# Patient Record
Sex: FEMALE | Race: WHITE | Employment: OTHER | URBAN - METROPOLITAN AREA
[De-identification: names, ages, dates, MRNs, and addresses within clinical notes are randomized per-mention and may not be internally consistent; named-entity substitution may affect disease eponyms.]

---

## 2017-01-16 ENCOUNTER — OFFICE VISIT (OUTPATIENT)
Dept: NEPHROLOGY | Age: 62
End: 2017-01-16

## 2017-01-16 VITALS
SYSTOLIC BLOOD PRESSURE: 118 MMHG | WEIGHT: 217.2 LBS | DIASTOLIC BLOOD PRESSURE: 64 MMHG | OXYGEN SATURATION: 98 % | BODY MASS INDEX: 42.42 KG/M2 | HEART RATE: 81 BPM

## 2017-01-16 DIAGNOSIS — N18.30 CKD (CHRONIC KIDNEY DISEASE) STAGE 3, GFR 30-59 ML/MIN (HCC): Primary | ICD-10-CM

## 2017-01-16 DIAGNOSIS — I10 ESSENTIAL HYPERTENSION: ICD-10-CM

## 2017-01-16 PROCEDURE — 99213 OFFICE O/P EST LOW 20 MIN: CPT | Performed by: INTERNAL MEDICINE

## 2017-01-24 LAB
AVERAGE GLUCOSE: 157 MG/DL (ref 66–114)
HBA1C MFR BLD: 7.1 % (ref 4.2–5.8)
T4 FREE: 1.33 NG/DL (ref 0.8–1.8)
TSH SERPL DL<=0.05 MIU/L-ACNC: 0.93 UIU/ML (ref 0.4–4.4)

## 2017-01-27 ENCOUNTER — OFFICE VISIT (OUTPATIENT)
Dept: ENDOCRINOLOGY | Age: 62
End: 2017-01-27

## 2017-01-27 VITALS
DIASTOLIC BLOOD PRESSURE: 62 MMHG | WEIGHT: 213 LBS | BODY MASS INDEX: 41.82 KG/M2 | HEART RATE: 84 BPM | HEIGHT: 60 IN | RESPIRATION RATE: 18 BRPM | SYSTOLIC BLOOD PRESSURE: 131 MMHG

## 2017-01-27 DIAGNOSIS — E11.65 TYPE 2 DIABETES MELLITUS WITH HYPERGLYCEMIA, WITH LONG-TERM CURRENT USE OF INSULIN (HCC): Primary | ICD-10-CM

## 2017-01-27 DIAGNOSIS — E66.01 MORBID OBESITY DUE TO EXCESS CALORIES (HCC): ICD-10-CM

## 2017-01-27 DIAGNOSIS — Z79.4 TYPE 2 DIABETES MELLITUS WITH HYPERGLYCEMIA, WITH LONG-TERM CURRENT USE OF INSULIN (HCC): Primary | ICD-10-CM

## 2017-01-27 DIAGNOSIS — E03.9 ACQUIRED HYPOTHYROIDISM: ICD-10-CM

## 2017-01-27 PROCEDURE — 99214 OFFICE O/P EST MOD 30 MIN: CPT | Performed by: NURSE PRACTITIONER

## 2017-01-27 ASSESSMENT — ENCOUNTER SYMPTOMS
CONSTIPATION: 0
VOMITING: 0
SHORTNESS OF BREATH: 0
BACK PAIN: 1
VOICE CHANGE: 1
CHEST TIGHTNESS: 0
ABDOMINAL PAIN: 0
NAUSEA: 0
PHOTOPHOBIA: 0
TROUBLE SWALLOWING: 0

## 2017-04-18 ENCOUNTER — OFFICE VISIT (OUTPATIENT)
Dept: OTHER | Age: 62
End: 2017-04-18

## 2017-04-18 VITALS
SYSTOLIC BLOOD PRESSURE: 127 MMHG | HEART RATE: 93 BPM | WEIGHT: 212 LBS | DIASTOLIC BLOOD PRESSURE: 60 MMHG | BODY MASS INDEX: 41.62 KG/M2

## 2017-04-18 DIAGNOSIS — E11.69 TYPE 2 DIABETES MELLITUS WITH OTHER SPECIFIED COMPLICATION, WITH LONG-TERM CURRENT USE OF INSULIN (HCC): Primary | ICD-10-CM

## 2017-04-18 DIAGNOSIS — Z79.4 TYPE 2 DIABETES MELLITUS WITH OTHER SPECIFIED COMPLICATION, WITH LONG-TERM CURRENT USE OF INSULIN (HCC): Primary | ICD-10-CM

## 2017-05-18 ENCOUNTER — OFFICE VISIT (OUTPATIENT)
Dept: PULMONOLOGY | Age: 62
End: 2017-05-18

## 2017-05-18 VITALS
HEART RATE: 89 BPM | HEIGHT: 59 IN | DIASTOLIC BLOOD PRESSURE: 70 MMHG | BODY MASS INDEX: 43.22 KG/M2 | OXYGEN SATURATION: 94 % | WEIGHT: 214.4 LBS | SYSTOLIC BLOOD PRESSURE: 130 MMHG

## 2017-05-18 DIAGNOSIS — G47.33 OSA ON CPAP: Primary | ICD-10-CM

## 2017-05-18 DIAGNOSIS — Z99.89 OSA ON CPAP: Primary | ICD-10-CM

## 2017-05-18 PROCEDURE — 99213 OFFICE O/P EST LOW 20 MIN: CPT | Performed by: PHYSICIAN ASSISTANT

## 2017-05-27 ENCOUNTER — NURSE TRIAGE (OUTPATIENT)
Dept: ADMINISTRATIVE | Age: 62
End: 2017-05-27

## 2017-06-05 ENCOUNTER — TELEPHONE (OUTPATIENT)
Dept: OTHER | Age: 62
End: 2017-06-05

## 2017-06-29 ENCOUNTER — OFFICE VISIT (OUTPATIENT)
Dept: ENDOCRINOLOGY | Age: 62
End: 2017-06-29

## 2017-06-29 VITALS
DIASTOLIC BLOOD PRESSURE: 58 MMHG | BODY MASS INDEX: 41.33 KG/M2 | HEIGHT: 60 IN | WEIGHT: 210.5 LBS | SYSTOLIC BLOOD PRESSURE: 134 MMHG | HEART RATE: 95 BPM | RESPIRATION RATE: 20 BRPM

## 2017-06-29 DIAGNOSIS — E78.2 MIXED HYPERLIPIDEMIA: ICD-10-CM

## 2017-06-29 DIAGNOSIS — Z79.4 TYPE 2 DIABETES MELLITUS WITH HYPERGLYCEMIA, WITH LONG-TERM CURRENT USE OF INSULIN (HCC): Primary | ICD-10-CM

## 2017-06-29 DIAGNOSIS — E11.65 TYPE 2 DIABETES MELLITUS WITH HYPERGLYCEMIA, WITH LONG-TERM CURRENT USE OF INSULIN (HCC): Primary | ICD-10-CM

## 2017-06-29 DIAGNOSIS — E03.9 HYPOTHYROIDISM (ACQUIRED): ICD-10-CM

## 2017-06-29 DIAGNOSIS — E66.01 MORBID OBESITY DUE TO EXCESS CALORIES (HCC): ICD-10-CM

## 2017-06-29 PROCEDURE — 99214 OFFICE O/P EST MOD 30 MIN: CPT | Performed by: NURSE PRACTITIONER

## 2017-06-29 ASSESSMENT — ENCOUNTER SYMPTOMS
SHORTNESS OF BREATH: 0
CONSTIPATION: 0
ABDOMINAL PAIN: 0
VOMITING: 0
PHOTOPHOBIA: 0
VOICE CHANGE: 0
CHEST TIGHTNESS: 0
NAUSEA: 0
TROUBLE SWALLOWING: 0

## 2017-07-11 LAB
ANION GAP SERPL CALCULATED.3IONS-SCNC: 14 MMOL/L
BUN BLDV-MCNC: 16 MG/DL (ref 10–20)
CALCIUM SERPL-MCNC: 9.7 MG/DL (ref 8.7–10.8)
CHLORIDE BLD-SCNC: 101 MMOL/L (ref 95–111)
CO2: 28 MMOL/L (ref 21–32)
CREAT SERPL-MCNC: 1.2 MG/DL (ref 0.5–1.3)
CREATINE, URINE: 127.9 MG/DL
EGFR AFRICAN AMERICAN: 56
EGFR IF NONAFRICAN AMERICAN: 46
GLUCOSE: 149 MG/DL (ref 70–100)
POTASSIUM SERPL-SCNC: 4.4 MMOL/L (ref 3.5–5.4)
PROTEIN, URINE: 8 MG/DL
SODIUM BLD-SCNC: 139 MMOL/L (ref 134–147)
T4 FREE: 1.19 NG/DL (ref 0.8–1.8)
TSH SERPL DL<=0.05 MIU/L-ACNC: 0.36 UIU/ML (ref 0.4–4.4)

## 2017-07-17 ENCOUNTER — OFFICE VISIT (OUTPATIENT)
Dept: NEPHROLOGY | Age: 62
End: 2017-07-17
Payer: MEDICARE

## 2017-07-17 VITALS
BODY MASS INDEX: 41.03 KG/M2 | WEIGHT: 209 LBS | SYSTOLIC BLOOD PRESSURE: 110 MMHG | DIASTOLIC BLOOD PRESSURE: 76 MMHG | HEIGHT: 60 IN | HEART RATE: 72 BPM

## 2017-07-17 DIAGNOSIS — N18.30 CKD (CHRONIC KIDNEY DISEASE) STAGE 3, GFR 30-59 ML/MIN (HCC): Primary | ICD-10-CM

## 2017-07-17 DIAGNOSIS — Z79.4 TYPE 2 DIABETES MELLITUS WITH OTHER SPECIFIED COMPLICATION, WITH LONG-TERM CURRENT USE OF INSULIN (HCC): ICD-10-CM

## 2017-07-17 DIAGNOSIS — E03.9 HYPOTHYROIDISM (ACQUIRED): ICD-10-CM

## 2017-07-17 DIAGNOSIS — I10 ESSENTIAL HYPERTENSION: ICD-10-CM

## 2017-07-17 DIAGNOSIS — E11.69 TYPE 2 DIABETES MELLITUS WITH OTHER SPECIFIED COMPLICATION, WITH LONG-TERM CURRENT USE OF INSULIN (HCC): ICD-10-CM

## 2017-07-17 PROCEDURE — 99213 OFFICE O/P EST LOW 20 MIN: CPT | Performed by: INTERNAL MEDICINE

## 2017-07-21 ENCOUNTER — APPOINTMENT (OUTPATIENT)
Dept: CT IMAGING | Age: 62
End: 2017-07-21
Payer: MEDICARE

## 2017-07-21 ENCOUNTER — APPOINTMENT (OUTPATIENT)
Dept: NON INVASIVE DIAGNOSTICS | Age: 62
End: 2017-07-21
Payer: MEDICARE

## 2017-07-21 ENCOUNTER — HOSPITAL ENCOUNTER (OUTPATIENT)
Age: 62
Setting detail: OBSERVATION
Discharge: HOME | End: 2017-07-21
Attending: FAMILY MEDICINE | Admitting: INTERNAL MEDICINE
Payer: MEDICARE

## 2017-07-21 VITALS
RESPIRATION RATE: 16 BRPM | BODY MASS INDEX: 39.58 KG/M2 | HEART RATE: 111 BPM | TEMPERATURE: 98.1 F | HEIGHT: 60 IN | SYSTOLIC BLOOD PRESSURE: 148 MMHG | DIASTOLIC BLOOD PRESSURE: 67 MMHG | WEIGHT: 201.6 LBS | OXYGEN SATURATION: 96 %

## 2017-07-21 DIAGNOSIS — Z79.4 TYPE 2 DIABETES MELLITUS WITH HYPOGLYCEMIA WITHOUT COMA, WITH LONG-TERM CURRENT USE OF INSULIN (HCC): ICD-10-CM

## 2017-07-21 DIAGNOSIS — R06.00 DYSPNEA AND RESPIRATORY ABNORMALITIES: Primary | ICD-10-CM

## 2017-07-21 DIAGNOSIS — E11.649 TYPE 2 DIABETES MELLITUS WITH HYPOGLYCEMIA WITHOUT COMA, WITH LONG-TERM CURRENT USE OF INSULIN (HCC): ICD-10-CM

## 2017-07-21 DIAGNOSIS — R06.89 DYSPNEA AND RESPIRATORY ABNORMALITIES: Primary | ICD-10-CM

## 2017-07-21 DIAGNOSIS — R07.9 CHEST PAIN, UNSPECIFIED TYPE: ICD-10-CM

## 2017-07-21 PROBLEM — E03.9 HYPOTHYROIDISM: Chronic | Status: ACTIVE | Noted: 2017-07-21

## 2017-07-21 PROBLEM — I10 ESSENTIAL HYPERTENSION: Status: ACTIVE | Noted: 2017-07-21

## 2017-07-21 PROBLEM — I50.32 CHRONIC DIASTOLIC CHF (CONGESTIVE HEART FAILURE) (HCC): Chronic | Status: ACTIVE | Noted: 2017-07-21

## 2017-07-21 PROBLEM — R65.10 SIRS (SYSTEMIC INFLAMMATORY RESPONSE SYNDROME) (HCC): Status: ACTIVE | Noted: 2017-07-21

## 2017-07-21 PROBLEM — E83.42 HYPOMAGNESEMIA: Status: ACTIVE | Noted: 2017-07-21

## 2017-07-21 PROBLEM — E66.9 OBESITY (BMI 30-39.9): Chronic | Status: ACTIVE | Noted: 2017-07-21

## 2017-07-21 LAB
ALLEN TEST: POSITIVE
ANION GAP SERPL CALCULATED.3IONS-SCNC: 15 MEQ/L (ref 8–16)
ANISOCYTOSIS: ABNORMAL
AVERAGE GLUCOSE: 168 MG/DL (ref 70–126)
BASE EXCESS (CALCULATED): -2.7 MMOL/L (ref -2.5–2.5)
BASOPHILS # BLD: 0.2 %
BASOPHILS ABSOLUTE: 0 THOU/MM3 (ref 0–0.1)
BUN BLDV-MCNC: 17 MG/DL (ref 7–22)
CALCIUM SERPL-MCNC: 9 MG/DL (ref 8.5–10.5)
CHLORIDE BLD-SCNC: 101 MEQ/L (ref 98–111)
CO2: 21 MEQ/L (ref 23–33)
COLLECTED BY:: ABNORMAL
CREAT SERPL-MCNC: 1.1 MG/DL (ref 0.4–1.2)
DEVICE: ABNORMAL
EKG ATRIAL RATE: 104 BPM
EKG P AXIS: 44 DEGREES
EKG P-R INTERVAL: 164 MS
EKG Q-T INTERVAL: 332 MS
EKG QRS DURATION: 76 MS
EKG QTC CALCULATION (BAZETT): 436 MS
EKG R AXIS: 31 DEGREES
EKG T AXIS: 62 DEGREES
EKG VENTRICULAR RATE: 104 BPM
EOSINOPHIL # BLD: 2.3 %
EOSINOPHILS ABSOLUTE: 0.3 THOU/MM3 (ref 0–0.4)
GFR SERPL CREATININE-BSD FRML MDRD: 51 ML/MIN/1.73M2
GLUCOSE BLD-MCNC: 159 MG/DL (ref 70–108)
GLUCOSE BLD-MCNC: 170 MG/DL (ref 70–108)
GLUCOSE BLD-MCNC: 221 MG/DL (ref 70–108)
HBA1C MFR BLD: 7.6 % (ref 4.4–6.4)
HCO3: 23 MMOL/L (ref 23–28)
HCT VFR BLD CALC: 35.8 % (ref 37–47)
HEMOGLOBIN: 11.6 GM/DL (ref 12–16)
HYPOCHROMIA: ABNORMAL
IRON: 28 UG/DL (ref 50–170)
LV EF: 62 %
LVEF MODALITY: NORMAL
LYMPHOCYTES # BLD: 21.6 %
LYMPHOCYTES ABSOLUTE: 3.1 THOU/MM3 (ref 1–4.8)
MAGNESIUM: 1.5 MG/DL (ref 1.6–2.4)
MCH RBC QN AUTO: 24.5 PG (ref 27–31)
MCHC RBC AUTO-ENTMCNC: 32.2 GM/DL (ref 33–37)
MCV RBC AUTO: 76.1 FL (ref 81–99)
MICROCYTES: ABNORMAL
MONOCYTES # BLD: 7.5 %
MONOCYTES ABSOLUTE: 1.1 THOU/MM3 (ref 0.4–1.3)
NUCLEATED RED BLOOD CELLS: 0 /100 WBC
O2 SATURATION: 93 %
OSMOLALITY CALCULATION: 279.3 MOSMOL/KG (ref 275–300)
PCO2: 41 MMHG (ref 35–45)
PDW BLD-RTO: 18 % (ref 11.5–14.5)
PH BLOOD GAS: 7.35 (ref 7.35–7.45)
PLATELET # BLD: 375 THOU/MM3 (ref 130–400)
PMV BLD AUTO: 9.9 MCM (ref 7.4–10.4)
PO2: 70 MMHG (ref 71–104)
POTASSIUM SERPL-SCNC: 4.5 MEQ/L (ref 3.5–5.2)
PRO-BNP: 28.1 PG/ML (ref 0–900)
PROCALCITONIN: 0.04 NG/ML (ref 0.01–0.09)
RBC # BLD: 4.71 MILL/MM3 (ref 4.2–5.4)
RBC # BLD: NORMAL 10*6/UL
SEG NEUTROPHILS: 68.4 %
SEGMENTED NEUTROPHILS ABSOLUTE COUNT: 9.9 THOU/MM3 (ref 1.8–7.7)
SODIUM BLD-SCNC: 137 MEQ/L (ref 135–145)
SOURCE, BLOOD GAS: ABNORMAL
TROPONIN T: < 0.01 NG/ML
WBC # BLD: 14.5 THOU/MM3 (ref 4.8–10.8)

## 2017-07-21 PROCEDURE — 6370000000 HC RX 637 (ALT 250 FOR IP): Performed by: INTERNAL MEDICINE

## 2017-07-21 PROCEDURE — 96375 TX/PRO/DX INJ NEW DRUG ADDON: CPT

## 2017-07-21 PROCEDURE — 84145 PROCALCITONIN (PCT): CPT

## 2017-07-21 PROCEDURE — 3430000000 HC RX DIAGNOSTIC RADIOPHARMACEUTICAL: Performed by: INTERNAL MEDICINE

## 2017-07-21 PROCEDURE — 99220 PR INITIAL OBSERVATION CARE/DAY 70 MINUTES: CPT | Performed by: INTERNAL MEDICINE

## 2017-07-21 PROCEDURE — 71275 CT ANGIOGRAPHY CHEST: CPT

## 2017-07-21 PROCEDURE — 83880 ASSAY OF NATRIURETIC PEPTIDE: CPT

## 2017-07-21 PROCEDURE — 6360000002 HC RX W HCPCS: Performed by: FAMILY MEDICINE

## 2017-07-21 PROCEDURE — 96361 HYDRATE IV INFUSION ADD-ON: CPT

## 2017-07-21 PROCEDURE — 36415 COLL VENOUS BLD VENIPUNCTURE: CPT

## 2017-07-21 PROCEDURE — 84484 ASSAY OF TROPONIN QUANT: CPT

## 2017-07-21 PROCEDURE — G0378 HOSPITAL OBSERVATION PER HR: HCPCS

## 2017-07-21 PROCEDURE — 82948 REAGENT STRIP/BLOOD GLUCOSE: CPT

## 2017-07-21 PROCEDURE — 6360000004 HC RX CONTRAST MEDICATION: Performed by: FAMILY MEDICINE

## 2017-07-21 PROCEDURE — 83036 HEMOGLOBIN GLYCOSYLATED A1C: CPT

## 2017-07-21 PROCEDURE — 78452 HT MUSCLE IMAGE SPECT MULT: CPT

## 2017-07-21 PROCEDURE — 83540 ASSAY OF IRON: CPT

## 2017-07-21 PROCEDURE — 99285 EMERGENCY DEPT VISIT HI MDM: CPT

## 2017-07-21 PROCEDURE — A9500 TC99M SESTAMIBI: HCPCS | Performed by: INTERNAL MEDICINE

## 2017-07-21 PROCEDURE — 82803 BLOOD GASES ANY COMBINATION: CPT

## 2017-07-21 PROCEDURE — 85025 COMPLETE CBC W/AUTO DIFF WBC: CPT

## 2017-07-21 PROCEDURE — 36600 WITHDRAWAL OF ARTERIAL BLOOD: CPT

## 2017-07-21 PROCEDURE — 93005 ELECTROCARDIOGRAM TRACING: CPT

## 2017-07-21 PROCEDURE — 96365 THER/PROPH/DIAG IV INF INIT: CPT

## 2017-07-21 PROCEDURE — 6360000002 HC RX W HCPCS

## 2017-07-21 PROCEDURE — 6360000002 HC RX W HCPCS: Performed by: PHYSICIAN ASSISTANT

## 2017-07-21 PROCEDURE — 96366 THER/PROPH/DIAG IV INF ADDON: CPT

## 2017-07-21 PROCEDURE — 93017 CV STRESS TEST TRACING ONLY: CPT | Performed by: INTERNAL MEDICINE

## 2017-07-21 PROCEDURE — 83735 ASSAY OF MAGNESIUM: CPT

## 2017-07-21 PROCEDURE — 2580000003 HC RX 258: Performed by: PHYSICIAN ASSISTANT

## 2017-07-21 PROCEDURE — 93306 TTE W/DOPPLER COMPLETE: CPT

## 2017-07-21 PROCEDURE — 2580000003 HC RX 258: Performed by: FAMILY MEDICINE

## 2017-07-21 PROCEDURE — 80048 BASIC METABOLIC PNL TOTAL CA: CPT

## 2017-07-21 PROCEDURE — 2580000003 HC RX 258: Performed by: INTERNAL MEDICINE

## 2017-07-21 PROCEDURE — 96374 THER/PROPH/DIAG INJ IV PUSH: CPT

## 2017-07-21 RX ORDER — SODIUM CHLORIDE 0.9 % (FLUSH) 0.9 %
10 SYRINGE (ML) INJECTION EVERY 12 HOURS SCHEDULED
Status: DISCONTINUED | OUTPATIENT
Start: 2017-07-21 | End: 2017-07-21 | Stop reason: HOSPADM

## 2017-07-21 RX ORDER — LORAZEPAM 2 MG/ML
1 INJECTION INTRAMUSCULAR ONCE
Status: COMPLETED | OUTPATIENT
Start: 2017-07-21 | End: 2017-07-21

## 2017-07-21 RX ORDER — ATORVASTATIN CALCIUM 40 MG/1
40 TABLET, FILM COATED ORAL DAILY
Status: DISCONTINUED | OUTPATIENT
Start: 2017-07-21 | End: 2017-07-21 | Stop reason: HOSPADM

## 2017-07-21 RX ORDER — FERROUS SULFATE 325(65) MG
325 TABLET ORAL
Status: DISCONTINUED | OUTPATIENT
Start: 2017-07-21 | End: 2017-07-21 | Stop reason: HOSPADM

## 2017-07-21 RX ORDER — DEXTROSE MONOHYDRATE 25 G/50ML
12.5 INJECTION, SOLUTION INTRAVENOUS PRN
Status: DISCONTINUED | OUTPATIENT
Start: 2017-07-21 | End: 2017-07-21 | Stop reason: HOSPADM

## 2017-07-21 RX ORDER — SODIUM CHLORIDE 0.9 % (FLUSH) 0.9 %
10 SYRINGE (ML) INJECTION PRN
Status: DISCONTINUED | OUTPATIENT
Start: 2017-07-21 | End: 2017-07-21 | Stop reason: HOSPADM

## 2017-07-21 RX ORDER — ASPIRIN 81 MG/1
81 TABLET ORAL DAILY
Status: DISCONTINUED | OUTPATIENT
Start: 2017-07-21 | End: 2017-07-21 | Stop reason: HOSPADM

## 2017-07-21 RX ORDER — LEVOTHYROXINE SODIUM 0.15 MG/1
150 TABLET ORAL DAILY
Status: DISCONTINUED | OUTPATIENT
Start: 2017-07-21 | End: 2017-07-21 | Stop reason: HOSPADM

## 2017-07-21 RX ORDER — GABAPENTIN 400 MG/1
400 CAPSULE ORAL 3 TIMES DAILY
Status: DISCONTINUED | OUTPATIENT
Start: 2017-07-21 | End: 2017-07-21 | Stop reason: HOSPADM

## 2017-07-21 RX ORDER — INSULIN GLARGINE 100 [IU]/ML
28 INJECTION, SOLUTION SUBCUTANEOUS EVERY MORNING
Status: DISCONTINUED | OUTPATIENT
Start: 2017-07-21 | End: 2017-07-21 | Stop reason: HOSPADM

## 2017-07-21 RX ORDER — LAMOTRIGINE 100 MG/1
200 TABLET ORAL DAILY
Status: DISCONTINUED | OUTPATIENT
Start: 2017-07-21 | End: 2017-07-21 | Stop reason: HOSPADM

## 2017-07-21 RX ORDER — HYDROCHLOROTHIAZIDE 25 MG/1
25 TABLET ORAL DAILY
Status: DISCONTINUED | OUTPATIENT
Start: 2017-07-21 | End: 2017-07-21 | Stop reason: HOSPADM

## 2017-07-21 RX ORDER — PANTOPRAZOLE SODIUM 40 MG/1
40 TABLET, DELAYED RELEASE ORAL
Status: DISCONTINUED | OUTPATIENT
Start: 2017-07-21 | End: 2017-07-21 | Stop reason: HOSPADM

## 2017-07-21 RX ORDER — LISINOPRIL 5 MG/1
5 TABLET ORAL DAILY
Status: DISCONTINUED | OUTPATIENT
Start: 2017-07-21 | End: 2017-07-21 | Stop reason: HOSPADM

## 2017-07-21 RX ORDER — BUSPIRONE HYDROCHLORIDE 10 MG/1
30 TABLET ORAL 2 TIMES DAILY
Status: DISCONTINUED | OUTPATIENT
Start: 2017-07-21 | End: 2017-07-21 | Stop reason: HOSPADM

## 2017-07-21 RX ORDER — ACETAMINOPHEN 325 MG/1
650 TABLET ORAL EVERY 4 HOURS PRN
Status: DISCONTINUED | OUTPATIENT
Start: 2017-07-21 | End: 2017-07-21 | Stop reason: HOSPADM

## 2017-07-21 RX ORDER — ONDANSETRON 2 MG/ML
INJECTION INTRAMUSCULAR; INTRAVENOUS
Status: DISPENSED
Start: 2017-07-21 | End: 2017-07-21

## 2017-07-21 RX ORDER — INSULIN GLARGINE 100 [IU]/ML
30 INJECTION, SOLUTION SUBCUTANEOUS NIGHTLY
Status: DISCONTINUED | OUTPATIENT
Start: 2017-07-21 | End: 2017-07-21 | Stop reason: HOSPADM

## 2017-07-21 RX ORDER — FENOFIBRATE 160 MG/1
160 TABLET ORAL DAILY
Status: DISCONTINUED | OUTPATIENT
Start: 2017-07-21 | End: 2017-07-21 | Stop reason: HOSPADM

## 2017-07-21 RX ORDER — NICOTINE POLACRILEX 4 MG
15 LOZENGE BUCCAL PRN
Status: DISCONTINUED | OUTPATIENT
Start: 2017-07-21 | End: 2017-07-21 | Stop reason: HOSPADM

## 2017-07-21 RX ORDER — 0.9 % SODIUM CHLORIDE 0.9 %
500 INTRAVENOUS SOLUTION INTRAVENOUS ONCE
Status: COMPLETED | OUTPATIENT
Start: 2017-07-21 | End: 2017-07-21

## 2017-07-21 RX ORDER — ONDANSETRON 2 MG/ML
4 INJECTION INTRAMUSCULAR; INTRAVENOUS EVERY 6 HOURS PRN
Status: DISCONTINUED | OUTPATIENT
Start: 2017-07-21 | End: 2017-07-21 | Stop reason: HOSPADM

## 2017-07-21 RX ORDER — DOXEPIN HYDROCHLORIDE 50 MG/1
200 CAPSULE ORAL NIGHTLY
Status: DISCONTINUED | OUTPATIENT
Start: 2017-07-21 | End: 2017-07-21 | Stop reason: HOSPADM

## 2017-07-21 RX ORDER — DEXTROSE MONOHYDRATE 50 MG/ML
100 INJECTION, SOLUTION INTRAVENOUS PRN
Status: DISCONTINUED | OUTPATIENT
Start: 2017-07-21 | End: 2017-07-21 | Stop reason: HOSPADM

## 2017-07-21 RX ORDER — METOPROLOL SUCCINATE 25 MG/1
25 TABLET, EXTENDED RELEASE ORAL NIGHTLY
Status: DISCONTINUED | OUTPATIENT
Start: 2017-07-21 | End: 2017-07-21 | Stop reason: HOSPADM

## 2017-07-21 RX ORDER — VILAZODONE HYDROCHLORIDE 40 MG/1
40 TABLET ORAL DAILY
Status: DISCONTINUED | OUTPATIENT
Start: 2017-07-21 | End: 2017-07-21 | Stop reason: HOSPADM

## 2017-07-21 RX ADMIN — MAGNESIUM GLUCONATE 500 MG ORAL TABLET 400 MG: 500 TABLET ORAL at 12:03

## 2017-07-21 RX ADMIN — SODIUM CHLORIDE 500 ML: 9 INJECTION, SOLUTION INTRAVENOUS at 00:55

## 2017-07-21 RX ADMIN — MAGNESIUM SULFATE HEPTAHYDRATE 2 G: 500 INJECTION, SOLUTION INTRAMUSCULAR; INTRAVENOUS at 12:09

## 2017-07-21 RX ADMIN — INSULIN GLARGINE 28 UNITS: 100 INJECTION, SOLUTION SUBCUTANEOUS at 12:09

## 2017-07-21 RX ADMIN — LAMOTRIGINE 200 MG: 100 TABLET ORAL at 12:03

## 2017-07-21 RX ADMIN — LISINOPRIL 5 MG: 5 TABLET ORAL at 12:03

## 2017-07-21 RX ADMIN — VILAZODONE HYDROCHLORIDE 40 MG: 40 TABLET ORAL at 12:03

## 2017-07-21 RX ADMIN — Medication 31.4 MILLICURIE: at 10:32

## 2017-07-21 RX ADMIN — Medication 325 MG: at 12:04

## 2017-07-21 RX ADMIN — ATORVASTATIN CALCIUM 40 MG: 40 TABLET, FILM COATED ORAL at 13:51

## 2017-07-21 RX ADMIN — FENOFIBRATE 160 MG: 160 TABLET ORAL at 12:04

## 2017-07-21 RX ADMIN — LORAZEPAM 1 MG: 2 INJECTION INTRAMUSCULAR; INTRAVENOUS at 03:29

## 2017-07-21 RX ADMIN — Medication 9.2 MILLICURIE: at 09:35

## 2017-07-21 RX ADMIN — HYDROCHLOROTHIAZIDE 25 MG: 25 TABLET ORAL at 12:03

## 2017-07-21 RX ADMIN — ASPIRIN 81 MG: 81 TABLET, COATED ORAL at 12:04

## 2017-07-21 RX ADMIN — LEVOTHYROXINE SODIUM 150 MCG: 150 TABLET ORAL at 12:03

## 2017-07-21 RX ADMIN — GABAPENTIN 400 MG: 400 CAPSULE ORAL at 12:04

## 2017-07-21 RX ADMIN — Medication 10 ML: at 12:09

## 2017-07-21 RX ADMIN — PANTOPRAZOLE SODIUM 40 MG: 40 TABLET, DELAYED RELEASE ORAL at 12:03

## 2017-07-21 RX ADMIN — IOPAMIDOL 80 ML: 755 INJECTION, SOLUTION INTRAVENOUS at 02:26

## 2017-07-21 RX ADMIN — ONDANSETRON 4 MG: 2 INJECTION INTRAMUSCULAR; INTRAVENOUS at 02:02

## 2017-07-21 RX ADMIN — BUSPIRONE HYDROCHLORIDE 30 MG: 10 TABLET ORAL at 12:04

## 2017-07-21 RX ADMIN — LINAGLIPTIN 5 MG: 5 TABLET, FILM COATED ORAL at 13:51

## 2017-07-21 ASSESSMENT — PAIN DESCRIPTION - PAIN TYPE
TYPE: ACUTE PAIN

## 2017-07-21 ASSESSMENT — ENCOUNTER SYMPTOMS
NAUSEA: 1
SHORTNESS OF BREATH: 1
EYE PAIN: 0
DIARRHEA: 1
ABDOMINAL PAIN: 0
EYE DISCHARGE: 0
VOMITING: 0
SORE THROAT: 0
RHINORRHEA: 0
BACK PAIN: 0
COUGH: 0

## 2017-07-21 ASSESSMENT — PAIN DESCRIPTION - LOCATION
LOCATION: CHEST

## 2017-07-21 ASSESSMENT — PAIN SCALES - GENERAL
PAINLEVEL_OUTOF10: 0
PAINLEVEL_OUTOF10: 8
PAINLEVEL_OUTOF10: 5
PAINLEVEL_OUTOF10: 5
PAINLEVEL_OUTOF10: 6
PAINLEVEL_OUTOF10: 0
PAINLEVEL_OUTOF10: 7

## 2017-08-22 ENCOUNTER — OFFICE VISIT (OUTPATIENT)
Dept: CARDIOLOGY CLINIC | Age: 62
End: 2017-08-22
Payer: MEDICARE

## 2017-08-22 VITALS
SYSTOLIC BLOOD PRESSURE: 128 MMHG | HEART RATE: 84 BPM | DIASTOLIC BLOOD PRESSURE: 58 MMHG | WEIGHT: 204 LBS | BODY MASS INDEX: 40.05 KG/M2 | HEIGHT: 60 IN

## 2017-08-22 DIAGNOSIS — I50.32 CHRONIC DIASTOLIC CHF (CONGESTIVE HEART FAILURE) (HCC): Chronic | ICD-10-CM

## 2017-08-22 DIAGNOSIS — I10 ESSENTIAL HYPERTENSION: Primary | ICD-10-CM

## 2017-08-22 PROCEDURE — 99213 OFFICE O/P EST LOW 20 MIN: CPT | Performed by: INTERNAL MEDICINE

## 2017-08-22 RX ORDER — ASCORBIC ACID 500 MG
1000 TABLET ORAL DAILY
COMMUNITY

## 2017-08-24 ENCOUNTER — HOSPITAL ENCOUNTER (OUTPATIENT)
Dept: PHARMACY | Age: 62
Setting detail: THERAPIES SERIES
Discharge: HOME | End: 2017-08-24
Payer: MEDICARE

## 2017-08-24 VITALS
HEIGHT: 60 IN | SYSTOLIC BLOOD PRESSURE: 118 MMHG | BODY MASS INDEX: 40.44 KG/M2 | WEIGHT: 206 LBS | DIASTOLIC BLOOD PRESSURE: 68 MMHG

## 2017-08-24 PROCEDURE — G0108 DIAB MANAGE TRN  PER INDIV: HCPCS | Performed by: REGISTERED NURSE

## 2017-10-03 ENCOUNTER — HOSPITAL ENCOUNTER (OUTPATIENT)
Dept: WOMENS IMAGING | Age: 62
Discharge: HOME | End: 2017-10-03
Payer: MEDICARE

## 2017-10-03 DIAGNOSIS — Z13.9 VISIT FOR SCREENING: ICD-10-CM

## 2017-10-03 PROCEDURE — G0202 SCR MAMMO BI INCL CAD: HCPCS

## 2017-10-05 ENCOUNTER — HOSPITAL ENCOUNTER (OUTPATIENT)
Dept: PHARMACY | Age: 62
Setting detail: THERAPIES SERIES
Discharge: HOME | End: 2017-10-05
Payer: MEDICARE

## 2017-10-05 VITALS — HEIGHT: 60 IN | BODY MASS INDEX: 38.68 KG/M2 | WEIGHT: 197 LBS

## 2017-10-05 PROCEDURE — 97803 MED NUTRITION INDIV SUBSEQ: CPT | Performed by: DIETITIAN, REGISTERED

## 2017-10-05 RX ORDER — BENZTROPINE MESYLATE 0.5 MG/1
0.5 TABLET ORAL 2 TIMES DAILY
Status: ON HOLD | COMMUNITY
End: 2019-06-27 | Stop reason: ALTCHOICE

## 2017-10-05 RX ORDER — ARIPIPRAZOLE 2 MG/1
10 TABLET ORAL DAILY
COMMUNITY
End: 2018-03-13 | Stop reason: DRUGHIGH

## 2017-10-05 NOTE — IP AVS SNAPSHOT
ID and insurance card. Information from Your Visit        Department     Name Address Phone Fax    [facility] [address].  [place] [address]      Vital Signs     Height Weight Body Mass Index Smoking Status          5' (1.524 m) 197 lb (89.4 kg) 38.47 kg/m2 Never Smoker        Additional Information about your Body Mass Index (BMI)           Your BMI as listed above is considered obese (30 or more). BMI is an estimate of body fat, calculated from your height and weight. The higher your BMI, the greater your risk of heart disease, high blood pressure, type 2 diabetes, stroke, gallstones, arthritis, sleep apnea, and certain cancers. BMI is not perfect. It may overestimate body fat in athletes and people who are more muscular. Even a small weight loss (between 5 and 10 percent of your current weight) by decreasing your calorie intake and becoming more physically active will help lower your risk of developing or worsening diseases associated with obesity.      Learn more at: [email or web address]             Medications and Orders      Your Current Medications Are              ARIPiprazole (ABILIFY) 2 MG tablet Take 2 mg by mouth daily    benztropine (COGENTIN) 0.5 MG tablet Take 0.5 mg by mouth daily    Ascorbic Acid (VITAMIN C) 500 MG tablet Take 1,000 mg by mouth daily    Cyanocobalamin (VITAMIN B 12 PO) Take by mouth    Coenzyme Q10 (CO Q 10 PO) Take by mouth    insulin glargine (LANTUS SOLOSTAR) 100 UNIT/ML injection pen 28 in the am, 30 in the pm    SITagliptin (JANUVIA) 100 MG tablet Take 0.5 tablets by mouth daily    insulin lispro (HUMALOG KWIKPEN) 100 UNIT/ML pen 20 units at breakfast, 18 units lunch and 25 units at dinner plus group 2 sliding scale (max 100 units/day)    busPIRone (BUSPAR) 30 MG tablet Take 30 mg by mouth 2 times daily

## 2017-10-05 NOTE — PROGRESS NOTES
[address] [facility] at [address] Brigette Banda, [address]  [phone] (phone)  [phone] (fax)    Patient Name: Gemma Harrison. Date of Birth: [id number]. MRN: [id number]      Assessment: Patient is a 63 y.o. female seen for follow-up MNT visit for Type II DB.     -Nutritionally relevant labs:   Lab Results   Component Value Date/Time    LABA1C 7.6 (H) 07/21/2017 05:56 AM    LABA1C 7.0 (H) 05/18/2017 12:26 PM    LABA1C 7.1 (H) 01/23/2017 12:39 PM    GLUCOSE 170 (H) 07/21/2017 12:51 AM    GLUCOSE 149 (H) 07/10/2017 11:59 AM    GLUCOSE 113 (H) 05/27/2017 11:19 PM    GLUCOSE 253 (H) 07/14/2016 11:47 AM    CHOL 149 05/18/2017 12:26 PM    HDL 26 05/18/2017 12:26 PM    LDLCALC 95 05/18/2017 12:26 PM    TRIG 138 05/18/2017 12:26 PM     -Blood sugar trends: Checks BS 3x/day most of the time. Jamie Track meter downloaded reviewed and scanned. FBS's:  [address]  Before Lunch: [address]  Before Dinner:  [address]  Bedtime BS's:   [address]    Pt with hx of acid reflux and lactose intolerance (diarrhea) avoids milk and ice cream. Ok with milk in cooking. Ok with cheese. Dislikes cott. Cheese.    -Food recall/logs:   Breakfast: Rice Krispies or Honey Nut Cheerios with fat free lactose free milk OR chocolate milk and banana (pt states this day was a busy day with appointments) OR maple brown sugar oatmeal (and weight watchers type item). Lunch: BBQ beef (from frozen) boxed macaroni and cheese, Taffy apple OR Manan crackers, milk OR bologna and cheese sandwich. Dinner: Spaghetti squash with meat sauce OR steak, scalloped potatoes, diana pasta. Snacks: peppermint patties OR ketan milk    Pt states she rarely eats chips anymore or eats sweets. Out to eat ~ 1-2 x month - community day at a [employer] OR [employer] OR [employer]. Once a month goes to the grocery store, such as [employer] or [employer] for produce.   Pt also goes to [employer] to get bulk cheese for her family. Food pantry 2x/month gets free produce. Pt states she currently has apples pears nectarines julia tomato carrots sterling peppers in her house. She aims for ~ 45 gms carbs/meal.  Pt states her family goes through a lot of cheese - 8-0# sliced cheese (american & swiss = 139 slices gone in a month). She also buys [employer] and occ shredded cheese - [employer] cheaper and get in bulk and divide into packages. Pt denies eating cheese but only seldom. Household includes  (does not work outside the home) and son and his family - son works outside the home. Has to do back exercises twice a week. Muscles are tight around her spine and she has a slipped vertibrea   Apples pears nectarines julia tomato carrots sterling peppers. Around 45 gms carbs/meal.    -Main Beverages: Diet dr reddy (3 cans), occ drinks water - stating when she drinks water she jugs this.    -Impression of Dietary Intake: on average, 3 meals per day, high fat/ cholesterol, high salt, lacking fresh fruits and veggies in her diet. .    -  Current Outpatient Prescriptions on File Prior to Encounter   Medication Sig Dispense Refill    Ascorbic Acid (VITAMIN C) 500 MG tablet Take 1,000 mg by mouth daily      Cyanocobalamin (VITAMIN B 12 PO) Take by mouth      Coenzyme Q10 (CO Q 10 PO) Take by mouth      insulin glargine (LANTUS SOLOSTAR) 100 UNIT/ML injection pen 28 in the am, 30 in the pm 5 Pen 0    SITagliptin (JANUVIA) 100 MG tablet Take 0.5 tablets by mouth daily 30 tablet 2    insulin lispro (HUMALOG KWIKPEN) 100 UNIT/ML pen 20 units at breakfast, 18 units lunch and 25 units at dinner plus group 2 sliding scale (max 100 units/day) 10 Pen 5    busPIRone (BUSPAR) 30 MG tablet Take 30 mg by mouth 2 times daily      lisinopril (PRINIVIL;ZESTRIL) 5 MG tablet Take 2.5 mg by mouth daily       pantoprazole (PROTONIX) 40 MG tablet Take 1 tablet by mouth daily 30 tablet 12    vilazodone HCl (VIIBRYD) 40 MG TABS Take 40 mg by mouth daily and calories.    -Instructed the patient on: Meal Planning for Regular, Balanced Meals & Snacks, Plate Method and The Importance of Regular Physical Activity. followng the plate method to improve her nutritional status and to help with weight loss efforts naturally, increasing her fresh fruit and vegetable intake, healthy snacks, her personal meal plan, limiting the high fat food choices (the black dot foods in the carb count booklet) and cutting in half added fats.    -Handouts given for: carbohydrate counting booklet and healthy snacks pictures. Patient Instructions/Goals:  1.)  Include a cooked veggie &/or salad with lunch and supper. 2.)  Include a fresh fruit with meal times or snacks (count into your carb allowance)  3.)  Good idea to use your roaster with water. 4.)  Follow the picture of the plate to keep your portions in check. 5.)  In place of flaherty - use nonfat plain greek yogurt to make chicken salad. 6.)  Have at least 3 food group choices at each meal:  Lean protein, nonfat milk or yogurt, fresh fruit, veggies, whole grains  7.)  Keep active each day - Ex:  10 minute walk in the morning and 10 minutes in the evening or late afternoon. Bring food logging - Thanks! ! And good job with checking your blood sugars. -General Diet Recommendations: low fat, balanced meal and snack planning.  -Nutrition prescription: 1400 calories/day, 150 g carbs/day. (45 gms/meal and 15-20 at snack time). Comprehension verified using teachback method. Monitoring/Evaluation:   -Followup visit: 2 mo with dietitian.   -Receptiveness to education/goals: Agreeable.  -Evaluation of education: Indicates understanding.  -Readiness to change: precontemplative- increasing daily physical activity and contemplation - ambivalent about change balanced meal planning with fresh fruit and veggies, cutting back on added fats. -Expected compliance: fair. Thank you for your referral of this patient.      Total time involved in direct patient education: 60 minutes for follow-up MNT visit.

## 2017-10-05 NOTE — LETTER
[facility]  [address] [facility].  [address]  Phone: [phone]  Fax: [phone]    Maxim Sorenson, OLEG, GIN        October 5, 2017     Belinda Matta, [address] [facility]  [place]IN[place] VILLAFANA [place], [address]    Patient: Sandy Hernandez   MR Number: [id number]   YOB: 1955   Date of Visit: 10/5/2017       Dear Dr. Yuri Prado: Thank you for referring Jonathan Castellanos to me for evaluation. Below are the relevant portions of my assessment and plan of care. Assessment:    Vitals from current and previous visits:  5'  Wt. 197#   -Body mass index is 38.47 kg/(m^2). 35-39.9 - Obesity Grade II. Pt has not seen a dietitian in 1 year.  -Weight goal: lose weight. Nutrition Diagnosis:   Obesity related to Sedentary lifestyle as evidenced by Body mass index is 38.47 kg/(m^2). and food logs indicative high fat and high sodium processed food choices. Plan:    Intervention:  -Impression: Pt spoke a lot about her 15 yr grandson that lives with her who is prediabetic and is 5'9\" and 281#. Encouraged pt that the two of you can support each other in improving your eating habits. Pt did not want to use the fat/calorie food logging form. Pt did not want to worry about losing weight and counting fat grams and calories.    -Instructed the patient on: Meal Planning for Regular, Balanced Meals & Snacks, Plate Method and The Importance of Regular Physical Activity. followng the plate method to improve her nutritional status and to help with weight loss efforts naturally, increasing her fresh fruit and vegetable intake, healthy snacks, her personal meal plan, limiting the high fat food choices (the black dot foods in the carb count booklet) and cutting in half added fats.    -Handouts given for: carbohydrate counting booklet and healthy snacks pictures. Patient Instructions/Goals:  1.)  Include a cooked veggie &/or salad with lunch and supper. 2.)  Include a fresh fruit with meal times or snacks (count into your carb allowance)  3.)  Good idea to use your roaster with water. 4.)  Follow the picture of the plate to keep your portions in check. 5.)  In place of flaherty - use nonfat plain greek yogurt to make chicken salad. 6.)  Have at least 3 food group choices at each meal:  Lean protein, nonfat milk or yogurt, fresh fruit, veggies, whole grains  7.)  Keep active each day - Ex:  10 minute walk in the morning and 10 minutes in the evening or late afternoon. Bring food logging - Thanks! ! And good job with checking your blood sugars. -General Diet Recommendations: low fat, balanced meal and snack planning.  -Nutrition prescription: 1400 calories/day, 150 g carbs/day. (45 gms/meal and 15-20 at snack time). Comprehension verified using teachback method. Monitoring/Evaluation:   -Followup visit: 2 mo with dietitian.   -Receptiveness to education/goals: Agreeable.  -Evaluation of education: Indicates understanding.  -Readiness to change: precontemplative- increasing daily physical activity and contemplation - ambivalent about change balanced meal planning with fresh fruit and veggies, cutting back on added fats. -Expected compliance: fair. Thank you for your referral of this patient. If you have questions, please do not hesitate to call me. I look forward to following Leta Andre along with you.     Sincerely,        Pennie Gaucher, RD, LD

## 2017-10-05 NOTE — COMMUNICATION BODY
Assessment:    Vitals from current and previous visits:  5'  Wt. 197#   -Body mass index is 38.47 kg/(m^2). 35-39.9 - Obesity Grade II. Pt has not seen a dietitian in 1 year.  -Weight goal: lose weight. Nutrition Diagnosis:   Obesity related to Sedentary lifestyle as evidenced by Body mass index is 38.47 kg/(m^2). and food logs indicative high fat and high sodium processed food choices. Plan:    Intervention:  -Impression: Pt spoke a lot about her 15 yr grandson that lives with her who is prediabetic and is 5'9\" and 281#. Encouraged pt that the two of you can support each other in improving your eating habits. Pt did not want to use the fat/calorie food logging form. Pt did not want to worry about losing weight and counting fat grams and calories.    -Instructed the patient on: Meal Planning for Regular, Balanced Meals & Snacks, Plate Method and The Importance of Regular Physical Activity. followng the plate method to improve her nutritional status and to help with weight loss efforts naturally, increasing her fresh fruit and vegetable intake, healthy snacks, her personal meal plan, limiting the high fat food choices (the black dot foods in the carb count booklet) and cutting in half added fats.    -Handouts given for: carbohydrate counting booklet and healthy snacks pictures. Patient Instructions/Goals:  1.)  Include a cooked veggie &/or salad with lunch and supper. 2.)  Include a fresh fruit with meal times or snacks (count into your carb allowance)  3.)  Good idea to use your roaster with water. 4.)  Follow the picture of the plate to keep your portions in check. 5.)  In place of flaherty - use nonfat plain greek yogurt to make chicken salad. 6.)  Have at least 3 food group choices at each meal:  Lean protein, nonfat milk or yogurt, fresh fruit, veggies, whole grains  7.)  Keep active each day - Ex:  10 minute walk in the morning and 10 minutes in the evening or late afternoon.   Bring food logging -

## 2017-10-19 ENCOUNTER — APPOINTMENT (OUTPATIENT)
Dept: CT IMAGING | Age: 62
End: 2017-10-19
Payer: MEDICARE

## 2017-10-19 ENCOUNTER — APPOINTMENT (OUTPATIENT)
Dept: GENERAL RADIOLOGY | Age: 62
End: 2017-10-19
Payer: MEDICARE

## 2017-10-19 ENCOUNTER — HOSPITAL ENCOUNTER (EMERGENCY)
Age: 62
Discharge: HOME | End: 2017-10-19
Payer: MEDICARE

## 2017-10-19 VITALS
WEIGHT: 220 LBS | SYSTOLIC BLOOD PRESSURE: 143 MMHG | HEART RATE: 67 BPM | TEMPERATURE: 98.7 F | OXYGEN SATURATION: 98 % | DIASTOLIC BLOOD PRESSURE: 56 MMHG | BODY MASS INDEX: 43.19 KG/M2 | RESPIRATION RATE: 16 BRPM | HEIGHT: 60 IN

## 2017-10-19 DIAGNOSIS — N39.0 URINARY TRACT INFECTION WITHOUT HEMATURIA, SITE UNSPECIFIED: ICD-10-CM

## 2017-10-19 DIAGNOSIS — R42 DIZZINESS: Primary | ICD-10-CM

## 2017-10-19 LAB
ANION GAP SERPL CALCULATED.3IONS-SCNC: 11 MEQ/L (ref 8–16)
ANISOCYTOSIS: ABNORMAL
BACTERIA: ABNORMAL
BASOPHILIA: SLIGHT
BASOPHILS # BLD: 0.2 %
BASOPHILS ABSOLUTE: 0 THOU/MM3 (ref 0–0.1)
BILIRUBIN URINE: NEGATIVE
BLOOD, URINE: NEGATIVE
BUN BLDV-MCNC: 23 MG/DL (ref 7–22)
CALCIUM SERPL-MCNC: 9 MG/DL (ref 8.5–10.5)
CASTS: ABNORMAL /LPF
CASTS: ABNORMAL /LPF
CHARACTER, URINE: CLEAR
CHLORIDE BLD-SCNC: 105 MEQ/L (ref 98–111)
CO2: 27 MEQ/L (ref 23–33)
COLOR: YELLOW
CREAT SERPL-MCNC: 1.2 MG/DL (ref 0.4–1.2)
CRYSTALS: ABNORMAL
EKG ATRIAL RATE: 69 BPM
EKG P AXIS: 18 DEGREES
EKG P-R INTERVAL: 176 MS
EKG Q-T INTERVAL: 402 MS
EKG QRS DURATION: 76 MS
EKG QTC CALCULATION (BAZETT): 430 MS
EKG R AXIS: 24 DEGREES
EKG T AXIS: -3 DEGREES
EKG VENTRICULAR RATE: 69 BPM
EOSINOPHIL # BLD: 1.9 %
EOSINOPHILS ABSOLUTE: 0.2 THOU/MM3 (ref 0–0.4)
EPITHELIAL CELLS, UA: ABNORMAL /HPF
GFR SERPL CREATININE-BSD FRML MDRD: 46 ML/MIN/1.73M2
GLUCOSE BLD-MCNC: 74 MG/DL (ref 70–108)
GLUCOSE, URINE: NEGATIVE MG/DL
HCT VFR BLD CALC: 32.4 % (ref 37–47)
HEMOGLOBIN: 10.5 GM/DL (ref 12–16)
HYPOCHROMIA: ABNORMAL
KETONES, URINE: NEGATIVE
LEUKOCYTE ESTERASE, URINE: ABNORMAL
LYMPHOCYTES # BLD: 24 %
LYMPHOCYTES ABSOLUTE: 2.9 THOU/MM3 (ref 1–4.8)
MCH RBC QN AUTO: 26.4 PG (ref 27–31)
MCHC RBC AUTO-ENTMCNC: 32.4 GM/DL (ref 33–37)
MCV RBC AUTO: 81.6 FL (ref 81–99)
MISCELLANEOUS LAB TEST RESULT: ABNORMAL
MONOCYTES # BLD: 6.8 %
MONOCYTES ABSOLUTE: 0.8 THOU/MM3 (ref 0.4–1.3)
NITRITE, URINE: POSITIVE
NUCLEATED RED BLOOD CELLS: 0 /100 WBC
OSMOLALITY CALCULATION: 287.3 MOSMOL/KG (ref 275–300)
PDW BLD-RTO: 21.8 % (ref 11.5–14.5)
PH UA: 5.5
PLATELET # BLD: 302 THOU/MM3 (ref 130–400)
PLATELET ESTIMATE: ADEQUATE
PMV BLD AUTO: 9.9 MCM (ref 7.4–10.4)
POTASSIUM SERPL-SCNC: 4.8 MEQ/L (ref 3.5–5.2)
PRO-BNP: 225.2 PG/ML (ref 0–900)
PROTEIN UA: NEGATIVE MG/DL
RBC # BLD: 3.97 MILL/MM3 (ref 4.2–5.4)
RBC # BLD: NORMAL 10*6/UL
RBC URINE: ABNORMAL /HPF
RENAL EPITHELIAL, UA: ABNORMAL
SCAN OF BLOOD SMEAR: NORMAL
SEG NEUTROPHILS: 67.1 %
SEGMENTED NEUTROPHILS ABSOLUTE COUNT: 8.1 THOU/MM3 (ref 1.8–7.7)
SODIUM BLD-SCNC: 143 MEQ/L (ref 135–145)
SPECIFIC GRAVITY UA: 1.02 (ref 1–1.03)
TROPONIN T: < 0.01 NG/ML
UROBILINOGEN, URINE: 1 EU/DL
WBC # BLD: 12.1 THOU/MM3 (ref 4.8–10.8)
WBC UA: ABNORMAL /HPF
YEAST: ABNORMAL

## 2017-10-19 PROCEDURE — 71020 XR CHEST STANDARD TWO VW: CPT

## 2017-10-19 PROCEDURE — 81001 URINALYSIS AUTO W/SCOPE: CPT

## 2017-10-19 PROCEDURE — 84484 ASSAY OF TROPONIN QUANT: CPT

## 2017-10-19 PROCEDURE — 87184 SC STD DISK METHOD PER PLATE: CPT

## 2017-10-19 PROCEDURE — 80048 BASIC METABOLIC PNL TOTAL CA: CPT

## 2017-10-19 PROCEDURE — 70450 CT HEAD/BRAIN W/O DYE: CPT

## 2017-10-19 PROCEDURE — 36415 COLL VENOUS BLD VENIPUNCTURE: CPT

## 2017-10-19 PROCEDURE — 83880 ASSAY OF NATRIURETIC PEPTIDE: CPT

## 2017-10-19 PROCEDURE — 87086 URINE CULTURE/COLONY COUNT: CPT

## 2017-10-19 PROCEDURE — 85025 COMPLETE CBC W/AUTO DIFF WBC: CPT

## 2017-10-19 PROCEDURE — 87077 CULTURE AEROBIC IDENTIFY: CPT

## 2017-10-19 PROCEDURE — 87186 SC STD MICRODIL/AGAR DIL: CPT

## 2017-10-19 PROCEDURE — 93005 ELECTROCARDIOGRAM TRACING: CPT

## 2017-10-19 PROCEDURE — 99284 EMERGENCY DEPT VISIT MOD MDM: CPT

## 2017-10-19 RX ORDER — NITROFURANTOIN 25; 75 MG/1; MG/1
100 CAPSULE ORAL 2 TIMES DAILY
Qty: 14 CAPSULE | Refills: 0 | Status: SHIPPED | OUTPATIENT
Start: 2017-10-19 | End: 2017-10-23

## 2017-10-19 ASSESSMENT — ENCOUNTER SYMPTOMS
COUGH: 0
CHEST TIGHTNESS: 0
ABDOMINAL PAIN: 0
NAUSEA: 0
RHINORRHEA: 0
DIARRHEA: 0
CONSTIPATION: 0
SHORTNESS OF BREATH: 0
PHOTOPHOBIA: 0
COLOR CHANGE: 0
SORE THROAT: 0
BACK PAIN: 0
VOMITING: 0
FACIAL SWELLING: 0

## 2017-10-19 NOTE — ED PROVIDER NOTES
[address] [facility] [address] [address] EMERGENCY DEPT      CHIEF COMPLAINT       Chief Complaint   Patient presents with    Dizziness       Nurses Notes reviewed and I agree except as noted in the HPI. HISTORY OF PRESENT ILLNESS    Chris Kumar is a 63 y.o. female with a history of diabetes, hypertension, hyperlipidemia, CKD, and diastolic heart failure who presents to the ED for evaluation of dizziness. The patient states she has felt dizzy and lightheaded for two days. She states she notices it more when she goes from sitting to standing or when she is walking. She has a history of vertigo and states this feel different than past vertigo episodes. She states she also feels off balance when she walks and has fallen twice in the last two days. She states one fall was getting up from bed and the other was walking to the car. She denies hitting her head, loss of consciousness, or other injury. She is on aspirin but no other anticoagulants. She states her blood sugars have been controlled. She denies new medications. She denies nausea, vomiting, chest pain, shortness of breath, headache, vision changes, weakness, numbness, paraesthesias, fever, chills, urinary symptoms, syncope, abdominal pain, or palpitations. REVIEW OF SYSTEMS     Review of Systems   Constitutional: Negative for chills, diaphoresis, fatigue, fever and unexpected weight change. HENT: Negative for congestion, ear pain, facial swelling, hearing loss, rhinorrhea, sore throat and tinnitus. Eyes: Negative for photophobia and visual disturbance. Respiratory: Negative for cough, chest tightness and shortness of breath. Cardiovascular: Negative for chest pain, palpitations and leg swelling. Gastrointestinal: Negative for abdominal pain, constipation, diarrhea, nausea and vomiting. Endocrine: Negative for polyuria. Genitourinary: Negative for difficulty urinating, dysuria, flank pain, hematuria and vaginal bleeding.    Musculoskeletal: Negative for back pain, joint swelling, neck pain and neck stiffness. Skin: Negative for color change, pallor and rash. Allergic/Immunologic: Negative for immunocompromised state. Neurological: Positive for dizziness and light-headedness. Negative for syncope, facial asymmetry, speech difficulty, weakness, numbness and headaches. Hematological: Does not bruise/bleed easily. Psychiatric/Behavioral: Negative for confusion. PAST MEDICAL HISTORY    has a past medical history of Asthma; Back pain; Chronic diastolic CHF (congestive heart failure) ([facility] [address] Colitis; COPD (chronic obstructive pulmonary disease) ([facility] [address] Depression; Depression; Gastroesophageal reflux; GERD (gastroesophageal reflux disease); H/O endoscopy; Hyperlipidemia; Hypertension; Hypertension; Hypothyroidism; Incontinence of urine; Lordosis (acquired) (postural); PONV (postoperative nausea and vomiting); Spondylosis of unspecified site without mention of myelopathy; Thoracic or lumbosacral neuritis or radiculitis, unspecified; Type II or unspecified type diabetes mellitus without mention of complication, not stated as uncontrolled; Unspecified sleep apnea; and Urinary incontinence. SURGICAL HISTORY      has a past surgical history that includes  section (); Carpal tunnel release (Bilateral, S); Abdomen surgery (); Dilation and curettage of uterus; Hysterectomy (); cardiovascular stress test (2015); Colonoscopy (, ); and Upper gastrointestinal endoscopy ().     CURRENT MEDICATIONS       Discharge Medication List as of 10/19/2017  5:13 PM      CONTINUE these medications which have NOT CHANGED    Details   ARIPiprazole (ABILIFY) 2 MG tablet Take 2 mg by mouth dailyHistorical Med      benztropine (COGENTIN) 0.5 MG tablet Take 0.5 mg by mouth dailyHistorical Med      Ascorbic Acid (VITAMIN C) 500 MG tablet Take 1,000 mg by mouth dailyHistorical Med      Cyanocobalamin (VITAMIN B 12 PO) Take by mouthHistorical Med Coenzyme Q10 (CO Q 10 PO) Take by mouthHistorical Med      insulin glargine (LANTUS SOLOSTAR) 100 UNIT/ML injection pen 28 in the am, 30 in the pm, Disp-5 Pen, R-0NO PRINT      SITagliptin (JANUVIA) 100 MG tablet Take 0.5 tablets by mouth daily, Disp-30 tablet, R-2NO PRINT      insulin lispro (HUMALOG KWIKPEN) 100 UNIT/ML pen 20 units at breakfast, 18 units lunch and 25 units at dinner plus group 2 sliding scale (max 100 units/day), Disp-10 Pen, R-5NO PRINT      busPIRone (BUSPAR) 30 MG tablet Take 30 mg by mouth 2 times daily      lisinopril (PRINIVIL;ZESTRIL) 5 MG tablet Take 2.5 mg by mouth daily Historical Med      pantoprazole (PROTONIX) 40 MG tablet Take 1 tablet by mouth daily, Disp-30 tablet, R-12      vilazodone HCl (VIIBRYD) 40 MG TABS Take 40 mg by mouth daily      ammonium lactate (AMLACTIN) 12 % cream Apply topically 2 times daily Apply topically as needed., Topical, Historical Med      Multiple Vitamins-Minerals (MULTI FOR HER PO) Take by mouth daily      atorvastatin (LIPITOR) 40 MG tablet Take 40 mg by mouth daily      ferrous sulfate 325 (65 FE) MG tablet Take 325 mg by mouth daily (with breakfast)      fenofibrate (TRICOR) 145 MG tablet Take 145 mg by mouth daily      Levothyroxine Sodium (SYNTHROID PO) Take 150 mcg by mouth 6 days a weekHistorical Med      Gabapentin (NEURONTIN PO)   Take 400 mg by mouth three times daily       DOXEPIN HCL PO Take 200 mg by mouth nightly. lamoTRIgine (LAMICTAL) 100 MG tablet   Take by mouth daily 200MG AM AND 100MG AT SUPPER      metFORMIN (GLUCOPHAGE) 1000 MG tablet Take 1,000 mg by mouth 2 times daily (with meals).       metoprolol (TOPROL-XL) 25 MG XL tablet   Take 25 mg by mouth nightly       glucose blood VI test strips (FREESTYLE LITE) strip Disp-400 each, R-1, NormalCheck BS 4 times a day      Blood Glucose Monitoring Suppl (FREESTYLE LITE) EMILIE DAILY PRN Starting 4/5/2016, Until Discontinued, Disp-1 Device, R-0, Normal      aspirin 81 MG tablet Take 4. GCS verbal subscore is 5. GCS motor subscore is 6. Skin: Skin is warm and dry. No rash noted. Psychiatric: She has a normal mood and affect. Her behavior is normal.       DIFFERENTIAL DIAGNOSIS:   Stroke, ACS, UTI, vertigo, pneumonia     DIAGNOSTIC RESULTS     EKG: All EKG's are interpreted by the Emergency Department Physician who either signs or Co-signs this chart in the absence of a cardiologist.  EKG reveals normal sinus rhythm with ventricular rate of 69bpm, interval of 176ms, QTc of 430ms, normal axis and no STEMI. EKG compared to study on 07/21/17 and shows improvement from previous. RADIOLOGY: non-plain film images(s) such as CT, Ultrasound and MRI are read by the radiologist.  Plain radiographic images are visualized and preliminarily interpreted by the emergency physician unless otherwise stated below. CT HEAD WO CONTRAST   Final Result   1. No acute intracranial hemorrhage, infarction, or mass. 2.  No fractures. **This report has been created using voice recognition software. It may contain minor errors which are inherent in voice recognition technology. **      Final report electronically signed by Dr. Asia Sebastian on 10/19/2017 3:36 PM      XR CHEST STANDARD (2 VW)   Final Result   1. Mild cardiomegaly. 2. No acute infiltrates or effusions are seen. No change from prior study. **This report has been created using voice recognition software. It may contain minor errors which are inherent in voice recognition technology. **      Final report electronically signed by Dr. Nhan Champion on 10/19/2017 3:28 PM          LABS:   Results for orders placed or performed during the hospital encounter of 10/19/17   CBC auto differential   Result Value Ref Range    WBC 12.1 (H) 4.8 - 10.8 thou/mm3    RBC 3.97 (L) 4.20 - 5.40 mill/mm3    Hemoglobin 10.5 (L) 12.0 - 16.0 gm/dl    Hematocrit 32.4 (L) 37.0 - 47.0 %    MCV 81.6 81.0 - 99.0 fL    MCH 26.4 (L) 27.0 - 31.0 pg    MCHC 32.4 (L) 33.0 - 37.0 gm/dl    RDW 21.8 (H) 11.5 - 14.5 %    Platelets 598 408 - 926 thou/mm3    MPV 9.9 7.4 - 10.4 mcm    RBC Morphology NORMAL     Seg Neutrophils 67.1 %    Lymphocytes 24.0 %    Monocytes 6.8 %    Eosinophils 1.9 %    Basophils 0.2 %    nRBC 0 /100 wbc    Platelet Estimate ADEQUATE     Anisocytosis 2+     Hypochromia 1+     BASOPHILIA SLIGHT     Segs Absolute 8.1 (H) 1.8 - 7.7 thou/mm3    Lymphocytes # 2.9 1.0 - 4.8 thou/mm3    Monocytes # 0.8 0.4 - 1.3 thou/mm3    Eosinophils # 0.2 0.0 - 0.4 thou/mm3    Basophils # 0.0 0.0 - 0.1 thou/mm3   Basic Metabolic Panel   Result Value Ref Range    Sodium 143 135 - 145 meq/L    Potassium 4.8 3.5 - 5.2 meq/L    Chloride 105 98 - 111 meq/L    CO2 27 23 - 33 meq/L    Glucose 74 70 - 108 mg/dL    BUN 23 (H) 7 - 22 mg/dL    CREATININE 1.2 0.4 - 1.2 mg/dL    Calcium 9.0 8.5 - 10.5 mg/dL   Brain Natriuretic Peptide   Result Value Ref Range    Pro-.2 0.0 - 900.0 pg/mL   Troponin   Result Value Ref Range    Troponin T < 0.010 ng/ml   Urinalysis with Microscopic   Result Value Ref Range    Glucose, Urine NEGATIVE NEGATIVE mg/dl    Bilirubin Urine NEGATIVE NEGATIVE    Ketones, Urine NEGATIVE NEGATIVE    Specific Gravity, UA 1.016 1.002 - 1.03    Blood, Urine NEGATIVE NEGATIVE    pH, UA 5.5 5.0 - 9.0    Protein, UA NEGATIVE NEGATIVE mg/dl    Urobilinogen, Urine 1.0 0.0 - 1.0 eu/dl    Nitrite, Urine POSITIVE (A) NEGATIVE    Leukocyte Esterase, Urine MODERATE (A) NEGATIVE    Color, UA YELLOW YELLOW-STR    Character, Urine CLEAR CLR-SL.HOA    RBC, UA 0-2 0-2/hpf /hpf    WBC, UA 10-15 0-4/hpf /hpf    Epi Cells 0-2 3-5/hpf /hpf    Bacteria, UA MODERATE FEW/NONE S    Casts NONE SEEN NONE SEEN /lpf    Crystals NONE SEEN NONE SEEN    Renal Epithelial, Urine NONE SEEN NONE SEEN    Yeast, UA NONE SEEN NONE SEEN    Casts NONE SEEN /lpf    Miscellaneous Lab Test Result NONE SEEN    Anion Gap   Result Value Ref Range    Anion Gap 11.0 8.0 - 16.0 meq/L   Glomerular Filtration Rate, Estimated   Result Value Ref Range    EstJose J Rate 46 (A) ml/min/1.73m2   Osmolality   Result Value Ref Range    Osmolality Calc 287.3 275.0 - 300 mOsmol/kg   Scan of Blood Smear   Result Value Ref Range    SCAN OF BLOOD SMEAR see below    EKG Dizziness   Result Value Ref Range    Ventricular Rate 69 BPM    Atrial Rate 69 BPM    P-R Interval 176 ms    QRS Duration 76 ms    Q-T Interval 402 ms    QTc Calculation (Bazett) 430 ms    P Axis 18 degrees    R Axis 24 degrees    T Axis -3 degrees       EMERGENCY DEPARTMENT COURSE:   Vitals:    Vitals:    10/19/17 1431 10/19/17 1602   BP: (!) 143/56    Pulse: 67 67   Resp: 22 16   Temp: 98.7 °F (37.1 °C)    TempSrc: Oral    SpO2: 94% 98%   Weight: 220 lb (99.8 kg)    Height: 5' (1.524 m)        MDM  Pt here with vague c/o dizziness that is not vertiginous. UA noted. Will start on Macrobid and f/u with PCP. The patient was given typical anticipatory guidance including indications for returning to the emergency department. All questions answered. Medications Given in the ED  Medications - No data to display      CRITICAL CARE[de-identified]   NONE    CONSULTS:  None    PROCEDURES:  None    FINAL IMPRESSION      1. Dizziness    2.  Urinary tract infection without hematuria, site unspecified          DISPOSITION/PLAN   DC    PATIENT REFERRED TO:  Rogers Reyez [address]    Schedule an appointment as soon as possible for a visit in 1 week        DISCHARGE MEDICATIONS:  Discharge Medication List as of 10/19/2017  5:13 PM      START taking these medications    Details   nitrofurantoin, macrocrystal-monohydrate, (MACROBID) 100 MG capsule Take 1 capsule by mouth 2 times daily for 7 doses, Disp-14 capsule, R-0Print             (Please note that portions of this note were completed with a voice recognition program.  Efforts were made to edit the dictations but occasionally words are mis-transcribed.)      Elva Gonzalez PA-C 10/19/17

## 2017-10-19 NOTE — ED NOTES
Patient to ED for increased dizziness for several days. Reports that she has had several episodes in which she had to lower herself to the ground. Denies hard falls, denies pain from any falls, denies any full losses of consciousness.       Aakash Asif RN  10/19/17 [id number]

## 2017-10-21 LAB
ORGANISM: ABNORMAL
URINE CULTURE, ROUTINE: ABNORMAL

## 2017-10-24 NOTE — PROGRESS NOTES
Pharmacy Note  ED Culture Follow-up    Patricia Steward is a 63 y.o. female. Allergies: Ibuprofen     Labs:  Lab Results   Component Value Date    BUN 23 (H) 10/19/2017    CREATININE 1.2 10/19/2017    WBC 12.1 (H) 10/19/2017     Estimated Creatinine Clearance: 54 mL/min (based on SCr of 1.2 mg/dL). Current antimicrobials:   · Nitrofurantoin    ASSESSMENT:  Micro results:   Urine culture: positive for E coli      PLAN:  Need for intervention: No 2/2 bacteria is sensitive to prescribed abx   Discussed with: Violette Bamberger, MD  Chosen treatment:    · Patient already on appropriate treatment as above    Patient response:   · No need to contact patient    Called/sent in prescription to: Not applicable    Please call with any questions.  Vernette Buerger, PharmD 10:09 PM 10/23/2017

## 2017-10-26 LAB
ANION GAP SERPL CALCULATED.3IONS-SCNC: 18 MMOL/L
BUN BLDV-MCNC: 22 MG/DL (ref 10–20)
CALCIUM SERPL-MCNC: 9.4 MG/DL (ref 8.7–10.8)
CHLORIDE BLD-SCNC: 103 MMOL/L (ref 95–111)
CHOLESTEROL/HDL RATIO: 4.5
CHOLESTEROL: 122 MG/DL
CO2: 27 MMOL/L (ref 21–32)
CREAT SERPL-MCNC: 1.4 MG/DL (ref 0.5–1.3)
EGFR AFRICAN AMERICAN: 47
EGFR IF NONAFRICAN AMERICAN: 38
GLUCOSE: 82 MG/DL (ref 70–100)
HDLC SERPL-MCNC: 27 MG/DL (ref 40–60)
LDL CHOLESTEROL CALCULATED: 71 MG/DL
LDL/HDL RATIO: 2.6
POTASSIUM SERPL-SCNC: 4.2 MMOL/L (ref 3.5–5.4)
SODIUM BLD-SCNC: 144 MMOL/L (ref 134–147)
TRIGL SERPL-MCNC: 120 MG/DL
VLDLC SERPL CALC-MCNC: 24 MG/DL

## 2017-10-27 LAB
AVERAGE GLUCOSE: 126 MG/DL (ref 66–114)
HBA1C MFR BLD: 6 % (ref 4.2–5.8)

## 2017-11-02 ENCOUNTER — OFFICE VISIT (OUTPATIENT)
Dept: ENDOCRINOLOGY | Age: 62
End: 2017-11-02
Payer: MEDICARE

## 2017-11-02 VITALS
HEIGHT: 60 IN | BODY MASS INDEX: 39.46 KG/M2 | RESPIRATION RATE: 15 BRPM | DIASTOLIC BLOOD PRESSURE: 60 MMHG | SYSTOLIC BLOOD PRESSURE: 131 MMHG | HEART RATE: 95 BPM | WEIGHT: 201 LBS

## 2017-11-02 DIAGNOSIS — Z79.4 TYPE 2 DIABETES MELLITUS WITH OTHER SPECIFIED COMPLICATION, WITH LONG-TERM CURRENT USE OF INSULIN (HCC): Primary | ICD-10-CM

## 2017-11-02 DIAGNOSIS — E78.00 PURE HYPERCHOLESTEROLEMIA: ICD-10-CM

## 2017-11-02 DIAGNOSIS — I10 ESSENTIAL HYPERTENSION: ICD-10-CM

## 2017-11-02 DIAGNOSIS — E11.69 TYPE 2 DIABETES MELLITUS WITH OTHER SPECIFIED COMPLICATION, WITH LONG-TERM CURRENT USE OF INSULIN (HCC): Primary | ICD-10-CM

## 2017-11-02 PROCEDURE — 99214 OFFICE O/P EST MOD 30 MIN: CPT | Performed by: INTERNAL MEDICINE

## 2017-11-02 ASSESSMENT — ENCOUNTER SYMPTOMS: BLURRED VISION: 0

## 2017-11-02 NOTE — LETTER
Endocrine Diabetes Metabolism [facility]  [address]  [address]  [address] [facility]  Phone: [phone]  Fax: [phone]    Danelle Vyas MD      11/03/17    Dr. Denny Nunn    Patient: Anastacio Venegas  MR Number: [id number]  YOB: 1955  Date of Visit: 11/2/2017      Dear Dr. Denny Nunn    Thank you for the request for consultation for Anastacio Venegas to me for the evaluation of   Chief Complaint   Patient presents with    Diabetes     type 2    Foot Problem     toes and heels of feet are numb    Eye Problem     10/2017    Hypothyroidism    Hyperlipidemia    Results     07/10/17   . Below are the relevant portions of my assessment and plan of care. Subjective:     Diabetes   She presents for her follow-up diabetic visit. She has type 2 diabetes mellitus. The initial diagnosis of diabetes was made 11 years ago. Her disease course has been stable. Hypoglycemia symptoms include tremors. Pertinent negatives for hypoglycemia include no sweats. Associated symptoms include fatigue and foot paresthesias. Pertinent negatives for diabetes include no blurred vision, no chest pain, no foot ulcerations, no polydipsia and no polyuria. Pertinent negatives for hypoglycemia complications include no blackouts and no hospitalization. Diabetic complications include nephropathy. Pertinent negatives for diabetic complications include no CVA, heart disease or retinopathy. Risk factors for coronary artery disease include diabetes mellitus, dyslipidemia, hypertension and obesity. Current diabetic treatments: lantis 30/28, humalog 20/18/25, januvia 50 mg daily, metformin 1000/1000. Her weight is decreasing steadily. She is following a generally healthy diet. She has had a previous visit with a dietitian. She participates in exercise intermittently. She monitors blood glucose at home 3-4 x per day. An ACE inhibitor/angiotensin II receptor blocker is being taken.  She does not see  DOXEPIN HCL PO Take 200 mg by mouth nightly.  lamoTRIgine (LAMICTAL) 100 MG tablet   Take by mouth daily 200MG AM AND 100MG AT SUPPER      metFORMIN (GLUCOPHAGE) 1000 MG tablet Take 1,000 mg by mouth 2 times daily (with meals).  metoprolol (TOPROL-XL) 25 MG XL tablet   Take 25 mg by mouth nightly        No current facility-administered medications for this visit.       Allergies   Allergen Reactions    Ibuprofen Other (See Comments)     Due to kidney failure     Health Maintenance   Topic Date Due    Hepatitis C screen  1955    HIV screen  11/29/1972    DTaP/Tdap/Td vaccine (1 - Tdap) 11/29/1976    Pneumococcal med risk (1 of 1 - PPSV23) 11/29/1976    Cervical cancer screen  11/29/1978    Colon cancer screen colonoscopy  11/29/2007    Diabetic foot exam  05/17/2017    Flu vaccine (1) 09/01/2017    Diabetic retinal exam  11/07/2017    Diabetic hemoglobin A1C test  10/25/2018    Lipid screen  10/25/2018    Breast cancer screen  10/03/2019       Labs  Lab Results   Component Value Date    LABA1C 6.0 (H) 10/25/2017     No results found for: EAG  Lab Results   Component Value Date    TSH 0.364 (L) 07/10/2017     Lab Results   Component Value Date    CHOL 122 10/25/2017    CHOL 149 05/18/2017    CHOL 128 12/03/2015     Lab Results   Component Value Date    TRIG 120 10/25/2017    TRIG 138 05/18/2017    TRIG 88 12/03/2015     Lab Results   Component Value Date    HDL 27 (L) 10/25/2017    HDL 26 05/18/2017    HDL 28 12/03/2015     Lab Results   Component Value Date    LDLCALC 71 10/25/2017    [address] 05/18/2017    LDLCALC 82 12/03/2015     Lab Results   Component Value Date    LABVLDL 24 10/25/2017     Lab Results   Component Value Date    CHOLHDLRATIO 4.5 10/25/2017         Review of Systems  Constitutional: negative for chills, fatigue and fevers  Eyes: negative for irritation, redness and visual disturbance  Respiratory: negative for cough, shortness of breath and wheezing Cardiovascular: negative for chest pain, irregular heart beat and palpitations    The remainder of systems were reviewed and negative. Objective:   /60   Pulse 95   Resp 15   Ht 5' (1.524 m)   Wt 201 lb (91.2 kg)   BMI 39.26 kg/m²      General:  alert, appears stated age, cooperative and no distress   Oropharynx: normal findings: lips normal without lesions, buccal mucosa normal, gums healthy and tongue midline and normal    Eyes:  negative findings: lids and lashes normal, conjunctivae and sclerae normal, corneas clear and pupils equal, round, reactive to light and accomodation       Neck: no adenopathy, no carotid bruit, no JVD, supple, symmetrical, trachea midline and thyroid not enlarged, symmetric, no tenderness/mass/nodules   Thyroid:  no palpable nodule   Lung: clear to auscultation bilaterally   Heart:  regular rate and rhythm, S1, S2 normal, no murmur, click, rub or gallop and normal apical impulse   Abdomen: soft, non-tender; bowel sounds normal; no masses,  no organomegaly   Extremities: extremities normal, atraumatic, no cyanosis or edema and Homans sign is negative, no sign of DVT   Pulses: 2+ and symmetric   Skin: warm and dry, no hyperpigmentation, vitiligo, or suspicious lesions   Neuro: normal without focal findings, mental status, speech normal, alert and oriented x3, LEILA, cranial nerves 2-12 intact, muscle tone and strength normal and symmetric. Assessment/Plan:     1. Type 2 diabetes mellitus with other specified complication, with long-term current use of insulin [facility]): Patient is now at an increased risk for hypoglycemia. I will adjust insulin to mitigate this tendency. Continue to check blood sugars 4 times a day. 2. Essential hypertension : Current therapy is effective and well tolerated. 3. Pure hypercholesterolemia : She is tolerating statin therapy with no muscular skeletal complaints.

## 2017-11-02 NOTE — PROGRESS NOTES
takes metoprolol and lisinopril. She denies headaches and chest pains.   Past Medical History:   Diagnosis Date    Asthma     Back pain     with radiation    Chronic diastolic CHF (congestive heart failure) ([facility] [address] 7/21/2017    Colitis     COPD (chronic obstructive pulmonary disease) (HCC)     Depression     Depression     Gastroesophageal reflux     GERD (gastroesophageal reflux disease)     H/O endoscopy     stretch the eso    Hyperlipidemia     Hypertension     Hypertension     Hypothyroidism     Incontinence of urine     Lordosis (acquired) (postural)     PONV (postoperative nausea and vomiting)     Spondylosis of unspecified site without mention of myelopathy     Thoracic or lumbosacral neuritis or radiculitis, unspecified     Type II or unspecified type diabetes mellitus without mention of complication, not stated as uncontrolled     diagnosed 2006    Unspecified sleep apnea      cpap mask    Urinary incontinence       Past Surgical History:   Procedure Laterality Date    ABDOMEN SURGERY  1980'S    LAPAROSCOPY    CARDIOVASCULAR STRESS TEST  6/2015    was not positive but proceeding cath    CARPAL TUNNEL RELEASE Bilateral 1970'S   [address]    COLONOSCOPY  2010, 2016    DILATION AND CURETTAGE OF UTERUS      HYSTERECTOMY  2002    Total    UPPER GASTROINTESTINAL ENDOSCOPY  2016    [facility] Dr. Luke Schroeder       Family History   Problem Relation Age of Onset    Diabetes Mother     Heart Disease Mother     Diabetes Father     Heart Disease Father     Diabetes Sister     Stroke Sister     Thyroid Disease Brother      Social History   Substance Use Topics    Smoking status: Never Smoker    Smokeless tobacco: Never Used      Comment: Second hand smoke from [place] Stephen Alcohol use No      Current Outpatient Prescriptions   Medication Sig Dispense Refill    ARIPiprazole (ABILIFY) 2 MG tablet Take 2 mg by mouth daily      benztropine (COGENTIN) 0.5 MG tablet Take 0.5 mg by

## 2017-11-02 NOTE — PATIENT INSTRUCTIONS
Take 28 units of lantus in the morning and 30 units in the evening     Take meal time insulin as follows:  Breakfast: 17 units of humalog  Lunch:       18 units of  humalog  Dinner:      25 units of  humalog     Plus sliding scale  humalog as follows:         Glucose:         Dose:               No Insulin  151-200           2 Units  201-250           4 Units  251-300           6 units  301-350           8 units  351-400           10 units  Above 400      12 units and call MD

## 2017-11-22 NOTE — TELEPHONE ENCOUNTER
Patients pharmacy contacted our office requesting a refill of humalog. She was seen in the office last 11/2017 and is scheduled to return 2-20-18. RX is pending, please advise.

## 2018-01-08 DIAGNOSIS — E03.9 HYPOTHYROIDISM (ACQUIRED): ICD-10-CM

## 2018-01-16 ENCOUNTER — OFFICE VISIT (OUTPATIENT)
Dept: PHYSICAL MEDICINE AND REHAB | Age: 63
End: 2018-01-16
Payer: MEDICARE

## 2018-01-16 VITALS
SYSTOLIC BLOOD PRESSURE: 154 MMHG | HEIGHT: 60 IN | BODY MASS INDEX: 41.11 KG/M2 | WEIGHT: 209.4 LBS | DIASTOLIC BLOOD PRESSURE: 73 MMHG | HEART RATE: 100 BPM

## 2018-01-16 DIAGNOSIS — M51.36 DDD (DEGENERATIVE DISC DISEASE), LUMBAR: ICD-10-CM

## 2018-01-16 DIAGNOSIS — M46.1 SI (SACROILIAC) JOINT INFLAMMATION (HCC): ICD-10-CM

## 2018-01-16 DIAGNOSIS — G89.4 CHRONIC PAIN SYNDROME: ICD-10-CM

## 2018-01-16 DIAGNOSIS — M47.816 SPONDYLOSIS OF LUMBAR REGION WITHOUT MYELOPATHY OR RADICULOPATHY: Primary | ICD-10-CM

## 2018-01-16 PROCEDURE — 99204 OFFICE O/P NEW MOD 45 MIN: CPT | Performed by: NURSE PRACTITIONER

## 2018-01-16 RX ORDER — CHLORAL HYDRATE 500 MG
3000 CAPSULE ORAL DAILY
COMMUNITY
End: 2020-05-26

## 2018-01-16 ASSESSMENT — ENCOUNTER SYMPTOMS
SHORTNESS OF BREATH: 0
CHEST TIGHTNESS: 0
SORE THROAT: 0
EYE PAIN: 0
BACK PAIN: 1
CONSTIPATION: 0
COLOR CHANGE: 0
PHOTOPHOBIA: 0
RHINORRHEA: 0
WHEEZING: 0
VOMITING: 0
SINUS PRESSURE: 0
NAUSEA: 0
COUGH: 0
ABDOMINAL PAIN: 0
DIARRHEA: 0

## 2018-01-16 NOTE — LETTER
[facility]  [address]  Phone: [phone]  Fax: [address], CNP        January 16, 2018     Beena Ruffin, [address] [id number]    Patient: Jesse Kam  MR Number: [id number]  YOB: 1955  Date of Visit: 1/16/2018    Dear Dr. Beena Ruffin:    Thank you for the request for consultation for [facility] to me for the evaluation of low back pain. Below are the relevant portions of my assessment and plan of care. If you have questions, please do not hesitate to call me. I look forward to following Natasha Baez along with you.     Sincerely,        Scar Villarreal, CNP

## 2018-01-16 NOTE — PROGRESS NOTES
Psychiatric: She has a normal mood and affect. Her speech is normal and behavior is normal. Judgment and thought content normal. Her mood appears not anxious. Her affect is not angry, not blunt, not labile and not inappropriate. She is not agitated, not aggressive, not hyperactive, not slowed, not withdrawn, not actively hallucinating and not combative. Thought content is not paranoid and not delusional. Cognition and memory are normal. Cognition and memory are not impaired. She does not express impulsivity or inappropriate judgment. She does not exhibit a depressed mood. She expresses no homicidal and no suicidal ideation. She expresses no suicidal plans and no homicidal plans. She exhibits normal recent memory and normal remote memory. She is attentive. Nursing note and vitals reviewed. HOMER test: positive   Yeoman's test: positive     Assessment:     1. Spondylosis of lumbar region without myelopathy or radiculopathy    2. SI (sacroiliac) joint inflammation (HCC)    3. Chronic pain syndrome    4. DDD (degenerative disc disease), lumbar            Plan:      · Patient read and signed orientation and opioid agreement. · OARRS reviewed. · Discussed long term side effects of medications. · Discussed tolerance, dependency and addiction. .UDS preformed today. · Patient told can not receive any pain medications from any other source. · Discussed with pt.may not be pain free. · No evidence of abuse, diversion or aberrant behavior. · Medications and/or procedures improve function and quality of life. · Reviewed OIO notes  · Reviewed Lumbar XR and MRI  · Plan Lumbar Facet MBB #1 @ L3-4,4-5,5-S1 Bilateral, risk and procedure reviewed  · Discussed SI injections in future if need. · Continue Neurontin per PCP, on for Diabetic Neuropathy  · Patient on VIIBRYD psych med that thins the blood and increases risk of bleeding. Will need Eliza from [place] approval to hold 3-5 days prior to procedure.     Previous Treatments tried:  · PT: Yes,  any benefit? No, how many weeks? 12 last date done: Fall 2017  · NSAIDs: No,  any benefit? No,  how long taken: Unable to take due to Kidney Stage 3  · Chiropractic: Yes,  any benefit? Yes short erm  · Muscle relaxants: No,  any benefit? No  · Narcotics: No,  any benefit? No  · Spine surgeon consult: Yes Dr Alyssa Blankenship  · Any Implants: No    Meds. Prescribed:   No orders of the defined types were placed in this encounter. Return for Lumbar Facet MBB #1@ L3-4,4-5,5-S1 Bilateral, Follow up after procedure. Time spent with patient was 60 minutes more than 50% was spent  Counseling/coordinated the patient's care.     Electronically signed by Demetris Plummer CNP on 1/16/2018 at 1:32 PM

## 2018-01-16 NOTE — LETTER
[facility]  [address]  Phone: [phone]  Fax: [address], [place]        January 16, 2018     Genevieve Mckenzie, [phone]    Patient: Fabi Joshi  MR Number: [id number]  YOB: 1955  Date of Visit: 1/16/2018    Dear Dr. Genevieve Mckenzie:    Thank you for the request for consultation for [facility] to me for the evaluation of low back pain. Below are the relevant portions of my assessment and plan of care. If you have questions, please do not hesitate to call me. I look forward to following Cameron Coburn along with you.     Sincerely,        Berneda Curling, CNP

## 2018-01-25 LAB
ANION GAP SERPL CALCULATED.3IONS-SCNC: 11 MMOL/L
BUN BLDV-MCNC: 18 MG/DL (ref 10–20)
CALCIUM SERPL-MCNC: 9.3 MG/DL (ref 8.7–10.8)
CHLORIDE BLD-SCNC: 104 MMOL/L (ref 95–111)
CO2: 29 MMOL/L (ref 21–32)
CREAT SERPL-MCNC: 1.3 MG/DL (ref 0.5–1.3)
EGFR AFRICAN AMERICAN: 51
EGFR IF NONAFRICAN AMERICAN: 42
GLUCOSE: 74 MG/DL (ref 70–100)
POTASSIUM SERPL-SCNC: 4 MMOL/L (ref 3.5–5.4)
SODIUM BLD-SCNC: 140 MMOL/L (ref 134–147)

## 2018-02-02 ENCOUNTER — TELEPHONE (OUTPATIENT)
Dept: PHYSICAL MEDICINE AND REHAB | Age: 63
End: 2018-02-02

## 2018-02-06 ENCOUNTER — OFFICE VISIT (OUTPATIENT)
Dept: NEPHROLOGY | Age: 63
End: 2018-02-06
Payer: MEDICARE

## 2018-02-06 VITALS
HEART RATE: 72 BPM | BODY MASS INDEX: 40.23 KG/M2 | WEIGHT: 206 LBS | OXYGEN SATURATION: 94 % | DIASTOLIC BLOOD PRESSURE: 64 MMHG | SYSTOLIC BLOOD PRESSURE: 124 MMHG

## 2018-02-06 DIAGNOSIS — I10 ESSENTIAL HYPERTENSION: ICD-10-CM

## 2018-02-06 DIAGNOSIS — N18.30 CKD (CHRONIC KIDNEY DISEASE) STAGE 3, GFR 30-59 ML/MIN (HCC): Primary | ICD-10-CM

## 2018-02-06 DIAGNOSIS — I50.32 CHRONIC DIASTOLIC CHF (CONGESTIVE HEART FAILURE) (HCC): Chronic | ICD-10-CM

## 2018-02-06 PROCEDURE — 99213 OFFICE O/P EST LOW 20 MIN: CPT | Performed by: INTERNAL MEDICINE

## 2018-02-06 RX ORDER — TRAZODONE HYDROCHLORIDE 50 MG/1
50 TABLET ORAL NIGHTLY
COMMUNITY
End: 2022-05-18

## 2018-02-06 RX ORDER — LIDOCAINE 50 MG/G
OINTMENT TOPICAL 3 TIMES DAILY PRN
COMMUNITY
End: 2021-05-25

## 2018-02-06 RX ORDER — SPIRONOLACTONE 25 MG/1
25 TABLET ORAL PRN
COMMUNITY
End: 2020-11-24 | Stop reason: ALTCHOICE

## 2018-02-15 NOTE — TELEPHONE ENCOUNTER
Approval received on 2/14/18.  Approved codes 02524,10064 from 1/31/18 - 3/30/18  Authorization number: [id number]  Ready to schedule

## 2018-02-21 ENCOUNTER — TELEPHONE (OUTPATIENT)
Dept: PHYSICAL MEDICINE AND REHAB | Age: 63
End: 2018-02-21

## 2018-02-27 ENCOUNTER — OFFICE VISIT (OUTPATIENT)
Dept: ENDOCRINOLOGY | Age: 63
End: 2018-02-27
Payer: MEDICARE

## 2018-02-27 VITALS
HEART RATE: 91 BPM | SYSTOLIC BLOOD PRESSURE: 141 MMHG | BODY MASS INDEX: 40.64 KG/M2 | HEIGHT: 60 IN | DIASTOLIC BLOOD PRESSURE: 61 MMHG | WEIGHT: 207 LBS | RESPIRATION RATE: 18 BRPM

## 2018-02-27 DIAGNOSIS — E78.00 PURE HYPERCHOLESTEROLEMIA: ICD-10-CM

## 2018-02-27 DIAGNOSIS — Z79.4 TYPE 2 DIABETES MELLITUS WITH HYPOGLYCEMIA WITHOUT COMA, WITH LONG-TERM CURRENT USE OF INSULIN (HCC): ICD-10-CM

## 2018-02-27 DIAGNOSIS — N18.30 CKD (CHRONIC KIDNEY DISEASE) STAGE 3, GFR 30-59 ML/MIN (HCC): ICD-10-CM

## 2018-02-27 DIAGNOSIS — E11.649 TYPE 2 DIABETES MELLITUS WITH HYPOGLYCEMIA WITHOUT COMA, WITH LONG-TERM CURRENT USE OF INSULIN (HCC): ICD-10-CM

## 2018-02-27 DIAGNOSIS — E03.9 HYPOTHYROIDISM, UNSPECIFIED TYPE: Chronic | ICD-10-CM

## 2018-02-27 DIAGNOSIS — Z79.4 TYPE 2 DIABETES MELLITUS WITH OTHER SPECIFIED COMPLICATION, WITH LONG-TERM CURRENT USE OF INSULIN (HCC): Primary | ICD-10-CM

## 2018-02-27 DIAGNOSIS — I10 ESSENTIAL HYPERTENSION: ICD-10-CM

## 2018-02-27 DIAGNOSIS — E11.69 TYPE 2 DIABETES MELLITUS WITH OTHER SPECIFIED COMPLICATION, WITH LONG-TERM CURRENT USE OF INSULIN (HCC): Primary | ICD-10-CM

## 2018-02-27 LAB — HBA1C MFR BLD: 7 %

## 2018-02-27 PROCEDURE — 83036 HEMOGLOBIN GLYCOSYLATED A1C: CPT | Performed by: INTERNAL MEDICINE

## 2018-02-27 PROCEDURE — 99214 OFFICE O/P EST MOD 30 MIN: CPT | Performed by: INTERNAL MEDICINE

## 2018-02-27 ASSESSMENT — ENCOUNTER SYMPTOMS
ABDOMINAL PAIN: 0
WHEEZING: 0
SHORTNESS OF BREATH: 0
SORE THROAT: 0
NAUSEA: 0
HEMOPTYSIS: 0
DIARRHEA: 0
BLURRED VISION: 0
HEARTBURN: 0
COUGH: 0

## 2018-02-27 NOTE — PATIENT INSTRUCTIONS
Take 32 units of lantus in the morning and 26 units in the evening     Take meal time insulin as follows:  Breakfast: 17 units of humalog  Lunch:       20 units of  humalog  Dinner:      28 units of  humalog     Plus sliding scale  humalog  WITH MEALS as follows:         Glucose:         Dose:               No Insulin  151-200           2 Units  201-250           4 Units  251-300           6 units  301-350           8 units  351-400           10 units  Above 400      12 units and call MD      SLIDING SCALE FOR BEDTIME  Glucose:         Dose:               No Insulin  151-200           1 Units  201-250           2 Units  251-300           3 units  301-350           4 units  351-400           5 units  Above 400      6 units and call MD

## 2018-02-27 NOTE — PROGRESS NOTES
Lordosis (acquired) (postural)     PONV (postoperative nausea and vomiting)     Spondylosis of unspecified site without mention of myelopathy     Thoracic or lumbosacral neuritis or radiculitis, unspecified     Type II or unspecified type diabetes mellitus without mention of complication, not stated as uncontrolled     diagnosed 2006    Unspecified sleep apnea      cpap mask    Urinary incontinence       Past Surgical History:   Procedure Laterality Date    ABDOMEN SURGERY  1980'S    LAPAROSCOPY    CARDIOVASCULAR STRESS TEST  6/2015    was not positive but proceeding cath   [address] Bilateral 1970'S   [address]    COLONOSCOPY  2010, 2016   [address]      HYSTERECTOMY  2002    Total    UPPER GASTROINTESTINAL ENDOSCOPY  [facility]    [facility] Dr. Tiana Umaña       Family History   Problem Relation Age of Onset    Diabetes Mother     Heart Disease Mother     Diabetes Father     Heart Disease Father     Diabetes Sister     Stroke Sister     Thyroid Disease Brother      Social History   Substance Use Topics    Smoking status: Never Smoker    Smokeless tobacco: Never Used      Comment: Second hand smoke from [place] Alcohol use No      Current Outpatient Prescriptions   Medication Sig Dispense Refill    insulin glargine (LANTUS SOLOSTAR) 100 UNIT/ML injection pen 32 in the am, 26 in the pm 5 pen 0    insulin lispro (HUMALOG KWIKPEN) 100 UNIT/ML pen Inject 17 units before breakfast, 20 units before lunch and 28 units before dinner plus sliding scale. Patient uses a max of 96 units per day. 10 pen 3    lidocaine (XYLOCAINE) 5 % ointment Apply topically 3 times daily as needed for Pain Apply topically as needed.       spironolactone (ALDACTONE) 25 MG tablet Take 25 mg by mouth daily      traZODone (DESYREL) 50 MG tablet Take 50 mg by mouth nightly      Omega-3 Fatty Acids (FISH OIL) 1000 MG CAPS Take 3,000 mg by mouth daily      SITagliptin (JANUVIA) 100 MG tablet Take 0.5 tablets by mouth daily 30 tablet 3    ARIPiprazole (ABILIFY) 2 MG tablet Take 10 mg by mouth daily       benztropine (COGENTIN) 0.5 MG tablet Take 0.5 mg by mouth daily      Ascorbic Acid (VITAMIN C) 500 MG tablet Take 1,000 mg by mouth daily      Cyanocobalamin (VITAMIN B 12 PO) Take 1,000 mg by mouth daily       busPIRone (BUSPAR) 30 MG tablet Take 30 mg by mouth 2 times daily      lisinopril (PRINIVIL;ZESTRIL) 5 MG tablet Take 7.5 mg by mouth daily       pantoprazole (PROTONIX) 40 MG tablet Take 1 tablet by mouth daily 30 tablet 12    vilazodone HCl (VIIBRYD) 40 MG TABS Take 40 mg by mouth daily      glucose blood VI test strips (FREESTYLE LITE) strip Check BS 4 times a day 400 each 1    Blood Glucose Monitoring Suppl (FREESTYLE LITE) EMILIE 1 Device by Does not apply route daily as needed 1 Device 0    aspirin 81 MG tablet Take 81 mg by mouth daily      ammonium lactate (AMLACTIN) 12 % cream Apply topically 2 times daily Apply topically as needed.  Multiple Vitamins-Minerals (MULTI FOR HER PO) Take by mouth daily      atorvastatin (LIPITOR) 40 MG tablet Take 40 mg by mouth daily      ferrous sulfate 325 (65 FE) MG tablet Take 325 mg by mouth daily (with breakfast)      fenofibrate (TRICOR) 145 MG tablet Take 145 mg by mouth daily      Levothyroxine Sodium (SYNTHROID PO) Take 150 mcg by mouth 6 days a week      Gabapentin (NEURONTIN PO)   Take 400 mg by mouth three times daily       DOXEPIN HCL PO Take 200 mg by mouth nightly.  lamoTRIgine (LAMICTAL) 100 MG tablet   Take by mouth daily 200MG AM AND 100MG AT SUPPER      metFORMIN (GLUCOPHAGE) 1000 MG tablet Take 1,000 mg by mouth 2 times daily (with meals).  metoprolol (TOPROL-XL) 25 MG XL tablet   Take 25 mg by mouth nightly        No current facility-administered medications for this visit.       Allergies   Allergen Reactions    Ibuprofen Other (See Comments)     Due to kidney failure     Health Maintenance

## 2018-03-13 RX ORDER — ARIPIPRAZOLE 10 MG/1
5 TABLET ORAL DAILY
Status: ON HOLD | COMMUNITY
End: 2019-06-27 | Stop reason: ALTCHOICE

## 2018-03-19 ENCOUNTER — HOSPITAL ENCOUNTER (OUTPATIENT)
Age: 63
Setting detail: OUTPATIENT SURGERY
Discharge: HOME | End: 2018-03-19
Attending: PAIN MEDICINE | Admitting: PAIN MEDICINE
Payer: MEDICARE

## 2018-03-19 ENCOUNTER — APPOINTMENT (OUTPATIENT)
Dept: GENERAL RADIOLOGY | Age: 63
End: 2018-03-19
Attending: PAIN MEDICINE
Payer: MEDICARE

## 2018-03-19 ENCOUNTER — ANESTHESIA EVENT (OUTPATIENT)
Dept: OPERATING ROOM | Age: 63
End: 2018-03-19
Payer: MEDICARE

## 2018-03-19 ENCOUNTER — ANESTHESIA (OUTPATIENT)
Dept: OPERATING ROOM | Age: 63
End: 2018-03-19
Payer: MEDICARE

## 2018-03-19 VITALS
WEIGHT: 199 LBS | HEART RATE: 84 BPM | HEIGHT: 60 IN | RESPIRATION RATE: 16 BRPM | BODY MASS INDEX: 39.07 KG/M2 | OXYGEN SATURATION: 95 % | TEMPERATURE: 98.1 F | SYSTOLIC BLOOD PRESSURE: 118 MMHG | DIASTOLIC BLOOD PRESSURE: 58 MMHG

## 2018-03-19 VITALS
SYSTOLIC BLOOD PRESSURE: 127 MMHG | RESPIRATION RATE: 27 BRPM | DIASTOLIC BLOOD PRESSURE: 57 MMHG | OXYGEN SATURATION: 96 %

## 2018-03-19 LAB — GLUCOSE BLD-MCNC: 158 MG/DL (ref 70–108)

## 2018-03-19 PROCEDURE — 64495 INJ PARAVERT F JNT L/S 3 LEV: CPT | Performed by: PAIN MEDICINE

## 2018-03-19 PROCEDURE — 2500000003 HC RX 250 WO HCPCS: Performed by: PAIN MEDICINE

## 2018-03-19 PROCEDURE — 82948 REAGENT STRIP/BLOOD GLUCOSE: CPT

## 2018-03-19 PROCEDURE — 7100000011 HC PHASE II RECOVERY - ADDTL 15 MIN: Performed by: PAIN MEDICINE

## 2018-03-19 PROCEDURE — 3600000058 HC PAIN LEVEL 5 BASE: Performed by: PAIN MEDICINE

## 2018-03-19 PROCEDURE — 64494 INJ PARAVERT F JNT L/S 2 LEV: CPT | Performed by: PAIN MEDICINE

## 2018-03-19 PROCEDURE — 64493 INJ PARAVERT F JNT L/S 1 LEV: CPT | Performed by: PAIN MEDICINE

## 2018-03-19 PROCEDURE — 2500000003 HC RX 250 WO HCPCS: Performed by: NURSE ANESTHETIST, CERTIFIED REGISTERED

## 2018-03-19 PROCEDURE — 6360000002 HC RX W HCPCS: Performed by: PAIN MEDICINE

## 2018-03-19 PROCEDURE — 3700000000 HC ANESTHESIA ATTENDED CARE: Performed by: PAIN MEDICINE

## 2018-03-19 PROCEDURE — 7100000010 HC PHASE II RECOVERY - FIRST 15 MIN: Performed by: PAIN MEDICINE

## 2018-03-19 PROCEDURE — 3209999900 FLUORO FOR SURGICAL PROCEDURES

## 2018-03-19 PROCEDURE — 3600000059 HC PAIN LEVEL 5 ADDL 15 MIN: Performed by: PAIN MEDICINE

## 2018-03-19 PROCEDURE — 3700000001 HC ADD 15 MINUTES (ANESTHESIA): Performed by: PAIN MEDICINE

## 2018-03-19 PROCEDURE — 6360000002 HC RX W HCPCS: Performed by: NURSE ANESTHETIST, CERTIFIED REGISTERED

## 2018-03-19 RX ORDER — LIDOCAINE HYDROCHLORIDE 10 MG/ML
INJECTION, SOLUTION INFILTRATION; PERINEURAL PRN
Status: DISCONTINUED | OUTPATIENT
Start: 2018-03-19 | End: 2018-03-19 | Stop reason: HOSPADM

## 2018-03-19 RX ORDER — FENTANYL CITRATE 50 UG/ML
INJECTION, SOLUTION INTRAMUSCULAR; INTRAVENOUS PRN
Status: DISCONTINUED | OUTPATIENT
Start: 2018-03-19 | End: 2018-03-19 | Stop reason: SDUPTHER

## 2018-03-19 RX ORDER — BETAMETHASONE SODIUM PHOSPHATE AND BETAMETHASONE ACETATE 3; 3 MG/ML; MG/ML
INJECTION, SUSPENSION INTRA-ARTICULAR; INTRALESIONAL; INTRAMUSCULAR; SOFT TISSUE PRN
Status: DISCONTINUED | OUTPATIENT
Start: 2018-03-19 | End: 2018-03-19 | Stop reason: HOSPADM

## 2018-03-19 RX ORDER — LIDOCAINE HYDROCHLORIDE 20 MG/ML
INJECTION, SOLUTION INFILTRATION; PERINEURAL PRN
Status: DISCONTINUED | OUTPATIENT
Start: 2018-03-19 | End: 2018-03-19 | Stop reason: SDUPTHER

## 2018-03-19 RX ORDER — PROPOFOL 10 MG/ML
INJECTION, EMULSION INTRAVENOUS PRN
Status: DISCONTINUED | OUTPATIENT
Start: 2018-03-19 | End: 2018-03-19 | Stop reason: SDUPTHER

## 2018-03-19 RX ORDER — BUPIVACAINE HYDROCHLORIDE 2.5 MG/ML
INJECTION, SOLUTION EPIDURAL; INFILTRATION; INTRACAUDAL PRN
Status: DISCONTINUED | OUTPATIENT
Start: 2018-03-19 | End: 2018-03-19 | Stop reason: HOSPADM

## 2018-03-19 RX ADMIN — PROPOFOL 100 MG: 10 INJECTION, EMULSION INTRAVENOUS at 10:46

## 2018-03-19 RX ADMIN — LIDOCAINE HYDROCHLORIDE 25 MG: 20 INJECTION, SOLUTION INFILTRATION; PERINEURAL at 10:46

## 2018-03-19 RX ADMIN — FENTANYL CITRATE 100 MCG: 50 INJECTION INTRAMUSCULAR; INTRAVENOUS at 10:40

## 2018-03-19 ASSESSMENT — PULMONARY FUNCTION TESTS
PIF_VALUE: 0

## 2018-03-19 ASSESSMENT — PAIN DESCRIPTION - DESCRIPTORS: DESCRIPTORS: ACHING

## 2018-03-19 ASSESSMENT — PAIN SCALES - GENERAL: PAINLEVEL_OUTOF10: 0

## 2018-03-19 ASSESSMENT — PAIN - FUNCTIONAL ASSESSMENT: PAIN_FUNCTIONAL_ASSESSMENT: 0-10

## 2018-03-19 NOTE — ANESTHESIA PRE PROCEDURE
tablet Take 1 tablet by mouth daily 8/18/16   Cem Velasquez MD   vilazodone HCl (VIIBRYD) 40 MG TABS Take 40 mg by mouth daily    Historical Provider, MD   glucose blood VI test strips (FREESTYLE LITE) strip Check BS 4 times a day 4/5/16   Kennedy Orellana MD   Blood Glucose Monitoring Suppl (FREESTYLE LITE) EMILIE 1 Device by Does not apply route daily as needed 4/5/16   Kennedy Orellana MD   aspirin 81 MG tablet Take 81 mg by mouth daily    Historical Provider, MD   ammonium lactate (AMLACTIN) 12 % cream Apply topically 2 times daily Apply topically as needed. Historical Provider, MD   Multiple Vitamins-Minerals (MULTI FOR HER PO) Take by mouth daily    Historical Provider, MD   atorvastatin (LIPITOR) 40 MG tablet Take 40 mg by mouth daily    Historical Provider, MD   ferrous sulfate 325 (65 FE) MG tablet Take 325 mg by mouth daily (with breakfast)    Historical Provider, MD   fenofibrate (TRICOR) 145 MG tablet Take 145 mg by mouth daily    Historical Provider, MD   Levothyroxine Sodium (SYNTHROID PO) Take 150 mcg by mouth 6 days a week    Historical Provider, MD   Gabapentin (NEURONTIN PO)   Take 400 mg by mouth three times daily     Historical Provider, MD   DOXEPIN HCL PO Take 200 mg by mouth nightly. Historical Provider, MD   lamoTRIgine (LAMICTAL) 100 MG tablet   Take by mouth daily 200MG AM AND 100MG AT SUPPER    Historical Provider, MD   metFORMIN (GLUCOPHAGE) 1000 MG tablet Take 1,000 mg by mouth 2 times daily (with meals). Historical Provider, MD   metoprolol (TOPROL-XL) 25 MG XL tablet   Take 25 mg by mouth nightly     Historical Provider, MD       Current medications:    No current facility-administered medications for this encounter. Allergies:     Allergies   Allergen Reactions    Ibuprofen Other (See Comments)     Due to kidney failure       Problem List:    Patient Active Problem List   Diagnosis Code    Dyspnea R06.00    Obesity, Class III, BMI 40-49.9 (morbid [facility]) [facility] E66.01   

## 2018-04-24 ENCOUNTER — TELEPHONE (OUTPATIENT)
Dept: PHYSICAL MEDICINE AND REHAB | Age: 63
End: 2018-04-24

## 2018-04-24 ENCOUNTER — OFFICE VISIT (OUTPATIENT)
Dept: PHYSICAL MEDICINE AND REHAB | Age: 63
End: 2018-04-24
Payer: MEDICARE

## 2018-04-24 VITALS
DIASTOLIC BLOOD PRESSURE: 64 MMHG | BODY MASS INDEX: 42.05 KG/M2 | SYSTOLIC BLOOD PRESSURE: 131 MMHG | HEART RATE: 79 BPM | WEIGHT: 214.2 LBS | HEIGHT: 60 IN

## 2018-04-24 DIAGNOSIS — G89.4 CHRONIC PAIN SYNDROME: ICD-10-CM

## 2018-04-24 DIAGNOSIS — M46.1 SI (SACROILIAC) JOINT INFLAMMATION (HCC): ICD-10-CM

## 2018-04-24 DIAGNOSIS — M47.816 SPONDYLOSIS OF LUMBAR REGION WITHOUT MYELOPATHY OR RADICULOPATHY: Primary | ICD-10-CM

## 2018-04-24 PROCEDURE — 99213 OFFICE O/P EST LOW 20 MIN: CPT | Performed by: NURSE PRACTITIONER

## 2018-04-24 ASSESSMENT — ENCOUNTER SYMPTOMS
WHEEZING: 0
ABDOMINAL PAIN: 0
EYE PAIN: 0
SORE THROAT: 0
CHEST TIGHTNESS: 0
VOMITING: 0
COLOR CHANGE: 0
DIARRHEA: 0
SINUS PRESSURE: 0
BACK PAIN: 1
CONSTIPATION: 0
COUGH: 0
NAUSEA: 0
PHOTOPHOBIA: 0
SHORTNESS OF BREATH: 0
RHINORRHEA: 0

## 2018-05-08 ENCOUNTER — ANESTHESIA EVENT (OUTPATIENT)
Dept: OPERATING ROOM | Age: 63
End: 2018-05-08
Payer: MEDICARE

## 2018-05-08 ENCOUNTER — ANESTHESIA (OUTPATIENT)
Dept: OPERATING ROOM | Age: 63
End: 2018-05-08
Payer: MEDICARE

## 2018-05-08 ENCOUNTER — APPOINTMENT (OUTPATIENT)
Dept: GENERAL RADIOLOGY | Age: 63
End: 2018-05-08
Attending: PAIN MEDICINE
Payer: MEDICARE

## 2018-05-08 ENCOUNTER — HOSPITAL ENCOUNTER (OUTPATIENT)
Age: 63
Setting detail: OUTPATIENT SURGERY
Discharge: HOME | End: 2018-05-08
Attending: PAIN MEDICINE | Admitting: PAIN MEDICINE
Payer: MEDICARE

## 2018-05-08 VITALS
DIASTOLIC BLOOD PRESSURE: 62 MMHG | SYSTOLIC BLOOD PRESSURE: 129 MMHG | OXYGEN SATURATION: 97 % | RESPIRATION RATE: 22 BRPM

## 2018-05-08 VITALS
HEART RATE: 66 BPM | DIASTOLIC BLOOD PRESSURE: 56 MMHG | WEIGHT: 208.8 LBS | OXYGEN SATURATION: 97 % | TEMPERATURE: 97 F | RESPIRATION RATE: 16 BRPM | SYSTOLIC BLOOD PRESSURE: 117 MMHG | BODY MASS INDEX: 40.99 KG/M2 | HEIGHT: 60 IN

## 2018-05-08 LAB — GLUCOSE BLD-MCNC: 132 MG/DL (ref 70–108)

## 2018-05-08 PROCEDURE — 3209999900 FLUORO FOR SURGICAL PROCEDURES

## 2018-05-08 PROCEDURE — 7100000011 HC PHASE II RECOVERY - ADDTL 15 MIN: Performed by: PAIN MEDICINE

## 2018-05-08 PROCEDURE — 7100000010 HC PHASE II RECOVERY - FIRST 15 MIN: Performed by: PAIN MEDICINE

## 2018-05-08 PROCEDURE — 64493 INJ PARAVERT F JNT L/S 1 LEV: CPT | Performed by: PAIN MEDICINE

## 2018-05-08 PROCEDURE — 82948 REAGENT STRIP/BLOOD GLUCOSE: CPT

## 2018-05-08 PROCEDURE — 64494 INJ PARAVERT F JNT L/S 2 LEV: CPT | Performed by: PAIN MEDICINE

## 2018-05-08 PROCEDURE — 2500000003 HC RX 250 WO HCPCS: Performed by: PAIN MEDICINE

## 2018-05-08 PROCEDURE — 6360000002 HC RX W HCPCS: Performed by: PAIN MEDICINE

## 2018-05-08 PROCEDURE — 2500000003 HC RX 250 WO HCPCS: Performed by: NURSE ANESTHETIST, CERTIFIED REGISTERED

## 2018-05-08 PROCEDURE — 3600000058 HC PAIN LEVEL 5 BASE: Performed by: PAIN MEDICINE

## 2018-05-08 PROCEDURE — 3700000000 HC ANESTHESIA ATTENDED CARE: Performed by: PAIN MEDICINE

## 2018-05-08 PROCEDURE — 3600000059 HC PAIN LEVEL 5 ADDL 15 MIN: Performed by: PAIN MEDICINE

## 2018-05-08 PROCEDURE — 6360000002 HC RX W HCPCS: Performed by: NURSE ANESTHETIST, CERTIFIED REGISTERED

## 2018-05-08 PROCEDURE — 3700000001 HC ADD 15 MINUTES (ANESTHESIA): Performed by: PAIN MEDICINE

## 2018-05-08 PROCEDURE — 64495 INJ PARAVERT F JNT L/S 3 LEV: CPT | Performed by: PAIN MEDICINE

## 2018-05-08 RX ORDER — PROPOFOL 10 MG/ML
INJECTION, EMULSION INTRAVENOUS PRN
Status: DISCONTINUED | OUTPATIENT
Start: 2018-05-08 | End: 2018-05-08 | Stop reason: SDUPTHER

## 2018-05-08 RX ORDER — BUPIVACAINE HYDROCHLORIDE 2.5 MG/ML
INJECTION, SOLUTION EPIDURAL; INFILTRATION; INTRACAUDAL PRN
Status: DISCONTINUED | OUTPATIENT
Start: 2018-05-08 | End: 2018-05-08 | Stop reason: HOSPADM

## 2018-05-08 RX ORDER — MECLIZINE HYDROCHLORIDE 25 MG/1
50 TABLET ORAL 3 TIMES DAILY PRN
COMMUNITY
End: 2021-05-25

## 2018-05-08 RX ORDER — FENTANYL CITRATE 50 UG/ML
INJECTION, SOLUTION INTRAMUSCULAR; INTRAVENOUS PRN
Status: DISCONTINUED | OUTPATIENT
Start: 2018-05-08 | End: 2018-05-08 | Stop reason: SDUPTHER

## 2018-05-08 RX ORDER — BETAMETHASONE SODIUM PHOSPHATE AND BETAMETHASONE ACETATE 3; 3 MG/ML; MG/ML
INJECTION, SUSPENSION INTRA-ARTICULAR; INTRALESIONAL; INTRAMUSCULAR; SOFT TISSUE PRN
Status: DISCONTINUED | OUTPATIENT
Start: 2018-05-08 | End: 2018-05-08 | Stop reason: HOSPADM

## 2018-05-08 RX ORDER — LIDOCAINE HYDROCHLORIDE 20 MG/ML
INJECTION, SOLUTION INFILTRATION; PERINEURAL PRN
Status: DISCONTINUED | OUTPATIENT
Start: 2018-05-08 | End: 2018-05-08 | Stop reason: SDUPTHER

## 2018-05-08 RX ORDER — LIDOCAINE HYDROCHLORIDE 10 MG/ML
INJECTION, SOLUTION INFILTRATION; PERINEURAL PRN
Status: DISCONTINUED | OUTPATIENT
Start: 2018-05-08 | End: 2018-05-08 | Stop reason: HOSPADM

## 2018-05-08 RX ADMIN — LIDOCAINE HYDROCHLORIDE 50 MG: 20 INJECTION, SOLUTION INFILTRATION; PERINEURAL at 09:53

## 2018-05-08 RX ADMIN — PROPOFOL 100 MG: 10 INJECTION, EMULSION INTRAVENOUS at 09:55

## 2018-05-08 RX ADMIN — FENTANYL CITRATE 50 MCG: 50 INJECTION INTRAMUSCULAR; INTRAVENOUS at 09:53

## 2018-05-08 ASSESSMENT — PULMONARY FUNCTION TESTS
PIF_VALUE: 0

## 2018-05-08 ASSESSMENT — ENCOUNTER SYMPTOMS: SHORTNESS OF BREATH: 1

## 2018-05-08 ASSESSMENT — PAIN SCALES - GENERAL: PAINLEVEL_OUTOF10: 0

## 2018-05-08 ASSESSMENT — PAIN DESCRIPTION - DESCRIPTORS: DESCRIPTORS: SHARP

## 2018-05-08 ASSESSMENT — PAIN - FUNCTIONAL ASSESSMENT: PAIN_FUNCTIONAL_ASSESSMENT: 0-10

## 2018-05-22 ENCOUNTER — OFFICE VISIT (OUTPATIENT)
Dept: PULMONOLOGY | Age: 63
End: 2018-05-22
Payer: MEDICARE

## 2018-05-22 ENCOUNTER — TELEPHONE (OUTPATIENT)
Dept: SLEEP CENTER | Age: 63
End: 2018-05-22

## 2018-05-22 VITALS
WEIGHT: 211.6 LBS | HEIGHT: 60 IN | OXYGEN SATURATION: 97 % | SYSTOLIC BLOOD PRESSURE: 112 MMHG | DIASTOLIC BLOOD PRESSURE: 68 MMHG | BODY MASS INDEX: 41.54 KG/M2 | HEART RATE: 69 BPM

## 2018-05-22 DIAGNOSIS — G47.33 OSA ON CPAP: Primary | ICD-10-CM

## 2018-05-22 DIAGNOSIS — E66.01 OBESITY, CLASS III, BMI 40-49.9 (MORBID OBESITY) (HCC): ICD-10-CM

## 2018-05-22 DIAGNOSIS — Z99.89 OSA ON CPAP: Primary | ICD-10-CM

## 2018-05-22 PROCEDURE — 99213 OFFICE O/P EST LOW 20 MIN: CPT | Performed by: PHYSICIAN ASSISTANT

## 2018-05-22 ASSESSMENT — ENCOUNTER SYMPTOMS
GASTROINTESTINAL NEGATIVE: 1
SORE THROAT: 0
SHORTNESS OF BREATH: 0
EYES NEGATIVE: 1
WHEEZING: 0
NAUSEA: 0
SPUTUM PRODUCTION: 0
COUGH: 0
SINUS PAIN: 0
HEARTBURN: 0
ORTHOPNEA: 0
RESPIRATORY NEGATIVE: 1

## 2018-05-29 ENCOUNTER — OFFICE VISIT (OUTPATIENT)
Dept: PHYSICAL MEDICINE AND REHAB | Age: 63
End: 2018-05-29
Payer: MEDICARE

## 2018-05-29 VITALS
BODY MASS INDEX: 41.55 KG/M2 | HEART RATE: 110 BPM | SYSTOLIC BLOOD PRESSURE: 127 MMHG | WEIGHT: 211.64 LBS | DIASTOLIC BLOOD PRESSURE: 64 MMHG | HEIGHT: 60 IN

## 2018-05-29 DIAGNOSIS — M47.816 SPONDYLOSIS OF LUMBAR REGION WITHOUT MYELOPATHY OR RADICULOPATHY: Primary | ICD-10-CM

## 2018-05-29 DIAGNOSIS — M46.1 SI (SACROILIAC) JOINT INFLAMMATION (HCC): ICD-10-CM

## 2018-05-29 DIAGNOSIS — G89.4 CHRONIC PAIN SYNDROME: ICD-10-CM

## 2018-05-29 PROCEDURE — 99213 OFFICE O/P EST LOW 20 MIN: CPT | Performed by: NURSE PRACTITIONER

## 2018-05-29 ASSESSMENT — ENCOUNTER SYMPTOMS
SORE THROAT: 0
DIARRHEA: 0
VOMITING: 0
RHINORRHEA: 0
WHEEZING: 0
PHOTOPHOBIA: 0
COLOR CHANGE: 0
COUGH: 0
CHEST TIGHTNESS: 0
EYE PAIN: 0
CONSTIPATION: 0
NAUSEA: 0
SHORTNESS OF BREATH: 0
SINUS PRESSURE: 0
ABDOMINAL PAIN: 0
BACK PAIN: 1

## 2018-06-06 ENCOUNTER — OFFICE VISIT (OUTPATIENT)
Dept: INTERNAL MEDICINE CLINIC | Age: 63
End: 2018-06-06
Payer: MEDICARE

## 2018-06-06 VITALS
HEART RATE: 99 BPM | SYSTOLIC BLOOD PRESSURE: 137 MMHG | WEIGHT: 210.5 LBS | BODY MASS INDEX: 41.33 KG/M2 | HEIGHT: 60 IN | RESPIRATION RATE: 16 BRPM | DIASTOLIC BLOOD PRESSURE: 62 MMHG

## 2018-06-06 DIAGNOSIS — E66.01 MORBID OBESITY WITH BMI OF 40.0-44.9, ADULT (HCC): ICD-10-CM

## 2018-06-06 DIAGNOSIS — E11.8 TYPE 2 DIABETES MELLITUS WITH COMPLICATION, WITH LONG-TERM CURRENT USE OF INSULIN (HCC): Primary | ICD-10-CM

## 2018-06-06 DIAGNOSIS — Z79.4 TYPE 2 DIABETES MELLITUS WITH HYPOGLYCEMIA WITHOUT COMA, WITH LONG-TERM CURRENT USE OF INSULIN (HCC): ICD-10-CM

## 2018-06-06 DIAGNOSIS — E78.2 MIXED HYPERLIPIDEMIA: ICD-10-CM

## 2018-06-06 DIAGNOSIS — E11.649 TYPE 2 DIABETES MELLITUS WITH HYPOGLYCEMIA WITHOUT COMA, WITH LONG-TERM CURRENT USE OF INSULIN (HCC): ICD-10-CM

## 2018-06-06 DIAGNOSIS — E03.9 HYPOTHYROIDISM (ACQUIRED): ICD-10-CM

## 2018-06-06 DIAGNOSIS — Z79.4 TYPE 2 DIABETES MELLITUS WITH COMPLICATION, WITH LONG-TERM CURRENT USE OF INSULIN (HCC): Primary | ICD-10-CM

## 2018-06-06 PROCEDURE — 99214 OFFICE O/P EST MOD 30 MIN: CPT | Performed by: CLINICAL NURSE SPECIALIST

## 2018-06-06 ASSESSMENT — PATIENT HEALTH QUESTIONNAIRE - PHQ9
SUM OF ALL RESPONSES TO PHQ QUESTIONS 1-9: 2
1. LITTLE INTEREST OR PLEASURE IN DOING THINGS: 1
SUM OF ALL RESPONSES TO PHQ9 QUESTIONS 1 & 2: 2
2. FEELING DOWN, DEPRESSED OR HOPELESS: 1

## 2018-06-06 ASSESSMENT — ENCOUNTER SYMPTOMS
BACK PAIN: 1
ABDOMINAL PAIN: 0
CHEST TIGHTNESS: 0
DIARRHEA: 0
EYE REDNESS: 0
SHORTNESS OF BREATH: 0
NAUSEA: 0
COUGH: 0
WHEEZING: 0
VOICE CHANGE: 0
CONSTIPATION: 0

## 2018-06-07 ENCOUNTER — TELEPHONE (OUTPATIENT)
Dept: PHYSICAL MEDICINE AND REHAB | Age: 63
End: 2018-06-07

## 2018-06-20 ENCOUNTER — NURSE TRIAGE (OUTPATIENT)
Dept: ADMINISTRATIVE | Age: 63
End: 2018-06-20

## 2018-06-20 ENCOUNTER — HOSPITAL ENCOUNTER (EMERGENCY)
Age: 63
Discharge: NURSING, REHAB OR LONG-TERM CARE | End: 2018-06-20
Attending: EMERGENCY MEDICINE
Payer: MEDICARE

## 2018-06-20 VITALS
DIASTOLIC BLOOD PRESSURE: 68 MMHG | SYSTOLIC BLOOD PRESSURE: 142 MMHG | OXYGEN SATURATION: 95 % | HEART RATE: 80 BPM | TEMPERATURE: 98.2 F | RESPIRATION RATE: 16 BRPM

## 2018-06-20 DIAGNOSIS — N39.0 URINARY TRACT INFECTION WITHOUT HEMATURIA, SITE UNSPECIFIED: Primary | ICD-10-CM

## 2018-06-20 LAB
ALBUMIN SERPL-MCNC: 4.3 G/DL (ref 3.5–5.1)
ALP BLD-CCNC: 97 U/L (ref 38–126)
ALT SERPL-CCNC: 31 U/L (ref 11–66)
AMPHETAMINE+METHAMPHETAMINE URINE SCREEN: NEGATIVE
ANION GAP SERPL CALCULATED.3IONS-SCNC: 16 MEQ/L (ref 8–16)
ANISOCYTOSIS: ABNORMAL
AST SERPL-CCNC: 27 U/L (ref 5–40)
BACTERIA: ABNORMAL /HPF
BARBITURATE QUANTITATIVE URINE: NEGATIVE
BASOPHILS # BLD: 0.3 %
BASOPHILS ABSOLUTE: 0 THOU/MM3 (ref 0–0.1)
BENZODIAZEPINE QUANTITATIVE URINE: NEGATIVE
BILIRUB SERPL-MCNC: 0.3 MG/DL (ref 0.3–1.2)
BILIRUBIN DIRECT: < 0.2 MG/DL (ref 0–0.3)
BILIRUBIN URINE: NEGATIVE
BLOOD, URINE: NEGATIVE
BUN BLDV-MCNC: 16 MG/DL (ref 7–22)
CALCIUM SERPL-MCNC: 10.3 MG/DL (ref 8.5–10.5)
CANNABINOID QUANTITATIVE URINE: NEGATIVE
CASTS 2: ABNORMAL /LPF
CASTS UA: ABNORMAL /LPF
CHARACTER, URINE: ABNORMAL
CHLORIDE BLD-SCNC: 99 MEQ/L (ref 98–111)
CO2: 23 MEQ/L (ref 23–33)
COCAINE METABOLITE QUANTITATIVE URINE: NEGATIVE
COLOR: YELLOW
CREAT SERPL-MCNC: 1 MG/DL (ref 0.4–1.2)
CRYSTALS, UA: ABNORMAL
EOSINOPHIL # BLD: 0.9 %
EOSINOPHILS ABSOLUTE: 0.1 THOU/MM3 (ref 0–0.4)
EPITHELIAL CELLS, UA: ABNORMAL /HPF
ETHYL ALCOHOL, SERUM: < 0.01 %
FLU A ANTIGEN: NEGATIVE
FLU B ANTIGEN: NEGATIVE
GFR SERPL CREATININE-BSD FRML MDRD: 56 ML/MIN/1.73M2
GLUCOSE BLD-MCNC: 227 MG/DL (ref 70–108)
GLUCOSE BLD-MCNC: 258 MG/DL (ref 70–108)
GLUCOSE URINE: NEGATIVE MG/DL
HCT VFR BLD CALC: 41.7 % (ref 37–47)
HEMOGLOBIN: 13.9 GM/DL (ref 12–16)
KETONES, URINE: NEGATIVE
LEUKOCYTE ESTERASE, URINE: ABNORMAL
LIPASE: 63.8 U/L (ref 5.6–51.3)
LYMPHOCYTES # BLD: 23.1 %
LYMPHOCYTES ABSOLUTE: 2.8 THOU/MM3 (ref 1–4.8)
MAGNESIUM: 1.6 MG/DL (ref 1.6–2.4)
MCH RBC QN AUTO: 29.2 PG (ref 27–31)
MCHC RBC AUTO-ENTMCNC: 33.2 GM/DL (ref 33–37)
MCV RBC AUTO: 87.9 FL (ref 81–99)
MISCELLANEOUS 2: ABNORMAL
MONOCYTES # BLD: 7.2 %
MONOCYTES ABSOLUTE: 0.9 THOU/MM3 (ref 0.4–1.3)
NITRITE, URINE: NEGATIVE
NUCLEATED RED BLOOD CELLS: 0 /100 WBC
OPIATES, URINE: NEGATIVE
OSMOLALITY CALCULATION: 285.7 MOSMOL/KG (ref 275–300)
OXYCODONE: NEGATIVE
PDW BLD-RTO: 15.2 % (ref 11.5–14.5)
PH UA: 6
PHENCYCLIDINE QUANTITATIVE URINE: NEGATIVE
PLATELET # BLD: 314 THOU/MM3 (ref 130–400)
PMV BLD AUTO: 9.8 FL (ref 7.4–10.4)
POTASSIUM SERPL-SCNC: 4.9 MEQ/L (ref 3.5–5.2)
PROTEIN UA: ABNORMAL
RBC # BLD: 4.75 MILL/MM3 (ref 4.2–5.4)
RBC URINE: ABNORMAL /HPF
RENAL EPITHELIAL, UA: ABNORMAL
SEG NEUTROPHILS: 68.5 %
SEGMENTED NEUTROPHILS ABSOLUTE COUNT: 8.2 THOU/MM3 (ref 1.8–7.7)
SODIUM BLD-SCNC: 138 MEQ/L (ref 135–145)
SPECIFIC GRAVITY, URINE: 1.01 (ref 1–1.03)
TOTAL PROTEIN: 7.8 G/DL (ref 6.1–8)
TROPONIN T: < 0.01 NG/ML
TSH SERPL DL<=0.05 MIU/L-ACNC: 3.75 UIU/ML (ref 0.4–4.2)
UROBILINOGEN, URINE: 1 EU/DL
WBC # BLD: 12 THOU/MM3 (ref 4.8–10.8)
WBC UA: ABNORMAL /HPF
YEAST: ABNORMAL

## 2018-06-20 PROCEDURE — 87086 URINE CULTURE/COLONY COUNT: CPT

## 2018-06-20 PROCEDURE — 84443 ASSAY THYROID STIM HORMONE: CPT

## 2018-06-20 PROCEDURE — 87804 INFLUENZA ASSAY W/OPTIC: CPT

## 2018-06-20 PROCEDURE — 84484 ASSAY OF TROPONIN QUANT: CPT

## 2018-06-20 PROCEDURE — 82248 BILIRUBIN DIRECT: CPT

## 2018-06-20 PROCEDURE — G0480 DRUG TEST DEF 1-7 CLASSES: HCPCS

## 2018-06-20 PROCEDURE — 36415 COLL VENOUS BLD VENIPUNCTURE: CPT

## 2018-06-20 PROCEDURE — 83690 ASSAY OF LIPASE: CPT

## 2018-06-20 PROCEDURE — 82948 REAGENT STRIP/BLOOD GLUCOSE: CPT

## 2018-06-20 PROCEDURE — 83735 ASSAY OF MAGNESIUM: CPT

## 2018-06-20 PROCEDURE — 80053 COMPREHEN METABOLIC PANEL: CPT

## 2018-06-20 PROCEDURE — 80307 DRUG TEST PRSMV CHEM ANLYZR: CPT

## 2018-06-20 PROCEDURE — 85025 COMPLETE CBC W/AUTO DIFF WBC: CPT

## 2018-06-20 PROCEDURE — 6370000000 HC RX 637 (ALT 250 FOR IP): Performed by: EMERGENCY MEDICINE

## 2018-06-20 PROCEDURE — 2580000003 HC RX 258: Performed by: EMERGENCY MEDICINE

## 2018-06-20 PROCEDURE — 81001 URINALYSIS AUTO W/SCOPE: CPT

## 2018-06-20 PROCEDURE — 99283 EMERGENCY DEPT VISIT LOW MDM: CPT

## 2018-06-20 RX ORDER — SULFAMETHOXAZOLE AND TRIMETHOPRIM 800; 160 MG/1; MG/1
1 TABLET ORAL 2 TIMES DAILY
Qty: 20 TABLET | Refills: 0 | Status: SHIPPED | OUTPATIENT
Start: 2018-06-20 | End: 2018-06-30

## 2018-06-20 RX ORDER — 0.9 % SODIUM CHLORIDE 0.9 %
1000 INTRAVENOUS SOLUTION INTRAVENOUS ONCE
Status: COMPLETED | OUTPATIENT
Start: 2018-06-20 | End: 2018-06-20

## 2018-06-20 RX ORDER — SULFAMETHOXAZOLE AND TRIMETHOPRIM 800; 160 MG/1; MG/1
1 TABLET ORAL ONCE
Status: COMPLETED | OUTPATIENT
Start: 2018-06-20 | End: 2018-06-20

## 2018-06-20 RX ADMIN — SULFAMETHOXAZOLE AND TRIMETHOPRIM 1 TABLET: 800; 160 TABLET ORAL at 23:11

## 2018-06-20 RX ADMIN — SODIUM CHLORIDE 1000 ML: 9 INJECTION, SOLUTION INTRAVENOUS at 22:01

## 2018-06-20 ASSESSMENT — ENCOUNTER SYMPTOMS
BACK PAIN: 0
DIARRHEA: 0
ABDOMINAL DISTENTION: 0
TROUBLE SWALLOWING: 0
SINUS PRESSURE: 0
ABDOMINAL PAIN: 0
EYE DISCHARGE: 0
VOICE CHANGE: 0
VOMITING: 0
EYE PAIN: 0
EYE REDNESS: 0
WHEEZING: 0
CONSTIPATION: 0
COUGH: 0
SORE THROAT: 0
CHEST TIGHTNESS: 0
RHINORRHEA: 0
BLOOD IN STOOL: 0
CHOKING: 0
SHORTNESS OF BREATH: 0
EYE ITCHING: 0
NAUSEA: 1
PHOTOPHOBIA: 0

## 2018-06-22 LAB
ORGANISM: ABNORMAL
URINE CULTURE REFLEX: ABNORMAL

## 2018-07-02 ENCOUNTER — ANESTHESIA EVENT (OUTPATIENT)
Dept: OPERATING ROOM | Age: 63
End: 2018-07-02
Payer: MEDICARE

## 2018-07-02 ENCOUNTER — ANESTHESIA (OUTPATIENT)
Dept: OPERATING ROOM | Age: 63
End: 2018-07-02
Payer: MEDICARE

## 2018-07-02 ENCOUNTER — HOSPITAL ENCOUNTER (OUTPATIENT)
Age: 63
Setting detail: OUTPATIENT SURGERY
Discharge: HOME | End: 2018-07-02
Attending: PAIN MEDICINE | Admitting: PAIN MEDICINE
Payer: MEDICARE

## 2018-07-02 ENCOUNTER — APPOINTMENT (OUTPATIENT)
Dept: GENERAL RADIOLOGY | Age: 63
End: 2018-07-02
Attending: PAIN MEDICINE
Payer: MEDICARE

## 2018-07-02 VITALS
SYSTOLIC BLOOD PRESSURE: 127 MMHG | DIASTOLIC BLOOD PRESSURE: 52 MMHG | OXYGEN SATURATION: 98 % | RESPIRATION RATE: 26 BRPM

## 2018-07-02 VITALS
TEMPERATURE: 98.4 F | RESPIRATION RATE: 14 BRPM | HEART RATE: 82 BPM | DIASTOLIC BLOOD PRESSURE: 58 MMHG | BODY MASS INDEX: 40.6 KG/M2 | OXYGEN SATURATION: 96 % | HEIGHT: 60 IN | SYSTOLIC BLOOD PRESSURE: 129 MMHG | WEIGHT: 206.8 LBS

## 2018-07-02 LAB — GLUCOSE BLD-MCNC: 139 MG/DL (ref 70–108)

## 2018-07-02 PROCEDURE — 3600000058 HC PAIN LEVEL 5 BASE: Performed by: PAIN MEDICINE

## 2018-07-02 PROCEDURE — 3700000001 HC ADD 15 MINUTES (ANESTHESIA): Performed by: PAIN MEDICINE

## 2018-07-02 PROCEDURE — 3700000000 HC ANESTHESIA ATTENDED CARE: Performed by: PAIN MEDICINE

## 2018-07-02 PROCEDURE — 64636 DESTROY L/S FACET JNT ADDL: CPT | Performed by: PAIN MEDICINE

## 2018-07-02 PROCEDURE — 64635 DESTROY LUMB/SAC FACET JNT: CPT | Performed by: PAIN MEDICINE

## 2018-07-02 PROCEDURE — 7100000010 HC PHASE II RECOVERY - FIRST 15 MIN: Performed by: PAIN MEDICINE

## 2018-07-02 PROCEDURE — 7100000011 HC PHASE II RECOVERY - ADDTL 15 MIN: Performed by: PAIN MEDICINE

## 2018-07-02 PROCEDURE — 3600000059 HC PAIN LEVEL 5 ADDL 15 MIN: Performed by: PAIN MEDICINE

## 2018-07-02 PROCEDURE — 6360000002 HC RX W HCPCS: Performed by: NURSE ANESTHETIST, CERTIFIED REGISTERED

## 2018-07-02 PROCEDURE — 82948 REAGENT STRIP/BLOOD GLUCOSE: CPT

## 2018-07-02 PROCEDURE — 2500000003 HC RX 250 WO HCPCS: Performed by: PAIN MEDICINE

## 2018-07-02 PROCEDURE — 6360000002 HC RX W HCPCS: Performed by: PAIN MEDICINE

## 2018-07-02 PROCEDURE — 3209999900 FLUORO FOR SURGICAL PROCEDURES

## 2018-07-02 RX ORDER — FENTANYL CITRATE 50 UG/ML
INJECTION, SOLUTION INTRAMUSCULAR; INTRAVENOUS PRN
Status: DISCONTINUED | OUTPATIENT
Start: 2018-07-02 | End: 2018-07-02 | Stop reason: SDUPTHER

## 2018-07-02 RX ORDER — LIDOCAINE HYDROCHLORIDE 10 MG/ML
INJECTION, SOLUTION EPIDURAL; INFILTRATION; INTRACAUDAL; PERINEURAL PRN
Status: DISCONTINUED | OUTPATIENT
Start: 2018-07-02 | End: 2018-07-02 | Stop reason: HOSPADM

## 2018-07-02 RX ORDER — BUPIVACAINE HYDROCHLORIDE 2.5 MG/ML
INJECTION, SOLUTION EPIDURAL; INFILTRATION; INTRACAUDAL PRN
Status: DISCONTINUED | OUTPATIENT
Start: 2018-07-02 | End: 2018-07-02 | Stop reason: HOSPADM

## 2018-07-02 RX ORDER — BETAMETHASONE SODIUM PHOSPHATE AND BETAMETHASONE ACETATE 3; 3 MG/ML; MG/ML
INJECTION, SUSPENSION INTRA-ARTICULAR; INTRALESIONAL; INTRAMUSCULAR; SOFT TISSUE PRN
Status: DISCONTINUED | OUTPATIENT
Start: 2018-07-02 | End: 2018-07-02 | Stop reason: HOSPADM

## 2018-07-02 RX ADMIN — FENTANYL CITRATE 50 MCG: 50 INJECTION INTRAMUSCULAR; INTRAVENOUS at 09:54

## 2018-07-02 RX ADMIN — PROPOFOL 60 MG: 10 INJECTION, EMULSION INTRAVENOUS at 10:12

## 2018-07-02 RX ADMIN — PROPOFOL 40 MG: 10 INJECTION, EMULSION INTRAVENOUS at 10:15

## 2018-07-02 ASSESSMENT — PULMONARY FUNCTION TESTS
PIF_VALUE: 0
PIF_VALUE: 3

## 2018-07-02 ASSESSMENT — PAIN DESCRIPTION - PAIN TYPE: TYPE: SURGICAL PAIN;CHRONIC PAIN

## 2018-07-02 ASSESSMENT — PAIN SCALES - GENERAL: PAINLEVEL_OUTOF10: 3

## 2018-07-02 ASSESSMENT — PAIN DESCRIPTION - LOCATION: LOCATION: BACK

## 2018-07-02 ASSESSMENT — ENCOUNTER SYMPTOMS: SHORTNESS OF BREATH: 1

## 2018-07-02 NOTE — PROGRESS NOTES
Dr. Corrina Guardado updated that patient finished antibiotics for UTI on 6/30/18. Ok to continue  with procedure.

## 2018-07-02 NOTE — ANESTHESIA PRE PROCEDURE
COLONOSCOPY  2010, 2016    DILATION AND CURETTAGE OF UTERUS      HYSTERECTOMY  2002    Total    NERVE SURGERY Bilateral 05/08/2018    LUMBAR FACET MBB L3-4, L4-5, L5-S1    OTHER SURGICAL HISTORY Bilateral 03/19/2018    Lumbar Facet MBB L3-4, L4-5, L5-S1    VT INJ DX/THER AGNT PARAVERT FACET JOINT, LUMBAR/SAC, 2ND LEVEL Bilateral 3/19/2018    LUMBAR FACET MBB   @L3-4, L4-5, L5-S1  BILATERAL performed by Rubi Blanchard MD at [facility] DX/THER AGNT PARAVERT FACET JOINT, LUMBAR/SAC, 2ND LEVEL Bilateral 5/8/2018    LUMBAR FACET MBB   @L3-4, L4-5, L5-S1  BILATERAL performed by Rubi Blanchard MD at [facility]  2016    [facility] Dr. Char Rockwell       Social History:    Social History   Substance Use Topics    Smoking status: Passive Smoke Exposure - Never Smoker    Smokeless tobacco: Never Used    Alcohol use No                                Counseling given: Not Answered      Vital Signs (Current): There were no vitals filed for this visit.                                            BP Readings from Last 3 Encounters:   06/20/18 (!) 142/68   06/06/18 137/62   05/29/18 127/64       NPO Status:                                                                                 BMI:   Wt Readings from Last 3 Encounters:   06/06/18 210 lb 8 oz (95.5 kg)   05/29/18 211 lb 10.3 oz (96 kg)   05/22/18 211 lb 9.6 oz (96 kg)     There is no height or weight on file to calculate BMI.    CBC:   Lab Results   Component Value Date    WBC 12.0 06/20/2018    RBC 4.75 06/20/2018    HGB 13.9 06/20/2018    HCT 41.7 06/20/2018    MCV 87.9 06/20/2018    RDW 15.2 06/20/2018     06/20/2018       CMP:   Lab Results   Component Value Date     06/20/2018    K 4.9 06/20/2018    CL 99 06/20/2018    CO2 23 06/20/2018    BUN 16 06/20/2018    CREATININE 1.0 06/20/2018    LABGLOM 56 06/20/2018    GLUCOSE 258 06/20/2018    GLUCOSE 74 01/24/2018    PROT 7.8 06/20/2018    CALCIUM 10.3 06/20/2018    BILITOT 0.3 06/20/2018    ALKPHOS 97 06/20/2018    AST 27 06/20/2018    ALT 31 06/20/2018       POC Tests: No results for input(s): POCGLU, POCNA, POCK, POCCL, POCBUN, POCHEMO, POCHCT in the last 72 hours. Coags: No results found for: PROTIME, INR, APTT    HCG (If Applicable): No results found for: PREGTESTUR, PREGSERUM, HCG, HCGQUANT     ABGs: No results found for: PHART, PO2ART, OHF4WYJ, RUZ3ISZ, BEART, J0VXBZRK     Type & Screen (If Applicable):  Lab Results   Component Value Date    LABRH POS 07/24/2015       Anesthesia Evaluation    Airway: Mallampati: III       Mouth opening: > = 3 FB Dental:          Pulmonary:   (+) shortness of breath:  sleep apnea:  asthma:                            Cardiovascular:    (+) hypertension:, CHF:,                   Neuro/Psych:               GI/Hepatic/Renal:   (+) GERD:,           Endo/Other:    (+) Diabetes, hypothyroidism::., .                 Abdominal:   (+) obese,         Vascular:                                        Anesthesia Plan      MAC     ASA 4             Anesthetic plan and risks discussed with patient. Plan discussed with CRNA.                   Freeman Santiago MD   7/2/2018

## 2018-07-02 NOTE — PROGRESS NOTES
1020: Patient to phase 2 recovery room via cart. Patient arouses easily to name. Report received from surgical Carolyn Issa. Patient's vitals obtained, see charting. 1021: Patient is denying nausea and stating her back hurts a little bit. 1024: Offered drink and snack provided to the patient. Ice pack given to apply to incision sites. Bed is in the lowest position, call light is within reach and patient's  is at the bedside. 1045: Discharge instructions given and explained to the patient and the patient's , both verbalized understanding. 1047: IV removed at this time, no complications. Patient is getting dressed at this time,  remains at the bedside. 1054: Patient discharged home in stable condition with her .

## 2018-07-26 ENCOUNTER — TELEPHONE (OUTPATIENT)
Dept: INTERNAL MEDICINE CLINIC | Age: 63
End: 2018-07-26

## 2018-07-30 ENCOUNTER — OFFICE VISIT (OUTPATIENT)
Dept: PHYSICAL MEDICINE AND REHAB | Age: 63
End: 2018-07-30
Payer: MEDICARE

## 2018-07-30 ENCOUNTER — TELEPHONE (OUTPATIENT)
Dept: CARDIOLOGY CLINIC | Age: 63
End: 2018-07-30

## 2018-07-30 VITALS
HEART RATE: 62 BPM | SYSTOLIC BLOOD PRESSURE: 113 MMHG | DIASTOLIC BLOOD PRESSURE: 60 MMHG | WEIGHT: 210 LBS | HEIGHT: 60 IN | BODY MASS INDEX: 41.23 KG/M2

## 2018-07-30 DIAGNOSIS — G89.4 CHRONIC PAIN SYNDROME: ICD-10-CM

## 2018-07-30 DIAGNOSIS — M46.1 SI (SACROILIAC) JOINT INFLAMMATION (HCC): ICD-10-CM

## 2018-07-30 DIAGNOSIS — M51.36 DDD (DEGENERATIVE DISC DISEASE), LUMBAR: ICD-10-CM

## 2018-07-30 DIAGNOSIS — M47.816 SPONDYLOSIS OF LUMBAR REGION WITHOUT MYELOPATHY OR RADICULOPATHY: Primary | ICD-10-CM

## 2018-07-30 PROCEDURE — 99213 OFFICE O/P EST LOW 20 MIN: CPT | Performed by: NURSE PRACTITIONER

## 2018-07-30 ASSESSMENT — ENCOUNTER SYMPTOMS
ABDOMINAL PAIN: 0
COLOR CHANGE: 0
BACK PAIN: 1
CONSTIPATION: 0

## 2018-07-30 NOTE — TELEPHONE ENCOUNTER
Called and talked to patient. She has appt on 8/23/18 to see Dr. Palomo Jessica and she is willing to wait till appt to be evaluated for clearance.

## 2018-07-30 NOTE — PROGRESS NOTES
since last visit. Pain scale with RFA/Tylenol/gabapentin is  2/10. Drug screen reviewed from 18 and was appropriate    The patient is allergic to ibuprofen. Past Medical History  Hayder Sawyer  has a past medical history of Asthma; Back pain; Chronic diastolic CHF (congestive heart failure) ([address] Colitis; Depression; Depression; Gastroesophageal reflux; GERD (gastroesophageal reflux disease); H/O endoscopy; Hyperlipidemia; Hypertension; Hypertension; Hypothyroidism; Incontinence of urine; Lordosis (acquired) (postural); Spondylosis of unspecified site without mention of myelopathy; Thoracic or lumbosacral neuritis or radiculitis, unspecified; Type II or unspecified type diabetes mellitus without mention of complication, not stated as uncontrolled; Unspecified sleep apnea; and Urinary incontinence. Past Surgical History  The patient  has a past surgical history that includes  section (); Carpal tunnel release (Bilateral, ); Abdomen surgery (); Dilation and curettage of uterus; Hysterectomy (); cardiovascular stress test (2015); Colonoscopy (, ); Upper gastrointestinal endoscopy (); Cardiac catheterization (); other surgical history (Bilateral, 2018); pr inj dx/ther agnt paravert facet joint, lumbar/sac, 2nd level (Bilateral, 3/19/2018); Nerve Surgery (Bilateral, 2018); pr inj dx/ther agnt paravert facet joint, lumbar/sac, 2nd level (Bilateral, 2018); and pr inject rx other periph nerve (Bilateral, 2018). Family History  This patient's family history includes Diabetes in her father, mother, and sister; Heart Disease in her father and mother; Stroke in her mother and sister; Thyroid Disease in her brother. Social History  Hayder Sawyer  reports that she is a non-smoker but has been exposed to tobacco smoke. She has never used smokeless tobacco. She reports that she does not drink alcohol or use drugs.     Medications    Current Outpatient

## 2018-07-31 ENCOUNTER — TELEPHONE (OUTPATIENT)
Dept: PHYSICAL MEDICINE AND REHAB | Age: 63
End: 2018-07-31

## 2018-08-23 ENCOUNTER — OFFICE VISIT (OUTPATIENT)
Dept: CARDIOLOGY CLINIC | Age: 63
End: 2018-08-23
Payer: MEDICARE

## 2018-08-23 VITALS
HEART RATE: 88 BPM | BODY MASS INDEX: 41.23 KG/M2 | DIASTOLIC BLOOD PRESSURE: 60 MMHG | WEIGHT: 210 LBS | HEIGHT: 60 IN | SYSTOLIC BLOOD PRESSURE: 130 MMHG

## 2018-08-23 DIAGNOSIS — Z01.810 PREOP CARDIOVASCULAR EXAM: Primary | ICD-10-CM

## 2018-08-23 PROCEDURE — 99213 OFFICE O/P EST LOW 20 MIN: CPT | Performed by: INTERNAL MEDICINE

## 2018-08-23 RX ORDER — METOPROLOL SUCCINATE 50 MG/1
50 TABLET, EXTENDED RELEASE ORAL DAILY
COMMUNITY

## 2018-08-23 ASSESSMENT — ENCOUNTER SYMPTOMS
EYE DISCHARGE: 0
EYE PAIN: 0
SPUTUM PRODUCTION: 0
ABDOMINAL PAIN: 0
ORTHOPNEA: 0
COUGH: 0
BOWEL INCONTINENCE: 0
CHANGE IN BOWEL HABIT: 0
HOARSE VOICE: 0
DOUBLE VISION: 0
BLOATING: 0
HEMOPTYSIS: 0
BLURRED VISION: 0
BACK PAIN: 0
SHORTNESS OF BREATH: 0
CONSTIPATION: 0
DIARRHEA: 0

## 2018-08-23 NOTE — PROGRESS NOTES
Reymundo Geiger [facility] CARDIOLOGY  [address]  [address] [place] [address] [address] [address]  Dept: [phone]  Dept Fax: [phone]  Loc: [phone]    Visit Date: 8/23/2018    Ms. Rossy Gage is a 63 y.o. female  who presented for:  Chief Complaint   Patient presents with    1 Year Follow Up    Cardiac Clearance     Dr Lombardi Foot pain mangement   preop     HPI:   62 yo F c hx of DM, HTN, HLD, Negrito on CPaP, diastolic HF, is here for preop risks tratification for hip injections. Patient used to follow up with Jina Arguelles in the past.  Denies any chest pain, sob, palpitations, lightheadedness, dizziness, orthopnea, PND or pedal edema. Current Outpatient Prescriptions:     metoprolol succinate (TOPROL XL) 50 MG extended release tablet, Take 50 mg by mouth daily, Disp: , Rfl:     insulin lispro (HUMALOG KWIKPEN) 100 UNIT/ML pen, Inject 17 units before breakfast, 20 units before lunch and 28 units before dinner plus sliding scale. Patient uses a max of 96 units per day., Disp: 10 pen, Rfl: 3    Liraglutide (VICTOZA) 18 MG/3ML SOPN SC injection, Inject 1.2 mg into the skin daily, Disp: 4 pen, Rfl: 3    insulin glargine (LANTUS SOLOSTAR) 100 UNIT/ML injection pen, 35 in the am, 29 in the pm, Disp: 5 pen, Rfl: 0    meclizine (ANTIVERT) 25 MG tablet, Take 25 mg by mouth 3 times daily as needed, Disp: , Rfl:     ARIPiprazole (ABILIFY) 10 MG tablet, Take 10 mg by mouth daily, Disp: , Rfl:     lidocaine (XYLOCAINE) 5 % ointment, Apply topically 3 times daily as needed for Pain Apply topically as needed. , Disp: , Rfl:     spironolactone (ALDACTONE) 25 MG tablet, Take 25 mg by mouth daily, Disp: , Rfl:     traZODone (DESYREL) 50 MG tablet, Take 50 mg by mouth nightly, Disp: , Rfl:     Omega-3 Fatty Acids (FISH OIL) 1000 MG CAPS, Take 3,000 mg by mouth daily, Disp: , Rfl:     benztropine (COGENTIN) 0.5 MG tablet, Take 0.5 mg by mouth daily, Disp: , Rfl:     Ascorbic Acid (VITAMIN C) 500 MG tablet, Take 1,000 mg by mouth daily, Disp: , Rfl:     Cyanocobalamin (VITAMIN B 12 PO), Take 1,000 mg by mouth daily , Disp: , Rfl:     busPIRone (BUSPAR) 30 MG tablet, Take 30 mg by mouth 3 times daily , Disp: , Rfl:     lisinopril (PRINIVIL;ZESTRIL) 5 MG tablet, Take 7.5 mg by mouth daily , Disp: , Rfl:     pantoprazole (PROTONIX) 40 MG tablet, Take 1 tablet by mouth daily, Disp: 30 tablet, Rfl: 12    vilazodone HCl (VIIBRYD) 40 MG TABS, Take 40 mg by mouth daily, Disp: , Rfl:     glucose blood VI test strips (FREESTYLE LITE) strip, Check BS 4 times a day, Disp: 400 each, Rfl: 1    Blood Glucose Monitoring Suppl (FREESTYLE LITE) EMILIE, 1 Device by Does not apply route daily as needed, Disp: 1 Device, Rfl: 0    aspirin 81 MG tablet, Take 81 mg by mouth daily, Disp: , Rfl:     ammonium lactate (AMLACTIN) 12 % cream, Apply topically 2 times daily Apply topically as needed. , Disp: , Rfl:     Multiple Vitamins-Minerals (MULTI FOR HER PO), Take by mouth daily, Disp: , Rfl:     atorvastatin (LIPITOR) 40 MG tablet, Take 40 mg by mouth daily, Disp: , Rfl:     ferrous sulfate 325 (65 FE) MG tablet, Take 325 mg by mouth daily (with breakfast), Disp: , Rfl:     fenofibrate (TRICOR) 145 MG tablet, Take 145 mg by mouth daily, Disp: , Rfl:     Levothyroxine Sodium (SYNTHROID PO), Take 150 mcg by mouth 6 days a week, Disp: , Rfl:     Gabapentin (NEURONTIN PO),  Take 400 mg by mouth three times daily , Disp: , Rfl:     DOXEPIN HCL PO, Take 200 mg by mouth nightly., Disp: , Rfl:     lamoTRIgine (LAMICTAL) 100 MG tablet,  Take by mouth daily 200MG AM AND 100MG AT SUPPER, Disp: , Rfl:     metFORMIN (GLUCOPHAGE) 1000 MG tablet, Take 1,000 mg by mouth 2 times daily (with meals). , Disp: , Rfl:     Past Medical History  Mary Segundo  has a past medical history of Asthma; Back pain; Chronic diastolic CHF (congestive heart failure) ([facility]  Colitis; Depression; Depression; Gastroesophageal reflux; GERD (gastroesophageal reflux 45 [address] ENDOSCOPY  2016    [facility] Dr. Char Rockwell       Subjective:     Review of Systems   Constitution: Negative for decreased appetite, diaphoresis, fever, weakness and malaise/fatigue. HENT: Negative for congestion, hearing loss and hoarse voice. Eyes: Negative for blurred vision, discharge, double vision and pain. Cardiovascular: Negative for chest pain, claudication, cyanosis, dyspnea on exertion, irregular heartbeat, leg swelling, near-syncope, orthopnea, palpitations, paroxysmal nocturnal dyspnea and syncope. Respiratory: Negative for cough, hemoptysis, shortness of breath and sputum production. Endocrine: Negative for cold intolerance, heat intolerance, polydipsia, polyphagia and polyuria. Musculoskeletal: Negative for arthritis, back pain, falls, gout, joint pain and joint swelling. Gastrointestinal: Negative for bloating, abdominal pain, anorexia, change in bowel habit, bowel incontinence, constipation and diarrhea. Genitourinary: Negative for bladder incontinence, dysuria, flank pain, frequency and hematuria. Neurological: Negative for difficulty with concentration, disturbances in coordination, focal weakness, headaches and numbness. Psychiatric/Behavioral: Negative for altered mental status and depression. All others reviewed and are negative. Objective:     /60   Pulse 88   Ht 5' (1.524 m)   Wt 210 lb (95.3 kg)   BMI 41.01 kg/m²     Wt Readings from Last 3 Encounters:   08/23/18 210 lb (95.3 kg)   07/30/18 210 lb (95.3 kg)   07/02/18 206 lb 12.8 oz (93.8 kg)     BP Readings from Last 3 Encounters:   08/23/18 130/60   07/30/18 113/60   07/02/18 (!) 129/58       Physical Exam   Constitutional: She is oriented to person, place, and time. She appears well-developed and well-nourished. HENT:   Head: Normocephalic and atraumatic. Eyes: Pupils are equal, round, and reactive to light. EOM are normal.   Neck: Normal range of motion.  Neck supple. No JVD present. Cardiovascular: Normal rate, regular rhythm, normal heart sounds and intact distal pulses. No murmur heard. Pulmonary/Chest: Effort normal and breath sounds normal. No respiratory distress. She has no wheezes. She has no rales. Abdominal: Soft. Bowel sounds are normal. She exhibits no distension. There is no tenderness. Musculoskeletal: Normal range of motion. She exhibits no edema. Neurological: She is alert and oriented to person, place, and time. No cranial nerve deficit. Coordination normal.   Skin: Skin is warm and dry. Psychiatric: She has a normal mood and affect. Nursing note and vitals reviewed.       No results found for: CKTOTAL, CKMB, CKMBINDEX    Lab Results   Component Value Date    WBC 12.0 06/20/2018    RBC 4.75 06/20/2018    HGB 13.9 06/20/2018    HCT 41.7 06/20/2018    MCV 87.9 06/20/2018    MCH 29.2 06/20/2018    MCHC 33.2 06/20/2018    RDW 15.2 06/20/2018     06/20/2018    MPV 9.8 06/20/2018       Lab Results   Component Value Date     06/20/2018    K 4.9 06/20/2018    CL 99 06/20/2018    CO2 23 06/20/2018    BUN 16 06/20/2018    LABALBU 4.3 06/20/2018    CREATININE 1.0 06/20/2018    CALCIUM 10.3 06/20/2018    LABGLOM 56 06/20/2018    GLUCOSE 258 06/20/2018    GLUCOSE 74 01/24/2018       Lab Results   Component Value Date    ALKPHOS 97 06/20/2018    ALT 31 06/20/2018    AST 27 06/20/2018    PROT 7.8 06/20/2018    BILITOT 0.3 06/20/2018    BILIDIR <0.2 06/20/2018    LABALBU 4.3 06/20/2018       Lab Results   Component Value Date    MG 1.6 06/20/2018       No results found for: INR, PROTIME      Lab Results   Component Value Date    LABA1C 7.0 02/27/2018       Lab Results   Component Value Date    TRIG 120 10/25/2017    HDL 27 10/25/2017    LDLCALC 71 10/25/2017    LABVLDL 24 10/25/2017       Lab Results   Component Value Date    TSH 3.750 06/20/2018         Testing Reviewed:      I have individually reviewed the below cardiac tests    EKG:SR, (Interpreting   physician) on 07/22/2017 at 12:43 PM   ----------------------------------------------------------------      Findings      Mitral Valve   The mitral valve structure was normal with normal leaflet separation. DOPPLER: The transmitral velocity was within the normal range with no   evidence for mitral stenosis. There was no evidence of mitral   regurgitation. Aortic Valve   The aortic valve was trileaflet with normal thickness and cuspal   separation. DOPPLER: Transaortic velocity was within the normal range with   no evidence of aortic stenosis. There was no evidence of aortic   regurgitation. Tricuspid Valve   The tricuspid valve structure was normal with normal leaflet separation. DOPPLER: There was no evidence of tricuspid stenosis. There was no   evidence of tricuspid regurgitation. Pulmonic Valve   The pulmonic valve leaflets exhibited normal thickness, no calcification,   and normal cuspal separation. DOPPLER: The transpulmonic velocity was   within the normal range with no evidence for regurgitation. Left Atrium   Left atrial size was normal.      Left Ventricle   Normal left ventricle size and systolic function. Ejection fraction was   estimated at 62%. There were no regional left ventricular wall motion   abnormalities and wall thickness was within normal limits. Doppler parameters were consistent with abnormal left ventricular   relaxation (grade 1 diastolic dysfunction). Right Atrium   Right atrial size was normal.      Right Ventricle   The right ventricular size was normal with normal systolic function and   wall thickness. Pericardial Effusion   The pericardium was normal in appearance with no evidence of a pericardial   effusion. Pleural Effusion   No evidence of pleural effusion. Aorta / Great Vessels   -Aortic root dimension within normal limits.   -The Pulmonary artery is within normal limits.    -IVC size is within normal limits with CAD    Assessment/Plan       Diagnosis Orders   1. Preop cardiovascular exam       Preop hip injections  Diastolic dysfunction  DM  HTN  HLD  Obesity  SERENA on CPAP    Patient denies any cardiac symptoms at present  Reviewed prior workup  BP well controlled  Continue Aspirin, lisinopril, metoprolol, spironolactone and statin  Patient is at a low risk for perioperative cardiac event  May proceed with the scheduled procedure  The patient is asked to make an attempt to improve diet and exercise patterns to aid in medical management of this problem. Advised patient to call office or seek immediate medical attention if there is any new onset of  any chest pain, sob, palpitations, lightheadedness, dizziness, orthopnea, PND or pedal edema. Thank you for allowing me to participate in the care of this patient. Please do not hesitate to contact me for any further questions. Return in about 1 year (around 8/23/2019), or if symptoms worsen or fail to improve, for Review testing, Regular follow up.        Electronically signed by Ash Neal MD [facility]  8/23/2018 at 10:54 AM

## 2018-08-23 NOTE — PROGRESS NOTES
Pt here for 1 year check up and Pre op clearance for back injection with Dr Bertrand Dawkins.   Stop ASA 5-7 days    Denies chest pain or SOB    C/O tachycardia with increased activity

## 2018-09-15 LAB
AVERAGE GLUCOSE: 143 MG/DL (ref 66–114)
HBA1C MFR BLD: 6.6 % (ref 4.2–5.8)
T4 FREE: 1.72 NG/DL (ref 0.8–1.9)
TSH SERPL DL<=0.05 MIU/L-ACNC: 0.69 UIU/ML (ref 0.4–4.1)

## 2018-09-17 ENCOUNTER — OFFICE VISIT (OUTPATIENT)
Dept: INTERNAL MEDICINE CLINIC | Age: 63
End: 2018-09-17
Payer: MEDICARE

## 2018-09-17 VITALS
SYSTOLIC BLOOD PRESSURE: 128 MMHG | BODY MASS INDEX: 40.47 KG/M2 | DIASTOLIC BLOOD PRESSURE: 64 MMHG | HEART RATE: 84 BPM | WEIGHT: 207.2 LBS | RESPIRATION RATE: 16 BRPM

## 2018-09-17 DIAGNOSIS — Z99.89 OSA ON CPAP: ICD-10-CM

## 2018-09-17 DIAGNOSIS — G47.33 OSA ON CPAP: ICD-10-CM

## 2018-09-17 DIAGNOSIS — E11.9 WELL CONTROLLED TYPE 2 DIABETES MELLITUS (HCC): Primary | ICD-10-CM

## 2018-09-17 DIAGNOSIS — E78.2 MIXED HYPERLIPIDEMIA: ICD-10-CM

## 2018-09-17 DIAGNOSIS — E66.01 OBESITY, MORBID, BMI 40.0-49.9 (HCC): ICD-10-CM

## 2018-09-17 DIAGNOSIS — I10 ESSENTIAL HYPERTENSION: ICD-10-CM

## 2018-09-17 DIAGNOSIS — N18.30 STAGE 3 CHRONIC KIDNEY DISEASE (HCC): ICD-10-CM

## 2018-09-17 PROCEDURE — 99204 OFFICE O/P NEW MOD 45 MIN: CPT | Performed by: INTERNAL MEDICINE

## 2018-09-17 NOTE — PROGRESS NOTES
Partners: Male     Other Topics Concern    Not on file     Social History Narrative    No narrative on file       Family History   Problem Relation Age of Onset    Diabetes Mother     Heart Disease Mother     Stroke Mother     Diabetes Father     Heart Disease Father     Diabetes Sister     Stroke Sister     Thyroid Disease Brother        Review of Systems - General ROS: negative  Psychological ROS: depression  Hematological and Lymphatic ROS: negative  Respiratory ROS: no cough, shortness of breath, or wheezing  Cardiovascular ROS: no chest pain or dyspnea on exertion  Gastrointestinal ROS: no abdominal pain, change in bowel habits, or black or bloody stools  Genito-polyuria  Musculoskeletal ROS: negative  Neurological ROS: no TIA or stroke symptoms  Dermatological ROS: thin hair, brittle nails    Blood pressure 128/64, pulse 84, resp. rate 16, weight 207 lb 3.2 oz (94 kg), not currently breastfeeding. Physical Examination: General appearance - alert, well appearing, and in no distress  HEENT-head normocephalic, atraumatic  Mental status - alert, oriented to person, place, and time  Neck - supple, no significant adenopathy  Chest - clear to auscultation, no wheezes, rales or rhonchi, symmetric air entry  Heart - normal rate, regular rhythm, normal S1, S2, no murmurs, rubs, clicks or gallops  Abdomen - soft, nontender, nondistended, no masses or organomegaly  Neurological - alert, oriented, normal speech, no focal findings or movement disorder noted  Musculoskeletal - no joint tenderness, deformity or swelling  Extremities - peripheral pulses normal, no pedal edema, no clubbing or cyanosis  Skin - normal coloration and turgor, no rashes, no suspicious skin lesions noted   DIABETIC FOOT EXAM    SENSATION: decreased on top left  PROPRIOCEPTION: intact  VIBRATORY SENSE: intact  No  ulcers, erythema or other lesions noted  Pulses present and normal       Diagnosis Orders   1.  Well controlled type 2

## 2018-09-28 ENCOUNTER — TELEPHONE (OUTPATIENT)
Dept: OPERATING ROOM | Age: 63
End: 2018-09-28

## 2018-10-02 ENCOUNTER — ANESTHESIA EVENT (OUTPATIENT)
Dept: OPERATING ROOM | Age: 63
End: 2018-10-02
Payer: MEDICARE

## 2018-10-02 ENCOUNTER — ANESTHESIA (OUTPATIENT)
Dept: OPERATING ROOM | Age: 63
End: 2018-10-02
Payer: MEDICARE

## 2018-10-02 ENCOUNTER — APPOINTMENT (OUTPATIENT)
Dept: GENERAL RADIOLOGY | Age: 63
End: 2018-10-02
Attending: PAIN MEDICINE
Payer: MEDICARE

## 2018-10-02 ENCOUNTER — HOSPITAL ENCOUNTER (OUTPATIENT)
Age: 63
Setting detail: OUTPATIENT SURGERY
Discharge: HOME | End: 2018-10-02
Attending: PAIN MEDICINE | Admitting: PAIN MEDICINE
Payer: MEDICARE

## 2018-10-02 VITALS
OXYGEN SATURATION: 95 % | HEIGHT: 60 IN | WEIGHT: 204 LBS | RESPIRATION RATE: 20 BRPM | DIASTOLIC BLOOD PRESSURE: 65 MMHG | BODY MASS INDEX: 40.05 KG/M2 | HEART RATE: 76 BPM | SYSTOLIC BLOOD PRESSURE: 144 MMHG | TEMPERATURE: 97.2 F

## 2018-10-02 VITALS
DIASTOLIC BLOOD PRESSURE: 74 MMHG | OXYGEN SATURATION: 99 % | SYSTOLIC BLOOD PRESSURE: 173 MMHG | RESPIRATION RATE: 26 BRPM

## 2018-10-02 LAB — GLUCOSE BLD-MCNC: 97 MG/DL (ref 70–108)

## 2018-10-02 PROCEDURE — 6360000002 HC RX W HCPCS: Performed by: NURSE ANESTHETIST, CERTIFIED REGISTERED

## 2018-10-02 PROCEDURE — 7100000010 HC PHASE II RECOVERY - FIRST 15 MIN: Performed by: PAIN MEDICINE

## 2018-10-02 PROCEDURE — 6360000004 HC RX CONTRAST MEDICATION: Performed by: PAIN MEDICINE

## 2018-10-02 PROCEDURE — 3600000054 HC PAIN LEVEL 3 BASE: Performed by: PAIN MEDICINE

## 2018-10-02 PROCEDURE — 2500000003 HC RX 250 WO HCPCS: Performed by: PAIN MEDICINE

## 2018-10-02 PROCEDURE — 2709999900 HC NON-CHARGEABLE SUPPLY: Performed by: PAIN MEDICINE

## 2018-10-02 PROCEDURE — 3209999900 FLUORO FOR SURGICAL PROCEDURES

## 2018-10-02 PROCEDURE — 3700000000 HC ANESTHESIA ATTENDED CARE: Performed by: PAIN MEDICINE

## 2018-10-02 PROCEDURE — 82948 REAGENT STRIP/BLOOD GLUCOSE: CPT

## 2018-10-02 PROCEDURE — 6360000002 HC RX W HCPCS: Performed by: PAIN MEDICINE

## 2018-10-02 PROCEDURE — 27096 INJECT SACROILIAC JOINT: CPT | Performed by: PAIN MEDICINE

## 2018-10-02 PROCEDURE — 7100000011 HC PHASE II RECOVERY - ADDTL 15 MIN: Performed by: PAIN MEDICINE

## 2018-10-02 PROCEDURE — 2500000003 HC RX 250 WO HCPCS: Performed by: NURSE ANESTHETIST, CERTIFIED REGISTERED

## 2018-10-02 PROCEDURE — 2580000003 HC RX 258: Performed by: NURSE ANESTHETIST, CERTIFIED REGISTERED

## 2018-10-02 RX ORDER — PROPOFOL 10 MG/ML
INJECTION, EMULSION INTRAVENOUS PRN
Status: DISCONTINUED | OUTPATIENT
Start: 2018-10-02 | End: 2018-10-02 | Stop reason: SDUPTHER

## 2018-10-02 RX ORDER — LIDOCAINE HYDROCHLORIDE 20 MG/ML
INJECTION, SOLUTION EPIDURAL; INFILTRATION; INTRACAUDAL; PERINEURAL PRN
Status: DISCONTINUED | OUTPATIENT
Start: 2018-10-02 | End: 2018-10-02 | Stop reason: SDUPTHER

## 2018-10-02 RX ORDER — 0.9 % SODIUM CHLORIDE 0.9 %
VIAL (ML) INJECTION PRN
Status: DISCONTINUED | OUTPATIENT
Start: 2018-10-02 | End: 2018-10-02 | Stop reason: SDUPTHER

## 2018-10-02 RX ORDER — METHYLPREDNISOLONE ACETATE 80 MG/ML
INJECTION, SUSPENSION INTRA-ARTICULAR; INTRALESIONAL; INTRAMUSCULAR; SOFT TISSUE PRN
Status: DISCONTINUED | OUTPATIENT
Start: 2018-10-02 | End: 2018-10-02 | Stop reason: HOSPADM

## 2018-10-02 RX ORDER — BUPIVACAINE HYDROCHLORIDE 2.5 MG/ML
INJECTION, SOLUTION EPIDURAL; INFILTRATION; INTRACAUDAL PRN
Status: DISCONTINUED | OUTPATIENT
Start: 2018-10-02 | End: 2018-10-02 | Stop reason: HOSPADM

## 2018-10-02 RX ORDER — LIDOCAINE HYDROCHLORIDE 10 MG/ML
INJECTION, SOLUTION INFILTRATION; PERINEURAL PRN
Status: DISCONTINUED | OUTPATIENT
Start: 2018-10-02 | End: 2018-10-02 | Stop reason: HOSPADM

## 2018-10-02 RX ADMIN — LIDOCAINE HYDROCHLORIDE 20 MG: 20 INJECTION, SOLUTION EPIDURAL; INFILTRATION; INTRACAUDAL; PERINEURAL at 09:57

## 2018-10-02 RX ADMIN — PROPOFOL 50 MG: 10 INJECTION, EMULSION INTRAVENOUS at 09:57

## 2018-10-02 RX ADMIN — SODIUM CHLORIDE 5 ML: 9 INJECTION, SOLUTION INTRAMUSCULAR; INTRAVENOUS; SUBCUTANEOUS at 09:57

## 2018-10-02 ASSESSMENT — PULMONARY FUNCTION TESTS
PIF_VALUE: 0

## 2018-10-02 ASSESSMENT — ENCOUNTER SYMPTOMS: SHORTNESS OF BREATH: 1

## 2018-10-02 ASSESSMENT — PAIN SCALES - GENERAL: PAINLEVEL_OUTOF10: 0

## 2018-10-02 ASSESSMENT — PAIN - FUNCTIONAL ASSESSMENT: PAIN_FUNCTIONAL_ASSESSMENT: 0-10

## 2018-10-08 ENCOUNTER — TELEPHONE (OUTPATIENT)
Dept: PHYSICAL MEDICINE AND REHAB | Age: 63
End: 2018-10-08

## 2018-11-06 ENCOUNTER — OFFICE VISIT (OUTPATIENT)
Dept: PHYSICAL MEDICINE AND REHAB | Age: 63
End: 2018-11-06
Payer: MEDICARE

## 2018-11-06 VITALS
HEIGHT: 60 IN | DIASTOLIC BLOOD PRESSURE: 64 MMHG | HEART RATE: 86 BPM | SYSTOLIC BLOOD PRESSURE: 108 MMHG | BODY MASS INDEX: 40.04 KG/M2 | WEIGHT: 203.93 LBS

## 2018-11-06 DIAGNOSIS — M46.1 SI (SACROILIAC) JOINT INFLAMMATION (HCC): Primary | ICD-10-CM

## 2018-11-06 DIAGNOSIS — G89.4 CHRONIC PAIN SYNDROME: ICD-10-CM

## 2018-11-06 DIAGNOSIS — M51.36 DDD (DEGENERATIVE DISC DISEASE), LUMBAR: ICD-10-CM

## 2018-11-06 DIAGNOSIS — M47.816 SPONDYLOSIS OF LUMBAR REGION WITHOUT MYELOPATHY OR RADICULOPATHY: ICD-10-CM

## 2018-11-06 PROCEDURE — 99213 OFFICE O/P EST LOW 20 MIN: CPT | Performed by: NURSE PRACTITIONER

## 2018-11-06 RX ORDER — DULOXETIN HYDROCHLORIDE 60 MG/1
90 CAPSULE, DELAYED RELEASE ORAL DAILY
Status: ON HOLD | COMMUNITY
End: 2019-09-25

## 2018-11-06 ASSESSMENT — ENCOUNTER SYMPTOMS
BACK PAIN: 1
SORE THROAT: 0
PHOTOPHOBIA: 0
COUGH: 0
VOMITING: 0
RHINORRHEA: 0
CONSTIPATION: 0
SHORTNESS OF BREATH: 0
WHEEZING: 0
ABDOMINAL PAIN: 0
NAUSEA: 0
EYE PAIN: 0
COLOR CHANGE: 0
SINUS PRESSURE: 0
DIARRHEA: 0
CHEST TIGHTNESS: 0

## 2018-11-06 NOTE — PROGRESS NOTES
Colitis; Depression; Depression; Gastroesophageal reflux; GERD (gastroesophageal reflux disease); H/O endoscopy; Hyperlipidemia; Hypertension; Hypertension; Hypothyroidism; Incontinence of urine; Lordosis (acquired) (postural); Spondylosis of unspecified site without mention of myelopathy; Thoracic or lumbosacral neuritis or radiculitis, unspecified; Type II or unspecified type diabetes mellitus without mention of complication, not stated as uncontrolled; Unspecified sleep apnea; and Urinary incontinence. Past Surgical History  The patient  has a past surgical history that includes  section (); Carpal tunnel release (Bilateral, ); Abdomen surgery (); Dilation and curettage of uterus; Hysterectomy (); cardiovascular stress test (2015); Colonoscopy (, ); Upper gastrointestinal endoscopy (); Cardiac catheterization (); other surgical history (Bilateral, 2018); pr inj dx/ther agnt paravert facet joint, lumbar/sac, 2nd level (Bilateral, 3/19/2018); Nerve Surgery (Bilateral, 2018); pr inj dx/ther agnt paravert facet joint, lumbar/sac, 2nd level (Bilateral, 2018); pr inject rx other periph nerve (Bilateral, 2018); and pr office/outpt visit,procedure only (Right, 10/2/2018). Family History  This patient's family history includes Diabetes in her father, mother, and sister; Heart Disease in her father and mother; Stroke in her mother and sister; Thyroid Disease in her brother. Social History  Varghese Ibrahim  reports that she is a non-smoker but has been exposed to tobacco smoke. She has never used smokeless tobacco. She reports that she does not drink alcohol or use drugs.     Medications    Current Outpatient Prescriptions:     DULoxetine (CYMBALTA) 60 MG extended release capsule, Take 60 mg by mouth daily, Disp: , Rfl:     metoprolol succinate (TOPROL XL) 50 MG extended release tablet, Take 50 mg by mouth daily, Disp: , Rfl:     insulin lispro (HUMALOG dizziness, tremors, seizures, syncope, facial asymmetry, speech difficulty, light-headedness and headaches. Hematological: Does not bruise/bleed easily. Psychiatric/Behavioral: Negative for agitation, behavioral problems, confusion, decreased concentration, dysphoric mood, hallucinations, self-injury, sleep disturbance and suicidal ideas. The patient is not nervous/anxious and is not hyperactive. Objective:     Vitals:    11/06/18 1245   BP: 108/64   Site: Left Upper Arm   Position: Sitting   Pulse: 86   Weight: 203 lb 14.8 oz (92.5 kg)   Height: 5' (1.524 m)       Physical Exam   Constitutional: She is oriented to person, place, and time. She appears well-developed and well-nourished. No distress. HENT:   Head: Normocephalic and atraumatic. Right Ear: External ear normal.   Left Ear: External ear normal.   Nose: Nose normal.   Mouth/Throat: Oropharynx is clear and moist. No oropharyngeal exudate. Eyes: Pupils are equal, round, and reactive to light. Conjunctivae and EOM are normal. Right eye exhibits no discharge. Left eye exhibits no discharge. No scleral icterus. Neck: Normal range of motion and full passive range of motion without pain. Neck supple. No muscular tenderness present. No neck rigidity. No edema, no erythema and normal range of motion present. No thyromegaly present. Cardiovascular: Normal rate, regular rhythm and intact distal pulses. Exam reveals no gallop and no friction rub. Murmur heard. Pulmonary/Chest: Effort normal and breath sounds normal. No respiratory distress. She has no wheezes. She has no rales. She exhibits no tenderness. Abdominal: Soft. Bowel sounds are normal. She exhibits no distension. There is no tenderness. There is no rebound and no guarding. Musculoskeletal: She exhibits tenderness. Right shoulder: Normal.        Left shoulder: Normal.        Right elbow: Normal.       Left elbow: Normal.        Right wrist: She exhibits tenderness. Left wrist: She exhibits tenderness. Right hip: She exhibits tenderness. Left hip: She exhibits tenderness. Right knee: Tenderness found. Left knee: Tenderness found. Right ankle: Normal.        Left ankle: Normal.        Cervical back: She exhibits tenderness. Thoracic back: She exhibits tenderness. Lumbar back: She exhibits decreased range of motion, tenderness, pain and spasm. Back:         Right upper leg: Normal.        Left upper leg: She exhibits tenderness. Neurological: She is alert and oriented to person, place, and time. She has normal strength and normal reflexes. She is not disoriented. She displays no atrophy. No cranial nerve deficit or sensory deficit. She exhibits normal muscle tone. She displays a negative Romberg sign. Coordination and gait normal. She displays no Babinski's sign on the right side. SLR-  Motor 5/5 BUE BLE   Skin: Skin is warm. No rash noted. She is not diaphoretic. No erythema. No pallor. Psychiatric: She has a normal mood and affect. Her speech is normal and behavior is normal. Judgment and thought content normal. Her mood appears not anxious. Her affect is not angry, not blunt, not labile and not inappropriate. She is not agitated, not aggressive, not hyperactive, not slowed, not withdrawn, not actively hallucinating and not combative. Thought content is not paranoid and not delusional. Cognition and memory are normal. Cognition and memory are not impaired. She does not express impulsivity or inappropriate judgment. She does not exhibit a depressed mood. She expresses no homicidal and no suicidal ideation. She expresses no suicidal plans and no homicidal plans. She exhibits normal recent memory and normal remote memory. She is attentive. Nursing note and vitals reviewed. HOMER test:positive right  Yeomans test: positive right  [place] test: positive right     Assessment:     1.  SI (sacroiliac) joint inflammation

## 2018-11-12 ENCOUNTER — TELEPHONE (OUTPATIENT)
Dept: PHYSICAL MEDICINE AND REHAB | Age: 63
End: 2018-11-12

## 2018-12-03 ENCOUNTER — PREP FOR PROCEDURE (OUTPATIENT)
Dept: PHYSICAL MEDICINE AND REHAB | Age: 63
End: 2018-12-03

## 2018-12-03 NOTE — H&P
Robert Rg is an 63 y.o. } female. Chief Complaint: SI PAIN        The patient is allergic to ibuprofen.     Past Medical History  Mumtaz  has a past medical history of Asthma; Back pain; Chronic diastolic CHF (congestive heart failure) ([address] Colitis; Depression; Depression; Gastroesophageal reflux; GERD (gastroesophageal reflux disease); H/O endoscopy; Hyperlipidemia; Hypertension; Hypertension; Hypothyroidism; Incontinence of urine; Lordosis (acquired) (postural); Spondylosis of unspecified site without mention of myelopathy; Thoracic or lumbosacral neuritis or radiculitis, unspecified; Type II or unspecified type diabetes mellitus without mention of complication, not stated as uncontrolled; Unspecified sleep apnea; and Urinary incontinence.     Past Surgical History  The patient  has a past surgical history that includes  section (); Carpal tunnel release (Bilateral, ); Abdomen surgery (); Dilation and curettage of uterus; Hysterectomy (); cardiovascular stress test (2015); Colonoscopy (, ); Upper gastrointestinal endoscopy (); Cardiac catheterization (); other surgical history (Bilateral, 2018); pr inj dx/ther agnt paravert facet joint, lumbar/sac, 2nd level (Bilateral, 3/19/2018); Nerve Surgery (Bilateral, 2018); pr inj dx/ther agnt paravert facet joint, lumbar/sac, 2nd level (Bilateral, 2018); and pr inject rx other periph nerve (Bilateral, 2018).    Family History  This patient's family history includes Diabetes in her father, mother, and sister; Heart Disease in her father and mother; Stroke in her mother and sister; Thyroid Disease in her brother.     Social History  Mumtaz  reports that she is a non-smoker but has been exposed to tobacco smoke.  She has never used smokeless tobacco. She reports that she does not drink alcohol or use drugs.     Medications     Current Medication      Current Outpatient Prescriptions:     insulin lispro (HUMALOG KWIKPEN) 100 UNIT/ML pen, Inject 17 units before breakfast, 20 units before lunch and 28 units before dinner plus sliding scale. Patient uses a max of 96 units per day., Disp: 10 pen, Rfl: 3    Liraglutide (VICTOZA) 18 MG/3ML SOPN SC injection, Inject 1.2 mg into the skin daily, Disp: 4 pen, Rfl: 3    insulin glargine (LANTUS SOLOSTAR) 100 UNIT/ML injection pen, 35 in the am, 29 in the pm, Disp: 5 pen, Rfl: 0    meclizine (ANTIVERT) 25 MG tablet, Take 25 mg by mouth 3 times daily as needed, Disp: , Rfl:     ARIPiprazole (ABILIFY) 10 MG tablet, Take 10 mg by mouth daily, Disp: , Rfl:     lidocaine (XYLOCAINE) 5 % ointment, Apply topically 3 times daily as needed for Pain Apply topically as needed. , Disp: , Rfl:     spironolactone (ALDACTONE) 25 MG tablet, Take 25 mg by mouth daily, Disp: , Rfl:     traZODone (DESYREL) 50 MG tablet, Take 50 mg by mouth nightly, Disp: , Rfl:     Omega-3 Fatty Acids (FISH OIL) 1000 MG CAPS, Take 3,000 mg by mouth daily, Disp: , Rfl:     benztropine (COGENTIN) 0.5 MG tablet, Take 0.5 mg by mouth daily, Disp: , Rfl:     Ascorbic Acid (VITAMIN C) 500 MG tablet, Take 1,000 mg by mouth daily, Disp: , Rfl:     Cyanocobalamin (VITAMIN B 12 PO), Take 1,000 mg by mouth daily , Disp: , Rfl:     busPIRone (BUSPAR) 30 MG tablet, Take 30 mg by mouth 3 times daily , Disp: , Rfl:     lisinopril (PRINIVIL;ZESTRIL) 5 MG tablet, Take 7.5 mg by mouth daily , Disp: , Rfl:     pantoprazole (PROTONIX) 40 MG tablet, Take 1 tablet by mouth daily, Disp: 30 tablet, Rfl: 12    vilazodone HCl (VIIBRYD) 40 MG TABS, Take 40 mg by mouth daily, Disp: , Rfl:     glucose blood VI test strips (FREESTYLE LITE) strip, Check BS 4 times a day, Disp: 400 each, Rfl: 1    Blood Glucose Monitoring Suppl (FREESTYLE LITE) EMILIE, 1 Device by Does not apply route daily as needed, Disp: 1 Device, Rfl: 0    aspirin 81 MG tablet, Take 81 mg by mouth daily, Disp: , Rfl:     ammonium lactate (AMLACTIN)

## 2018-12-07 ENCOUNTER — APPOINTMENT (OUTPATIENT)
Dept: GENERAL RADIOLOGY | Age: 63
End: 2018-12-07
Attending: PAIN MEDICINE
Payer: MEDICARE

## 2018-12-07 ENCOUNTER — ANESTHESIA EVENT (OUTPATIENT)
Dept: OPERATING ROOM | Age: 63
End: 2018-12-07
Payer: MEDICARE

## 2018-12-07 ENCOUNTER — HOSPITAL ENCOUNTER (OUTPATIENT)
Age: 63
Setting detail: OUTPATIENT SURGERY
Discharge: HOME | End: 2018-12-07
Attending: PAIN MEDICINE | Admitting: PAIN MEDICINE
Payer: MEDICARE

## 2018-12-07 ENCOUNTER — ANESTHESIA (OUTPATIENT)
Dept: OPERATING ROOM | Age: 63
End: 2018-12-07
Payer: MEDICARE

## 2018-12-07 VITALS
HEART RATE: 61 BPM | WEIGHT: 210 LBS | TEMPERATURE: 97.5 F | RESPIRATION RATE: 16 BRPM | BODY MASS INDEX: 41.23 KG/M2 | DIASTOLIC BLOOD PRESSURE: 58 MMHG | SYSTOLIC BLOOD PRESSURE: 122 MMHG | OXYGEN SATURATION: 96 % | HEIGHT: 60 IN

## 2018-12-07 VITALS
DIASTOLIC BLOOD PRESSURE: 63 MMHG | RESPIRATION RATE: 11 BRPM | OXYGEN SATURATION: 100 % | SYSTOLIC BLOOD PRESSURE: 138 MMHG

## 2018-12-07 LAB — GLUCOSE BLD-MCNC: 142 MG/DL (ref 70–108)

## 2018-12-07 PROCEDURE — 6360000002 HC RX W HCPCS: Performed by: SPECIALIST

## 2018-12-07 PROCEDURE — 3700000000 HC ANESTHESIA ATTENDED CARE: Performed by: PAIN MEDICINE

## 2018-12-07 PROCEDURE — 2580000003 HC RX 258: Performed by: SPECIALIST

## 2018-12-07 PROCEDURE — 2500000003 HC RX 250 WO HCPCS: Performed by: PAIN MEDICINE

## 2018-12-07 PROCEDURE — 6360000002 HC RX W HCPCS: Performed by: PAIN MEDICINE

## 2018-12-07 PROCEDURE — 27096 INJECT SACROILIAC JOINT: CPT | Performed by: PAIN MEDICINE

## 2018-12-07 PROCEDURE — 2709999900 HC NON-CHARGEABLE SUPPLY: Performed by: PAIN MEDICINE

## 2018-12-07 PROCEDURE — 3600000054 HC PAIN LEVEL 3 BASE: Performed by: PAIN MEDICINE

## 2018-12-07 PROCEDURE — 7100000011 HC PHASE II RECOVERY - ADDTL 15 MIN: Performed by: PAIN MEDICINE

## 2018-12-07 PROCEDURE — 7100000010 HC PHASE II RECOVERY - FIRST 15 MIN: Performed by: PAIN MEDICINE

## 2018-12-07 PROCEDURE — 82948 REAGENT STRIP/BLOOD GLUCOSE: CPT

## 2018-12-07 PROCEDURE — 6360000004 HC RX CONTRAST MEDICATION: Performed by: PAIN MEDICINE

## 2018-12-07 PROCEDURE — 3209999900 FLUORO FOR SURGICAL PROCEDURES

## 2018-12-07 RX ORDER — METHYLPREDNISOLONE ACETATE 80 MG/ML
INJECTION, SUSPENSION INTRA-ARTICULAR; INTRALESIONAL; INTRAMUSCULAR; SOFT TISSUE PRN
Status: DISCONTINUED | OUTPATIENT
Start: 2018-12-07 | End: 2018-12-07 | Stop reason: HOSPADM

## 2018-12-07 RX ORDER — 0.9 % SODIUM CHLORIDE 0.9 %
VIAL (ML) INJECTION PRN
Status: DISCONTINUED | OUTPATIENT
Start: 2018-12-07 | End: 2018-12-07 | Stop reason: SDUPTHER

## 2018-12-07 RX ORDER — LIDOCAINE HYDROCHLORIDE 10 MG/ML
INJECTION, SOLUTION INFILTRATION; PERINEURAL PRN
Status: DISCONTINUED | OUTPATIENT
Start: 2018-12-07 | End: 2018-12-07 | Stop reason: HOSPADM

## 2018-12-07 RX ORDER — ROPIVACAINE HYDROCHLORIDE 2 MG/ML
INJECTION, SOLUTION EPIDURAL; INFILTRATION; PERINEURAL PRN
Status: DISCONTINUED | OUTPATIENT
Start: 2018-12-07 | End: 2018-12-07 | Stop reason: HOSPADM

## 2018-12-07 RX ORDER — PROPOFOL 10 MG/ML
INJECTION, EMULSION INTRAVENOUS PRN
Status: DISCONTINUED | OUTPATIENT
Start: 2018-12-07 | End: 2018-12-07 | Stop reason: SDUPTHER

## 2018-12-07 RX ADMIN — PROPOFOL 60 MG: 10 INJECTION, EMULSION INTRAVENOUS at 13:36

## 2018-12-07 RX ADMIN — SODIUM CHLORIDE 5 ML: 9 INJECTION, SOLUTION INTRAMUSCULAR; INTRAVENOUS; SUBCUTANEOUS at 13:37

## 2018-12-07 ASSESSMENT — PULMONARY FUNCTION TESTS
PIF_VALUE: 0
PIF_VALUE: 0
PIF_VALUE: 1
PIF_VALUE: 0

## 2018-12-07 ASSESSMENT — ENCOUNTER SYMPTOMS: SHORTNESS OF BREATH: 1

## 2018-12-07 ASSESSMENT — PAIN - FUNCTIONAL ASSESSMENT: PAIN_FUNCTIONAL_ASSESSMENT: 0-10

## 2018-12-07 ASSESSMENT — PAIN SCALES - GENERAL: PAINLEVEL_OUTOF10: 0

## 2018-12-07 ASSESSMENT — PAIN DESCRIPTION - DESCRIPTORS: DESCRIPTORS: CONSTANT

## 2018-12-07 NOTE — OP NOTE
Pre-Procedure Note    Patient Name: Cole Gallagher   YOB: 1955  Medical Record Number: [id number]  Date: 2018     Indication: Right SI pain    Consent: On file. Vital Signs:   Vitals:    18 1301   BP: (!) 120/56   Pulse: 66   Resp: 16   Temp: 97.8 °F (36.6 °C)   SpO2: 98%       Past Medical History:   has a past medical history of Back pain; Chronic diastolic CHF (congestive heart failure) ([facility] [address] Colitis; Depression; Depression; Gastroesophageal reflux; GERD (gastroesophageal reflux disease); H/O endoscopy; Hyperlipidemia; Hypertension; Hypertension; Hypothyroidism; Incontinence of urine; Lordosis (acquired) (postural); Spondylosis of unspecified site without mention of myelopathy; Thoracic or lumbosacral neuritis or radiculitis, unspecified; Type II or unspecified type diabetes mellitus without mention of complication, not stated as uncontrolled; Unspecified sleep apnea; and Urinary incontinence. Past Surgical History:   has a past surgical history that includes  section (); Carpal tunnel release (Bilateral, 'S); Abdomen surgery (); Dilation and curettage of uterus; Hysterectomy (); cardiovascular stress test (2015); Colonoscopy (, ); Upper gastrointestinal endoscopy (); Cardiac catheterization (); other surgical history (Bilateral, 2018); pr inj dx/ther agnt paravert facet joint, lumbar/sac, 2nd level (Bilateral, 3/19/2018); Nerve Surgery (Bilateral, 2018); pr inj dx/ther agnt paravert facet joint, lumbar/sac, 2nd level (Bilateral, 2018); pr inject rx other periph nerve (Bilateral, 2018); and pr office/outpt visit,procedure only (Right, 10/2/2018). Pre-Sedation Documentation and Exam:   Vital signs have been reviewed (see flow sheet for vitals). Sedation/ Anesthesia Plan: MAC    Patient is an appropriate candidate for plan of sedation: yes    Preoperative Diagnosis:  Right sacroiliitis.     Postoperative

## 2019-01-28 ENCOUNTER — OFFICE VISIT (OUTPATIENT)
Dept: PHYSICAL MEDICINE AND REHAB | Age: 64
End: 2019-01-28
Payer: MEDICARE

## 2019-01-28 VITALS
WEIGHT: 211 LBS | SYSTOLIC BLOOD PRESSURE: 123 MMHG | HEIGHT: 60 IN | HEART RATE: 76 BPM | BODY MASS INDEX: 41.43 KG/M2 | DIASTOLIC BLOOD PRESSURE: 56 MMHG

## 2019-01-28 DIAGNOSIS — G89.4 CHRONIC PAIN SYNDROME: ICD-10-CM

## 2019-01-28 DIAGNOSIS — M47.816 SPONDYLOSIS OF LUMBAR REGION WITHOUT MYELOPATHY OR RADICULOPATHY: ICD-10-CM

## 2019-01-28 DIAGNOSIS — M46.1 SI (SACROILIAC) JOINT INFLAMMATION (HCC): Primary | ICD-10-CM

## 2019-01-28 DIAGNOSIS — M51.36 DDD (DEGENERATIVE DISC DISEASE), LUMBAR: ICD-10-CM

## 2019-01-28 PROCEDURE — 99213 OFFICE O/P EST LOW 20 MIN: CPT | Performed by: NURSE PRACTITIONER

## 2019-01-28 ASSESSMENT — ENCOUNTER SYMPTOMS
BACK PAIN: 1
CONSTIPATION: 0
COLOR CHANGE: 0
ABDOMINAL PAIN: 0

## 2019-01-31 ENCOUNTER — PREP FOR PROCEDURE (OUTPATIENT)
Dept: PHYSICAL MEDICINE AND REHAB | Age: 64
End: 2019-01-31

## 2019-02-05 ENCOUNTER — HOSPITAL ENCOUNTER (OUTPATIENT)
Dept: NON INVASIVE DIAGNOSTICS | Age: 64
Discharge: HOME | End: 2019-02-05
Payer: MEDICARE

## 2019-02-05 VITALS — BODY MASS INDEX: 41.43 KG/M2 | WEIGHT: 211 LBS | HEIGHT: 60 IN

## 2019-02-05 PROCEDURE — 2709999900 HC NON-CHARGEABLE SUPPLY

## 2019-02-05 PROCEDURE — 93660 TILT TABLE EVALUATION: CPT | Performed by: INTERNAL MEDICINE

## 2019-02-06 ENCOUNTER — TELEPHONE (OUTPATIENT)
Dept: OPERATING ROOM | Age: 64
End: 2019-02-06

## 2019-02-12 ENCOUNTER — TELEPHONE (OUTPATIENT)
Dept: PHYSICAL MEDICINE AND REHAB | Age: 64
End: 2019-02-12

## 2019-02-20 ENCOUNTER — TELEPHONE (OUTPATIENT)
Dept: PHYSICAL MEDICINE AND REHAB | Age: 64
End: 2019-02-20

## 2019-02-21 ENCOUNTER — HOSPITAL ENCOUNTER (OUTPATIENT)
Age: 64
Setting detail: SPECIMEN
Discharge: HOME | End: 2019-02-21
Payer: MEDICARE

## 2019-02-21 LAB
ANION GAP SERPL CALCULATED.3IONS-SCNC: 16 MMOL/L (ref 9–17)
BUN BLDV-MCNC: 26 MG/DL (ref 8–23)
BUN/CREAT BLD: ABNORMAL (ref 9–20)
CALCIUM SERPL-MCNC: 9.9 MG/DL (ref 8.6–10.4)
CHLORIDE BLD-SCNC: 97 MMOL/L (ref 98–107)
CO2: 26 MMOL/L (ref 20–31)
CREAT SERPL-MCNC: 1.22 MG/DL (ref 0.5–0.9)
CREATININE URINE: 111 MG/DL (ref 28–217)
GFR AFRICAN AMERICAN: 54 ML/MIN
GFR NON-AFRICAN AMERICAN: 45 ML/MIN
GFR SERPL CREATININE-BSD FRML MDRD: ABNORMAL ML/MIN/{1.73_M2}
GFR SERPL CREATININE-BSD FRML MDRD: ABNORMAL ML/MIN/{1.73_M2}
GLUCOSE BLD-MCNC: 145 MG/DL (ref 70–99)
POTASSIUM SERPL-SCNC: 4.9 MMOL/L (ref 3.7–5.3)
SODIUM BLD-SCNC: 139 MMOL/L (ref 135–144)
TOTAL PROTEIN, URINE: 9 MG/DL
URINE TOTAL PROTEIN CREATININE RATIO: 0.08 (ref 0–0.2)

## 2019-02-23 ENCOUNTER — HOSPITAL ENCOUNTER (OUTPATIENT)
Age: 64
Setting detail: OBSERVATION
Discharge: HOME | End: 2019-02-25
Attending: EMERGENCY MEDICINE | Admitting: INTERNAL MEDICINE
Payer: MEDICARE

## 2019-02-23 DIAGNOSIS — N30.01 ACUTE CYSTITIS WITH HEMATURIA: Primary | ICD-10-CM

## 2019-02-23 PROBLEM — N39.0 UTI (URINARY TRACT INFECTION): Status: ACTIVE | Noted: 2019-02-23

## 2019-02-23 LAB
AMPHETAMINE+METHAMPHETAMINE URINE SCREEN: NEGATIVE
ANION GAP SERPL CALCULATED.3IONS-SCNC: 13 MEQ/L (ref 8–16)
AVERAGE GLUCOSE: 156 MG/DL (ref 70–126)
BACTERIA: ABNORMAL /HPF
BARBITURATE QUANTITATIVE URINE: NEGATIVE
BASOPHILS # BLD: 0.3 %
BASOPHILS ABSOLUTE: 0 THOU/MM3 (ref 0–0.1)
BENZODIAZEPINE QUANTITATIVE URINE: NEGATIVE
BILIRUBIN URINE: NEGATIVE
BLOOD, URINE: ABNORMAL
BUN BLDV-MCNC: 20 MG/DL (ref 7–22)
CALCIUM SERPL-MCNC: 9.3 MG/DL (ref 8.5–10.5)
CANNABINOID QUANTITATIVE URINE: NEGATIVE
CASTS UA: ABNORMAL /LPF
CHARACTER, URINE: CLEAR
CHLORIDE BLD-SCNC: 105 MEQ/L (ref 98–111)
CO2: 21 MEQ/L (ref 23–33)
COCAINE METABOLITE QUANTITATIVE URINE: NEGATIVE
COLOR: YELLOW
CREAT SERPL-MCNC: 1.2 MG/DL (ref 0.4–1.2)
CRYSTALS, UA: ABNORMAL
EKG ATRIAL RATE: 70 BPM
EKG P AXIS: 55 DEGREES
EKG P-R INTERVAL: 188 MS
EKG Q-T INTERVAL: 372 MS
EKG QRS DURATION: 80 MS
EKG QTC CALCULATION (BAZETT): 401 MS
EKG R AXIS: 41 DEGREES
EKG T AXIS: 70 DEGREES
EKG VENTRICULAR RATE: 70 BPM
EOSINOPHIL # BLD: 2 %
EOSINOPHILS ABSOLUTE: 0.2 THOU/MM3 (ref 0–0.4)
EPITHELIAL CELLS, UA: ABNORMAL /HPF
ERYTHROCYTE [DISTWIDTH] IN BLOOD BY AUTOMATED COUNT: 14 % (ref 11.5–14.5)
ERYTHROCYTE [DISTWIDTH] IN BLOOD BY AUTOMATED COUNT: 46.8 FL (ref 35–45)
GFR SERPL CREATININE-BSD FRML MDRD: 46 ML/MIN/1.73M2
GLUCOSE BLD-MCNC: 133 MG/DL (ref 70–108)
GLUCOSE BLD-MCNC: 90 MG/DL (ref 70–108)
GLUCOSE URINE: NEGATIVE MG/DL
HBA1C MFR BLD: 7.2 % (ref 4.4–6.4)
HCT VFR BLD CALC: 38.1 % (ref 37–47)
HEMOGLOBIN: 12.3 GM/DL (ref 12–16)
IMMATURE GRANS (ABS): 0.05 THOU/MM3 (ref 0–0.07)
IMMATURE GRANULOCYTES: 0.4 %
KETONES, URINE: NEGATIVE
LEUKOCYTE ESTERASE, URINE: ABNORMAL
LYMPHOCYTES # BLD: 23.6 %
LYMPHOCYTES ABSOLUTE: 2.7 THOU/MM3 (ref 1–4.8)
MCH RBC QN AUTO: 29.5 PG (ref 26–33)
MCHC RBC AUTO-ENTMCNC: 32.3 GM/DL (ref 32.2–35.5)
MCV RBC AUTO: 91.4 FL (ref 81–99)
MONOCYTES # BLD: 9.3 %
MONOCYTES ABSOLUTE: 1.1 THOU/MM3 (ref 0.4–1.3)
NITRITE, URINE: NEGATIVE
NUCLEATED RED BLOOD CELLS: 0 /100 WBC
OPIATES, URINE: NEGATIVE
OSMOLALITY CALCULATION: 279.7 MOSMOL/KG (ref 275–300)
OXYCODONE: NEGATIVE
PH UA: 6
PHENCYCLIDINE QUANTITATIVE URINE: NEGATIVE
PLATELET # BLD: 303 THOU/MM3 (ref 130–400)
PMV BLD AUTO: 11.1 FL (ref 9.4–12.4)
POTASSIUM REFLEX MAGNESIUM: 5.3 MEQ/L (ref 3.5–5.2)
PRO-BNP: 63.1 PG/ML (ref 0–900)
PROTEIN UA: ABNORMAL
RBC # BLD: 4.17 MILL/MM3 (ref 4.2–5.4)
RBC URINE: ABNORMAL /HPF
RENAL EPITHELIAL, UA: ABNORMAL
SEG NEUTROPHILS: 64.4 %
SEGMENTED NEUTROPHILS ABSOLUTE COUNT: 7.4 THOU/MM3 (ref 1.8–7.7)
SODIUM BLD-SCNC: 139 MEQ/L (ref 135–145)
SPECIFIC GRAVITY, URINE: 1.02 (ref 1–1.03)
T4 FREE: 1.76 NG/DL (ref 0.93–1.76)
TROPONIN T: < 0.01 NG/ML
TSH SERPL DL<=0.05 MIU/L-ACNC: 1.5 UIU/ML (ref 0.4–4.2)
UROBILINOGEN, URINE: 0.2 EU/DL
WBC # BLD: 11.5 THOU/MM3 (ref 4.8–10.8)
WBC UA: ABNORMAL /HPF

## 2019-02-23 PROCEDURE — 81001 URINALYSIS AUTO W/SCOPE: CPT

## 2019-02-23 PROCEDURE — 83880 ASSAY OF NATRIURETIC PEPTIDE: CPT

## 2019-02-23 PROCEDURE — 1200000003 HC TELEMETRY R&B

## 2019-02-23 PROCEDURE — 99285 EMERGENCY DEPT VISIT HI MDM: CPT

## 2019-02-23 PROCEDURE — 2580000003 HC RX 258: Performed by: INTERNAL MEDICINE

## 2019-02-23 PROCEDURE — 82948 REAGENT STRIP/BLOOD GLUCOSE: CPT

## 2019-02-23 PROCEDURE — 99222 1ST HOSP IP/OBS MODERATE 55: CPT | Performed by: INTERNAL MEDICINE

## 2019-02-23 PROCEDURE — 80048 BASIC METABOLIC PNL TOTAL CA: CPT

## 2019-02-23 PROCEDURE — 93005 ELECTROCARDIOGRAM TRACING: CPT | Performed by: EMERGENCY MEDICINE

## 2019-02-23 PROCEDURE — 36415 COLL VENOUS BLD VENIPUNCTURE: CPT

## 2019-02-23 PROCEDURE — 6370000000 HC RX 637 (ALT 250 FOR IP): Performed by: INTERNAL MEDICINE

## 2019-02-23 PROCEDURE — 96365 THER/PROPH/DIAG IV INF INIT: CPT

## 2019-02-23 PROCEDURE — G0378 HOSPITAL OBSERVATION PER HR: HCPCS

## 2019-02-23 PROCEDURE — 87086 URINE CULTURE/COLONY COUNT: CPT

## 2019-02-23 PROCEDURE — 6360000002 HC RX W HCPCS: Performed by: EMERGENCY MEDICINE

## 2019-02-23 PROCEDURE — 80307 DRUG TEST PRSMV CHEM ANLYZR: CPT

## 2019-02-23 PROCEDURE — 96372 THER/PROPH/DIAG INJ SC/IM: CPT

## 2019-02-23 PROCEDURE — 84439 ASSAY OF FREE THYROXINE: CPT

## 2019-02-23 PROCEDURE — 87040 BLOOD CULTURE FOR BACTERIA: CPT

## 2019-02-23 PROCEDURE — 85025 COMPLETE CBC W/AUTO DIFF WBC: CPT

## 2019-02-23 PROCEDURE — 83036 HEMOGLOBIN GLYCOSYLATED A1C: CPT

## 2019-02-23 PROCEDURE — 84484 ASSAY OF TROPONIN QUANT: CPT

## 2019-02-23 PROCEDURE — 6360000002 HC RX W HCPCS: Performed by: INTERNAL MEDICINE

## 2019-02-23 PROCEDURE — 84443 ASSAY THYROID STIM HORMONE: CPT

## 2019-02-23 PROCEDURE — 2580000003 HC RX 258: Performed by: EMERGENCY MEDICINE

## 2019-02-23 RX ORDER — METOPROLOL SUCCINATE 50 MG/1
50 TABLET, EXTENDED RELEASE ORAL DAILY
Status: DISCONTINUED | OUTPATIENT
Start: 2019-02-24 | End: 2019-02-25 | Stop reason: HOSPADM

## 2019-02-23 RX ORDER — POLYETHYLENE GLYCOL 3350 17 G/17G
17 POWDER, FOR SOLUTION ORAL DAILY
Status: DISCONTINUED | OUTPATIENT
Start: 2019-02-23 | End: 2019-02-25 | Stop reason: HOSPADM

## 2019-02-23 RX ORDER — LAMOTRIGINE 100 MG/1
100 TABLET ORAL EVERY EVENING
Status: DISCONTINUED | OUTPATIENT
Start: 2019-02-23 | End: 2019-02-25 | Stop reason: HOSPADM

## 2019-02-23 RX ORDER — BENZTROPINE MESYLATE 1 MG/1
0.5 TABLET ORAL DAILY
Status: DISCONTINUED | OUTPATIENT
Start: 2019-02-23 | End: 2019-02-25 | Stop reason: HOSPADM

## 2019-02-23 RX ORDER — INSULIN GLARGINE 100 [IU]/ML
29 INJECTION, SOLUTION SUBCUTANEOUS NIGHTLY
Status: DISCONTINUED | OUTPATIENT
Start: 2019-02-23 | End: 2019-02-25 | Stop reason: HOSPADM

## 2019-02-23 RX ORDER — FENOFIBRATE 54 MG/1
145 TABLET ORAL DAILY
Status: DISCONTINUED | OUTPATIENT
Start: 2019-02-24 | End: 2019-02-25 | Stop reason: HOSPADM

## 2019-02-23 RX ORDER — SODIUM CHLORIDE 0.9 % (FLUSH) 0.9 %
10 SYRINGE (ML) INJECTION PRN
Status: DISCONTINUED | OUTPATIENT
Start: 2019-02-23 | End: 2019-02-25 | Stop reason: HOSPADM

## 2019-02-23 RX ORDER — TRAZODONE HYDROCHLORIDE 50 MG/1
50 TABLET ORAL NIGHTLY
Status: DISCONTINUED | OUTPATIENT
Start: 2019-02-23 | End: 2019-02-25 | Stop reason: HOSPADM

## 2019-02-23 RX ORDER — ASPIRIN 81 MG/1
81 TABLET ORAL DAILY
Status: DISCONTINUED | OUTPATIENT
Start: 2019-02-23 | End: 2019-02-25 | Stop reason: HOSPADM

## 2019-02-23 RX ORDER — SPIRONOLACTONE 25 MG/1
25 TABLET ORAL DAILY
Status: DISCONTINUED | OUTPATIENT
Start: 2019-02-24 | End: 2019-02-25 | Stop reason: HOSPADM

## 2019-02-23 RX ORDER — GABAPENTIN 600 MG/1
600 TABLET ORAL 3 TIMES DAILY
Status: DISCONTINUED | OUTPATIENT
Start: 2019-02-23 | End: 2019-02-25 | Stop reason: HOSPADM

## 2019-02-23 RX ORDER — DULOXETIN HYDROCHLORIDE 60 MG/1
60 CAPSULE, DELAYED RELEASE ORAL DAILY
Status: DISCONTINUED | OUTPATIENT
Start: 2019-02-24 | End: 2019-02-25 | Stop reason: HOSPADM

## 2019-02-23 RX ORDER — LAMOTRIGINE 100 MG/1
100 TABLET ORAL 2 TIMES DAILY
Status: DISCONTINUED | OUTPATIENT
Start: 2019-02-23 | End: 2019-02-23 | Stop reason: DRUGHIGH

## 2019-02-23 RX ORDER — ONDANSETRON 2 MG/ML
4 INJECTION INTRAMUSCULAR; INTRAVENOUS EVERY 6 HOURS PRN
Status: DISCONTINUED | OUTPATIENT
Start: 2019-02-23 | End: 2019-02-25 | Stop reason: HOSPADM

## 2019-02-23 RX ORDER — ATORVASTATIN CALCIUM 40 MG/1
40 TABLET, FILM COATED ORAL DAILY
Status: DISCONTINUED | OUTPATIENT
Start: 2019-02-23 | End: 2019-02-25 | Stop reason: HOSPADM

## 2019-02-23 RX ORDER — ARIPIPRAZOLE 10 MG/1
10 TABLET ORAL DAILY
Status: DISCONTINUED | OUTPATIENT
Start: 2019-02-24 | End: 2019-02-25 | Stop reason: HOSPADM

## 2019-02-23 RX ORDER — NICOTINE POLACRILEX 4 MG
15 LOZENGE BUCCAL PRN
Status: DISCONTINUED | OUTPATIENT
Start: 2019-02-23 | End: 2019-02-25 | Stop reason: HOSPADM

## 2019-02-23 RX ORDER — SODIUM CHLORIDE 9 MG/ML
INJECTION, SOLUTION INTRAVENOUS CONTINUOUS
Status: DISCONTINUED | OUTPATIENT
Start: 2019-02-23 | End: 2019-02-24

## 2019-02-23 RX ORDER — DOXEPIN HYDROCHLORIDE 50 MG/1
200 CAPSULE ORAL NIGHTLY
Status: DISCONTINUED | OUTPATIENT
Start: 2019-02-24 | End: 2019-02-25 | Stop reason: HOSPADM

## 2019-02-23 RX ORDER — TRAZODONE HYDROCHLORIDE 50 MG/1
50 TABLET ORAL NIGHTLY
Status: DISCONTINUED | OUTPATIENT
Start: 2019-02-24 | End: 2019-02-23

## 2019-02-23 RX ORDER — LEVOTHYROXINE SODIUM 0.15 MG/1
150 TABLET ORAL DAILY
Status: DISCONTINUED | OUTPATIENT
Start: 2019-02-24 | End: 2019-02-25 | Stop reason: HOSPADM

## 2019-02-23 RX ORDER — PANTOPRAZOLE SODIUM 40 MG/1
40 TABLET, DELAYED RELEASE ORAL DAILY
Status: DISCONTINUED | OUTPATIENT
Start: 2019-02-24 | End: 2019-02-25 | Stop reason: HOSPADM

## 2019-02-23 RX ORDER — LAMOTRIGINE 100 MG/1
200 TABLET ORAL EVERY MORNING
Status: DISCONTINUED | OUTPATIENT
Start: 2019-02-24 | End: 2019-02-25 | Stop reason: HOSPADM

## 2019-02-23 RX ORDER — SODIUM CHLORIDE 0.9 % (FLUSH) 0.9 %
10 SYRINGE (ML) INJECTION EVERY 12 HOURS SCHEDULED
Status: DISCONTINUED | OUTPATIENT
Start: 2019-02-23 | End: 2019-02-25 | Stop reason: HOSPADM

## 2019-02-23 RX ORDER — DEXTROSE MONOHYDRATE 25 G/50ML
12.5 INJECTION, SOLUTION INTRAVENOUS PRN
Status: DISCONTINUED | OUTPATIENT
Start: 2019-02-23 | End: 2019-02-25 | Stop reason: HOSPADM

## 2019-02-23 RX ORDER — BUSPIRONE HYDROCHLORIDE 7.5 MG/1
30 TABLET ORAL 3 TIMES DAILY
Status: DISCONTINUED | OUTPATIENT
Start: 2019-02-23 | End: 2019-02-25 | Stop reason: HOSPADM

## 2019-02-23 RX ORDER — INSULIN GLARGINE 100 [IU]/ML
35 INJECTION, SOLUTION SUBCUTANEOUS DAILY
Status: DISCONTINUED | OUTPATIENT
Start: 2019-02-24 | End: 2019-02-25 | Stop reason: HOSPADM

## 2019-02-23 RX ORDER — DEXTROSE MONOHYDRATE 50 MG/ML
100 INJECTION, SOLUTION INTRAVENOUS PRN
Status: DISCONTINUED | OUTPATIENT
Start: 2019-02-23 | End: 2019-02-25 | Stop reason: HOSPADM

## 2019-02-23 RX ADMIN — LAMOTRIGINE 100 MG: 100 TABLET ORAL at 20:27

## 2019-02-23 RX ADMIN — ENOXAPARIN SODIUM 40 MG: 40 INJECTION SUBCUTANEOUS at 20:27

## 2019-02-23 RX ADMIN — SODIUM CHLORIDE: 9 INJECTION, SOLUTION INTRAVENOUS at 17:43

## 2019-02-23 RX ADMIN — DOXEPIN HYDROCHLORIDE 200 MG: 50 CAPSULE ORAL at 23:10

## 2019-02-23 RX ADMIN — CEFTRIAXONE SODIUM 1 G: 1 INJECTION, POWDER, FOR SOLUTION INTRAMUSCULAR; INTRAVENOUS at 16:22

## 2019-02-23 RX ADMIN — ATORVASTATIN CALCIUM 40 MG: 40 TABLET, FILM COATED ORAL at 20:27

## 2019-02-23 RX ADMIN — BUSPIRONE HYDROCHLORIDE 30 MG: 7.5 TABLET ORAL at 23:10

## 2019-02-23 RX ADMIN — GABAPENTIN 600 MG: 600 TABLET, FILM COATED ORAL at 22:31

## 2019-02-23 RX ADMIN — TRAZODONE HYDROCHLORIDE 50 MG: 50 TABLET ORAL at 23:10

## 2019-02-23 RX ADMIN — BENZTROPINE MESYLATE 0.5 MG: 1 TABLET ORAL at 22:31

## 2019-02-23 RX ADMIN — INSULIN GLARGINE 29 UNITS: 100 INJECTION, SOLUTION SUBCUTANEOUS at 22:31

## 2019-02-23 RX ADMIN — ASPIRIN 81 MG: 81 TABLET, COATED ORAL at 22:31

## 2019-02-23 ASSESSMENT — ENCOUNTER SYMPTOMS
EYE PAIN: 0
COUGH: 0
ABDOMINAL PAIN: 0
BACK PAIN: 0
WHEEZING: 0
VOMITING: 0
SORE THROAT: 0
DIARRHEA: 0
EYE DISCHARGE: 0
SHORTNESS OF BREATH: 0
RHINORRHEA: 0
NAUSEA: 0

## 2019-02-23 ASSESSMENT — PAIN DESCRIPTION - ONSET: ONSET: ON-GOING

## 2019-02-23 ASSESSMENT — PAIN - FUNCTIONAL ASSESSMENT: PAIN_FUNCTIONAL_ASSESSMENT: ACTIVITIES ARE NOT PREVENTED

## 2019-02-23 ASSESSMENT — PAIN DESCRIPTION - ORIENTATION: ORIENTATION: LOWER

## 2019-02-23 ASSESSMENT — PAIN DESCRIPTION - LOCATION: LOCATION: ABDOMEN

## 2019-02-23 ASSESSMENT — PAIN DESCRIPTION - PROGRESSION: CLINICAL_PROGRESSION: GRADUALLY WORSENING

## 2019-02-23 ASSESSMENT — PAIN SCALES - GENERAL: PAINLEVEL_OUTOF10: 1

## 2019-02-23 ASSESSMENT — PAIN DESCRIPTION - DESCRIPTORS: DESCRIPTORS: ACHING

## 2019-02-23 ASSESSMENT — PAIN DESCRIPTION - FREQUENCY: FREQUENCY: INTERMITTENT

## 2019-02-23 ASSESSMENT — PAIN DESCRIPTION - PAIN TYPE: TYPE: ACUTE PAIN

## 2019-02-24 ENCOUNTER — APPOINTMENT (OUTPATIENT)
Dept: GENERAL RADIOLOGY | Age: 64
End: 2019-02-24
Payer: MEDICARE

## 2019-02-24 LAB
ANION GAP SERPL CALCULATED.3IONS-SCNC: 12 MEQ/L (ref 8–16)
BASOPHILS # BLD: 0.3 %
BASOPHILS ABSOLUTE: 0 THOU/MM3 (ref 0–0.1)
BUN BLDV-MCNC: 22 MG/DL (ref 7–22)
CALCIUM SERPL-MCNC: 9 MG/DL (ref 8.5–10.5)
CHLORIDE BLD-SCNC: 101 MEQ/L (ref 98–111)
CO2: 26 MEQ/L (ref 23–33)
CREAT SERPL-MCNC: 1.3 MG/DL (ref 0.4–1.2)
EOSINOPHIL # BLD: 2.4 %
EOSINOPHILS ABSOLUTE: 0.3 THOU/MM3 (ref 0–0.4)
ERYTHROCYTE [DISTWIDTH] IN BLOOD BY AUTOMATED COUNT: 14.2 % (ref 11.5–14.5)
ERYTHROCYTE [DISTWIDTH] IN BLOOD BY AUTOMATED COUNT: 47.8 FL (ref 35–45)
GFR SERPL CREATININE-BSD FRML MDRD: 42 ML/MIN/1.73M2
GLUCOSE BLD-MCNC: 107 MG/DL (ref 70–108)
GLUCOSE BLD-MCNC: 112 MG/DL (ref 70–108)
GLUCOSE BLD-MCNC: 137 MG/DL (ref 70–108)
GLUCOSE BLD-MCNC: 160 MG/DL (ref 70–108)
GLUCOSE BLD-MCNC: 173 MG/DL (ref 70–108)
HCT VFR BLD CALC: 37.7 % (ref 37–47)
HEMOGLOBIN: 12 GM/DL (ref 12–16)
IMMATURE GRANS (ABS): 0.08 THOU/MM3 (ref 0–0.07)
IMMATURE GRANULOCYTES: 0.6 %
LYMPHOCYTES # BLD: 26.2 %
LYMPHOCYTES ABSOLUTE: 3.2 THOU/MM3 (ref 1–4.8)
MAGNESIUM: 1.7 MG/DL (ref 1.6–2.4)
MCH RBC QN AUTO: 29.1 PG (ref 26–33)
MCHC RBC AUTO-ENTMCNC: 31.8 GM/DL (ref 32.2–35.5)
MCV RBC AUTO: 91.5 FL (ref 81–99)
MONOCYTES # BLD: 8.3 %
MONOCYTES ABSOLUTE: 1 THOU/MM3 (ref 0.4–1.3)
NUCLEATED RED BLOOD CELLS: 0 /100 WBC
ORGANISM: ABNORMAL
PLATELET # BLD: 295 THOU/MM3 (ref 130–400)
PMV BLD AUTO: 11.2 FL (ref 9.4–12.4)
POTASSIUM REFLEX MAGNESIUM: 4.5 MEQ/L (ref 3.5–5.2)
RBC # BLD: 4.12 MILL/MM3 (ref 4.2–5.4)
SEG NEUTROPHILS: 62.2 %
SEGMENTED NEUTROPHILS ABSOLUTE COUNT: 7.7 THOU/MM3 (ref 1.8–7.7)
SODIUM BLD-SCNC: 139 MEQ/L (ref 135–145)
URINE CULTURE REFLEX: ABNORMAL
WBC # BLD: 12.4 THOU/MM3 (ref 4.8–10.8)

## 2019-02-24 PROCEDURE — 99232 SBSQ HOSP IP/OBS MODERATE 35: CPT | Performed by: INTERNAL MEDICINE

## 2019-02-24 PROCEDURE — 82948 REAGENT STRIP/BLOOD GLUCOSE: CPT

## 2019-02-24 PROCEDURE — G0378 HOSPITAL OBSERVATION PER HR: HCPCS

## 2019-02-24 PROCEDURE — 83735 ASSAY OF MAGNESIUM: CPT

## 2019-02-24 PROCEDURE — 6370000000 HC RX 637 (ALT 250 FOR IP): Performed by: INTERNAL MEDICINE

## 2019-02-24 PROCEDURE — 93010 ELECTROCARDIOGRAM REPORT: CPT | Performed by: INTERNAL MEDICINE

## 2019-02-24 PROCEDURE — 36415 COLL VENOUS BLD VENIPUNCTURE: CPT

## 2019-02-24 PROCEDURE — 74018 RADEX ABDOMEN 1 VIEW: CPT

## 2019-02-24 PROCEDURE — 96372 THER/PROPH/DIAG INJ SC/IM: CPT

## 2019-02-24 PROCEDURE — 80048 BASIC METABOLIC PNL TOTAL CA: CPT

## 2019-02-24 PROCEDURE — 96376 TX/PRO/DX INJ SAME DRUG ADON: CPT

## 2019-02-24 PROCEDURE — 85025 COMPLETE CBC W/AUTO DIFF WBC: CPT

## 2019-02-24 PROCEDURE — 1200000003 HC TELEMETRY R&B

## 2019-02-24 PROCEDURE — 2580000003 HC RX 258: Performed by: INTERNAL MEDICINE

## 2019-02-24 PROCEDURE — 6360000002 HC RX W HCPCS: Performed by: INTERNAL MEDICINE

## 2019-02-24 RX ORDER — MECLIZINE HYDROCHLORIDE CHEWABLE TABLETS 25 MG/1
50 TABLET, CHEWABLE ORAL 2 TIMES DAILY PRN
Status: DISCONTINUED | OUTPATIENT
Start: 2019-02-24 | End: 2019-02-25 | Stop reason: HOSPADM

## 2019-02-24 RX ORDER — LACTULOSE 10 G/15ML
20 SOLUTION ORAL 2 TIMES DAILY
Status: DISCONTINUED | OUTPATIENT
Start: 2019-02-24 | End: 2019-02-25 | Stop reason: HOSPADM

## 2019-02-24 RX ADMIN — GABAPENTIN 600 MG: 600 TABLET, FILM COATED ORAL at 13:24

## 2019-02-24 RX ADMIN — LISINOPRIL 7.5 MG: 5 TABLET ORAL at 09:24

## 2019-02-24 RX ADMIN — MECLIZINE HCL 50 MG: 25 TABLET, CHEWABLE ORAL at 11:28

## 2019-02-24 RX ADMIN — DOXEPIN HYDROCHLORIDE 200 MG: 50 CAPSULE ORAL at 20:37

## 2019-02-24 RX ADMIN — ENOXAPARIN SODIUM 40 MG: 40 INJECTION SUBCUTANEOUS at 20:39

## 2019-02-24 RX ADMIN — BUSPIRONE HYDROCHLORIDE 30 MG: 7.5 TABLET ORAL at 13:24

## 2019-02-24 RX ADMIN — SODIUM CHLORIDE: 9 INJECTION, SOLUTION INTRAVENOUS at 06:35

## 2019-02-24 RX ADMIN — BISACODYL 5 MG: 5 TABLET, DELAYED RELEASE ORAL at 22:14

## 2019-02-24 RX ADMIN — GABAPENTIN 600 MG: 600 TABLET, FILM COATED ORAL at 09:24

## 2019-02-24 RX ADMIN — METFORMIN HYDROCHLORIDE 1000 MG: 500 TABLET ORAL at 17:17

## 2019-02-24 RX ADMIN — Medication 1 UNITS: at 20:41

## 2019-02-24 RX ADMIN — PANTOPRAZOLE SODIUM 40 MG: 40 TABLET, DELAYED RELEASE ORAL at 17:21

## 2019-02-24 RX ADMIN — DULOXETINE HYDROCHLORIDE 60 MG: 60 CAPSULE, DELAYED RELEASE ORAL at 09:24

## 2019-02-24 RX ADMIN — INSULIN GLARGINE 35 UNITS: 100 INJECTION, SOLUTION SUBCUTANEOUS at 09:41

## 2019-02-24 RX ADMIN — BUSPIRONE HYDROCHLORIDE 30 MG: 7.5 TABLET ORAL at 09:26

## 2019-02-24 RX ADMIN — ASPIRIN 81 MG: 81 TABLET, COATED ORAL at 20:38

## 2019-02-24 RX ADMIN — GABAPENTIN 600 MG: 600 TABLET, FILM COATED ORAL at 20:37

## 2019-02-24 RX ADMIN — CEFTRIAXONE SODIUM 1 G: 1 INJECTION, POWDER, FOR SOLUTION INTRAMUSCULAR; INTRAVENOUS at 04:27

## 2019-02-24 RX ADMIN — Medication 10 ML: at 20:39

## 2019-02-24 RX ADMIN — ARIPIPRAZOLE 10 MG: 10 TABLET ORAL at 09:24

## 2019-02-24 RX ADMIN — METFORMIN HYDROCHLORIDE 1000 MG: 500 TABLET ORAL at 09:24

## 2019-02-24 RX ADMIN — METOPROLOL SUCCINATE 50 MG: 50 TABLET, EXTENDED RELEASE ORAL at 20:36

## 2019-02-24 RX ADMIN — INSULIN GLARGINE 29 UNITS: 100 INJECTION, SOLUTION SUBCUTANEOUS at 20:41

## 2019-02-24 RX ADMIN — CEFTRIAXONE SODIUM 1 G: 1 INJECTION, POWDER, FOR SOLUTION INTRAMUSCULAR; INTRAVENOUS at 17:21

## 2019-02-24 RX ADMIN — SPIRONOLACTONE 25 MG: 25 TABLET ORAL at 09:24

## 2019-02-24 RX ADMIN — LAMOTRIGINE 100 MG: 100 TABLET ORAL at 17:19

## 2019-02-24 RX ADMIN — LACTULOSE 20 G: 20 SOLUTION ORAL at 20:36

## 2019-02-24 RX ADMIN — FENOFIBRATE 135 MG: 54 TABLET ORAL at 17:17

## 2019-02-24 RX ADMIN — BUSPIRONE HYDROCHLORIDE 30 MG: 7.5 TABLET ORAL at 20:38

## 2019-02-24 RX ADMIN — INSULIN LISPRO 2 UNITS: 100 INJECTION, SOLUTION INTRAVENOUS; SUBCUTANEOUS at 11:30

## 2019-02-24 RX ADMIN — LEVOTHYROXINE SODIUM 150 MCG: 150 TABLET ORAL at 06:35

## 2019-02-24 RX ADMIN — ATORVASTATIN CALCIUM 40 MG: 40 TABLET, FILM COATED ORAL at 20:38

## 2019-02-24 RX ADMIN — TRAZODONE HYDROCHLORIDE 50 MG: 50 TABLET ORAL at 20:36

## 2019-02-24 RX ADMIN — BENZTROPINE MESYLATE 0.5 MG: 1 TABLET ORAL at 20:38

## 2019-02-24 RX ADMIN — LAMOTRIGINE 200 MG: 100 TABLET ORAL at 09:24

## 2019-02-24 ASSESSMENT — PAIN SCALES - GENERAL
PAINLEVEL_OUTOF10: 5
PAINLEVEL_OUTOF10: 0
PAINLEVEL_OUTOF10: 0

## 2019-02-24 ASSESSMENT — PAIN DESCRIPTION - PAIN TYPE: TYPE: ACUTE PAIN

## 2019-02-24 ASSESSMENT — PAIN DESCRIPTION - DESCRIPTORS: DESCRIPTORS: ACHING

## 2019-02-24 ASSESSMENT — PAIN DESCRIPTION - FREQUENCY: FREQUENCY: INTERMITTENT

## 2019-02-24 ASSESSMENT — PAIN DESCRIPTION - LOCATION: LOCATION: ABDOMEN

## 2019-02-24 ASSESSMENT — PAIN DESCRIPTION - ONSET: ONSET: ON-GOING

## 2019-02-24 ASSESSMENT — PAIN DESCRIPTION - ORIENTATION: ORIENTATION: LOWER

## 2019-02-24 ASSESSMENT — PAIN - FUNCTIONAL ASSESSMENT: PAIN_FUNCTIONAL_ASSESSMENT: ACTIVITIES ARE NOT PREVENTED

## 2019-02-24 ASSESSMENT — PAIN DESCRIPTION - PROGRESSION: CLINICAL_PROGRESSION: GRADUALLY WORSENING

## 2019-02-25 VITALS
TEMPERATURE: 97.6 F | SYSTOLIC BLOOD PRESSURE: 125 MMHG | HEART RATE: 67 BPM | WEIGHT: 216.1 LBS | HEIGHT: 60 IN | RESPIRATION RATE: 18 BRPM | DIASTOLIC BLOOD PRESSURE: 58 MMHG | BODY MASS INDEX: 42.43 KG/M2 | OXYGEN SATURATION: 95 %

## 2019-02-25 LAB
GLUCOSE BLD-MCNC: 127 MG/DL (ref 70–108)
GLUCOSE BLD-MCNC: 179 MG/DL (ref 70–108)

## 2019-02-25 PROCEDURE — 97165 OT EVAL LOW COMPLEX 30 MIN: CPT

## 2019-02-25 PROCEDURE — 96376 TX/PRO/DX INJ SAME DRUG ADON: CPT

## 2019-02-25 PROCEDURE — 2580000003 HC RX 258: Performed by: INTERNAL MEDICINE

## 2019-02-25 PROCEDURE — 6370000000 HC RX 637 (ALT 250 FOR IP): Performed by: INTERNAL MEDICINE

## 2019-02-25 PROCEDURE — 97535 SELF CARE MNGMENT TRAINING: CPT

## 2019-02-25 PROCEDURE — 97110 THERAPEUTIC EXERCISES: CPT

## 2019-02-25 PROCEDURE — 99217 PR OBSERVATION CARE DISCHARGE MANAGEMENT: CPT | Performed by: INTERNAL MEDICINE

## 2019-02-25 PROCEDURE — G0378 HOSPITAL OBSERVATION PER HR: HCPCS

## 2019-02-25 PROCEDURE — 82948 REAGENT STRIP/BLOOD GLUCOSE: CPT

## 2019-02-25 PROCEDURE — 97161 PT EVAL LOW COMPLEX 20 MIN: CPT

## 2019-02-25 PROCEDURE — 6360000002 HC RX W HCPCS: Performed by: INTERNAL MEDICINE

## 2019-02-25 RX ORDER — CEFDINIR 300 MG/1
600 CAPSULE ORAL DAILY
Qty: 14 CAPSULE | Refills: 0 | Status: SHIPPED | OUTPATIENT
Start: 2019-02-25 | End: 2019-03-04

## 2019-02-25 RX ADMIN — DULOXETINE HYDROCHLORIDE 60 MG: 60 CAPSULE, DELAYED RELEASE ORAL at 08:37

## 2019-02-25 RX ADMIN — ARIPIPRAZOLE 10 MG: 10 TABLET ORAL at 08:37

## 2019-02-25 RX ADMIN — PANTOPRAZOLE SODIUM 40 MG: 40 TABLET, DELAYED RELEASE ORAL at 08:35

## 2019-02-25 RX ADMIN — BUSPIRONE HYDROCHLORIDE 30 MG: 7.5 TABLET ORAL at 08:37

## 2019-02-25 RX ADMIN — SPIRONOLACTONE 25 MG: 25 TABLET ORAL at 08:34

## 2019-02-25 RX ADMIN — GABAPENTIN 600 MG: 600 TABLET, FILM COATED ORAL at 08:36

## 2019-02-25 RX ADMIN — METFORMIN HYDROCHLORIDE 1000 MG: 500 TABLET ORAL at 08:34

## 2019-02-25 RX ADMIN — Medication 10 ML: at 08:43

## 2019-02-25 RX ADMIN — CEFTRIAXONE SODIUM 1 G: 1 INJECTION, POWDER, FOR SOLUTION INTRAMUSCULAR; INTRAVENOUS at 04:00

## 2019-02-25 RX ADMIN — LAMOTRIGINE 200 MG: 100 TABLET ORAL at 08:36

## 2019-02-25 RX ADMIN — LEVOTHYROXINE SODIUM 150 MCG: 150 TABLET ORAL at 06:46

## 2019-02-25 RX ADMIN — FENOFIBRATE 135 MG: 54 TABLET ORAL at 08:36

## 2019-02-25 RX ADMIN — INSULIN GLARGINE 35 UNITS: 100 INJECTION, SOLUTION SUBCUTANEOUS at 08:28

## 2019-02-25 RX ADMIN — LISINOPRIL 7.5 MG: 5 TABLET ORAL at 08:35

## 2019-02-25 ASSESSMENT — PAIN SCALES - GENERAL: PAINLEVEL_OUTOF10: 0

## 2019-03-01 LAB
BLOOD CULTURE, ROUTINE: NORMAL
BLOOD CULTURE, ROUTINE: NORMAL

## 2019-03-18 ENCOUNTER — OFFICE VISIT (OUTPATIENT)
Dept: INTERNAL MEDICINE CLINIC | Age: 64
End: 2019-03-18
Payer: MEDICARE

## 2019-03-18 VITALS
SYSTOLIC BLOOD PRESSURE: 117 MMHG | HEART RATE: 78 BPM | HEIGHT: 60 IN | WEIGHT: 213.5 LBS | BODY MASS INDEX: 41.91 KG/M2 | DIASTOLIC BLOOD PRESSURE: 62 MMHG

## 2019-03-18 DIAGNOSIS — E11.649 TYPE 2 DIABETES MELLITUS WITH HYPOGLYCEMIA WITHOUT COMA, WITH LONG-TERM CURRENT USE OF INSULIN (HCC): ICD-10-CM

## 2019-03-18 DIAGNOSIS — Z79.4 TYPE 2 DIABETES MELLITUS WITH HYPOGLYCEMIA WITHOUT COMA, WITH LONG-TERM CURRENT USE OF INSULIN (HCC): ICD-10-CM

## 2019-03-18 PROCEDURE — 99214 OFFICE O/P EST MOD 30 MIN: CPT | Performed by: NURSE PRACTITIONER

## 2019-03-25 PROBLEM — N39.0 UTI (URINARY TRACT INFECTION): Status: RESOLVED | Noted: 2019-02-23 | Resolved: 2019-03-25

## 2019-03-26 ENCOUNTER — OFFICE VISIT (OUTPATIENT)
Dept: NEPHROLOGY | Age: 64
End: 2019-03-26
Payer: MEDICARE

## 2019-03-26 VITALS
DIASTOLIC BLOOD PRESSURE: 75 MMHG | OXYGEN SATURATION: 98 % | HEART RATE: 98 BPM | WEIGHT: 207 LBS | BODY MASS INDEX: 40.43 KG/M2 | SYSTOLIC BLOOD PRESSURE: 145 MMHG

## 2019-03-26 DIAGNOSIS — I10 ESSENTIAL HYPERTENSION: ICD-10-CM

## 2019-03-26 DIAGNOSIS — N18.30 CKD (CHRONIC KIDNEY DISEASE) STAGE 3, GFR 30-59 ML/MIN (HCC): Primary | ICD-10-CM

## 2019-03-26 DIAGNOSIS — I50.32 CHRONIC DIASTOLIC CHF (CONGESTIVE HEART FAILURE) (HCC): ICD-10-CM

## 2019-03-26 PROCEDURE — 99213 OFFICE O/P EST LOW 20 MIN: CPT | Performed by: INTERNAL MEDICINE

## 2019-03-26 RX ORDER — GABAPENTIN 600 MG/1
600 TABLET ORAL 2 TIMES DAILY
Qty: 90 TABLET | Refills: 3 | Status: ON HOLD
Start: 2019-03-26 | End: 2020-01-20 | Stop reason: ALTCHOICE

## 2019-03-26 RX ORDER — CALCIUM CARBONATE 500(1250)
650 TABLET ORAL DAILY
COMMUNITY
End: 2021-05-25

## 2019-04-10 ENCOUNTER — APPOINTMENT (OUTPATIENT)
Dept: CT IMAGING | Age: 64
End: 2019-04-10
Payer: MEDICARE

## 2019-04-10 ENCOUNTER — HOSPITAL ENCOUNTER (EMERGENCY)
Age: 64
Discharge: HOME | End: 2019-04-10
Payer: MEDICARE

## 2019-04-10 VITALS
DIASTOLIC BLOOD PRESSURE: 49 MMHG | SYSTOLIC BLOOD PRESSURE: 135 MMHG | HEART RATE: 65 BPM | OXYGEN SATURATION: 95 % | BODY MASS INDEX: 40.43 KG/M2 | WEIGHT: 207 LBS | RESPIRATION RATE: 15 BRPM

## 2019-04-10 DIAGNOSIS — R53.1 GENERALIZED WEAKNESS: Primary | ICD-10-CM

## 2019-04-10 DIAGNOSIS — G25.2 FINE TREMOR: ICD-10-CM

## 2019-04-10 DIAGNOSIS — Z79.899 POLYPHARMACY: ICD-10-CM

## 2019-04-10 DIAGNOSIS — R29.6 FREQUENT FALLS: ICD-10-CM

## 2019-04-10 LAB
ALBUMIN SERPL-MCNC: 3.7 G/DL (ref 3.5–5.1)
ALP BLD-CCNC: 78 U/L (ref 38–126)
ALT SERPL-CCNC: 20 U/L (ref 11–66)
ANION GAP SERPL CALCULATED.3IONS-SCNC: 12 MEQ/L (ref 8–16)
AST SERPL-CCNC: 20 U/L (ref 5–40)
BASOPHILS # BLD: 0.3 %
BASOPHILS ABSOLUTE: 0 THOU/MM3 (ref 0–0.1)
BILIRUB SERPL-MCNC: 0.2 MG/DL (ref 0.3–1.2)
BILIRUBIN DIRECT: < 0.2 MG/DL (ref 0–0.3)
BUN BLDV-MCNC: 21 MG/DL (ref 7–22)
CALCIUM SERPL-MCNC: 9.3 MG/DL (ref 8.5–10.5)
CHLORIDE BLD-SCNC: 105 MEQ/L (ref 98–111)
CO2: 26 MEQ/L (ref 23–33)
CREAT SERPL-MCNC: 1.5 MG/DL (ref 0.4–1.2)
EKG ATRIAL RATE: 72 BPM
EKG P AXIS: 37 DEGREES
EKG P-R INTERVAL: 182 MS
EKG Q-T INTERVAL: 392 MS
EKG QRS DURATION: 70 MS
EKG QTC CALCULATION (BAZETT): 429 MS
EKG R AXIS: 30 DEGREES
EKG T AXIS: 56 DEGREES
EKG VENTRICULAR RATE: 72 BPM
EOSINOPHIL # BLD: 1.7 %
EOSINOPHILS ABSOLUTE: 0.2 THOU/MM3 (ref 0–0.4)
ERYTHROCYTE [DISTWIDTH] IN BLOOD BY AUTOMATED COUNT: 13.8 % (ref 11.5–14.5)
ERYTHROCYTE [DISTWIDTH] IN BLOOD BY AUTOMATED COUNT: 47 FL (ref 35–45)
GFR SERPL CREATININE-BSD FRML MDRD: 35 ML/MIN/1.73M2
GLUCOSE BLD-MCNC: 113 MG/DL (ref 70–108)
HCT VFR BLD CALC: 38.3 % (ref 37–47)
HEMOGLOBIN: 12.2 GM/DL (ref 12–16)
IMMATURE GRANS (ABS): 0.07 THOU/MM3 (ref 0–0.07)
IMMATURE GRANULOCYTES: 0.7 %
LYMPHOCYTES # BLD: 25.7 %
LYMPHOCYTES ABSOLUTE: 2.6 THOU/MM3 (ref 1–4.8)
MCH RBC QN AUTO: 29.8 PG (ref 26–33)
MCHC RBC AUTO-ENTMCNC: 31.9 GM/DL (ref 32.2–35.5)
MCV RBC AUTO: 93.4 FL (ref 81–99)
MONOCYTES # BLD: 6.8 %
MONOCYTES ABSOLUTE: 0.7 THOU/MM3 (ref 0.4–1.3)
NUCLEATED RED BLOOD CELLS: 0 /100 WBC
OSMOLALITY CALCULATION: 288.8 MOSMOL/KG (ref 275–300)
PLATELET # BLD: 295 THOU/MM3 (ref 130–400)
PMV BLD AUTO: 11.1 FL (ref 9.4–12.4)
POTASSIUM SERPL-SCNC: 5.3 MEQ/L (ref 3.5–5.2)
RBC # BLD: 4.1 MILL/MM3 (ref 4.2–5.4)
SEG NEUTROPHILS: 64.8 %
SEGMENTED NEUTROPHILS ABSOLUTE COUNT: 6.6 THOU/MM3 (ref 1.8–7.7)
SODIUM BLD-SCNC: 143 MEQ/L (ref 135–145)
TOTAL PROTEIN: 6.9 G/DL (ref 6.1–8)
TSH SERPL DL<=0.05 MIU/L-ACNC: 0.55 UIU/ML (ref 0.4–4.2)
WBC # BLD: 10.2 THOU/MM3 (ref 4.8–10.8)

## 2019-04-10 PROCEDURE — 72125 CT NECK SPINE W/O DYE: CPT

## 2019-04-10 PROCEDURE — 70450 CT HEAD/BRAIN W/O DYE: CPT

## 2019-04-10 PROCEDURE — 99284 EMERGENCY DEPT VISIT MOD MDM: CPT

## 2019-04-10 PROCEDURE — 80053 COMPREHEN METABOLIC PANEL: CPT

## 2019-04-10 PROCEDURE — 36415 COLL VENOUS BLD VENIPUNCTURE: CPT

## 2019-04-10 PROCEDURE — 85025 COMPLETE CBC W/AUTO DIFF WBC: CPT

## 2019-04-10 PROCEDURE — 84443 ASSAY THYROID STIM HORMONE: CPT

## 2019-04-10 PROCEDURE — 82248 BILIRUBIN DIRECT: CPT

## 2019-04-10 PROCEDURE — 93005 ELECTROCARDIOGRAM TRACING: CPT | Performed by: NURSE PRACTITIONER

## 2019-04-10 ASSESSMENT — ENCOUNTER SYMPTOMS
BLOOD IN STOOL: 0
ABDOMINAL PAIN: 0
VOMITING: 0
CHEST TIGHTNESS: 0
SORE THROAT: 0
DIARRHEA: 0
SINUS PRESSURE: 0
PHOTOPHOBIA: 0
VOICE CHANGE: 0
CONSTIPATION: 0
NAUSEA: 0
SHORTNESS OF BREATH: 0
ABDOMINAL DISTENTION: 0
WHEEZING: 0
RHINORRHEA: 0
COLOR CHANGE: 0
COUGH: 0
BACK PAIN: 0
EYE REDNESS: 0

## 2019-04-10 NOTE — ED TRIAGE NOTES
Patient is seeing doctor Chao for tremors. Patient is taking cogentin and dose was up two weeks ago patient doesn't feel like it is helping.

## 2019-04-10 NOTE — ED NOTES
Report given to Pratibha Alvarado RN at bedside face to face with patient.       Eyad Grove RN  04/10/19 4758

## 2019-04-10 NOTE — ED NOTES
Reassessment of the patients Tremors   is unchanged, the patients pain reassessment is a 0/10, Side rails up times 2, call light in reach, will continue to monitor.        Larisa Mazariegos RN  04/10/19 8480

## 2019-04-10 NOTE — ED NOTES
Patient has no outward tremors she states, \"I feel them in my arms and legs. \"     General Cristofer RN  04/10/19 4286

## 2019-04-10 NOTE — ED PROVIDER NOTES
[facility] Emergency Department    CHIEF COMPLAINT       Chief Complaint   Patient presents with    Tremors       Nurses Notes reviewed and I agree except as noted in the HPI. HISTORY OF PRESENT ILLNESS    Scott Khanna royce 66 y.o. female who presents to the ED for evaluation of tremors. The patient reports a history of hypertension, hyperlipidemia, diabetes, CHF, CAD, and chronic pain for which she sees Dr. Luis Vizcarra (Pain Management) and has received injections in the past. She states that she has been experiencing tremors with generalized weakness and intermittent dizziness which she describes as the \"room spinning\" for the past 3 months. She has been seen within the ED for the issue in the past, and she was admitted to the hospital on 2/23 for a UTI where she has similar symptoms. The patient reports that she has fallen twice in the past week and that she experienced some confusion with difficulty speaking after one of the episodes. She states that the symptoms have since resolved, and denies that she has hit her head with any of her falls. The patient reports that she was placed on Cogentin through [facility] [place] [facility] for her weakness, and that she saw Dr. Ricardo Mendoza (Neurology) on 4/3 where she was advised that she could follow-up as needed, but that her work-ups have been negative. The patient denies experiencing any chest pain, shortness of breath, abdominal pain, fever, chills, headache, weakness, numbness, tingling, visual changes, nausea, emesis, or urinary symptoms. She sees Dr. Alan Bautista (Nephrology) and he reduced her Neurontin recently. She is not on any blood thinners. The patient denies further complaints at initial encounter. Experienced previously: Yes    HPI was provided by the patient. REVIEW OF SYSTEMS     Review of Systems   Constitutional: Negative for appetite change, chills, diaphoresis, fatigue, fever and unexpected weight change.    HENT: Negative for congestion, hearing loss, postnasal drip, rhinorrhea, sinus pressure, sore throat and voice change. Eyes: Negative for photophobia, redness and visual disturbance. Respiratory: Negative for cough, chest tightness, shortness of breath and wheezing. Cardiovascular: Negative for chest pain and palpitations. Gastrointestinal: Negative for abdominal distention, abdominal pain, blood in stool, constipation, diarrhea, nausea and vomiting. Endocrine: Negative for cold intolerance, heat intolerance, polydipsia, polyphagia and polyuria. Genitourinary: Negative for difficulty urinating, dysuria, flank pain, frequency and vaginal pain. Musculoskeletal: Negative for arthralgias, back pain, gait problem, joint swelling, neck pain and neck stiffness. Skin: Negative for color change and rash. Allergic/Immunologic: Negative for immunocompromised state. Neurological: Positive for dizziness (\"room spinning\"), tremors, speech difficulty (resolved) and weakness (generalized). Negative for syncope, light-headedness, numbness and headaches. Hematological: Does not bruise/bleed easily. Psychiatric/Behavioral: Positive for confusion (resolved). Negative for behavioral problems, decreased concentration, hallucinations, self-injury and suicidal ideas. The patient is not nervous/anxious.          PAST MEDICAL HISTORY     Past Medical History:   Diagnosis Date    Back pain     with radiation    CAD (coronary artery disease)     Chronic diastolic CHF [facility]congestive heart failure) ([facility] 7/21/2017    Colitis     Depression     Depression     Gastroesophageal reflux     GERD (gastroesophageal reflux disease)     H/O endoscopy     stretch the eso    Hyperlipidemia     Hypertension     Hypertension     Hypothyroidism     Incontinence of urine     Lordosis (acquired) (postural)     Spondylosis of unspecified site without mention of myelopathy     Thoracic or lumbosacral neuritis or radiculitis, unspecified     Type II or unspecified type diabetes (TRICOR) 145 MG TABLET    Take 145 mg by mouth daily    FERROUS SULFATE 325 (65 FE) MG TABLET    Take 325 mg by mouth daily (with breakfast)    GABAPENTIN (NEURONTIN) 600 MG TABLET    Take 1 tablet by mouth 2 times daily for 60 days. GLUCOSE BLOOD VI TEST STRIPS (FREESTYLE LITE) STRIP    Check BS 4 times a day    INSULIN GLARGINE (LANTUS SOLOSTAR) 100 UNIT/ML INJECTION PEN    35 in the am, 29 in the pm    INSULIN LISPRO (HUMALOG KWIKPEN) 100 UNIT/ML PEN    Inject 17 units before breakfast, 20 units before lunch and 28 units before dinner plus sliding scale. Patient uses a max of 96 units per day. LAMOTRIGINE (LAMICTAL) 100 MG TABLET      Take by mouth daily 200MG AM AND 100MG AT SUPPER    LEVOTHYROXINE SODIUM (SYNTHROID PO)    Take 150 mcg by mouth 6 days a week    LIDOCAINE (XYLOCAINE) 5 % OINTMENT    Apply topically 3 times daily as needed for Pain Apply topically as needed.     LIRAGLUTIDE (VICTOZA) 18 MG/3ML SOPN SC INJECTION    Inject 1.2 mg into the skin daily    LISINOPRIL (PRINIVIL;ZESTRIL) 5 MG TABLET    Take 7.5 mg by mouth daily     MECLIZINE (ANTIVERT) 25 MG TABLET    Take 50 mg by mouth 3 times daily as needed     METFORMIN (GLUCOPHAGE) 1000 MG TABLET    Take 1,000 mg by mouth 2 times daily (with meals) Indications: 1 tab if hr is >60 or SBP is >100     METOPROLOL SUCCINATE (TOPROL XL) 50 MG EXTENDED RELEASE TABLET    Take 50 mg by mouth daily    MULTIPLE VITAMINS-MINERALS (MULTI FOR HER PO)    Take by mouth daily    NONFORMULARY    Take 8,400 mcg by mouth daily Indications: cranberry- 2 gelcaps daily    NONFORMULARY    Take by mouth 3 times daily Indications: fiber capsule-    OMEGA-3 FATTY ACIDS (FISH OIL) 1000 MG CAPS    Take 3,000 mg by mouth daily    PANTOPRAZOLE (PROTONIX) 40 MG TABLET    Take 1 tablet by mouth daily    SPIRONOLACTONE (ALDACTONE) 25 MG TABLET    Take 25 mg by mouth daily    TRAZODONE (DESYREL) 50 MG TABLET    Take 50 mg by mouth nightly Indications: 1/2 tab        ALLERGIES is allergic to ibuprofen. FAMILY HISTORY     indicated that her mother is . She indicated that her father is . She indicated that both of her sisters are alive. She indicated that both of her brothers are alive. She indicated that her child is alive. family history includes Diabetes in her father, mother, and sister; Heart Disease in her father and mother; Stroke in her mother and sister; Thyroid Disease in her brother. SOCIAL HISTORY       Social History     Socioeconomic History    Marital status:      Spouse name: Not on file    Number of children: Not on file    Years of education: Not on file    Highest education level: Not on file   Occupational History    Not on file   Social Needs    Financial resource strain: Not on file    Food insecurity:     Worry: Not on file     Inability: Not on file    Transportation needs:     Medical: Not on file     Non-medical: Not on file   Tobacco Use    Smoking status: Passive Smoke Exposure - Never Smoker    Smokeless tobacco: Never Used   Substance and Sexual Activity    Alcohol use: No     Alcohol/week: 0.0 oz    Drug use: No    Sexual activity: Yes     Partners: Male   Lifestyle    Physical activity:     Days per week: Not on file     Minutes per session: Not on file    Stress: Not on file   Relationships    Social connections:     Talks on phone: Not on file     Gets together: Not on file     Attends Orthodox service: Not on file     Active member of club or organization: Not on file     Attends meetings of clubs or organizations: Not on file     Relationship status: Not on file    Intimate partner violence:     Fear of current or ex partner: Not on file     Emotionally abused: Not on file     Physically abused: Not on file     Forced sexual activity: Not on file   Other Topics Concern    Not on file   Social History Narrative    Not on file       PHYSICAL EXAM     INITIAL VITALS:  weight is 207 lb (93.9 kg).  Her blood pressure is 135/49 (abnormal) and her pulse is 65. Her respiration is 15 and oxygen saturation is 95%. Physical Exam   Constitutional: She is oriented to person, place, and time. She appears well-developed and well-nourished. HENT:   Head: Normocephalic. Mouth/Throat: Uvula is midline. Eyes: Conjunctivae and EOM are normal.   Neck: Normal range of motion. Neck supple. Cardiovascular: Normal rate, regular rhythm, S1 normal, S2 normal, normal heart sounds, intact distal pulses and normal pulses. Pulses:       Radial pulses are 2+ on the right side, and 2+ on the left side. Dorsalis pedis pulses are 2+ on the right side, and 2+ on the left side. Posterior tibial pulses are 2+ on the right side, and 2+ on the left side. Pulmonary/Chest: Effort normal and breath sounds normal. No respiratory distress. She exhibits no tenderness. Abdominal: Soft. Normal appearance and bowel sounds are normal. She exhibits no distension. There is no tenderness. Musculoskeletal: Normal range of motion. Lymphadenopathy:     She has no cervical adenopathy. Neurological: She is alert and oriented to person, place, and time. She has normal strength. She displays no tremor. No cranial nerve deficit. She exhibits normal muscle tone. GCS eye subscore is 4. GCS verbal subscore is 5. GCS motor subscore is 6. Patient is neurologically intact with intact sensation. 5/5 strength is appreciated in bilateral upper and lower extremities. Skin: Skin is warm, dry and intact. Psychiatric: She has a normal mood and affect. Her speech is normal and behavior is normal. Thought content normal.   Nursing note and vitals reviewed.       DIFFERENTIAL DIAGNOSIS:   Orthostatic hypotension, dehydration, electrolyte imbalance, fall, fracture, rule out ICH    DIAGNOSTIC RESULTS     EKG: All EKG's are interpreted by the Emergency Department Physician who eithersigns or Co-signs this chart in the absence of a cardiologist.    EKG read and interpreted by myself gives impression of normal sinus rhythm with heart rate of 72; interval 182; QRS 70;QTc 429; axis 37, 30, 56. No STEMI     RADIOLOGY: non-plainfilm images(s) such as CT, Ultrasound and MRI are read by the radiologist.  Plain radiographic images are visualized and preliminarily interpreted by the emergency physician unless otherwise stated below. CT Head WO Contrast   Final Result   No acute intracranial findings. **This report has been created using voice recognition software. It may contain minor errors which are inherent in voice recognition technology. **      Final report electronically signed by Dr. Fuentes Sinha on 4/10/2019 12:17 PM      CT Cervical Spine WO Contrast   Final Result   No acute fracture or subluxation. **This report has been created using voice recognition software. It may contain minor errors which are inherent in voice recognition technology. **      Final report electronically signed by Dr. Fuentes Sinha on 4/10/2019 12:10 PM            LABS:   Labs Reviewed   CBC WITH AUTO DIFFERENTIAL - Abnormal; Notable for the following components:       Result Value    RBC 4.10 (*)     MCHC 31.9 (*)     RDW-SD 47.0 (*)     All other components within normal limits   BASIC METABOLIC PANEL - Abnormal; Notable for the following components:    Potassium 5.3 (*)     Glucose 113 (*)     CREATININE 1.5 (*)     All other components within normal limits   HEPATIC FUNCTION PANEL - Abnormal; Notable for the following components:     Total Bilirubin 0.2 (*)     All other components within normal limits   GLOMERULAR FILTRATION RATE, ESTIMATED - Abnormal; Notable for the following components:    Est, Glom Filt Rate 35 (*)     All other components within normal limits   TSH WITHOUT REFLEX   ANION GAP   OSMOLALITY       EMERGENCY DEPARTMENT COURSE:   Vitals:    Vitals:    04/10/19 1107 04/10/19 1200 04/10/19 1316   BP: (!) 123/57  (!) 135/49   Pulse: 79  65   Resp: evalaution      DISCHARGE MEDICATIONS:  New Prescriptions    No medications on file       (Please note that portions of this note were completed with a voice recognition program.  Efforts were made to edit the dictations but occasionally words are mis-transcribed.)    Scribe:  Skyla Orellana 4/10/19 11:39 AM Scribing for and in the presence of Star Frazier CNP. Signed by: Jade Agarwal, 04/10/19 1:54 PM    Provider:  I personally performed the services described in the documentation,reviewed and edited the documentation which was dictated to the scribe in my presence, and it accurately records my words and actions.     Star Frazier CNP 04/10/19 1:54 PM    RAIMUNDO Juarez CNP  04/10/19 4688

## 2019-04-11 PROCEDURE — 93010 ELECTROCARDIOGRAM REPORT: CPT | Performed by: INTERNAL MEDICINE

## 2019-04-26 ENCOUNTER — TELEPHONE (OUTPATIENT)
Dept: PHYSICAL MEDICINE AND REHAB | Age: 64
End: 2019-04-26

## 2019-04-26 NOTE — TELEPHONE ENCOUNTER
Pt. Called to check on the appeal status for her procedure. The last documentation we have discusses that the patient would call to appeal the procedure and that we let her know the paperwork she requested was ready for picked. Called to discuss further. Left message for patient to call the office.

## 2019-05-20 NOTE — PROGRESS NOTES
[facility]      Zeeshan Gray         [id number]  5/21/2019   Chief Complaint   Patient presents with    Sleep Apnea     SERENA 1 yr f/u [facility]         Pt of Mayi DOUGHERTY Download:   Original or initialAHI: 102.7     Date of initial study: 9/24/14      Compliant  93.3%     Noncompliant 6.7 %     PAP Type CPAPLevel  10   Avg Hrs/Day 9:04  AHI: 0.3   Recorded compliance dates , April 21, 2019  to May 20, 2019   Machine/Mfg: Respironics Interface: Nasal    Provider:    _[email or web address]           Jerrell Soto        __ [employer]    __ [address]            __[facility] [facility] __[employer]   __[place]:       __Other    Neck Size: 18.75  Mallampati Mallampati 4  ESS:  18  SAQLI: 62    Here is a scan of the most recent download:              Presentation:   Deja Palomares presents for sleep medicine follow up for obstructive sleep apnea  Since the last visit, Deja Palomares is doing ok with PAP but still having EDS. She is sleeping ok. She feels sleepy a lot. She is not active. She is on 33 medicines!! Equipment issues: The pressure is  acceptable, the mask is acceptable and she  is  using the humidity. Sleep issues:  Do you feel better? No  More rested? No   Better concentration? no    Progress History:   Since last visit any new medical issues? Yes dizziness  New ER or hospitlal visits? No  Any new or changes in medicines? No  Any new sleep medicines?  No        Past Medical History:   Diagnosis Date    Back pain     with radiation    CAD (coronary artery disease)     Chronic diastolic CH[facility]congestive [facility] 7/21/2017    Colitis     Depression     Depression     Gastroesophageal reflux     GERD (gastroesophageal reflux disease)     H/O endoscopy     stretch the eso    Hyperlipidemia     Hypertension     Hypertension     Hypothyroidism     Incontinence of urine     Lordosis (acquired) (postural)     Spondylosis of unspecified site without mention of myelopathy     by mouth 3 times daily       lisinopril (PRINIVIL;ZESTRIL) 5 MG tablet Take 7.5 mg by mouth daily       pantoprazole (PROTONIX) 40 MG tablet Take 1 tablet by mouth daily 30 tablet 12    glucose blood VI test strips (FREESTYLE LITE) strip Check BS 4 times a day 400 each 1    Blood Glucose Monitoring Suppl (FREESTYLE LITE) EMILIE 1 Device by Does not apply route daily as needed 1 Device 0    aspirin 81 MG tablet Take 81 mg by mouth daily      ammonium lactate (AMLACTIN) 12 % cream Apply topically 2 times daily Apply topically as needed.  Multiple Vitamins-Minerals (MULTI FOR HER PO) Take by mouth daily      atorvastatin (LIPITOR) 40 MG tablet Take 40 mg by mouth daily      ferrous sulfate 325 (65 FE) MG tablet Take 325 mg by mouth daily (with breakfast)      fenofibrate (TRICOR) 145 MG tablet Take 145 mg by mouth daily      Levothyroxine Sodium (SYNTHROID PO) Take 150 mcg by mouth 6 days a week      DOXEPIN HCL PO Take 200 mg by mouth nightly.  lamoTRIgine (LAMICTAL) 100 MG tablet   Take by mouth daily 200MG AM AND 100MG AT SUPPER      metFORMIN (GLUCOPHAGE) 1000 MG tablet Take 1,000 mg by mouth 2 times daily (with meals) Indications: 1 tab if hr is >60 or SBP is >100        No current facility-administered medications for this visit. Family History   Problem Relation Age of Onset    Diabetes Mother     Heart Disease Mother     Stroke Mother     Diabetes Father     Heart Disease Father     Diabetes Sister     Stroke Sister     Thyroid Disease Brother         Review of Systems -   Review of Systems   Constitutional: Negative for activity change, appetite change, chills and fever. HENT: Negative for congestion and postnasal drip. Eyes: Negative. Respiratory: Negative for cough, chest tightness, shortness of breath, wheezing and stridor. Cardiovascular: Negative for chest pain and leg swelling. Gastrointestinal: Negative for diarrhea and nausea. Endocrine: Negative. Genitourinary: Negative. Musculoskeletal: Negative. Negative for arthralgias and back pain. Skin: Negative. Allergic/Immunologic: Negative. Neurological: Positive for dizziness. Negative for light-headedness. Psychiatric/Behavioral: Negative. All other systems reviewed and are negative. Physical Exam:    BMI:  Body mass index is 40.58 kg/m². Wt Readings from Last 3 Encounters:   05/21/19 207 lb 12.8 oz (94.3 kg)   04/10/19 207 lb (93.9 kg)   03/26/19 207 lb (93.9 kg)     Weight stable / unchanged  Vitals: /64 (Site: Right Upper Arm, Position: Sitting, Cuff Size: Large Adult)   Pulse 67   Ht 5' (1.524 m)   Wt 207 lb 12.8 oz (94.3 kg)   SpO2 98%   BMI 40.58 kg/m²       Physical Exam   Constitutional: She is oriented to person, place, and time. She appears well-developed and well-nourished. HENT:   Head: Normocephalic and atraumatic. Right Ear: External ear normal.   Left Ear: External ear normal.   Mouth/Throat: Oropharynx is clear and moist.   Eyes: Pupils are equal, round, and reactive to light. Conjunctivae and EOM are normal.   Neck: Normal range of motion. Neck supple. Cardiovascular: Normal rate, regular rhythm and normal heart sounds. Pulmonary/Chest: Effort normal and breath sounds normal.   Abdominal: Soft. Musculoskeletal: Normal range of motion. Neurological: She is alert and oriented to person, place, and time. Skin: Skin is warm and dry. Psychiatric: She has a normal mood and affect. Her behavior is normal. Judgment and thought content normal.         ASSESSMENT/DIAGNOSIS     Diagnosis Orders   1. SERENA on CPAP     2. Obesity, Class III, BMI 40-49.9 (morbid obesity) ([address]     3. Chronic diastolic CHF (congestive heart failure) ([address]     4. Excessive sleepiness              Plan   Do you need any equipment today?  Yes update supplies  - Hypersomnia secondary to medicines  - Plan scheduled naps  - Not inclined to add more medicines to over come side effects of current meds  - She  was advised to continue current positive airway pressure therapy with above described pressure. - She  advised to keep goodcompliance with current recommended pressure to get optimal results and clinical improvement  - Recommend 7-9 hours of sleep with PAP  - She was advised to call [employer] regarding supplies if needed.   -She call my office for earlier appointment if needed for worsening of sleep symptoms.   - She was instructed on weight loss  - Otto Mosqueda was educated about my impression and plan. Patient verbalizesunderstanding.   We will see Michelle Archuleta back in: 1 year with download    Information added by my medical assistant/LPN was reviewed today       Steven Blunt PA-C, MPAS  5/21/2019

## 2019-05-21 ENCOUNTER — OFFICE VISIT (OUTPATIENT)
Dept: PULMONOLOGY | Age: 64
End: 2019-05-21
Payer: MEDICARE

## 2019-05-21 VITALS
HEART RATE: 67 BPM | BODY MASS INDEX: 40.8 KG/M2 | SYSTOLIC BLOOD PRESSURE: 116 MMHG | OXYGEN SATURATION: 98 % | WEIGHT: 207.8 LBS | HEIGHT: 60 IN | DIASTOLIC BLOOD PRESSURE: 64 MMHG

## 2019-05-21 DIAGNOSIS — I50.32 CHRONIC DIASTOLIC CHF (CONGESTIVE HEART FAILURE) (HCC): Chronic | ICD-10-CM

## 2019-05-21 DIAGNOSIS — Z99.89 OSA ON CPAP: Primary | ICD-10-CM

## 2019-05-21 DIAGNOSIS — G47.10 EXCESSIVE SLEEPINESS: ICD-10-CM

## 2019-05-21 DIAGNOSIS — E66.01 OBESITY, CLASS III, BMI 40-49.9 (MORBID OBESITY) (HCC): ICD-10-CM

## 2019-05-21 DIAGNOSIS — G47.33 OSA ON CPAP: Primary | ICD-10-CM

## 2019-05-21 PROCEDURE — 99213 OFFICE O/P EST LOW 20 MIN: CPT | Performed by: PHYSICIAN ASSISTANT

## 2019-05-21 ASSESSMENT — ENCOUNTER SYMPTOMS
WHEEZING: 0
COUGH: 0
ALLERGIC/IMMUNOLOGIC NEGATIVE: 1
EYES NEGATIVE: 1
STRIDOR: 0
SHORTNESS OF BREATH: 0
NAUSEA: 0
DIARRHEA: 0
BACK PAIN: 0
CHEST TIGHTNESS: 0

## 2019-05-24 ENCOUNTER — OFFICE VISIT (OUTPATIENT)
Dept: PHYSICAL MEDICINE AND REHAB | Age: 64
End: 2019-05-24
Payer: MEDICARE

## 2019-05-24 VITALS
DIASTOLIC BLOOD PRESSURE: 61 MMHG | HEART RATE: 66 BPM | BODY MASS INDEX: 40.82 KG/M2 | HEIGHT: 60 IN | SYSTOLIC BLOOD PRESSURE: 111 MMHG | WEIGHT: 207.89 LBS

## 2019-05-24 DIAGNOSIS — M47.816 SPONDYLOSIS OF LUMBAR REGION WITHOUT MYELOPATHY OR RADICULOPATHY: Primary | ICD-10-CM

## 2019-05-24 DIAGNOSIS — G89.4 CHRONIC PAIN SYNDROME: ICD-10-CM

## 2019-05-24 DIAGNOSIS — M51.36 DDD (DEGENERATIVE DISC DISEASE), LUMBAR: ICD-10-CM

## 2019-05-24 DIAGNOSIS — M46.1 SI (SACROILIAC) JOINT INFLAMMATION (HCC): ICD-10-CM

## 2019-05-24 PROCEDURE — 99213 OFFICE O/P EST LOW 20 MIN: CPT | Performed by: NURSE PRACTITIONER

## 2019-05-24 ASSESSMENT — ENCOUNTER SYMPTOMS
COLOR CHANGE: 0
CONSTIPATION: 0
ABDOMINAL PAIN: 0
BACK PAIN: 1

## 2019-05-24 NOTE — PROGRESS NOTES
[address] [facility]  [address] [facility] [address]  Dept: [phone]  Dept Fax: [phone]  Loc: [phone]    Visit Date: 5/24/2019    Functionality Assessment/Goals Worksheet     On a scale of 0 (Does not Interfere) to 10 (Completely Interferes)     1. Which number describes how during the past week pain has interfered with       the following:  A. General Activity:  8  B. Mood: 6  C. Walking Ability:  9  D. Normal Work (Includes both work outside the home and housework):  8  E. Relations with Other People:   8  F. Sleep:   3  G. Enjoyment of Life:   10    2. Patient Prefers to Take their Pain Medications:     [x]  On a regular basis   []  Only when necessary    []  Does not take pain medications    3. What are the Patient's Goals/Expectations for Visiting Pain Management? [x]  Learn about my pain    []  Receive Medication   []  Physical Therapy     []  Treat Depression   [x]  Receive Injections    []  Treat Sleep   [x]  Deal with Anxiety and Stress   []  Treat Opoid Dependence/Addiction   []  Other:      HPI:   Pari Lainez is a 66 y.o. female is here today for    Chief Complaint: Low back pain    HPI     Lumbar RFA Bilateral L3-4,4-5,5-S1 7/2/2018: patient had 70% relief for over 4 months and 60% relief for a couple more months. Around the 6 month kalyn relief had worn off. Patient would like to et these repeated as she is having difficulty with her ADLs and getting things done around the house. There has been a delay in getting these repeated due to awaiting appeal for SI (see next paragraph). SI RFA appeal: per notes in our office, papers were prepared for patient to  for her to file an appeal, patient states she did not pick them up and didn't want to appeal at this time. Medications reviewed. Pain scale with out pain medications or at its worst is 9/10.   Pain scale with pain medications or at its best is 4/10. Drug screen reviewed from 18 and was appropriate    The patientis allergic to ibuprofen. Past Medical History  Clementina Villatoro  has a past medical history of Back pain, CAD (coronary artery disease), Chronic diastolic CHF (congestive heart failure) ([facility] [address] Colitis, Depression, Depression, Gastroesophageal reflux, GERD (gastroesophageal reflux disease), H/O endoscopy, Hyperlipidemia, Hypertension, Hypertension, Hypothyroidism, Incontinence of urine, Lordosis (acquired) (postural), Spondylosis of unspecified site without mention of myelopathy, Thoracic or lumbosacral neuritis or radiculitis, unspecified, Type II or unspecified type diabetes mellitus without mention of complication, not stated as uncontrolled, Unspecified sleep apnea, and Urinary incontinence. Past Surgical History  The patient  has a past surgical history that includes  section (); Carpal tunnel release (Bilateral, ); Abdomen surgery (); Dilation and curettage of uterus; Hysterectomy (); cardiovascular stress test (2015); Colonoscopy (, ); Upper gastrointestinal endoscopy (); Cardiac catheterization (); other surgical history (Bilateral, 2018); pr inj dx/ther agnt paravert facet joint, lumbar/sac, 2nd level (Bilateral, 3/19/2018); Nerve Surgery (Bilateral, 2018); pr inj dx/ther agnt paravert facet joint, lumbar/sac, 2nd level (Bilateral, 2018); pr inject rx other periph nerve (Bilateral, 2018); pr office/outpt visit,procedure only (Right, 10/2/2018); and Injection Procedure For Sacroiliac Joint (Right, 2018). Family History  This patient's family history includes Diabetes in her father, mother, and sister; Heart Disease in her father and mother; Stroke in her mother and sister; Thyroid Disease in her brother. Social History  Clementina Villatoro  reports that she is a non-smoker but has been exposed to tobacco smoke.  She has never used smokeless tobacco. She reports that she does not drink alcohol or use drugs. Medications    Current Outpatient Medications:     calcium carbonate (OSCAL) 500 MG TABS tablet, Take 650 mg by mouth daily Indications: 2 chews daily, Disp: , Rfl:     NONFORMULARY, Take 8,400 mcg by mouth daily Indications: cranberry- 2 gelcaps daily, Disp: , Rfl:     NONFORMULARY, Take by mouth 3 times daily Indications: fiber capsule-, Disp: , Rfl:     gabapentin (NEURONTIN) 600 MG tablet, Take 1 tablet by mouth 2 times daily for 60 days. , Disp: 90 tablet, Rfl: 3    insulin lispro (HUMALOG KWIKPEN) 100 UNIT/ML pen, Inject 17 units before breakfast, 20 units before lunch and 28 units before dinner plus sliding scale. Patient uses a max of 96 units per day., Disp: 10 pen, Rfl: 5    Liraglutide (VICTOZA) 18 MG/3ML SOPN SC injection, Inject 1.2 mg into the skin daily, Disp: 4 pen, Rfl: 5    insulin glargine (LANTUS SOLOSTAR) 100 UNIT/ML injection pen, 35 in the am, 29 in the pm, Disp: 5 pen, Rfl: 5    DULoxetine (CYMBALTA) 60 MG extended release capsule, Take 60 mg by mouth daily, Disp: , Rfl:     metoprolol succinate (TOPROL XL) 50 MG extended release tablet, Take 50 mg by mouth daily, Disp: , Rfl:     meclizine (ANTIVERT) 25 MG tablet, Take 50 mg by mouth 3 times daily as needed , Disp: , Rfl:     ARIPiprazole (ABILIFY) 10 MG tablet, Take 5 mg by mouth daily , Disp: , Rfl:     lidocaine (XYLOCAINE) 5 % ointment, Apply topically 3 times daily as needed for Pain Apply topically as needed. , Disp: , Rfl:     spironolactone (ALDACTONE) 25 MG tablet, Take 25 mg by mouth daily, Disp: , Rfl:     traZODone (DESYREL) 50 MG tablet, Take 50 mg by mouth nightly Indications: 1/2 tab , Disp: , Rfl:     Omega-3 Fatty Acids (FISH OIL) 1000 MG CAPS, Take 3,000 mg by mouth daily, Disp: , Rfl:     benztropine (COGENTIN) 0.5 MG tablet, Take 0.5 mg by mouth 2 times daily , Disp: , Rfl:     Ascorbic Acid (VITAMIN C) 500 MG tablet, Take 1,000 mg by mouth daily, Disp: , Rfl:     Cyanocobalamin (VITAMIN B 12 PO), Take 500 mg by mouth daily , Disp: , Rfl:     busPIRone (BUSPAR) 30 MG tablet, Take 30 mg by mouth 3 times daily , Disp: , Rfl:     lisinopril (PRINIVIL;ZESTRIL) 5 MG tablet, Take 7.5 mg by mouth daily , Disp: , Rfl:     pantoprazole (PROTONIX) 40 MG tablet, Take 1 tablet by mouth daily, Disp: 30 tablet, Rfl: 12    glucose blood VI test strips (FREESTYLE LITE) strip, Check BS 4 times a day, Disp: 400 each, Rfl: 1    Blood Glucose Monitoring Suppl (FREESTYLE LITE) EMILIE, 1 Device by Does not apply route daily as needed, Disp: 1 Device, Rfl: 0    aspirin 81 MG tablet, Take 81 mg by mouth daily, Disp: , Rfl:     ammonium lactate (AMLACTIN) 12 % cream, Apply topically 2 times daily Apply topically as needed. , Disp: , Rfl:     Multiple Vitamins-Minerals (MULTI FOR HER PO), Take by mouth daily, Disp: , Rfl:     atorvastatin (LIPITOR) 40 MG tablet, Take 40 mg by mouth daily, Disp: , Rfl:     ferrous sulfate 325 (65 FE) MG tablet, Take 325 mg by mouth daily (with breakfast), Disp: , Rfl:     fenofibrate (TRICOR) 145 MG tablet, Take 145 mg by mouth daily, Disp: , Rfl:     Levothyroxine Sodium (SYNTHROID PO), Take 150 mcg by mouth 6 days a week, Disp: , Rfl:     DOXEPIN HCL PO, Take 200 mg by mouth nightly., Disp: , Rfl:     lamoTRIgine (LAMICTAL) 100 MG tablet,  Take by mouth daily 200MG AM AND 100MG AT SUPPER, Disp: , Rfl:     metFORMIN (GLUCOPHAGE) 1000 MG tablet, Take 1,000 mg by mouth 2 times daily (with meals) Indications: 1 tab if hr is >60 or SBP is >100 , Disp: , Rfl:     Subjective:      Review of Systems   Constitutional: Positive for activity change and fatigue. Negative for chills and fever. Gastrointestinal: Negative for abdominal pain and constipation. Musculoskeletal: Positive for arthralgias, back pain, gait problem and myalgias. Skin: Negative for color change, pallor and rash. Neurological: Negative for numbness. Psychiatric/Behavioral: Positive for sleep disturbance. Objective:     Vitals:    05/24/19 0852   BP: 111/61   Site: Right Upper Arm   Position: Sitting   Cuff Size: Large Adult   Pulse: 66   Weight: 207 lb 14.3 oz (94.3 kg)   Height: 5' (1.524 m)       Physical Exam   Constitutional: She is oriented to person, place, and time. She appears well-developed and well-nourished. No distress. HENT:   Head: Normocephalic and atraumatic. Right Ear: External ear normal.   Left Ear: External ear normal.   Eyes: Right eye exhibits no discharge. Left eye exhibits no discharge. Neck: Normal range of motion. Neck supple. Pulmonary/Chest: Effort normal. No respiratory distress. Musculoskeletal: She exhibits tenderness. She exhibits no edema. Lumbar back: She exhibits normal range of motion, no tenderness and no pain. Back:    Neurological: She is alert and oriented to person, place, and time. Skin: Skin is warm and dry. She is not diaphoretic. No erythema. Psychiatric: She has a normal mood and affect. Her behavior is normal. Judgment and thought content normal.     HOMER test: positive  Yeomans test: positive  Gaenslen test: positive      Assessment:     1. Spondylosis of lumbar region without myelopathy or radiculopathy    2. DDD (degenerative disc disease), lumbar    3. SI (sacroiliac) joint inflammation (HCC)    4. Chronic pain syndrome            Plan:      · OARRS reviewed. Current MED: 0  · Patient was not offered naloxone for home. · Discussed long term side effects of medications, tolerance, dependency and addiction. · Previous UDS reviewed  · Patient told can not receive any pain medications from any other source. · No evidence of abuse, diversion or aberrant behavior.    Medications and/or procedures to improve function and quality of life- patient understanding with this and that may not be pain free   Discussed with patient about safe storage of medications at home   Discussed possible weaning of medication dosing dependent on treatment/procedure results.  Testing: none   Procedures: Lumbar RFA Bilateral L3-4,4-5,5-S1   Discussed with patient about risks with procedure including infection, reaction to medication, increased pain, or bleeding.  Medications: none       Meds. Prescribed:   No orders of the defined types were placed in this encounter. Return for Lumbar RFA Bilateral L3-4,4-5,5-S1, follow up after procedure.          Electronically signed by RAIMUNDO Cuellar CNP on5/24/2019 at 9:07 AM

## 2019-06-11 ENCOUNTER — HOSPITAL ENCOUNTER (OUTPATIENT)
Dept: WOMENS IMAGING | Age: 64
Discharge: HOME | End: 2019-06-11
Payer: MEDICARE

## 2019-06-11 DIAGNOSIS — Z12.31 VISIT FOR SCREENING MAMMOGRAM: ICD-10-CM

## 2019-06-11 PROCEDURE — 77063 BREAST TOMOSYNTHESIS BI: CPT

## 2019-06-18 ENCOUNTER — HOSPITAL ENCOUNTER (OUTPATIENT)
Age: 64
Setting detail: SPECIMEN
Discharge: HOME | End: 2019-06-18
Payer: MEDICARE

## 2019-06-20 ENCOUNTER — PREP FOR PROCEDURE (OUTPATIENT)
Dept: PHYSICAL MEDICINE AND REHAB | Age: 64
End: 2019-06-20

## 2019-06-20 ENCOUNTER — HOSPITAL ENCOUNTER (OUTPATIENT)
Dept: WOMENS IMAGING | Age: 64
Discharge: HOME | End: 2019-06-20
Payer: MEDICARE

## 2019-06-20 DIAGNOSIS — Z90.722 ACQUIRED ABSENCE OF OVARIES, BILATERAL: ICD-10-CM

## 2019-06-20 LAB
CYTOLOGY REPORT: NORMAL
HPV SAMPLE: NORMAL
HPV, GENOTYPE 16: NOT DETECTED
HPV, GENOTYPE 18: NOT DETECTED
HPV, HIGH RISK OTHER: NOT DETECTED
HPV, INTERPRETATION: NORMAL
SPECIMEN DESCRIPTION: NORMAL

## 2019-06-20 PROCEDURE — 77080 DXA BONE DENSITY AXIAL: CPT

## 2019-06-20 NOTE — H&P (VIEW-ONLY)
Clarita Pastor is an 66 y.o.  female. Chief Complaint: Low back pain     The patientis allergic to ibuprofen.     Past Medical History  Sherine Albrecht  has a past medical history of Back pain, CAD (coronary artery disease), Chronic diastolic CHF (congestive heart failure) ([address] Colitis, Depression, Depression, Gastroesophageal reflux, GERD (gastroesophageal reflux disease), H/O endoscopy, Hyperlipidemia, Hypertension, Hypertension, Hypothyroidism, Incontinence of urine, Lordosis (acquired) (postural), Spondylosis of unspecified site without mention of myelopathy, Thoracic or lumbosacral neuritis or radiculitis, unspecified, Type II or unspecified type diabetes mellitus without mention of complication, not stated as uncontrolled, Unspecified sleep apnea, and Urinary incontinence.     Past Surgical History  The patient  has a past surgical history that includes  section (); Carpal tunnel release (Bilateral, ); Abdomen surgery (); Dilation and curettage of uterus; Hysterectomy (); cardiovascular stress test (2015); Colonoscopy (, ); Upper gastrointestinal endoscopy (); Cardiac catheterization (); other surgical history (Bilateral, 2018); pr inj dx/ther agnt paravert facet joint, lumbar/sac, 2nd level (Bilateral, 3/19/2018); Nerve Surgery (Bilateral, 2018); pr inj dx/ther agnt paravert facet joint, lumbar/sac, 2nd level (Bilateral, 2018); pr inject rx other periph nerve (Bilateral, 2018); pr office/outpt visit,procedure only (Right, 10/2/2018); and Injection Procedure For Sacroiliac Joint (Right, 2018).    Family History  This patient's family history includes Diabetes in her father, mother, and sister; Heart Disease in her father and mother; Stroke in her mother and sister; Thyroid Disease in her brother.  Vickiede Jitendra  reports that she is a non-smoker but has been exposed to tobacco smoke.  She has never used smokeless tobacco. She reports that she does not drink alcohol or use drugs.     Medications    Current Medication      Current Outpatient Medications:     calcium carbonate (OSCAL) 500 MG TABS tablet, Take 650 mg by mouth daily Indications: 2 chews daily, Disp: , Rfl:     NONFORMULARY, Take 8,400 mcg by mouth daily Indications: cranberry- 2 gelcaps daily, Disp: , Rfl:     NONFORMULARY, Take by mouth 3 times daily Indications: fiber capsule-, Disp: , Rfl:     gabapentin (NEURONTIN) 600 MG tablet, Take 1 tablet by mouth 2 times daily for 60 days. , Disp: 90 tablet, Rfl: 3    insulin lispro (HUMALOG KWIKPEN) 100 UNIT/ML pen, Inject 17 units before breakfast, 20 units before lunch and 28 units before dinner plus sliding scale. Patient uses a max of 96 units per day., Disp: 10 pen, Rfl: 5    Liraglutide (VICTOZA) 18 MG/3ML SOPN SC injection, Inject 1.2 mg into the skin daily, Disp: 4 pen, Rfl: 5    insulin glargine (LANTUS SOLOSTAR) 100 UNIT/ML injection pen, 35 in the am, 29 in the pm, Disp: 5 pen, Rfl: 5    DULoxetine (CYMBALTA) 60 MG extended release capsule, Take 60 mg by mouth daily, Disp: , Rfl:     metoprolol succinate (TOPROL XL) 50 MG extended release tablet, Take 50 mg by mouth daily, Disp: , Rfl:     meclizine (ANTIVERT) 25 MG tablet, Take 50 mg by mouth 3 times daily as needed , Disp: , Rfl:     ARIPiprazole (ABILIFY) 10 MG tablet, Take 5 mg by mouth daily , Disp: , Rfl:     lidocaine (XYLOCAINE) 5 % ointment, Apply topically 3 times daily as needed for Pain Apply topically as needed. , Disp: , Rfl:     spironolactone (ALDACTONE) 25 MG tablet, Take 25 mg by mouth daily, Disp: , Rfl:     traZODone (DESYREL) 50 MG tablet, Take 50 mg by mouth nightly Indications: 1/2 tab , Disp: , Rfl:     Omega-3 Fatty Acids (FISH OIL) 1000 MG CAPS, Take 3,000 mg by mouth daily, Disp: , Rfl:     benztropine (COGENTIN) 0.5 MG tablet, Take 0.5 mg by mouth 2 times daily , Disp: , Rfl:     Ascorbic Acid (VITAMIN C) 500 MG tablet, Take numbness. Psychiatric/Behavioral: Positive for sleep disturbance.         Objective:      Vitals   Weight: 207 lb 14.3 oz (94.3 kg)   Height: 5' (1.524 m)            Physical Exam   Constitutional: She is oriented to person, place, and time. She appears well-developed and well-nourished. No distress. HENT:   Head: Normocephalic and atraumatic. Right Ear: External ear normal.   Left Ear: External ear normal.   Eyes: Right eye exhibits no discharge. Left eye exhibits no discharge. Neck: Normal range of motion. Neck supple. Pulmonary/Chest: Effort normal. No respiratory distress. Musculoskeletal: She exhibits tenderness. She exhibits no edema. Lumbar back: She exhibits normal range of motion, no tenderness and no pain. Back:    Neurological: She is alert and oriented to person, place, and time. Skin: Skin is warm and dry. She is not diaphoretic. No erythema. Psychiatric: She has a normal mood and affect. Her behavior is normal. Judgment and thought content normal.      HOMER test: positive  Yeomans test: positive  Gaenslen test: positive   Assessment:      1. Spondylosis of lumbar region without myelopathy or radiculopathy    2. DDD (degenerative disc disease), lumbar    3. SI (sacroiliac) joint inflammation (HCC)    4.  Chronic pain syndrome           Review of Systems    Physical Exam      Plan:  Lumbar RFA Bilateral L3-4,4-5,5-S1        Anais Mcgowan, APRN - CNP  6/20/2019

## 2019-06-24 ENCOUNTER — TELEPHONE (OUTPATIENT)
Dept: PHYSICAL MEDICINE AND REHAB | Age: 64
End: 2019-06-24

## 2019-06-27 ENCOUNTER — ANESTHESIA (OUTPATIENT)
Dept: OPERATING ROOM | Age: 64
End: 2019-06-27
Payer: MEDICARE

## 2019-06-27 ENCOUNTER — APPOINTMENT (OUTPATIENT)
Dept: GENERAL RADIOLOGY | Age: 64
End: 2019-06-27
Attending: PAIN MEDICINE
Payer: MEDICARE

## 2019-06-27 ENCOUNTER — HOSPITAL ENCOUNTER (OUTPATIENT)
Age: 64
Setting detail: OUTPATIENT SURGERY
Discharge: HOME | End: 2019-06-27
Attending: PAIN MEDICINE | Admitting: PAIN MEDICINE
Payer: MEDICARE

## 2019-06-27 ENCOUNTER — ANESTHESIA EVENT (OUTPATIENT)
Dept: OPERATING ROOM | Age: 64
End: 2019-06-27
Payer: MEDICARE

## 2019-06-27 VITALS
OXYGEN SATURATION: 100 % | DIASTOLIC BLOOD PRESSURE: 81 MMHG | RESPIRATION RATE: 21 BRPM | SYSTOLIC BLOOD PRESSURE: 192 MMHG

## 2019-06-27 VITALS
TEMPERATURE: 98.4 F | HEIGHT: 59 IN | RESPIRATION RATE: 18 BRPM | BODY MASS INDEX: 41.12 KG/M2 | DIASTOLIC BLOOD PRESSURE: 66 MMHG | HEART RATE: 79 BPM | SYSTOLIC BLOOD PRESSURE: 142 MMHG | WEIGHT: 204 LBS | OXYGEN SATURATION: 95 %

## 2019-06-27 LAB — GLUCOSE BLD-MCNC: 174 MG/DL (ref 70–108)

## 2019-06-27 PROCEDURE — 82948 REAGENT STRIP/BLOOD GLUCOSE: CPT

## 2019-06-27 PROCEDURE — 3700000001 HC ADD 15 MINUTES (ANESTHESIA): Performed by: PAIN MEDICINE

## 2019-06-27 PROCEDURE — 6360000002 HC RX W HCPCS: Performed by: PAIN MEDICINE

## 2019-06-27 PROCEDURE — 3700000000 HC ANESTHESIA ATTENDED CARE: Performed by: PAIN MEDICINE

## 2019-06-27 PROCEDURE — 7100000011 HC PHASE II RECOVERY - ADDTL 15 MIN: Performed by: PAIN MEDICINE

## 2019-06-27 PROCEDURE — 2500000003 HC RX 250 WO HCPCS: Performed by: PAIN MEDICINE

## 2019-06-27 PROCEDURE — 3209999900 FLUORO FOR SURGICAL PROCEDURES

## 2019-06-27 PROCEDURE — 3600000056 HC PAIN LEVEL 4 BASE: Performed by: PAIN MEDICINE

## 2019-06-27 PROCEDURE — 6360000002 HC RX W HCPCS: Performed by: NURSE ANESTHETIST, CERTIFIED REGISTERED

## 2019-06-27 PROCEDURE — 3600000057 HC PAIN LEVEL 4 ADDL 15 MIN: Performed by: PAIN MEDICINE

## 2019-06-27 PROCEDURE — 64635 DESTROY LUMB/SAC FACET JNT: CPT | Performed by: PAIN MEDICINE

## 2019-06-27 PROCEDURE — 2500000003 HC RX 250 WO HCPCS: Performed by: NURSE ANESTHETIST, CERTIFIED REGISTERED

## 2019-06-27 PROCEDURE — 2709999900 HC NON-CHARGEABLE SUPPLY: Performed by: PAIN MEDICINE

## 2019-06-27 PROCEDURE — 64636 DESTROY L/S FACET JNT ADDL: CPT | Performed by: PAIN MEDICINE

## 2019-06-27 PROCEDURE — 7100000010 HC PHASE II RECOVERY - FIRST 15 MIN: Performed by: PAIN MEDICINE

## 2019-06-27 RX ORDER — ROPIVACAINE HYDROCHLORIDE 2 MG/ML
INJECTION, SOLUTION EPIDURAL; INFILTRATION; PERINEURAL PRN
Status: DISCONTINUED | OUTPATIENT
Start: 2019-06-27 | End: 2019-06-27 | Stop reason: ALTCHOICE

## 2019-06-27 RX ORDER — METHYLPREDNISOLONE ACETATE 80 MG/ML
INJECTION, SUSPENSION INTRA-ARTICULAR; INTRALESIONAL; INTRAMUSCULAR; SOFT TISSUE PRN
Status: DISCONTINUED | OUTPATIENT
Start: 2019-06-27 | End: 2019-06-27 | Stop reason: ALTCHOICE

## 2019-06-27 RX ORDER — LIDOCAINE HYDROCHLORIDE 10 MG/ML
INJECTION, SOLUTION INFILTRATION; PERINEURAL PRN
Status: DISCONTINUED | OUTPATIENT
Start: 2019-06-27 | End: 2019-06-27 | Stop reason: SDUPTHER

## 2019-06-27 RX ORDER — PROPOFOL 10 MG/ML
INJECTION, EMULSION INTRAVENOUS PRN
Status: DISCONTINUED | OUTPATIENT
Start: 2019-06-27 | End: 2019-06-27 | Stop reason: SDUPTHER

## 2019-06-27 RX ORDER — LIDOCAINE HYDROCHLORIDE 10 MG/ML
INJECTION, SOLUTION EPIDURAL; INFILTRATION; INTRACAUDAL; PERINEURAL PRN
Status: DISCONTINUED | OUTPATIENT
Start: 2019-06-27 | End: 2019-06-27 | Stop reason: ALTCHOICE

## 2019-06-27 RX ORDER — FENTANYL CITRATE 50 UG/ML
INJECTION, SOLUTION INTRAMUSCULAR; INTRAVENOUS PRN
Status: DISCONTINUED | OUTPATIENT
Start: 2019-06-27 | End: 2019-06-27 | Stop reason: SDUPTHER

## 2019-06-27 RX ADMIN — FENTANYL CITRATE 50 MCG: 50 INJECTION INTRAMUSCULAR; INTRAVENOUS at 09:17

## 2019-06-27 RX ADMIN — LIDOCAINE HYDROCHLORIDE 3 ML: 10 INJECTION, SOLUTION INFILTRATION; PERINEURAL at 09:12

## 2019-06-27 RX ADMIN — FENTANYL CITRATE 50 MCG: 50 INJECTION INTRAMUSCULAR; INTRAVENOUS at 09:12

## 2019-06-27 RX ADMIN — PROPOFOL 100 MG: 10 INJECTION, EMULSION INTRAVENOUS at 09:24

## 2019-06-27 ASSESSMENT — PULMONARY FUNCTION TESTS
PIF_VALUE: 1
PIF_VALUE: 1
PIF_VALUE: 0
PIF_VALUE: 1
PIF_VALUE: 3
PIF_VALUE: 1

## 2019-06-27 ASSESSMENT — ENCOUNTER SYMPTOMS: SHORTNESS OF BREATH: 1

## 2019-06-27 ASSESSMENT — PAIN SCALES - GENERAL: PAINLEVEL_OUTOF10: 4

## 2019-06-27 ASSESSMENT — PAIN - FUNCTIONAL ASSESSMENT: PAIN_FUNCTIONAL_ASSESSMENT: 0-10

## 2019-06-27 NOTE — OP NOTE
Pre-Procedure Note    Patient Name: Shira Addison   YOB: 1955  Medical Record Number: [id number]  Date: 19    Indication:  Lower back pain  Consent: On file. Vital Signs:   Vitals:    19 0835   BP: (!) 169/72   Pulse: 89   Resp: 16   Temp: 97 °F (36.1 °C)   SpO2: 94%       Past Medical History:   has a past medical history of Back pain, CAD (coronary artery disease), Chronic diastolic CHF (congestive heart failure) ([facility] [address] Colitis, Depression, Depression, Gastroesophageal reflux, GERD (gastroesophageal reflux disease), H/O endoscopy, Hyperlipidemia, Hypertension, Hypertension, Hypothyroidism, Incontinence of urine, Lordosis (acquired) (postural), Spondylosis of unspecified site without mention of myelopathy, Thoracic or lumbosacral neuritis or radiculitis, unspecified, Type II or unspecified type diabetes mellitus without mention of complication, not stated as uncontrolled, Unspecified sleep apnea, and Urinary incontinence. Past Surgical History:   has a past surgical history that includes  section (); Carpal tunnel release (Bilateral, 'S); Abdomen surgery (); Dilation and curettage of uterus; Hysterectomy (); cardiovascular stress test (2015); Colonoscopy (, ); Upper gastrointestinal endoscopy (); Cardiac catheterization (); other surgical history (Bilateral, 2018); pr inj dx/ther agnt paravert facet joint, lumbar/sac, 2nd level (Bilateral, 3/19/2018); Nerve Surgery (Bilateral, 2018); pr inj dx/ther agnt paravert facet joint, lumbar/sac, 2nd level (Bilateral, 2018); pr inject rx other periph nerve (Bilateral, 2018); pr office/outpt visit,procedure only (Right, 10/2/2018); and Injection Procedure For Sacroiliac Joint (Right, 2018). Pre-Sedation Documentation and Exam:   Vital signs have been reviewed (see flow sheet for vitals).      Sedation/ Anesthesia Plan:   MAC    Patient is an appropriate candidate for plan of sedation: yes    Preoperative Diagnosis:  L-spondylosis    Post-Op Dx: as above    Procedure Performed:  :Radiofrequency ablation of median branches at the levels of L4-5 and L5-S1 bilateral under fluoroscopic guidance     Indication for the Procedure: The patient has ahistory of chronic low back pain that is not responding well to the conservative treatment. Patient's pain is mostly axial in nature. Pain is interfering with the activities of daily living. Physical examination revealed facet tenderness and facet loading is positive. Patient had undergone lumbar facet joint injections with pain relief that lasted for only a short period of time and had greater than 70% pain relief with confirmatory median branch blocks. Hence we decided to do radiofrequency abalation of median branches for long term pain releif. The procedure and risks  were discussed with the patient and an informed consent was obtained. Procedure:  Bilateral   A meaningful communication was kept up with the patient throughout the procedure. The patient is placed in prone position and skin over the back was prepped and draped in sterile manner. Then using fluoroscopy the junction of the transverse process of the vertebra with the superior process of the facet joint was observed and the view was optimized. The skin and deep tissues posterior were infiltrated with 12 ml of  1% xylocaine. The RF canula with the 15 mm active tip was introduced through the skin wheal under fluoroscopy guidance such that the tip of the needle lies in the groove of the transverse process with the superior processes of the facet joint. Then a lateral view of the lumbar spine was obtained to make sure the tip of needle is not in the neural foramen. Then electric impedence was checked to make sure it is acceptable. Then a sensory stimulus was applied at 50 Hz up to 1 volt and concordant pain symptoms were reproduced.  Then a motor stimulus was applied at 2 Hz up to 2 volts and no motor stimulation was seen in lower extremities. Some multifidus stimulus was seen. Then after negative aspiration a mixture of depomedrol 80 mg  and 0.2% ropivacaine 2 cc was injected through the needle in divided dosesThen a initial lesion was done at 80 degrees centigrade for 90 seconds. For L5 median branch block the junction of the ala of  the sacrum with the superior articular process of the facet joint was taken as a reference point. For the L4 median branch the junction of the transverse process of L5 with the superolateral possible facet joint was taken as a reference point and for S1 median branch the most lateral and superior aspect of S1 foramina was taken as a reference point,. Patient's vital signs and neurological status remained stable throughout the procedure and post procedural period. The patient tolerated the procedure well and was discharged home in stable condition.     Electronically signed by Jeff Hartley MD on 6/27/19 at 9:13 AM

## 2019-06-27 NOTE — INTERVAL H&P NOTE
H&P Update    Patient's History and Physical from June 20, 2019 was reviewed. Patient examined. There has been no change.     Brina Louis

## 2019-06-27 NOTE — ANESTHESIA POSTPROCEDURE EVALUATION
Department of Anesthesiology  Postprocedure Note    Patient: Regina Lewis  MRN: [id number]  YOB: 1955  Date of evaluation: 6/27/2019  Time:  9:40 AM     Procedure Summary     Date:  06/27/19 Room / Location:  [address]    Anesthesia Start:  0909 Anesthesia Stop:  3589    Procedure:  Lumbar RFA Bilateral L4-5,L5-S1 (Bilateral Back) Diagnosis:  (Spondylosis of lumbar region)    Surgeon:  Jeff Hartley MD Responsible Provider:  Danna Levy MD    Anesthesia Type:  MAC ASA Status:  3          Anesthesia Type: MAC    Will Phase I:      Will Phase II: Will Score: 10    Last vitals: Reviewed and per EMR flowsheets. Anesthesia Post Evaluation    Patient location during evaluation: PACU  Patient participation: complete - patient participated  Level of consciousness: awake and alert  Airway patency: patent  Nausea & Vomiting: no nausea and no vomiting  Complications: no  Cardiovascular status: hemodynamically stable  Respiratory status: acceptable  Hydration status: euvolemic      [facility]  POST-ANESTHESIA NOTE       Name:  Regina Lewis                                         Age:  66 y.o.   MRN:  [id number]      Last Vitals:  BP (!) 142/66   Pulse 79   Temp 98.4 °F (36.9 °C) (Temporal)   Resp 18   Ht 4' 11\" (1.499 m)   Wt 204 lb (92.5 kg)   SpO2 95%   BMI 41.20 kg/m²   Patient Vitals for the past 4 hrs:   BP Temp Temp src Pulse Resp SpO2 Height Weight   06/27/19 0929 (!) 142/66 98.4 °F (36.9 °C) Temporal 79 18 95 % -- --   06/27/19 0835 (!) 169/72 97 °F (36.1 °C) Temporal 89 16 94 % 4' 11\" (1.499 m) 204 lb (92.5 kg)       Level of Consciousness:  Awake    Respiratory:  Stable    Oxygen Saturation:  Stable    Cardiovascular:  Stable    Hydration:  Adequate    PONV:  Stable    Post-op Pain:  Adequate analgesia    Post-op Assessment:  No apparent anesthetic complications    Additional Follow-Up / Treatment / Comment:  None    ESTELA Lynn Andrews MD  June 27, 2019   9:40 AM

## 2019-06-27 NOTE — ANESTHESIA PRE PROCEDURE
Department of Anesthesiology  Preprocedure Note       Name:  Clarita Pastor   Age:  66 y.o.  :  1955                                          MRN:  [id number]         Date:  2019      Surgeon: Alberto Trinh):  Grayson Rasheed MD    Procedure: Lumbar RFA Bilateral L4-5,L5-S1 (Bilateral )    Medications prior to admission:   Prior to Admission medications    Medication Sig Start Date End Date Taking? Authorizing Provider   calcium carbonate (OSCAL) 500 MG TABS tablet Take 650 mg by mouth daily Indications: 2 chews daily    Historical Provider, MD   NONFORMULARY Take 8,400 mcg by mouth daily Indications: cranberry- 2 gelcaps daily    Historical Provider, MD   NONFORMULARY Take by mouth 3 times daily Indications: fiber capsule-    Historical Provider, MD   gabapentin (NEURONTIN) 600 MG tablet Take 1 tablet by mouth 2 times daily for 60 days. 3/26/19 5/25/19  Amairani Srivastava MD   insulin lispro (HUMALOG KWIKPEN) 100 UNIT/ML pen Inject 17 units before breakfast, 20 units before lunch and 28 units before dinner plus sliding scale. Patient uses a max of 96 units per day. 3/18/19   RAIMUNDO Lopez CNP   Liraglutide (VICTOZA) 18 MG/3ML SOPN SC injection Inject 1.2 mg into the skin daily 3/18/19   RAIMUNDO Lopez CNP   insulin glargine (LANTUS SOLOSTAR) 100 UNIT/ML injection pen 35 in the am, 29 in the pm 3/18/19   RAIMUNDO oLpez CNP   DULoxetine (CYMBALTA) 60 MG extended release capsule Take 60 mg by mouth daily    Historical Provider, MD   metoprolol succinate (TOPROL XL) 50 MG extended release tablet Take 50 mg by mouth daily    Historical Provider, MD   meclizine (ANTIVERT) 25 MG tablet Take 50 mg by mouth 3 times daily as needed     Historical Provider, MD   lidocaine (XYLOCAINE) 5 % ointment Apply topically 3 times daily as needed for Pain Apply topically as needed.     Historical Provider, MD   spironolactone (ALDACTONE) 25 MG tablet Take 25 mg by mouth daily without mention of complication, not stated as uncontrolled     diagnosed     Unspecified sleep apnea      cpap mask    Urinary incontinence        Past Surgical History:        Procedure Laterality Date    ABDOMEN SURGERY      LAPAROSCOPY    CARDIAC CATHETERIZATION      CARDIOVASCULAR STRESS TEST  2015    was not positive but proceeding cath    CARPAL TUNNEL RELEASE Bilateral 'S     SECTION  1975    COLONOSCOPY  , 2016    DILATION AND CURETTAGE OF UTERUS      HC INJECTION PROCEDURE FOR SACROILIAC JOINT Right 2018    SACROILIAC JOINT INJECTION Right SI MBB #2, performed by Alfie Beard MD at [facility]  2002    Total    NERVE SURGERY Bilateral 2018    LUMBAR FACET MBB L3-4, L4-5, L5-S1    OTHER SURGICAL HISTORY Bilateral 2018    Lumbar Facet MBB L3-4, L4-5, L5-S1    [place] INJ DX/THER AGNT PARAVERT FACET JOINT, LUMBAR/SAC, 2ND LEVEL Bilateral 3/19/2018    LUMBAR FACET MBB   @L3-4, L4-5, L5-S1  BILATERAL performed by Alfie Beard MD at [address] [facility] DX/THER AGNT PARAVERT FACET JOINT, LUMBAR/SAC, 2ND LEVEL Bilateral 2018    LUMBAR FACET MBB   @L3-4, L4-5, L5-S1  BILATERAL performed by Alfie Beard MD at [address] Bilateral 2018    LUMBAR RFA  Bilateral @L3-4,4-5,5-S1, performed by Alfie Beard MD at [address] OFFICE/OUTPT [facility] Right 10/2/2018    SACROILIAC JOINT INJECTION SI MBB RIGHT SIDE, performed by Alfie Beard MD at [facility]      [facility] [address] Manfred Reid       Social History:    Social History     Tobacco Use    Smoking status: Passive Smoke Exposure - Never Smoker    Smokeless tobacco: Never Used   Substance Use Topics    Alcohol use: No     Alcohol/week: 0.0 oz                                Counseling given: Not Cardiovascular:  Exercise tolerance: good (>4 METS),   (+) hypertension:, CHF: diastolic,                   Neuro/Psych:   (+) psychiatric history:            GI/Hepatic/Renal:   (+) GERD:, morbid obesity          Endo/Other:    (+) DiabetesType II DM, , hypothyroidism: arthritis:., .          Pt had no PAT visit       Abdominal:   (+) obese,     Abdomen: soft. Vascular: negative vascular ROS. Anesthesia Plan      MAC     ASA 3       Induction: intravenous. Anesthetic plan and risks discussed with patient.       Plan discussed with CRNA and surgical team.                  Ford Hernandez MD   6/27/2019

## 2019-06-27 NOTE — PROGRESS NOTES
8356: Patient to phase 2 recovery room via cart. Patient is awake and alert. Report received from surgical RN, Baby Minus. Patient's vitals obtained, see charting.   8683: Patient is denying nausea and rating pain a \"4\"/10.   0934: Offered drink and snack provided to the patient. Bed is in the lowest position, call light is within reach and patient's  brought to the room. 6510: Discharge instructions given and explained to the patient and the patient's , both verbalized understanding. 1712: IV removed at this time, no complications and dressing applied. 1002: Patient wheeled to the car and discharged home in stable condition with her .

## 2019-07-18 ENCOUNTER — OFFICE VISIT (OUTPATIENT)
Dept: PHYSICAL MEDICINE AND REHAB | Age: 64
End: 2019-07-18
Payer: MEDICARE

## 2019-07-18 VITALS
SYSTOLIC BLOOD PRESSURE: 118 MMHG | WEIGHT: 202.82 LBS | DIASTOLIC BLOOD PRESSURE: 62 MMHG | HEART RATE: 70 BPM | HEIGHT: 59 IN | BODY MASS INDEX: 40.89 KG/M2

## 2019-07-18 DIAGNOSIS — M51.26 DISC DISPLACEMENT, LUMBAR: Primary | ICD-10-CM

## 2019-07-18 DIAGNOSIS — M51.36 DDD (DEGENERATIVE DISC DISEASE), LUMBAR: ICD-10-CM

## 2019-07-18 DIAGNOSIS — M54.50 LUMBAR PAIN: ICD-10-CM

## 2019-07-18 DIAGNOSIS — G89.4 CHRONIC PAIN SYNDROME: ICD-10-CM

## 2019-07-18 PROCEDURE — 99214 OFFICE O/P EST MOD 30 MIN: CPT | Performed by: NURSE PRACTITIONER

## 2019-07-18 ASSESSMENT — ENCOUNTER SYMPTOMS
ABDOMINAL PAIN: 0
CONSTIPATION: 0
BACK PAIN: 1
COLOR CHANGE: 0

## 2019-07-18 NOTE — PROGRESS NOTES
[facility]  [address]  Dept: [phone]  Dept Fax: [phone]: [phone]    Visit Date: 7/18/2019    Functionality Assessment/Goals Worksheet     On a scale of 0 (Does not Interfere) to 10 (Completely Interferes)     1. Which number describes how during the past week pain has interfered with       the following:  A. General Activity:  10  B. Mood: 7  C. Walking Ability:  4  D. Normal Work (Includes both work outside the home and housework):  10  E. Relations with Other People:   4  F. Sleep:   4  G. Enjoyment of Life:   10    2. Patient Prefers to Take their Pain Medications:     [x]  On a regular basis   []  Only when necessary    []  Does not take pain medications    3. What are the Patient's Goals/Expectations for Visiting Pain Management? [x]  Learn about my pain    []  Receive Medication   []  Physical Therapy     []  Treat Depression   [x]  Receive Injections    []  Treat Sleep   []  Deal with Anxiety and Stress   []  Treat Opoid Dependence/Addiction   []  Other:      HPI:   Maegan Sandoval is a 66 y.o. female is here today for    Chief Complaint: Low back pain    HPI     Radiofrequency ablation of median branches at the levels of L4-5 and L5-S1 bilateral under fluoroscopic guidance with Dr Therese Goyal. Patient states that she had relief for about 2-3 days then pain completely returned. Patient discouraged as these have worked well for her in the past.     MRI reviewed. Some disc displacement at L2-3, L3-4. Discussed LESI at L3 for better relief of overall back pain. Patient denies any leg pain    Medications reviewed. Pain scale with out pain medications or at its worst is 10/10. Pain scale with pain medications or at its best is 7/10. Drug screen reviewed from 5/29/18 and was appropriate          The patientis allergic to ibuprofen.     Past Medical History  Layla Rojas  has a past medical history of Back pain, CAD (coronary artery disease), Chronic diastolic CHF (congestive heart failure) ([facility] [address] Colitis, Depression, Depression, Gastroesophageal reflux, GERD (gastroesophageal reflux disease), H/O endoscopy, Hyperlipidemia, Hypertension, Hypertension, Hypothyroidism, Incontinence of urine, Lordosis (acquired) (postural), Spondylosis of unspecified site without mention of myelopathy, Thoracic or lumbosacral neuritis or radiculitis, unspecified, Type II or unspecified type diabetes mellitus without mention of complication, not stated as uncontrolled, Unspecified sleep apnea, and Urinary incontinence. Past Surgical History  The patient  has a past surgical history that includes  section (); Carpal tunnel release (Bilateral, ); Abdomen surgery (); Dilation and curettage of uterus; Hysterectomy (); cardiovascular stress test (2015); Colonoscopy (, ); Upper gastrointestinal endoscopy (); Cardiac catheterization (); other surgical history (Bilateral, 2018); pr inj dx/ther agnt paravert facet joint, lumbar/sac, 2nd level (Bilateral, 3/19/2018); Nerve Surgery (Bilateral, 2018); pr inj dx/ther agnt paravert facet joint, lumbar/sac, 2nd level (Bilateral, 2018); pr inject rx other periph nerve (Bilateral, 2018); pr office/outpt visit,procedure only (Right, 10/2/2018); Injection Procedure For Sacroiliac Joint (Right, 2018); and Lumbar spine surgery (Bilateral, 2019). Family History  This patient's family history includes Diabetes in her father, mother, and sister; Heart Disease in her father and mother; Stroke in her mother and sister; Thyroid Disease in her brother. Social History  Layla Rojas  reports that she is a non-smoker but has been exposed to tobacco smoke. She has never used smokeless tobacco. She reports that she does not drink alcohol or use drugs.     Medications    Current Outpatient procedure.          Electronically signed by RAIMUNDO Adams CNP on7/18/2019 at 9:12 AM

## 2019-08-22 ENCOUNTER — PREP FOR PROCEDURE (OUTPATIENT)
Dept: PAIN MANAGEMENT | Age: 64
End: 2019-08-22

## 2019-08-22 ENCOUNTER — APPOINTMENT (OUTPATIENT)
Dept: GENERAL RADIOLOGY | Age: 64
End: 2019-08-22
Attending: PAIN MEDICINE
Payer: MEDICARE

## 2019-08-22 ENCOUNTER — ANESTHESIA (OUTPATIENT)
Dept: OPERATING ROOM | Age: 64
End: 2019-08-22
Payer: MEDICARE

## 2019-08-22 ENCOUNTER — HOSPITAL ENCOUNTER (OUTPATIENT)
Age: 64
Setting detail: OUTPATIENT SURGERY
Discharge: HOME | End: 2019-08-22
Attending: PAIN MEDICINE | Admitting: PAIN MEDICINE
Payer: MEDICARE

## 2019-08-22 ENCOUNTER — ANESTHESIA EVENT (OUTPATIENT)
Dept: OPERATING ROOM | Age: 64
End: 2019-08-22
Payer: MEDICARE

## 2019-08-22 ENCOUNTER — PREP FOR PROCEDURE (OUTPATIENT)
Dept: PHYSICAL MEDICINE AND REHAB | Age: 64
End: 2019-08-22

## 2019-08-22 VITALS
HEIGHT: 60 IN | RESPIRATION RATE: 18 BRPM | BODY MASS INDEX: 40.56 KG/M2 | DIASTOLIC BLOOD PRESSURE: 53 MMHG | OXYGEN SATURATION: 97 % | TEMPERATURE: 97 F | SYSTOLIC BLOOD PRESSURE: 112 MMHG | WEIGHT: 206.6 LBS | HEART RATE: 61 BPM

## 2019-08-22 VITALS
DIASTOLIC BLOOD PRESSURE: 67 MMHG | SYSTOLIC BLOOD PRESSURE: 156 MMHG | RESPIRATION RATE: 19 BRPM | OXYGEN SATURATION: 100 %

## 2019-08-22 LAB — GLUCOSE BLD-MCNC: 166 MG/DL (ref 70–108)

## 2019-08-22 PROCEDURE — 3209999900 FLUORO FOR SURGICAL PROCEDURES

## 2019-08-22 PROCEDURE — 3700000000 HC ANESTHESIA ATTENDED CARE: Performed by: PAIN MEDICINE

## 2019-08-22 PROCEDURE — 6360000002 HC RX W HCPCS: Performed by: SPECIALIST

## 2019-08-22 PROCEDURE — 6360000004 HC RX CONTRAST MEDICATION: Performed by: PAIN MEDICINE

## 2019-08-22 PROCEDURE — 7100000011 HC PHASE II RECOVERY - ADDTL 15 MIN: Performed by: PAIN MEDICINE

## 2019-08-22 PROCEDURE — 82948 REAGENT STRIP/BLOOD GLUCOSE: CPT

## 2019-08-22 PROCEDURE — 2500000003 HC RX 250 WO HCPCS: Performed by: PAIN MEDICINE

## 2019-08-22 PROCEDURE — 7100000010 HC PHASE II RECOVERY - FIRST 15 MIN: Performed by: PAIN MEDICINE

## 2019-08-22 PROCEDURE — 2709999900 HC NON-CHARGEABLE SUPPLY: Performed by: PAIN MEDICINE

## 2019-08-22 PROCEDURE — 62323 NJX INTERLAMINAR LMBR/SAC: CPT | Performed by: PAIN MEDICINE

## 2019-08-22 PROCEDURE — 6360000002 HC RX W HCPCS: Performed by: PAIN MEDICINE

## 2019-08-22 PROCEDURE — 2580000003 HC RX 258: Performed by: PAIN MEDICINE

## 2019-08-22 PROCEDURE — 3600000054 HC PAIN LEVEL 3 BASE: Performed by: PAIN MEDICINE

## 2019-08-22 RX ORDER — LIDOCAINE HYDROCHLORIDE 10 MG/ML
INJECTION, SOLUTION INFILTRATION; PERINEURAL PRN
Status: DISCONTINUED | OUTPATIENT
Start: 2019-08-22 | End: 2019-08-22 | Stop reason: ALTCHOICE

## 2019-08-22 RX ORDER — 0.9 % SODIUM CHLORIDE 0.9 %
VIAL (ML) INJECTION PRN
Status: DISCONTINUED | OUTPATIENT
Start: 2019-08-22 | End: 2019-08-22 | Stop reason: ALTCHOICE

## 2019-08-22 RX ORDER — DEXAMETHASONE SODIUM PHOSPHATE 4 MG/ML
INJECTION, SOLUTION INTRA-ARTICULAR; INTRALESIONAL; INTRAMUSCULAR; INTRAVENOUS; SOFT TISSUE PRN
Status: DISCONTINUED | OUTPATIENT
Start: 2019-08-22 | End: 2019-08-22 | Stop reason: ALTCHOICE

## 2019-08-22 RX ORDER — PROPOFOL 10 MG/ML
INJECTION, EMULSION INTRAVENOUS PRN
Status: DISCONTINUED | OUTPATIENT
Start: 2019-08-22 | End: 2019-08-22 | Stop reason: SDUPTHER

## 2019-08-22 RX ADMIN — PROPOFOL 40 MG: 10 INJECTION, EMULSION INTRAVENOUS at 12:49

## 2019-08-22 ASSESSMENT — PULMONARY FUNCTION TESTS
PIF_VALUE: 0

## 2019-08-22 ASSESSMENT — ENCOUNTER SYMPTOMS: SHORTNESS OF BREATH: 1

## 2019-08-22 ASSESSMENT — PAIN SCALES - GENERAL
PAINLEVEL_OUTOF10: 5
PAINLEVEL_OUTOF10: 0

## 2019-08-22 ASSESSMENT — PAIN DESCRIPTION - ORIENTATION: ORIENTATION: LOWER

## 2019-08-22 ASSESSMENT — PAIN DESCRIPTION - PAIN TYPE: TYPE: CHRONIC PAIN

## 2019-08-22 ASSESSMENT — PAIN DESCRIPTION - LOCATION: LOCATION: BACK

## 2019-08-22 NOTE — OP NOTE
Pre-Procedure Note    Patient Name: Kendy Rush   YOB: 1955  Medical Record Number: [id number]  Date: 19       Indication:  Lower back and leg pain  Consent: On file. Vital Signs:   Vitals:    19 1157   BP: (!) 178/72   Pulse: 76   Resp: 20   Temp: 97.1 °F (36.2 °C)   SpO2: 97%       Past Medical History:   has a past medical history of Back pain, CAD (coronary artery disease), Chronic diastolic CHF (congestive heart failure) ([facility] [address] Colitis, Depression, Depression, Gastroesophageal reflux, GERD (gastroesophageal reflux disease), H/O endoscopy, Hyperlipidemia, Hypertension, Hypertension, Hypothyroidism, Incontinence of urine, Lordosis (acquired) (postural), Spondylosis of unspecified site without mention of myelopathy, Thoracic or lumbosacral neuritis or radiculitis, unspecified, Type II or unspecified type diabetes mellitus without mention of complication, not stated as uncontrolled, Unspecified sleep apnea, and Urinary incontinence. Past Surgical History:   has a past surgical history that includes  section (); Carpal tunnel release (Bilateral, 'S); Abdomen surgery (); Dilation and curettage of uterus; Hysterectomy (); cardiovascular stress test (2015); Colonoscopy (, ); Upper gastrointestinal endoscopy (); Cardiac catheterization (); other surgical history (Bilateral, 2018); pr inj dx/ther agnt paravert facet joint, lumbar/sac, 2nd level (Bilateral, 3/19/2018); Nerve Surgery (Bilateral, 2018); pr inj dx/ther agnt paravert facet joint, lumbar/sac, 2nd level (Bilateral, 2018); pr inject rx other periph nerve (Bilateral, 2018); pr office/outpt visit,procedure only (Right, 10/2/2018); Injection Procedure For Sacroiliac Joint (Right, 2018); and Lumbar spine surgery (Bilateral, 2019). Pre-Sedation Documentation and Exam:   Vital signs have been reviewed (see flow sheet for vitals).      Sedation/Anesthesia

## 2019-08-22 NOTE — H&P
Weight: 202 lb 13.2 oz (92 kg)   Height: 4' 11\" (1.499 m)            Physical Exam   Constitutional: She is oriented to person, place, and time. She appears well-developed and well-nourished. No distress. HENT:   Head: Normocephalic and atraumatic. Right Ear: External ear normal.   Left Ear: External ear normal.   Eyes: Right eye exhibits no discharge. Left eye exhibits no discharge. Neck: Normal range of motion. Neck supple. Pulmonary/Chest: Effort normal. No respiratory distress. Musculoskeletal: She exhibits tenderness. She exhibits no edema. Lumbar back: She exhibits normal range of motion, no tenderness and no pain. Back:    Neurological: She is alert and oriented to person, place, and time. Skin: Skin is warm and dry. She is not diaphoretic. No erythema. Psychiatric: She has a normal mood and affect. Her behavior is normal. Judgment and thought content normal.         Assessment:      1. Disc displacement, lumbar    2. Lumbar pain    3. DDD (degenerative disc disease), lumbar    4.  Chronic pain syndrome          Plan:  [facility] L3 #1       RAIMUNDO Boateng - CNP  8/22/2019

## 2019-09-05 ENCOUNTER — OFFICE VISIT (OUTPATIENT)
Dept: PHYSICAL MEDICINE AND REHAB | Age: 64
End: 2019-09-05
Payer: MEDICARE

## 2019-09-05 VITALS
HEART RATE: 82 BPM | HEIGHT: 60 IN | SYSTOLIC BLOOD PRESSURE: 136 MMHG | WEIGHT: 206.57 LBS | OXYGEN SATURATION: 96 % | BODY MASS INDEX: 40.56 KG/M2 | DIASTOLIC BLOOD PRESSURE: 72 MMHG

## 2019-09-05 DIAGNOSIS — M47.816 SPONDYLOSIS OF LUMBAR REGION WITHOUT MYELOPATHY OR RADICULOPATHY: ICD-10-CM

## 2019-09-05 DIAGNOSIS — M51.26 DISC DISPLACEMENT, LUMBAR: Primary | ICD-10-CM

## 2019-09-05 DIAGNOSIS — G89.4 CHRONIC PAIN SYNDROME: ICD-10-CM

## 2019-09-05 DIAGNOSIS — M54.50 LUMBAR PAIN: ICD-10-CM

## 2019-09-05 PROCEDURE — 99213 OFFICE O/P EST LOW 20 MIN: CPT | Performed by: NURSE PRACTITIONER

## 2019-09-05 ASSESSMENT — ENCOUNTER SYMPTOMS
COLOR CHANGE: 0
BACK PAIN: 1
CONSTIPATION: 0
ABDOMINAL PAIN: 0

## 2019-09-05 NOTE — PROGRESS NOTES
12/7/2018); Lumbar spine surgery (Bilateral, 6/27/2019); and lumbar nerve block (Bilateral, 8/22/2019). Family History  This patient's family history includes Diabetes in her father, mother, and sister; Heart Disease in her father and mother; Stroke in her mother and sister; Thyroid Disease in her brother. Social History  Ely Case  reports that she is a non-smoker but has been exposed to tobacco smoke. She has never used smokeless tobacco. She reports that she does not drink alcohol or use drugs. Medications    Current Outpatient Medications:     calcium carbonate (OSCAL) 500 MG TABS tablet, Take 650 mg by mouth daily Indications: 2 chews daily, Disp: , Rfl:     NONFORMULARY, Take 8,400 mcg by mouth daily Indications: cranberry- 2 gelcaps daily, Disp: , Rfl:     NONFORMULARY, Take by mouth 3 times daily Indications: fiber capsule-, Disp: , Rfl:     insulin lispro (HUMALOG KWIKPEN) 100 UNIT/ML pen, Inject 17 units before breakfast, 20 units before lunch and 28 units before dinner plus sliding scale. Patient uses a max of 96 units per day., Disp: 10 pen, Rfl: 5    Liraglutide (VICTOZA) 18 MG/3ML SOPN SC injection, Inject 1.2 mg into the skin daily, Disp: 4 pen, Rfl: 5    insulin glargine (LANTUS SOLOSTAR) 100 UNIT/ML injection pen, 35 in the am, 29 in the pm, Disp: 5 pen, Rfl: 5    DULoxetine (CYMBALTA) 60 MG extended release capsule, Take 60 mg by mouth daily, Disp: , Rfl:     metoprolol succinate (TOPROL XL) 50 MG extended release tablet, Take 50 mg by mouth daily, Disp: , Rfl:     meclizine (ANTIVERT) 25 MG tablet, Take 50 mg by mouth 3 times daily as needed , Disp: , Rfl:     lidocaine (XYLOCAINE) 5 % ointment, Apply topically 3 times daily as needed for Pain Apply topically as needed. , Disp: , Rfl:     spironolactone (ALDACTONE) 25 MG tablet, Take 25 mg by mouth daily, Disp: , Rfl:     traZODone (DESYREL) 50 MG tablet, Take 50 mg by mouth nightly Indications: 1/2 tab , Disp: , Rfl:   

## 2019-09-24 ENCOUNTER — HOSPITAL ENCOUNTER (OUTPATIENT)
Age: 64
Setting detail: SPECIMEN
Discharge: HOME | End: 2019-09-24
Payer: MEDICARE

## 2019-09-24 ENCOUNTER — HOSPITAL ENCOUNTER (INPATIENT)
Age: 64
LOS: 1 days | Discharge: HOME | DRG: 690 | End: 2019-09-25
Attending: EMERGENCY MEDICINE | Admitting: HOSPITALIST
Payer: MEDICARE

## 2019-09-24 DIAGNOSIS — A41.9 SEPSIS, DUE TO UNSPECIFIED ORGANISM: ICD-10-CM

## 2019-09-24 DIAGNOSIS — N39.0 URINARY TRACT INFECTION WITHOUT HEMATURIA, SITE UNSPECIFIED: Primary | ICD-10-CM

## 2019-09-24 LAB
ALBUMIN SERPL-MCNC: 4.4 G/DL (ref 3.5–5.1)
ALP BLD-CCNC: 92 U/L (ref 38–126)
ALT SERPL-CCNC: 23 U/L (ref 11–66)
ANION GAP SERPL CALCULATED.3IONS-SCNC: 15 MEQ/L (ref 8–16)
AST SERPL-CCNC: 24 U/L (ref 5–40)
BACTERIA: ABNORMAL /HPF
BASOPHILS # BLD: 0.3 %
BASOPHILS ABSOLUTE: 0 THOU/MM3 (ref 0–0.1)
BILIRUB SERPL-MCNC: 0.2 MG/DL (ref 0.3–1.2)
BILIRUBIN URINE: NEGATIVE
BLOOD, URINE: NEGATIVE
BUN BLDV-MCNC: 17 MG/DL (ref 7–22)
CALCIUM SERPL-MCNC: 10 MG/DL (ref 8.5–10.5)
CASTS 2: ABNORMAL /LPF
CASTS UA: ABNORMAL /LPF
CHARACTER, URINE: ABNORMAL
CHLORIDE BLD-SCNC: 104 MEQ/L (ref 98–111)
CO2: 25 MEQ/L (ref 23–33)
COLOR: YELLOW
CREAT SERPL-MCNC: 1.6 MG/DL (ref 0.4–1.2)
CRYSTALS, UA: ABNORMAL
EOSINOPHIL # BLD: 1.1 %
EOSINOPHILS ABSOLUTE: 0.1 THOU/MM3 (ref 0–0.4)
EPITHELIAL CELLS, UA: ABNORMAL /HPF
ERYTHROCYTE [DISTWIDTH] IN BLOOD BY AUTOMATED COUNT: 13.5 % (ref 11.5–14.5)
ERYTHROCYTE [DISTWIDTH] IN BLOOD BY AUTOMATED COUNT: 45.1 FL (ref 35–45)
GFR SERPL CREATININE-BSD FRML MDRD: 33 ML/MIN/1.73M2
GLUCOSE BLD-MCNC: 133 MG/DL (ref 70–108)
GLUCOSE BLD-MCNC: 216 MG/DL (ref 70–108)
GLUCOSE URINE: NEGATIVE MG/DL
HCT VFR BLD CALC: 42.5 % (ref 37–47)
HEMOGLOBIN: 13.8 GM/DL (ref 12–16)
IMMATURE GRANS (ABS): 0.06 THOU/MM3 (ref 0–0.07)
IMMATURE GRANULOCYTES: 0.5 %
KETONES, URINE: NEGATIVE
LACTIC ACID, SEPSIS: 2.8 MMOL/L (ref 0.5–1.9)
LACTIC ACID: 0.8 MMOL/L (ref 0.5–2.2)
LEUKOCYTE ESTERASE, URINE: ABNORMAL
LYMPHOCYTES # BLD: 22.2 %
LYMPHOCYTES ABSOLUTE: 2.6 THOU/MM3 (ref 1–4.8)
MCH RBC QN AUTO: 29.6 PG (ref 26–33)
MCHC RBC AUTO-ENTMCNC: 32.5 GM/DL (ref 32.2–35.5)
MCV RBC AUTO: 91 FL (ref 81–99)
MISCELLANEOUS 2: ABNORMAL
MONOCYTES # BLD: 8.7 %
MONOCYTES ABSOLUTE: 1 THOU/MM3 (ref 0.4–1.3)
NITRITE, URINE: NEGATIVE
NUCLEATED RED BLOOD CELLS: 0 /100 WBC
OSMOLALITY CALCULATION: 290.3 MOSMOL/KG (ref 275–300)
PH UA: 5.5 (ref 5–9)
PLATELET # BLD: 321 THOU/MM3 (ref 130–400)
PMV BLD AUTO: 11.1 FL (ref 9.4–12.4)
POTASSIUM SERPL-SCNC: 4.8 MEQ/L (ref 3.5–5.2)
PROTEIN UA: NEGATIVE
RBC # BLD: 4.67 MILL/MM3 (ref 4.2–5.4)
RBC URINE: ABNORMAL /HPF
RENAL EPITHELIAL, UA: ABNORMAL
SEG NEUTROPHILS: 67.2 %
SEGMENTED NEUTROPHILS ABSOLUTE COUNT: 7.9 THOU/MM3 (ref 1.8–7.7)
SODIUM BLD-SCNC: 144 MEQ/L (ref 135–145)
SPECIFIC GRAVITY, URINE: 1.02 (ref 1–1.03)
TOTAL PROTEIN: 7.3 G/DL (ref 6.1–8)
UROBILINOGEN, URINE: 0.2 EU/DL (ref 0–1)
WBC # BLD: 11.7 THOU/MM3 (ref 4.8–10.8)
WBC UA: > 100 /HPF
YEAST: ABNORMAL

## 2019-09-24 PROCEDURE — 6360000002 HC RX W HCPCS: Performed by: EMERGENCY MEDICINE

## 2019-09-24 PROCEDURE — 96365 THER/PROPH/DIAG IV INF INIT: CPT

## 2019-09-24 PROCEDURE — 6360000002 HC RX W HCPCS: Performed by: PHYSICIAN ASSISTANT

## 2019-09-24 PROCEDURE — 94760 N-INVAS EAR/PLS OXIMETRY 1: CPT

## 2019-09-24 PROCEDURE — 87040 BLOOD CULTURE FOR BACTERIA: CPT

## 2019-09-24 PROCEDURE — 36415 COLL VENOUS BLD VENIPUNCTURE: CPT

## 2019-09-24 PROCEDURE — 87086 URINE CULTURE/COLONY COUNT: CPT

## 2019-09-24 PROCEDURE — 1200000000 HC SEMI PRIVATE

## 2019-09-24 PROCEDURE — 96372 THER/PROPH/DIAG INJ SC/IM: CPT

## 2019-09-24 PROCEDURE — 6370000000 HC RX 637 (ALT 250 FOR IP): Performed by: PHYSICIAN ASSISTANT

## 2019-09-24 PROCEDURE — 2580000003 HC RX 258: Performed by: EMERGENCY MEDICINE

## 2019-09-24 PROCEDURE — 83605 ASSAY OF LACTIC ACID: CPT

## 2019-09-24 PROCEDURE — 96366 THER/PROPH/DIAG IV INF ADDON: CPT

## 2019-09-24 PROCEDURE — 2580000003 HC RX 258: Performed by: PHYSICIAN ASSISTANT

## 2019-09-24 PROCEDURE — 99223 1ST HOSP IP/OBS HIGH 75: CPT | Performed by: PHYSICIAN ASSISTANT

## 2019-09-24 PROCEDURE — 99284 EMERGENCY DEPT VISIT MOD MDM: CPT

## 2019-09-24 PROCEDURE — 81001 URINALYSIS AUTO W/SCOPE: CPT

## 2019-09-24 PROCEDURE — 2709999900 HC NON-CHARGEABLE SUPPLY

## 2019-09-24 PROCEDURE — 82948 REAGENT STRIP/BLOOD GLUCOSE: CPT

## 2019-09-24 PROCEDURE — 80053 COMPREHEN METABOLIC PANEL: CPT

## 2019-09-24 PROCEDURE — 85025 COMPLETE CBC W/AUTO DIFF WBC: CPT

## 2019-09-24 RX ORDER — SPIRONOLACTONE 25 MG/1
25 TABLET ORAL DAILY
Status: DISCONTINUED | OUTPATIENT
Start: 2019-09-25 | End: 2019-09-25 | Stop reason: HOSPADM

## 2019-09-24 RX ORDER — 0.9 % SODIUM CHLORIDE 0.9 %
1000 INTRAVENOUS SOLUTION INTRAVENOUS ONCE
Status: COMPLETED | OUTPATIENT
Start: 2019-09-24 | End: 2019-09-25

## 2019-09-24 RX ORDER — FENOFIBRATE 160 MG/1
160 TABLET ORAL DAILY
Status: DISCONTINUED | OUTPATIENT
Start: 2019-09-25 | End: 2019-09-25 | Stop reason: HOSPADM

## 2019-09-24 RX ORDER — INSULIN GLARGINE 100 [IU]/ML
29 INJECTION, SOLUTION SUBCUTANEOUS NIGHTLY
Status: DISCONTINUED | OUTPATIENT
Start: 2019-09-24 | End: 2019-09-25 | Stop reason: HOSPADM

## 2019-09-24 RX ORDER — HEPARIN SODIUM 5000 [USP'U]/ML
5000 INJECTION, SOLUTION INTRAVENOUS; SUBCUTANEOUS EVERY 8 HOURS SCHEDULED
Status: DISCONTINUED | OUTPATIENT
Start: 2019-09-24 | End: 2019-09-25 | Stop reason: HOSPADM

## 2019-09-24 RX ORDER — PANTOPRAZOLE SODIUM 40 MG/1
40 TABLET, DELAYED RELEASE ORAL DAILY
Status: DISCONTINUED | OUTPATIENT
Start: 2019-09-25 | End: 2019-09-25 | Stop reason: HOSPADM

## 2019-09-24 RX ORDER — METOPROLOL SUCCINATE 50 MG/1
50 TABLET, EXTENDED RELEASE ORAL DAILY
Status: DISCONTINUED | OUTPATIENT
Start: 2019-09-25 | End: 2019-09-25 | Stop reason: HOSPADM

## 2019-09-24 RX ORDER — ASPIRIN 81 MG/1
81 TABLET, CHEWABLE ORAL DAILY
Status: DISCONTINUED | OUTPATIENT
Start: 2019-09-25 | End: 2019-09-25 | Stop reason: HOSPADM

## 2019-09-24 RX ORDER — ONDANSETRON 2 MG/ML
4 INJECTION INTRAMUSCULAR; INTRAVENOUS EVERY 6 HOURS PRN
Status: DISCONTINUED | OUTPATIENT
Start: 2019-09-24 | End: 2019-09-25 | Stop reason: HOSPADM

## 2019-09-24 RX ORDER — CIPROFLOXACIN 500 MG/1
500 TABLET, FILM COATED ORAL 2 TIMES DAILY
Status: ON HOLD | COMMUNITY
End: 2019-09-25 | Stop reason: HOSPADM

## 2019-09-24 RX ORDER — LEVOTHYROXINE SODIUM 0.15 MG/1
150 TABLET ORAL
Status: DISCONTINUED | OUTPATIENT
Start: 2019-09-25 | End: 2019-09-25 | Stop reason: HOSPADM

## 2019-09-24 RX ORDER — INSULIN GLARGINE 100 [IU]/ML
35 INJECTION, SOLUTION SUBCUTANEOUS EVERY MORNING
Status: DISCONTINUED | OUTPATIENT
Start: 2019-09-25 | End: 2019-09-25 | Stop reason: HOSPADM

## 2019-09-24 RX ORDER — DEXTROSE MONOHYDRATE 50 MG/ML
100 INJECTION, SOLUTION INTRAVENOUS PRN
Status: DISCONTINUED | OUTPATIENT
Start: 2019-09-24 | End: 2019-09-25 | Stop reason: HOSPADM

## 2019-09-24 RX ORDER — LAMOTRIGINE 100 MG/1
200 TABLET ORAL DAILY
Status: DISCONTINUED | OUTPATIENT
Start: 2019-09-25 | End: 2019-09-25 | Stop reason: HOSPADM

## 2019-09-24 RX ORDER — BUSPIRONE HYDROCHLORIDE 10 MG/1
30 TABLET ORAL 3 TIMES DAILY
Status: DISCONTINUED | OUTPATIENT
Start: 2019-09-24 | End: 2019-09-25 | Stop reason: HOSPADM

## 2019-09-24 RX ORDER — ACETAMINOPHEN 325 MG/1
650 TABLET ORAL EVERY 4 HOURS PRN
Status: DISCONTINUED | OUTPATIENT
Start: 2019-09-24 | End: 2019-09-25 | Stop reason: HOSPADM

## 2019-09-24 RX ORDER — SODIUM CHLORIDE 0.9 % (FLUSH) 0.9 %
10 SYRINGE (ML) INJECTION EVERY 12 HOURS SCHEDULED
Status: DISCONTINUED | OUTPATIENT
Start: 2019-09-24 | End: 2019-09-25 | Stop reason: HOSPADM

## 2019-09-24 RX ORDER — DEXTROSE MONOHYDRATE 25 G/50ML
12.5 INJECTION, SOLUTION INTRAVENOUS PRN
Status: DISCONTINUED | OUTPATIENT
Start: 2019-09-24 | End: 2019-09-25 | Stop reason: HOSPADM

## 2019-09-24 RX ORDER — TRAZODONE HYDROCHLORIDE 50 MG/1
50 TABLET ORAL NIGHTLY
Status: DISCONTINUED | OUTPATIENT
Start: 2019-09-25 | End: 2019-09-25 | Stop reason: HOSPADM

## 2019-09-24 RX ORDER — ATORVASTATIN CALCIUM 40 MG/1
40 TABLET, FILM COATED ORAL NIGHTLY
Status: DISCONTINUED | OUTPATIENT
Start: 2019-09-24 | End: 2019-09-25 | Stop reason: HOSPADM

## 2019-09-24 RX ORDER — ACETAMINOPHEN 500 MG
1000 TABLET ORAL ONCE
Status: DISCONTINUED | OUTPATIENT
Start: 2019-09-24 | End: 2019-09-25 | Stop reason: HOSPADM

## 2019-09-24 RX ORDER — NICOTINE POLACRILEX 4 MG
15 LOZENGE BUCCAL PRN
Status: DISCONTINUED | OUTPATIENT
Start: 2019-09-24 | End: 2019-09-25 | Stop reason: HOSPADM

## 2019-09-24 RX ORDER — LAMOTRIGINE 100 MG/1
100 TABLET ORAL
Status: DISCONTINUED | OUTPATIENT
Start: 2019-09-24 | End: 2019-09-25 | Stop reason: HOSPADM

## 2019-09-24 RX ORDER — POTASSIUM CHLORIDE 7.45 MG/ML
10 INJECTION INTRAVENOUS PRN
Status: DISCONTINUED | OUTPATIENT
Start: 2019-09-24 | End: 2019-09-25 | Stop reason: HOSPADM

## 2019-09-24 RX ORDER — POTASSIUM CHLORIDE 20 MEQ/1
40 TABLET, EXTENDED RELEASE ORAL PRN
Status: DISCONTINUED | OUTPATIENT
Start: 2019-09-24 | End: 2019-09-25 | Stop reason: HOSPADM

## 2019-09-24 RX ORDER — DULOXETIN HYDROCHLORIDE 30 MG/1
90 CAPSULE, DELAYED RELEASE ORAL DAILY
Status: DISCONTINUED | OUTPATIENT
Start: 2019-09-25 | End: 2019-09-25 | Stop reason: HOSPADM

## 2019-09-24 RX ORDER — SODIUM CHLORIDE 9 MG/ML
INJECTION, SOLUTION INTRAVENOUS CONTINUOUS
Status: DISCONTINUED | OUTPATIENT
Start: 2019-09-24 | End: 2019-09-25 | Stop reason: HOSPADM

## 2019-09-24 RX ORDER — SODIUM CHLORIDE 0.9 % (FLUSH) 0.9 %
10 SYRINGE (ML) INJECTION PRN
Status: DISCONTINUED | OUTPATIENT
Start: 2019-09-24 | End: 2019-09-25 | Stop reason: HOSPADM

## 2019-09-24 RX ORDER — GABAPENTIN 600 MG/1
600 TABLET ORAL 2 TIMES DAILY
Status: DISCONTINUED | OUTPATIENT
Start: 2019-09-24 | End: 2019-09-25 | Stop reason: HOSPADM

## 2019-09-24 RX ADMIN — SODIUM CHLORIDE 1000 ML: 9 INJECTION, SOLUTION INTRAVENOUS at 18:57

## 2019-09-24 RX ADMIN — HEPARIN SODIUM 5000 UNITS: 5000 INJECTION INTRAVENOUS; SUBCUTANEOUS at 23:02

## 2019-09-24 RX ADMIN — SODIUM CHLORIDE: 9 INJECTION, SOLUTION INTRAVENOUS at 21:15

## 2019-09-24 RX ADMIN — GABAPENTIN 600 MG: 600 TABLET, FILM COATED ORAL at 23:02

## 2019-09-24 RX ADMIN — BUSPIRONE HYDROCHLORIDE 30 MG: 10 TABLET ORAL at 23:02

## 2019-09-24 RX ADMIN — ATORVASTATIN CALCIUM 40 MG: 40 TABLET, FILM COATED ORAL at 23:02

## 2019-09-24 RX ADMIN — CEFTRIAXONE SODIUM 1 G: 1 INJECTION, POWDER, FOR SOLUTION INTRAMUSCULAR; INTRAVENOUS at 19:12

## 2019-09-24 RX ADMIN — LAMOTRIGINE 100 MG: 100 TABLET ORAL at 23:02

## 2019-09-24 ASSESSMENT — ENCOUNTER SYMPTOMS
EYES NEGATIVE: 1
FACIAL SWELLING: 0
CHEST TIGHTNESS: 0
SHORTNESS OF BREATH: 0
RHINORRHEA: 1
DIARRHEA: 0
WHEEZING: 0
VOICE CHANGE: 0
BACK PAIN: 0
RESPIRATORY NEGATIVE: 1
COUGH: 0
SORE THROAT: 0
PHOTOPHOBIA: 0
ALLERGIC/IMMUNOLOGIC NEGATIVE: 1
BLOOD IN STOOL: 0
NAUSEA: 0
GASTROINTESTINAL NEGATIVE: 1
ABDOMINAL PAIN: 0
TROUBLE SWALLOWING: 0
VOMITING: 0

## 2019-09-24 ASSESSMENT — PAIN SCALES - GENERAL: PAINLEVEL_OUTOF10: 0

## 2019-09-24 NOTE — ED NOTES
Pt is resting on cot at this time, ice chips provided to pt. Provider at beside updating pt on POC and that the hospitalist will be coming down to assess pt and try and get her admitted. Pt is alert and oriented x4. VSS. Call light within reach. Will continue to monitor.       Pia Pantoja RN  09/24/19 3624

## 2019-09-24 NOTE — H&P
0-2/hpf /hpf    WBC, UA > 100 0-4/hpf /hpf    Epi Cells NONE SEEN 3-5/hpf /hpf    Bacteria, UA NONE FEW/NONE S /hpf    Casts UA NONE SEEN NONE SEEN /lpf    Crystals NONE SEEN NONE SEEN    Renal Epithelial, Urine NONE SEEN NONE SEEN    Yeast, UA NONE SEEN NONE SEEN    CASTS 2 NONE SEEN NONE SEEN /lpf    MISCELLANEOUS 2 NONE SEEN    Comprehensive Metabolic Panel    Collection Time: 09/24/19  6:30 PM   Result Value Ref Range    Glucose 133 (H) 70 - 108 mg/dL    CREATININE 1.6 (H) 0.4 - 1.2 mg/dL    BUN 17 7 - 22 mg/dL    Sodium 144 135 - 145 meq/L    Potassium 4.8 3.5 - 5.2 meq/L    Chloride 104 98 - 111 meq/L    CO2 25 23 - 33 meq/L    Calcium 10.0 8.5 - 10.5 mg/dL    AST 24 5 - 40 U/L    Alkaline Phosphatase 92 38 - 126 U/L    Total Protein 7.3 6.1 - 8.0 g/dL    Alb 4.4 3.5 - 5.1 g/dL    Total Bilirubin 0.2 (L) 0.3 - 1.2 mg/dL    ALT 23 11 - 66 U/L   CBC auto differential    Collection Time: 09/24/19  6:30 PM   Result Value Ref Range    WBC 11.7 (H) 4.8 - 10.8 thou/mm3    RBC 4.67 4.20 - 5.40 mill/mm3    Hemoglobin 13.8 12.0 - 16.0 gm/dl    Hematocrit 42.5 37.0 - 47.0 %    MCV 91.0 81.0 - 99.0 fL    MCH 29.6 26.0 - 33.0 pg    MCHC 32.5 32.2 - 35.5 gm/dl    RDW-CV 13.5 11.5 - 14.5 %    RDW-SD 45.1 (H) 35.0 - 45.0 fL    Platelets 166 541 - 830 thou/mm3    MPV 11.1 9.4 - 12.4 fL    Seg Neutrophils 67.2 %    Lymphocytes 22.2 %    Monocytes 8.7 %    Eosinophils 1.1 %    Basophils 0.3 %    Immature Granulocytes 0.5 %    Segs Absolute 7.9 (H) 1.8 - 7.7 thou/mm3    Lymphocytes Absolute 2.6 1.0 - 4.8 thou/mm3    Monocytes Absolute 1.0 0.4 - 1.3 thou/mm3    Eosinophils Absolute 0.1 0.0 - 0.4 thou/mm3    Basophils Absolute 0.0 0.0 - 0.1 thou/mm3    Immature Grans (Abs) 0.06 0.00 - 0.07 thou/mm3    nRBC 0 /100 wbc   Lactate, Sepsis    Collection Time: 09/24/19  6:30 PM   Result Value Ref Range    Lactic Acid, Sepsis 2.8 (H) 0.5 - 1.9 mmol/L   Anion Gap    Collection Time: 09/24/19  6:30 PM   Result Value Ref Range    Anion Gap

## 2019-09-24 NOTE — ED PROVIDER NOTES
Socioeconomic History    Marital status:      Spouse name: None    Number of children: None    Years of education: None    Highest education level: None   Occupational History    None   Social Needs    Financial resource strain: None    Food insecurity:     Worry: None     Inability: None    Transportation needs:     Medical: None     Non-medical: None   Tobacco Use    Smoking status: Passive Smoke Exposure - Never Smoker    Smokeless tobacco: Never Used   Substance and Sexual Activity    Alcohol use: No     Alcohol/week: 0.0 standard drinks    Drug use: No    Sexual activity: Yes     Partners: Male   Lifestyle    Physical activity:     Days per week: None     Minutes per session: None    Stress: None   Relationships    Social connections:     Talks on phone: None     Gets together: None     Attends Voodoo service: None     Active member of club or organization: None     Attends meetings of clubs or organizations: None     Relationship status: None    Intimate partner violence:     Fear of current or ex partner: None     Emotionally abused: None     Physically abused: None     Forced sexual activity: None   Other Topics Concern    None   Social History Narrative    None       REVIEW OF SYSTEMS     Review of Systems   Constitutional: Positive for chills. Negative for diaphoresis and fever. HENT: Negative for facial swelling, sore throat, trouble swallowing and voice change. Eyes: Negative for photophobia and visual disturbance. Respiratory: Negative for cough, chest tightness, shortness of breath and wheezing. Cardiovascular: Negative for chest pain, palpitations and leg swelling. Gastrointestinal: Negative for abdominal pain, blood in stool, diarrhea, nausea and vomiting. Endocrine: Negative for polydipsia and polyuria. Genitourinary: Negative for dysuria, frequency, hematuria and urgency. Musculoskeletal: Negative for back pain, neck pain and neck stiffness.

## 2019-09-24 NOTE — ED NOTES
Patient presents to the ED with complaints of urinary frequency. She was seen at [facility] this morning and was given medicine but her symptoms are getting worse.       German Patino, AMRITAN  [phone]

## 2019-09-24 NOTE — ED NOTES
Patient resting on cart with complaint of chills. Patient denies pain. Patient updated on plan of care.  at bedside. [place] provided, patient denies need at this time. Call light in reach.      Sam Geronimo RN  09/24/19 [phone]

## 2019-09-25 VITALS
BODY MASS INDEX: 40.46 KG/M2 | DIASTOLIC BLOOD PRESSURE: 68 MMHG | WEIGHT: 206.1 LBS | TEMPERATURE: 98.7 F | HEART RATE: 69 BPM | HEIGHT: 60 IN | SYSTOLIC BLOOD PRESSURE: 154 MMHG | RESPIRATION RATE: 16 BRPM | OXYGEN SATURATION: 94 %

## 2019-09-25 LAB
ANION GAP SERPL CALCULATED.3IONS-SCNC: 15 MEQ/L (ref 8–16)
BASOPHILS # BLD: 0.4 %
BASOPHILS ABSOLUTE: 0 THOU/MM3 (ref 0–0.1)
BUN BLDV-MCNC: 15 MG/DL (ref 7–22)
CALCIUM SERPL-MCNC: 9 MG/DL (ref 8.5–10.5)
CHLORIDE BLD-SCNC: 104 MEQ/L (ref 98–111)
CO2: 23 MEQ/L (ref 23–33)
CREAT SERPL-MCNC: 1.2 MG/DL (ref 0.4–1.2)
CULTURE: NORMAL
EOSINOPHIL # BLD: 1.7 %
EOSINOPHILS ABSOLUTE: 0.2 THOU/MM3 (ref 0–0.4)
ERYTHROCYTE [DISTWIDTH] IN BLOOD BY AUTOMATED COUNT: 13.7 % (ref 11.5–14.5)
ERYTHROCYTE [DISTWIDTH] IN BLOOD BY AUTOMATED COUNT: 46.9 FL (ref 35–45)
GFR SERPL CREATININE-BSD FRML MDRD: 46 ML/MIN/1.73M2
GLUCOSE BLD-MCNC: 141 MG/DL (ref 70–108)
GLUCOSE BLD-MCNC: 147 MG/DL (ref 70–108)
GLUCOSE BLD-MCNC: 191 MG/DL (ref 70–108)
HCT VFR BLD CALC: 40.5 % (ref 37–47)
HEMOGLOBIN: 12.8 GM/DL (ref 12–16)
IMMATURE GRANS (ABS): 0.04 THOU/MM3 (ref 0–0.07)
IMMATURE GRANULOCYTES: 0.4 %
LYMPHOCYTES # BLD: 32.5 %
LYMPHOCYTES ABSOLUTE: 2.9 THOU/MM3 (ref 1–4.8)
Lab: NORMAL
MCH RBC QN AUTO: 29.8 PG (ref 26–33)
MCHC RBC AUTO-ENTMCNC: 31.6 GM/DL (ref 32.2–35.5)
MCV RBC AUTO: 94.4 FL (ref 81–99)
MONOCYTES # BLD: 9.5 %
MONOCYTES ABSOLUTE: 0.9 THOU/MM3 (ref 0.4–1.3)
NUCLEATED RED BLOOD CELLS: 0 /100 WBC
OSMOLALITY CALCULATION: 286.3 MOSMOL/KG (ref 275–300)
PLATELET # BLD: 256 THOU/MM3 (ref 130–400)
PMV BLD AUTO: 11 FL (ref 9.4–12.4)
POTASSIUM REFLEX MAGNESIUM: 4.3 MEQ/L (ref 3.5–5.2)
RBC # BLD: 4.29 MILL/MM3 (ref 4.2–5.4)
SEG NEUTROPHILS: 55.5 %
SEGMENTED NEUTROPHILS ABSOLUTE COUNT: 5 THOU/MM3 (ref 1.8–7.7)
SODIUM BLD-SCNC: 142 MEQ/L (ref 135–145)
SPECIMEN DESCRIPTION: NORMAL
WBC # BLD: 9 THOU/MM3 (ref 4.8–10.8)

## 2019-09-25 PROCEDURE — 94760 N-INVAS EAR/PLS OXIMETRY 1: CPT

## 2019-09-25 PROCEDURE — 6370000000 HC RX 637 (ALT 250 FOR IP): Performed by: PHYSICIAN ASSISTANT

## 2019-09-25 PROCEDURE — 36415 COLL VENOUS BLD VENIPUNCTURE: CPT

## 2019-09-25 PROCEDURE — 80048 BASIC METABOLIC PNL TOTAL CA: CPT

## 2019-09-25 PROCEDURE — 82948 REAGENT STRIP/BLOOD GLUCOSE: CPT

## 2019-09-25 PROCEDURE — 99239 HOSP IP/OBS DSCHRG MGMT >30: CPT | Performed by: FAMILY MEDICINE

## 2019-09-25 PROCEDURE — 96361 HYDRATE IV INFUSION ADD-ON: CPT

## 2019-09-25 PROCEDURE — 96366 THER/PROPH/DIAG IV INF ADDON: CPT

## 2019-09-25 PROCEDURE — 85025 COMPLETE CBC W/AUTO DIFF WBC: CPT

## 2019-09-25 PROCEDURE — 96372 THER/PROPH/DIAG INJ SC/IM: CPT

## 2019-09-25 PROCEDURE — 6360000002 HC RX W HCPCS: Performed by: PHYSICIAN ASSISTANT

## 2019-09-25 RX ORDER — CEPHALEXIN 500 MG/1
500 CAPSULE ORAL 3 TIMES DAILY
Qty: 15 CAPSULE | Refills: 0 | Status: SHIPPED | OUTPATIENT
Start: 2019-09-25 | End: 2019-09-30

## 2019-09-25 RX ORDER — DULOXETIN HYDROCHLORIDE 30 MG/1
120 CAPSULE, DELAYED RELEASE ORAL DAILY
COMMUNITY
End: 2020-11-24

## 2019-09-25 RX ORDER — ACETAMINOPHEN 325 MG/1
650 TABLET ORAL EVERY 4 HOURS PRN
Qty: 120 TABLET | Refills: 3 | COMMUNITY
Start: 2019-09-25 | End: 2022-09-30

## 2019-09-25 RX ORDER — DULOXETIN HYDROCHLORIDE 60 MG/1
30 CAPSULE, DELAYED RELEASE ORAL DAILY
Status: ON HOLD | COMMUNITY
End: 2019-09-25

## 2019-09-25 RX ADMIN — ASPIRIN 81 MG 81 MG: 81 TABLET ORAL at 08:41

## 2019-09-25 RX ADMIN — GABAPENTIN 600 MG: 600 TABLET, FILM COATED ORAL at 08:41

## 2019-09-25 RX ADMIN — LEVOTHYROXINE SODIUM 150 MCG: 150 TABLET ORAL at 05:59

## 2019-09-25 RX ADMIN — BUSPIRONE HYDROCHLORIDE 30 MG: 10 TABLET ORAL at 08:41

## 2019-09-25 RX ADMIN — INSULIN LISPRO 3 UNITS: 100 INJECTION, SOLUTION INTRAVENOUS; SUBCUTANEOUS at 00:41

## 2019-09-25 RX ADMIN — LAMOTRIGINE 200 MG: 100 TABLET ORAL at 08:41

## 2019-09-25 RX ADMIN — HEPARIN SODIUM 5000 UNITS: 5000 INJECTION INTRAVENOUS; SUBCUTANEOUS at 05:59

## 2019-09-25 RX ADMIN — INSULIN LISPRO 3 UNITS: 100 INJECTION, SOLUTION INTRAVENOUS; SUBCUTANEOUS at 08:43

## 2019-09-25 RX ADMIN — INSULIN GLARGINE 29 UNITS: 100 INJECTION, SOLUTION SUBCUTANEOUS at 00:41

## 2019-09-25 RX ADMIN — TRAZODONE HYDROCHLORIDE 50 MG: 50 TABLET ORAL at 00:41

## 2019-09-25 RX ADMIN — LISINOPRIL 7.5 MG: 5 TABLET ORAL at 08:41

## 2019-09-25 RX ADMIN — DULOXETINE HYDROCHLORIDE 90 MG: 30 CAPSULE, DELAYED RELEASE ORAL at 08:41

## 2019-09-25 RX ADMIN — PANTOPRAZOLE SODIUM 40 MG: 40 TABLET, DELAYED RELEASE ORAL at 08:42

## 2019-09-25 RX ADMIN — FENOFIBRATE 160 MG: 160 TABLET ORAL at 08:41

## 2019-09-25 RX ADMIN — INSULIN GLARGINE 35 UNITS: 100 INJECTION, SOLUTION SUBCUTANEOUS at 08:43

## 2019-09-25 RX ADMIN — METOPROLOL SUCCINATE 50 MG: 50 TABLET, EXTENDED RELEASE ORAL at 08:42

## 2019-09-25 ASSESSMENT — PAIN SCALES - GENERAL
PAINLEVEL_OUTOF10: 0
PAINLEVEL_OUTOF10: 0

## 2019-09-25 NOTE — DISCHARGE SUMMARY
Instructions EIJ:[id number]    Printed on:09/25/19 0909   Medication Information                      acetaminophen (TYLENOL) 325 MG tablet  Take 2 tablets by mouth every 4 hours as needed for Pain             ammonium lactate (AMLACTIN) 12 % cream  Apply topically 2 times daily Apply topically as needed. Ascorbic Acid (VITAMIN C) 500 MG tablet  Take 1,000 mg by mouth daily             aspirin 81 MG tablet  Take 81 mg by mouth daily             atorvastatin (LIPITOR) 40 MG tablet  Take 40 mg by mouth daily             Blood Glucose Monitoring Suppl (FREESTYLE LITE) EMILIE  1 Device by Does not apply route daily as needed             busPIRone (BUSPAR) 30 MG tablet  Take 30 mg by mouth 3 times daily              calcium carbonate (OSCAL) 500 MG TABS tablet  Take 650 mg by mouth daily Indications: 2 chews daily             cephALEXin (KEFLEX) 500 MG capsule  Take 1 capsule by mouth 3 times daily for 5 days             Cyanocobalamin (VITAMIN B 12 PO)  Take 500 mg by mouth daily              DOXEPIN HCL PO  Take 200 mg by mouth nightly. DULoxetine (CYMBALTA) 30 MG extended release capsule  Take 90 mg by mouth daily Take 3 tablets (30mg each) daily. fenofibrate (TRICOR) 145 MG tablet  Take 145 mg by mouth daily             ferrous sulfate 325 (65 FE) MG tablet  Take 325 mg by mouth daily (with breakfast)             gabapentin (NEURONTIN) 600 MG tablet  Take 1 tablet by mouth 2 times daily for 60 days. glucose blood VI test strips (FREESTYLE LITE) strip  Check BS 4 times a day             insulin glargine (LANTUS SOLOSTAR) 100 UNIT/ML injection pen  35 in the am, 29 in the pm             insulin lispro (HUMALOG KWIKPEN) 100 UNIT/ML pen  Inject 17 units before breakfast, 20 units before lunch and 28 units before dinner plus sliding scale. Patient uses a max of 96 units per day.              lamoTRIgine (LAMICTAL) 100 MG tablet    Take by mouth daily 200MG AM AND 100MG AT Weight: 206 lb 1.6 oz (93.5 kg)      Height: 5' (1.524 m)        Weight: Weight: 206 lb 1.6 oz (93.5 kg)     24 hour intake/output:    Intake/Output Summary (Last 24 hours) at 9/25/2019 8445  Last data filed at 9/25/2019 0556  Gross per 24 hour   Intake 1121 ml   Output 0 ml   Net 1121 ml       General appearance - alert, well appearing, and in no distress and oriented to person, place, and time  Chest - clear to auscultation, no wheezes, rales or rhonchi, symmetric air entry, no tachypnea, retractions or cyanosis  Heart - normal rate, regular rhythm, normal S1, S2, no murmurs, rubs, clicks or gallops  Abdomen - soft, nontender, nondistended, no masses or organomegaly  bowel sounds normal  Obese: Yes; Protuberant: Yes   Neurological - alert, oriented, normal speech, no focal findings or movement disorder noted, cranial nerves II through XII intact  Extremities - peripheral pulses normal, no pedal edema, no clubbing or cyanosis  Skin - normal coloration and turgor, no rashes, no suspicious skin lesions noted    Significant Diagnostics:   Radiology:   No orders to display       Labs:   Recent Results (from the past 72 hour(s))   Urine with Reflexed Micro    Collection Time: 09/24/19  5:55 PM   Result Value Ref Range    Glucose, Ur NEGATIVE NEGATIVE mg/dl    Bilirubin Urine NEGATIVE NEGATIVE    Ketones, Urine NEGATIVE NEGATIVE    Specific Gravity, Urine 1.022 1.002 - 1.03    Blood, Urine NEGATIVE NEGATIVE    pH, UA 5.5 5.0 - 9.0    Protein, UA NEGATIVE NEGATIVE    Urobilinogen, Urine 0.2 0.0 - 1.0 eu/dl    Nitrite, Urine NEGATIVE NEGATIVE    Leukocyte Esterase, Urine LARGE (A) NEGATIVE    Color, UA YELLOW STRAW-YELL    Character, Urine CLOUDY (A) CLEAR-SL C    RBC, UA 3-5 0-2/hpf /hpf    WBC, UA > 100 0-4/hpf /hpf    Epi Cells NONE SEEN 3-5/hpf /hpf    Bacteria, UA NONE FEW/NONE S /hpf    Casts UA NONE SEEN NONE SEEN /lpf    Crystals NONE SEEN NONE SEEN    Renal Epithelial, Urine NONE SEEN NONE SEEN    Yeast, UA NONE SEEN NONE SEEN    CASTS 2 NONE SEEN NONE SEEN /lpf    MISCELLANEOUS 2 NONE SEEN    Urine Culture, Reflexed    Collection Time: 09/24/19  5:55 PM   Result Value Ref Range    Urine Culture Reflex No growth-preliminary    Culture blood #1    Collection Time: 09/24/19  6:24 PM   Result Value Ref Range    Blood Culture, Routine No growth-preliminary    Comprehensive Metabolic Panel    Collection Time: 09/24/19  6:30 PM   Result Value Ref Range    Glucose 133 (H) 70 - 108 mg/dL    CREATININE 1.6 (H) 0.4 - 1.2 mg/dL    BUN 17 7 - 22 mg/dL    Sodium 144 135 - 145 meq/L    Potassium 4.8 3.5 - 5.2 meq/L    Chloride 104 98 - 111 meq/L    CO2 25 23 - 33 meq/L    Calcium 10.0 8.5 - 10.5 mg/dL    AST 24 5 - 40 U/L    Alkaline Phosphatase 92 38 - 126 U/L    Total Protein 7.3 6.1 - 8.0 g/dL    Alb 4.4 3.5 - 5.1 g/dL    Total Bilirubin 0.2 (L) 0.3 - 1.2 mg/dL    ALT 23 11 - 66 U/L   CBC auto differential    Collection Time: 09/24/19  6:30 PM   Result Value Ref Range    WBC 11.7 (H) 4.8 - 10.8 thou/mm3    RBC 4.67 4.20 - 5.40 mill/mm3    Hemoglobin 13.8 12.0 - 16.0 gm/dl    Hematocrit 42.5 37.0 - 47.0 %    MCV 91.0 81.0 - 99.0 fL    MCH 29.6 26.0 - 33.0 pg    MCHC 32.5 32.2 - 35.5 gm/dl    RDW-CV 13.5 11.5 - 14.5 %    RDW-SD 45.1 (H) 35.0 - 45.0 fL    Platelets 109 496 - 475 thou/mm3    MPV 11.1 9.4 - 12.4 fL    Seg Neutrophils 67.2 %    Lymphocytes 22.2 %    Monocytes 8.7 %    Eosinophils 1.1 %    Basophils 0.3 %    Immature Granulocytes 0.5 %    Segs Absolute 7.9 (H) 1.8 - 7.7 thou/mm3    Lymphocytes Absolute 2.6 1.0 - 4.8 thou/mm3    Monocytes Absolute 1.0 0.4 - 1.3 thou/mm3    Eosinophils Absolute 0.1 0.0 - 0.4 thou/mm3    Basophils Absolute 0.0 0.0 - 0.1 thou/mm3    Immature Grans (Abs) 0.06 0.00 - 0.07 thou/mm3    nRBC 0 /100 wbc   Lactate, Sepsis    Collection Time: 09/24/19  6:30 PM   Result Value Ref Range    Lactic Acid, Sepsis 2.8 (H) 0.5 - 1.9 mmol/L   Culture blood #2    Collection Time: 09/24/19  6:30 PM   Result Value

## 2019-09-26 LAB — URINE CULTURE REFLEX: NORMAL

## 2019-09-27 ENCOUNTER — PREP FOR PROCEDURE (OUTPATIENT)
Dept: PHYSICAL MEDICINE AND REHAB | Age: 64
End: 2019-09-27

## 2019-09-27 NOTE — H&P
Kendy Rush is a 66 y.o. female is here today for     Chief Complaint: Low back pain               The patientis allergic to ibuprofen.     Past Medical History  Lisa Harrison  has a past medical history of Back pain, CAD (coronary artery disease), Chronic diastolic CHF (congestive heart failure) ([facility] [address] Colitis, Depression, Depression, Gastroesophageal reflux, GERD (gastroesophageal reflux disease), H/O endoscopy, Hyperlipidemia, Hypertension, Hypertension, Hypothyroidism, Incontinence of urine, Lordosis (acquired) (postural), Spondylosis of unspecified site without mention of myelopathy, Thoracic or lumbosacral neuritis or radiculitis, unspecified, Type II or unspecified type diabetes mellitus without mention of complication, not stated as uncontrolled, Unspecified sleep apnea, and Urinary incontinence.     Past Surgical History  The patient  has a past surgical history that includes  section (); Carpal tunnel release (Bilateral, ); Abdomen surgery (); Dilation and curettage of uterus; Hysterectomy (); cardiovascular stress test (2015); Colonoscopy (, ); Upper gastrointestinal endoscopy (); Cardiac catheterization (); other surgical history (Bilateral, 2018); pr inj dx/ther agnt paravert facet joint, lumbar/sac, 2nd level (Bilateral, 3/19/2018); Nerve Surgery (Bilateral, 2018); pr inj dx/ther agnt paravert facet joint, lumbar/sac, 2nd level (Bilateral, 2018); pr inject rx other periph nerve (Bilateral, 2018); pr office/outpt visit,procedure only (Right, 10/2/2018); Injection Procedure For Sacroiliac Joint (Right, 2018); Lumbar spine surgery (Bilateral, 2019); and lumbar nerve block (Bilateral, 2019).    Family History  This patient's family history includes Diabetes in her father, mother, and sister; Heart Disease in her father and mother; Stroke in her mother and sister;  Thyroid Disease in her brother.  Abril Mckinney disturbance.         Objective:      Vitals                          Weight: 206 lb 9.1 oz (93.7 kg)   Height: 5' (1.524 m)            Physical Exam   Constitutional: She is oriented to person, place, and time. She appears well-developed and well-nourished. No distress. HENT:   Head: Normocephalic and atraumatic. Right Ear: External ear normal.   Left Ear: External ear normal.   Eyes: Right eye exhibits no discharge. Left eye exhibits no discharge. Neck: Normal range of motion. Neck supple. Pulmonary/Chest: Effort normal. No respiratory distress. Musculoskeletal: She exhibits tenderness. She exhibits no edema. Lumbar back: She exhibits normal range of motion, no tenderness and no pain. Back:    Neurological: She is alert and oriented to person, place, and time. Skin: Skin is warm and dry. She is not diaphoretic. No erythema. Psychiatric: She has a normal mood and affect. Her behavior is normal. Judgment and thought content normal.         Assessment:      1. Disc displacement, lumbar    2. Spondylosis of lumbar region without myelopathy or radiculopathy    3. Lumbar pain    4.  Chronic pain syndrome          Plan:  LISA L3 #2       [facility], APRN - CNP  9/27/2019

## 2019-09-30 LAB
BLOOD CULTURE, ROUTINE: NORMAL
BLOOD CULTURE, ROUTINE: NORMAL

## 2019-10-04 ENCOUNTER — APPOINTMENT (OUTPATIENT)
Dept: GENERAL RADIOLOGY | Age: 64
End: 2019-10-04
Attending: PAIN MEDICINE
Payer: MEDICARE

## 2019-10-04 ENCOUNTER — HOSPITAL ENCOUNTER (OUTPATIENT)
Age: 64
Setting detail: OUTPATIENT SURGERY
Discharge: HOME | End: 2019-10-04
Attending: PAIN MEDICINE | Admitting: PAIN MEDICINE
Payer: MEDICARE

## 2019-10-04 ENCOUNTER — ANESTHESIA (OUTPATIENT)
Dept: OPERATING ROOM | Age: 64
End: 2019-10-04
Payer: MEDICARE

## 2019-10-04 ENCOUNTER — ANESTHESIA EVENT (OUTPATIENT)
Dept: OPERATING ROOM | Age: 64
End: 2019-10-04
Payer: MEDICARE

## 2019-10-04 VITALS
DIASTOLIC BLOOD PRESSURE: 65 MMHG | RESPIRATION RATE: 14 BRPM | SYSTOLIC BLOOD PRESSURE: 150 MMHG | OXYGEN SATURATION: 95 %

## 2019-10-04 VITALS
TEMPERATURE: 97.4 F | BODY MASS INDEX: 42.01 KG/M2 | OXYGEN SATURATION: 94 % | RESPIRATION RATE: 20 BRPM | HEART RATE: 62 BPM | SYSTOLIC BLOOD PRESSURE: 126 MMHG | WEIGHT: 214 LBS | HEIGHT: 60 IN | DIASTOLIC BLOOD PRESSURE: 58 MMHG

## 2019-10-04 LAB — GLUCOSE BLD-MCNC: 146 MG/DL (ref 70–108)

## 2019-10-04 PROCEDURE — 7100000010 HC PHASE II RECOVERY - FIRST 15 MIN: Performed by: PAIN MEDICINE

## 2019-10-04 PROCEDURE — 3600000054 HC PAIN LEVEL 3 BASE: Performed by: PAIN MEDICINE

## 2019-10-04 PROCEDURE — 62323 NJX INTERLAMINAR LMBR/SAC: CPT | Performed by: PAIN MEDICINE

## 2019-10-04 PROCEDURE — 2709999900 HC NON-CHARGEABLE SUPPLY: Performed by: PAIN MEDICINE

## 2019-10-04 PROCEDURE — 82948 REAGENT STRIP/BLOOD GLUCOSE: CPT

## 2019-10-04 PROCEDURE — 3700000000 HC ANESTHESIA ATTENDED CARE: Performed by: PAIN MEDICINE

## 2019-10-04 PROCEDURE — 2580000003 HC RX 258: Performed by: PAIN MEDICINE

## 2019-10-04 PROCEDURE — 2500000003 HC RX 250 WO HCPCS: Performed by: PAIN MEDICINE

## 2019-10-04 PROCEDURE — 6360000004 HC RX CONTRAST MEDICATION: Performed by: PAIN MEDICINE

## 2019-10-04 PROCEDURE — 6360000002 HC RX W HCPCS: Performed by: NURSE ANESTHETIST, CERTIFIED REGISTERED

## 2019-10-04 PROCEDURE — 3209999900 FLUORO FOR SURGICAL PROCEDURES

## 2019-10-04 PROCEDURE — 7100000011 HC PHASE II RECOVERY - ADDTL 15 MIN: Performed by: PAIN MEDICINE

## 2019-10-04 PROCEDURE — 6360000002 HC RX W HCPCS: Performed by: PAIN MEDICINE

## 2019-10-04 RX ORDER — 0.9 % SODIUM CHLORIDE 0.9 %
VIAL (ML) INJECTION PRN
Status: DISCONTINUED | OUTPATIENT
Start: 2019-10-04 | End: 2019-10-04 | Stop reason: ALTCHOICE

## 2019-10-04 RX ORDER — LIDOCAINE HYDROCHLORIDE 10 MG/ML
INJECTION, SOLUTION INFILTRATION; PERINEURAL PRN
Status: DISCONTINUED | OUTPATIENT
Start: 2019-10-04 | End: 2019-10-04 | Stop reason: ALTCHOICE

## 2019-10-04 RX ORDER — DEXAMETHASONE SODIUM PHOSPHATE 4 MG/ML
INJECTION, SOLUTION INTRA-ARTICULAR; INTRALESIONAL; INTRAMUSCULAR; INTRAVENOUS; SOFT TISSUE PRN
Status: DISCONTINUED | OUTPATIENT
Start: 2019-10-04 | End: 2019-10-04 | Stop reason: ALTCHOICE

## 2019-10-04 RX ORDER — PROPOFOL 10 MG/ML
INJECTION, EMULSION INTRAVENOUS PRN
Status: DISCONTINUED | OUTPATIENT
Start: 2019-10-04 | End: 2019-10-04 | Stop reason: SDUPTHER

## 2019-10-04 RX ORDER — FENTANYL CITRATE 50 UG/ML
INJECTION, SOLUTION INTRAMUSCULAR; INTRAVENOUS PRN
Status: DISCONTINUED | OUTPATIENT
Start: 2019-10-04 | End: 2019-10-04 | Stop reason: SDUPTHER

## 2019-10-04 RX ADMIN — FENTANYL CITRATE 50 MCG: 50 INJECTION INTRAMUSCULAR; INTRAVENOUS at 10:50

## 2019-10-04 RX ADMIN — FENTANYL CITRATE 50 MCG: 50 INJECTION INTRAMUSCULAR; INTRAVENOUS at 10:46

## 2019-10-04 RX ADMIN — PROPOFOL 50 MG: 10 INJECTION, EMULSION INTRAVENOUS at 10:50

## 2019-10-04 RX ADMIN — PROPOFOL 50 MG: 10 INJECTION, EMULSION INTRAVENOUS at 10:46

## 2019-10-04 ASSESSMENT — PULMONARY FUNCTION TESTS
PIF_VALUE: 0

## 2019-10-04 ASSESSMENT — PAIN - FUNCTIONAL ASSESSMENT: PAIN_FUNCTIONAL_ASSESSMENT: 0-10

## 2019-10-04 ASSESSMENT — ENCOUNTER SYMPTOMS: SHORTNESS OF BREATH: 1

## 2019-10-04 ASSESSMENT — PAIN DESCRIPTION - DESCRIPTORS: DESCRIPTORS: ACHING;CONSTANT;DISCOMFORT

## 2019-10-04 ASSESSMENT — PAIN SCALES - GENERAL: PAINLEVEL_OUTOF10: 0

## 2019-10-07 ENCOUNTER — HOSPITAL ENCOUNTER (OUTPATIENT)
Age: 64
Setting detail: SPECIMEN
Discharge: HOME | End: 2019-10-07
Payer: MEDICARE

## 2019-10-07 LAB
-: NORMAL
AMORPHOUS: NORMAL
BACTERIA: NORMAL
BILIRUBIN URINE: NEGATIVE
CASTS UA: NORMAL /LPF (ref 0–8)
COLOR: YELLOW
COMMENT UA: ABNORMAL
CRYSTALS, UA: NORMAL /HPF
EPITHELIAL CELLS UA: NORMAL /HPF (ref 0–5)
GLUCOSE URINE: NEGATIVE
KETONES, URINE: NEGATIVE
LEUKOCYTE ESTERASE, URINE: ABNORMAL
MUCUS: NORMAL
NITRITE, URINE: NEGATIVE
OTHER OBSERVATIONS UA: NORMAL
PH UA: 5.5 (ref 5–8)
PROTEIN UA: NEGATIVE
RBC UA: NORMAL /HPF (ref 0–4)
RENAL EPITHELIAL, UA: NORMAL /HPF
SPECIFIC GRAVITY UA: 1.01 (ref 1–1.03)
TRICHOMONAS: NORMAL
TURBIDITY: CLEAR
URINE HGB: NEGATIVE
UROBILINOGEN, URINE: NORMAL
WBC UA: NORMAL /HPF (ref 0–5)
YEAST: NORMAL

## 2019-10-08 LAB
CULTURE: NORMAL
Lab: NORMAL
SPECIMEN DESCRIPTION: NORMAL

## 2019-10-22 ENCOUNTER — OFFICE VISIT (OUTPATIENT)
Dept: PHYSICAL MEDICINE AND REHAB | Age: 64
End: 2019-10-22
Payer: MEDICARE

## 2019-10-22 VITALS
HEIGHT: 62 IN | HEART RATE: 88 BPM | WEIGHT: 213.85 LBS | BODY MASS INDEX: 39.35 KG/M2 | SYSTOLIC BLOOD PRESSURE: 132 MMHG | DIASTOLIC BLOOD PRESSURE: 64 MMHG

## 2019-10-22 DIAGNOSIS — M51.36 DDD (DEGENERATIVE DISC DISEASE), LUMBAR: ICD-10-CM

## 2019-10-22 DIAGNOSIS — M54.50 LUMBAR PAIN: ICD-10-CM

## 2019-10-22 DIAGNOSIS — M47.816 SPONDYLOSIS OF LUMBAR REGION WITHOUT MYELOPATHY OR RADICULOPATHY: Primary | ICD-10-CM

## 2019-10-22 DIAGNOSIS — G89.4 CHRONIC PAIN SYNDROME: ICD-10-CM

## 2019-10-22 DIAGNOSIS — M46.1 SI (SACROILIAC) JOINT INFLAMMATION (HCC): ICD-10-CM

## 2019-10-22 DIAGNOSIS — M51.26 DISC DISPLACEMENT, LUMBAR: ICD-10-CM

## 2019-10-22 DIAGNOSIS — M54.16 LUMBAR RADICULOPATHY: ICD-10-CM

## 2019-10-22 PROCEDURE — 99214 OFFICE O/P EST MOD 30 MIN: CPT | Performed by: NURSE PRACTITIONER

## 2019-10-22 RX ORDER — PREGABALIN 25 MG/1
25 CAPSULE ORAL 3 TIMES DAILY
Qty: 90 CAPSULE | Refills: 0 | Status: SHIPPED | OUTPATIENT
Start: 2019-10-22 | End: 2019-10-24

## 2019-10-22 ASSESSMENT — ENCOUNTER SYMPTOMS
ABDOMINAL PAIN: 0
BACK PAIN: 1
CONSTIPATION: 0
COLOR CHANGE: 0

## 2019-10-23 DIAGNOSIS — M54.50 LUMBAR PAIN: Primary | ICD-10-CM

## 2019-10-23 DIAGNOSIS — G89.4 CHRONIC PAIN SYNDROME: ICD-10-CM

## 2019-10-23 DIAGNOSIS — M54.16 LUMBAR RADICULOPATHY: ICD-10-CM

## 2019-10-24 PROBLEM — N39.0 UTI (URINARY TRACT INFECTION): Status: RESOLVED | Noted: 2019-09-24 | Resolved: 2019-10-24

## 2019-10-24 RX ORDER — PREGABALIN 50 MG/1
50 CAPSULE ORAL 3 TIMES DAILY
Qty: 15 CAPSULE | Refills: 0 | Status: ON HOLD | OUTPATIENT
Start: 2019-10-28 | End: 2020-01-20

## 2019-10-24 RX ORDER — PREGABALIN 25 MG/1
25 CAPSULE ORAL 3 TIMES DAILY
Qty: 15 CAPSULE | Refills: 0 | Status: ON HOLD | OUTPATIENT
Start: 2019-10-24 | End: 2020-01-20

## 2019-10-24 RX ORDER — PREGABALIN 75 MG/1
75 CAPSULE ORAL 3 TIMES DAILY
Qty: 90 CAPSULE | Refills: 0 | Status: SHIPPED | OUTPATIENT
Start: 2019-11-01 | End: 2019-12-19 | Stop reason: SDUPTHER

## 2019-11-05 ENCOUNTER — HOSPITAL ENCOUNTER (OUTPATIENT)
Dept: MRI IMAGING | Age: 64
Discharge: HOME | End: 2019-11-05
Payer: MEDICARE

## 2019-11-05 ENCOUNTER — TELEPHONE (OUTPATIENT)
Dept: PHYSICAL MEDICINE AND REHAB | Age: 64
End: 2019-11-05

## 2019-11-05 DIAGNOSIS — M54.16 LUMBAR RADICULOPATHY: ICD-10-CM

## 2019-11-05 DIAGNOSIS — M51.26 DISC DISPLACEMENT, LUMBAR: ICD-10-CM

## 2019-11-05 DIAGNOSIS — M54.6 ACUTE BILATERAL THORACIC BACK PAIN: Primary | ICD-10-CM

## 2019-11-05 PROCEDURE — 72148 MRI LUMBAR SPINE W/O DYE: CPT

## 2019-11-16 ENCOUNTER — APPOINTMENT (OUTPATIENT)
Dept: GENERAL RADIOLOGY | Age: 64
End: 2019-11-16
Payer: MEDICARE

## 2019-11-16 ENCOUNTER — HOSPITAL ENCOUNTER (EMERGENCY)
Age: 64
Discharge: HOME | End: 2019-11-16
Attending: EMERGENCY MEDICINE
Payer: MEDICARE

## 2019-11-16 ENCOUNTER — APPOINTMENT (OUTPATIENT)
Dept: CT IMAGING | Age: 64
End: 2019-11-16
Payer: MEDICARE

## 2019-11-16 VITALS
WEIGHT: 209 LBS | HEIGHT: 65 IN | SYSTOLIC BLOOD PRESSURE: 138 MMHG | TEMPERATURE: 97.4 F | OXYGEN SATURATION: 95 % | HEART RATE: 75 BPM | DIASTOLIC BLOOD PRESSURE: 51 MMHG | RESPIRATION RATE: 22 BRPM | BODY MASS INDEX: 34.82 KG/M2

## 2019-11-16 DIAGNOSIS — T50.905A ADVERSE EFFECT OF DRUG, INITIAL ENCOUNTER: Primary | ICD-10-CM

## 2019-11-16 LAB
ALBUMIN SERPL-MCNC: 4 G/DL (ref 3.5–5.1)
ALP BLD-CCNC: 75 U/L (ref 38–126)
ALT SERPL-CCNC: 17 U/L (ref 11–66)
ANION GAP SERPL CALCULATED.3IONS-SCNC: 16 MEQ/L (ref 8–16)
AST SERPL-CCNC: 20 U/L (ref 5–40)
BASOPHILS # BLD: 0.3 %
BASOPHILS ABSOLUTE: 0 THOU/MM3 (ref 0–0.1)
BILIRUB SERPL-MCNC: 0.2 MG/DL (ref 0.3–1.2)
BUN BLDV-MCNC: 34 MG/DL (ref 7–22)
CALCIUM SERPL-MCNC: 9.4 MG/DL (ref 8.5–10.5)
CHLORIDE BLD-SCNC: 100 MEQ/L (ref 98–111)
CO2: 22 MEQ/L (ref 23–33)
CREAT SERPL-MCNC: 1.9 MG/DL (ref 0.4–1.2)
EKG ATRIAL RATE: 94 BPM
EKG P AXIS: 50 DEGREES
EKG P-R INTERVAL: 192 MS
EKG Q-T INTERVAL: 344 MS
EKG QRS DURATION: 90 MS
EKG QTC CALCULATION (BAZETT): 430 MS
EKG R AXIS: 57 DEGREES
EKG T AXIS: 61 DEGREES
EKG VENTRICULAR RATE: 94 BPM
EOSINOPHIL # BLD: 2.2 %
EOSINOPHILS ABSOLUTE: 0.3 THOU/MM3 (ref 0–0.4)
ERYTHROCYTE [DISTWIDTH] IN BLOOD BY AUTOMATED COUNT: 13.6 % (ref 11.5–14.5)
ERYTHROCYTE [DISTWIDTH] IN BLOOD BY AUTOMATED COUNT: 46.1 FL (ref 35–45)
GFR SERPL CREATININE-BSD FRML MDRD: 27 ML/MIN/1.73M2
GLUCOSE BLD-MCNC: 71 MG/DL (ref 70–108)
GLUCOSE BLD-MCNC: 74 MG/DL
GLUCOSE BLD-MCNC: 74 MG/DL (ref 70–108)
GLUCOSE BLD-MCNC: 74 MG/DL (ref 70–108)
GLUCOSE BLD-MCNC: 76 MG/DL (ref 70–108)
GLUCOSE BLD-MCNC: 92 MG/DL (ref 70–108)
HCT VFR BLD CALC: 36 % (ref 37–47)
HEMOGLOBIN: 11.5 GM/DL (ref 12–16)
IMMATURE GRANS (ABS): 0.03 THOU/MM3 (ref 0–0.07)
IMMATURE GRANULOCYTES: 0.3 %
LYMPHOCYTES # BLD: 26.1 %
LYMPHOCYTES ABSOLUTE: 3.1 THOU/MM3 (ref 1–4.8)
MCH RBC QN AUTO: 29.9 PG (ref 26–33)
MCHC RBC AUTO-ENTMCNC: 31.9 GM/DL (ref 32.2–35.5)
MCV RBC AUTO: 93.5 FL (ref 81–99)
MONOCYTES # BLD: 8.6 %
MONOCYTES ABSOLUTE: 1 THOU/MM3 (ref 0.4–1.3)
NUCLEATED RED BLOOD CELLS: 0 /100 WBC
OSMOLALITY CALCULATION: 282 MOSMOL/KG (ref 275–300)
PLATELET # BLD: 266 THOU/MM3 (ref 130–400)
PMV BLD AUTO: 11.6 FL (ref 9.4–12.4)
POTASSIUM SERPL-SCNC: 4.6 MEQ/L (ref 3.5–5.2)
RBC # BLD: 3.85 MILL/MM3 (ref 4.2–5.4)
SEG NEUTROPHILS: 62.5 %
SEGMENTED NEUTROPHILS ABSOLUTE COUNT: 7.4 THOU/MM3 (ref 1.8–7.7)
SODIUM BLD-SCNC: 138 MEQ/L (ref 135–145)
TOTAL PROTEIN: 7 G/DL (ref 6.1–8)
TROPONIN T: < 0.01 NG/ML
WBC # BLD: 11.8 THOU/MM3 (ref 4.8–10.8)

## 2019-11-16 PROCEDURE — 93005 ELECTROCARDIOGRAM TRACING: CPT | Performed by: NURSE PRACTITIONER

## 2019-11-16 PROCEDURE — 84484 ASSAY OF TROPONIN QUANT: CPT

## 2019-11-16 PROCEDURE — 85025 COMPLETE CBC W/AUTO DIFF WBC: CPT

## 2019-11-16 PROCEDURE — 2580000003 HC RX 258: Performed by: EMERGENCY MEDICINE

## 2019-11-16 PROCEDURE — 82948 REAGENT STRIP/BLOOD GLUCOSE: CPT

## 2019-11-16 PROCEDURE — 36415 COLL VENOUS BLD VENIPUNCTURE: CPT

## 2019-11-16 PROCEDURE — 71045 X-RAY EXAM CHEST 1 VIEW: CPT

## 2019-11-16 PROCEDURE — 70450 CT HEAD/BRAIN W/O DYE: CPT

## 2019-11-16 PROCEDURE — 99285 EMERGENCY DEPT VISIT HI MDM: CPT

## 2019-11-16 PROCEDURE — 80053 COMPREHEN METABOLIC PANEL: CPT

## 2019-11-16 RX ORDER — DEXTROSE MONOHYDRATE 25 G/50ML
INJECTION, SOLUTION INTRAVENOUS
Status: DISCONTINUED
Start: 2019-11-16 | End: 2019-11-16 | Stop reason: HOSPADM

## 2019-11-16 RX ORDER — 0.9 % SODIUM CHLORIDE 0.9 %
500 INTRAVENOUS SOLUTION INTRAVENOUS ONCE
Status: COMPLETED | OUTPATIENT
Start: 2019-11-16 | End: 2019-11-16

## 2019-11-16 RX ADMIN — SODIUM CHLORIDE 500 ML: 9 INJECTION, SOLUTION INTRAVENOUS at 18:13

## 2019-11-16 ASSESSMENT — ENCOUNTER SYMPTOMS
WHEEZING: 0
SORE THROAT: 0
DIARRHEA: 0
EYE DISCHARGE: 0
ABDOMINAL PAIN: 0
COUGH: 0
EYE PAIN: 0
SHORTNESS OF BREATH: 0
BACK PAIN: 0
RHINORRHEA: 0
VOMITING: 0
NAUSEA: 0

## 2019-11-17 PROCEDURE — 93010 ELECTROCARDIOGRAM REPORT: CPT | Performed by: INTERNAL MEDICINE

## 2019-11-18 ENCOUNTER — HOSPITAL ENCOUNTER (OUTPATIENT)
Dept: MRI IMAGING | Age: 64
Discharge: HOME | End: 2019-11-18
Payer: MEDICARE

## 2019-11-18 DIAGNOSIS — M54.6 ACUTE BILATERAL THORACIC BACK PAIN: ICD-10-CM

## 2019-11-18 PROCEDURE — 72146 MRI CHEST SPINE W/O DYE: CPT

## 2019-11-19 ENCOUNTER — PROCEDURE VISIT (OUTPATIENT)
Dept: NEUROLOGY | Age: 64
End: 2019-11-19
Payer: MEDICARE

## 2019-11-19 DIAGNOSIS — R20.0 NUMBNESS AND TINGLING OF BOTH FEET: ICD-10-CM

## 2019-11-19 DIAGNOSIS — G62.9 NEUROPATHY: ICD-10-CM

## 2019-11-19 DIAGNOSIS — M54.16 LUMBAR RADICULOPATHY: Primary | ICD-10-CM

## 2019-11-19 DIAGNOSIS — R20.2 NUMBNESS AND TINGLING OF BOTH FEET: ICD-10-CM

## 2019-11-19 PROCEDURE — 95886 MUSC TEST DONE W/N TEST COMP: CPT | Performed by: PSYCHIATRY & NEUROLOGY

## 2019-11-19 PROCEDURE — 95910 NRV CNDJ TEST 7-8 STUDIES: CPT | Performed by: PSYCHIATRY & NEUROLOGY

## 2019-12-02 ENCOUNTER — OFFICE VISIT (OUTPATIENT)
Dept: CARDIOLOGY CLINIC | Age: 64
End: 2019-12-02
Payer: MEDICARE

## 2019-12-02 VITALS
BODY MASS INDEX: 40.25 KG/M2 | HEIGHT: 60 IN | HEART RATE: 72 BPM | WEIGHT: 205 LBS | SYSTOLIC BLOOD PRESSURE: 132 MMHG | DIASTOLIC BLOOD PRESSURE: 68 MMHG

## 2019-12-02 DIAGNOSIS — I51.89 DIASTOLIC DYSFUNCTION: Primary | ICD-10-CM

## 2019-12-02 PROCEDURE — 99213 OFFICE O/P EST LOW 20 MIN: CPT | Performed by: INTERNAL MEDICINE

## 2019-12-12 ENCOUNTER — OFFICE VISIT (OUTPATIENT)
Dept: PHYSICAL MEDICINE AND REHAB | Age: 64
End: 2019-12-12
Payer: MEDICARE

## 2019-12-12 VITALS
DIASTOLIC BLOOD PRESSURE: 72 MMHG | SYSTOLIC BLOOD PRESSURE: 126 MMHG | WEIGHT: 205.03 LBS | HEIGHT: 60 IN | BODY MASS INDEX: 40.25 KG/M2 | HEART RATE: 78 BPM

## 2019-12-12 DIAGNOSIS — M51.26 DISC DISPLACEMENT, LUMBAR: ICD-10-CM

## 2019-12-12 DIAGNOSIS — M54.16 LUMBAR RADICULOPATHY: Primary | ICD-10-CM

## 2019-12-12 DIAGNOSIS — M51.36 DDD (DEGENERATIVE DISC DISEASE), LUMBAR: ICD-10-CM

## 2019-12-12 PROCEDURE — 99214 OFFICE O/P EST MOD 30 MIN: CPT | Performed by: NURSE PRACTITIONER

## 2019-12-12 ASSESSMENT — ENCOUNTER SYMPTOMS
COLOR CHANGE: 0
BACK PAIN: 1
ABDOMINAL PAIN: 0
CONSTIPATION: 0

## 2019-12-18 DIAGNOSIS — M54.16 LUMBAR RADICULOPATHY: ICD-10-CM

## 2019-12-18 DIAGNOSIS — G89.4 CHRONIC PAIN SYNDROME: ICD-10-CM

## 2019-12-18 DIAGNOSIS — M54.50 LUMBAR PAIN: ICD-10-CM

## 2019-12-19 RX ORDER — PREGABALIN 75 MG/1
75 CAPSULE ORAL 3 TIMES DAILY
Qty: 90 CAPSULE | Refills: 0 | Status: SHIPPED | OUTPATIENT
Start: 2019-12-19 | End: 2020-02-05 | Stop reason: SDUPTHER

## 2020-01-09 ENCOUNTER — PREP FOR PROCEDURE (OUTPATIENT)
Dept: PAIN MANAGEMENT | Age: 65
End: 2020-01-09

## 2020-01-09 NOTE — H&P (VIEW-ONLY)
Jennifer Márquez is an 60 y.o. female. Chief Complaint: Low back pain        PROCEDURE: MRI LUMBAR SPINE WO CONTRAST       CLINICAL INFORMATION: Disc displacement, lumbar, Lumbar radiculopathy. Bilateral leg pain and weakness for 8 months.       COMPARISON: None available. Correlation is made to radiographs of the lumbar spine dated May 9, 2017.       TECHNIQUE: Sagittal and axial T1 and T2-weighted images were obtained through the lumbar spine.       FINDINGS:       The lumbar spine is imaged from T11 to the inferior sacrum. The conus medullaris terminates at the L1-L2 level. No abnormal signal or expansion is present within the conus. The visualized nerve roots are evenly distributed throughout the thecal sac.       There is preservation of the expected lumbar lordosis. Minimal retrolisthesis is present of L2 on L3. Type I Modic changes are also present at the endplates of L2 on L3. A hemangioma is noted within the inferior aspect of the L4 vertebral body. No acute    compression fracture deformity or suspicious marrow replacing lesion is identified.       Degenerative facet arthropathy is present at every level within the lumbar spine. With regards to the disc spaces, there is suggestion of a disc extrusion at the T11-T12 level which extends cranially and causes spinal canal narrowing. This is not further    evaluated on the axial sequences.       At L1-L2, there is disc space narrowing and disc desiccation. The spinal canal and neural foramina are patent.       At L2-L3, there is a disc osteophyte complex which flattens the ventral thecal sac and causes mild spinal canal narrowing. Mild neural foraminal narrowing is present bilaterally.       At L3-L4, there is a disc osteophyte complex. The spinal canal and neural foramina are patent.       At L4-L5, there is a disc osteophyte complex which flattens the ventral thecal sac without causing significant spinal canal narrowing.  The neural foramina are patent.     minimal disc bulge without significant spinal canal stenosis and moderate bilateral neural foraminal stenosis in association with facet hypertrophy and ligament flavum thickening. At T11-12 there is a right paracentral extrusion which extends cephalad to the mid T11 vertebral body and causes mild spinal canal stenosis and appears to contact the ventral aspect of spinal cord without abnormal signal in the cord. There is no    significant neural foraminal stenosis. There is no significant spinal canal or neuroforaminal stenosis at any other thoracic level.           Impression    Right paracentral extrusion at T11-12 which causes mild spinal canal stenosis and appears to contact the ventral aspect of the cord without abnormal signal in the cord.                 The patientis allergic to ibuprofen.     Past Medical History  Froy Ramirez  has a past medical history of Back pain, CAD (coronary artery disease), Chronic diastolic CHF (congestive heart failure) ([facility] [address] Colitis, Depression, Depression, Gastroesophageal reflux, GERD (gastroesophageal reflux disease), H/O endoscopy, Hyperlipidemia, Hypertension, Hypertension, Hypothyroidism, Incontinence of urine, Lordosis (acquired) (postural), Spondylosis of unspecified site without mention of myelopathy, Thoracic or lumbosacral neuritis or radiculitis, unspecified, Type II or unspecified type diabetes mellitus without mention of complication, not stated as uncontrolled, Unspecified sleep apnea, and Urinary incontinence.     Past Surgical History  The patient  has a past surgical history that includes  section (); Carpal tunnel release (Bilateral, S); Abdomen surgery (); Dilation and curettage of uterus; Hysterectomy (); cardiovascular stress test (2015); Colonoscopy (, ); Upper gastrointestinal endoscopy ();  Cardiac catheterization (); other surgical history (Bilateral, 2018); pr inj dx/ther agnt paravert facet joint, Disp: 5 pen, Rfl: 5    metoprolol succinate (TOPROL XL) 50 MG extended release tablet, Take 50 mg by mouth daily, Disp: , Rfl:     meclizine (ANTIVERT) 25 MG tablet, Take 50 mg by mouth 3 times daily as needed , Disp: , Rfl:     lidocaine (XYLOCAINE) 5 % ointment, Apply topically 3 times daily as needed for Pain Apply topically as needed. , Disp: , Rfl:     spironolactone (ALDACTONE) 25 MG tablet, Take 25 mg by mouth daily, Disp: , Rfl:     traZODone (DESYREL) 50 MG tablet, Take 50 mg by mouth nightly Indications: 1/2 tab , Disp: , Rfl:     Omega-3 Fatty Acids (FISH OIL) 1000 MG CAPS, Take 3,000 mg by mouth daily, Disp: , Rfl:     Ascorbic Acid (VITAMIN C) 500 MG tablet, Take 1,000 mg by mouth daily, Disp: , Rfl:     Cyanocobalamin (VITAMIN B 12 PO), Take 500 mg by mouth daily , Disp: , Rfl:     busPIRone (BUSPAR) 30 MG tablet, Take 30 mg by mouth 3 times daily , Disp: , Rfl:     lisinopril (PRINIVIL;ZESTRIL) 5 MG tablet, Take 7.5 mg by mouth daily , Disp: , Rfl:     pantoprazole (PROTONIX) 40 MG tablet, Take 1 tablet by mouth daily, Disp: 30 tablet, Rfl: 12    glucose blood VI test strips (FREESTYLE LITE) strip, Check BS 4 times a day, Disp: 400 each, Rfl: 1    Blood Glucose Monitoring Suppl (FREESTYLE LITE) EMILIE, 1 Device by Does not apply route daily as needed, Disp: 1 Device, Rfl: 0    aspirin 81 MG tablet, Take 81 mg by mouth daily, Disp: , Rfl:     ammonium lactate (AMLACTIN) 12 % cream, Apply topically 2 times daily Apply topically as needed. , Disp: , Rfl:     Multiple Vitamins-Minerals (MULTI FOR HER PO), Take by mouth daily, Disp: , Rfl:     atorvastatin (LIPITOR) 40 MG tablet, Take 40 mg by mouth daily, Disp: , Rfl:     ferrous sulfate 325 (65 FE) MG tablet, Take 325 mg by mouth daily (with breakfast), Disp: , Rfl:     fenofibrate (TRICOR) 145 MG tablet, Take 145 mg by mouth daily, Disp: , Rfl:     Levothyroxine Sodium (SYNTHROID PO), Take 150 mcg by mouth 6 days a week Does NOT take on motion, tenderness and pain. Back:         Legs:    Skin:     General: Skin is warm and dry. Findings: No erythema. Neurological:      Mental Status: She is alert and oriented to person, place, and time. Psychiatric:         Behavior: Behavior normal.         Thought Content: Thought content normal.         Judgment: Judgment normal.            Assessment:      1. Lumbar radiculopathy    2. Disc displacement, lumbar    3. DDD (degenerative disc disease), lumbar           Review of Systems   All other systems reviewed and are negative.       Physical Exam    Plan:  TFESI L5 LEFT #1    RAIMUNDO Espitia - CNP  1/9/2020

## 2020-01-20 ENCOUNTER — ANESTHESIA (OUTPATIENT)
Dept: OPERATING ROOM | Age: 65
End: 2020-01-20
Payer: MEDICARE

## 2020-01-20 ENCOUNTER — ANESTHESIA EVENT (OUTPATIENT)
Dept: OPERATING ROOM | Age: 65
End: 2020-01-20
Payer: MEDICARE

## 2020-01-20 ENCOUNTER — APPOINTMENT (OUTPATIENT)
Dept: GENERAL RADIOLOGY | Age: 65
End: 2020-01-20
Attending: PAIN MEDICINE
Payer: MEDICARE

## 2020-01-20 ENCOUNTER — HOSPITAL ENCOUNTER (OUTPATIENT)
Age: 65
Setting detail: OUTPATIENT SURGERY
Discharge: HOME | End: 2020-01-20
Attending: PAIN MEDICINE | Admitting: PAIN MEDICINE
Payer: MEDICARE

## 2020-01-20 VITALS
HEART RATE: 71 BPM | HEIGHT: 60 IN | WEIGHT: 210.4 LBS | OXYGEN SATURATION: 94 % | DIASTOLIC BLOOD PRESSURE: 58 MMHG | SYSTOLIC BLOOD PRESSURE: 119 MMHG | RESPIRATION RATE: 16 BRPM | TEMPERATURE: 97.5 F | BODY MASS INDEX: 41.31 KG/M2

## 2020-01-20 VITALS
RESPIRATION RATE: 9 BRPM | OXYGEN SATURATION: 100 % | SYSTOLIC BLOOD PRESSURE: 120 MMHG | DIASTOLIC BLOOD PRESSURE: 58 MMHG

## 2020-01-20 LAB — GLUCOSE BLD-MCNC: 124 MG/DL (ref 70–108)

## 2020-01-20 PROCEDURE — 3209999900 FLUORO FOR SURGICAL PROCEDURES

## 2020-01-20 PROCEDURE — 64483 NJX AA&/STRD TFRM EPI L/S 1: CPT | Performed by: PAIN MEDICINE

## 2020-01-20 PROCEDURE — 6360000002 HC RX W HCPCS: Performed by: PAIN MEDICINE

## 2020-01-20 PROCEDURE — 7100000011 HC PHASE II RECOVERY - ADDTL 15 MIN: Performed by: PAIN MEDICINE

## 2020-01-20 PROCEDURE — 6360000004 HC RX CONTRAST MEDICATION: Performed by: PAIN MEDICINE

## 2020-01-20 PROCEDURE — 2709999900 HC NON-CHARGEABLE SUPPLY: Performed by: PAIN MEDICINE

## 2020-01-20 PROCEDURE — 2580000003 HC RX 258: Performed by: PAIN MEDICINE

## 2020-01-20 PROCEDURE — 3600000054 HC PAIN LEVEL 3 BASE: Performed by: PAIN MEDICINE

## 2020-01-20 PROCEDURE — 3700000000 HC ANESTHESIA ATTENDED CARE: Performed by: PAIN MEDICINE

## 2020-01-20 PROCEDURE — 82948 REAGENT STRIP/BLOOD GLUCOSE: CPT

## 2020-01-20 PROCEDURE — 6360000002 HC RX W HCPCS: Performed by: NURSE ANESTHETIST, CERTIFIED REGISTERED

## 2020-01-20 PROCEDURE — 7100000010 HC PHASE II RECOVERY - FIRST 15 MIN: Performed by: PAIN MEDICINE

## 2020-01-20 PROCEDURE — 2500000003 HC RX 250 WO HCPCS: Performed by: PAIN MEDICINE

## 2020-01-20 RX ORDER — DEXAMETHASONE SODIUM PHOSPHATE 4 MG/ML
INJECTION, SOLUTION INTRA-ARTICULAR; INTRALESIONAL; INTRAMUSCULAR; INTRAVENOUS; SOFT TISSUE PRN
Status: DISCONTINUED | OUTPATIENT
Start: 2020-01-20 | End: 2020-01-20 | Stop reason: ALTCHOICE

## 2020-01-20 RX ORDER — FENTANYL CITRATE 50 UG/ML
INJECTION, SOLUTION INTRAMUSCULAR; INTRAVENOUS PRN
Status: DISCONTINUED | OUTPATIENT
Start: 2020-01-20 | End: 2020-01-20 | Stop reason: SDUPTHER

## 2020-01-20 RX ORDER — SODIUM CHLORIDE 9 MG/ML
INJECTION, SOLUTION INTRAVENOUS CONTINUOUS
Status: DISCONTINUED | OUTPATIENT
Start: 2020-01-20 | End: 2020-01-20 | Stop reason: HOSPADM

## 2020-01-20 RX ORDER — LIDOCAINE HYDROCHLORIDE 10 MG/ML
INJECTION, SOLUTION INFILTRATION; PERINEURAL PRN
Status: DISCONTINUED | OUTPATIENT
Start: 2020-01-20 | End: 2020-01-20 | Stop reason: ALTCHOICE

## 2020-01-20 RX ORDER — ROPIVACAINE HYDROCHLORIDE 2 MG/ML
INJECTION, SOLUTION EPIDURAL; INFILTRATION; PERINEURAL PRN
Status: DISCONTINUED | OUTPATIENT
Start: 2020-01-20 | End: 2020-01-20 | Stop reason: ALTCHOICE

## 2020-01-20 RX ADMIN — FENTANYL CITRATE 100 MCG: 50 INJECTION, SOLUTION INTRAMUSCULAR; INTRAVENOUS at 12:05

## 2020-01-20 RX ADMIN — SODIUM CHLORIDE: 9 INJECTION, SOLUTION INTRAVENOUS at 11:35

## 2020-01-20 ASSESSMENT — PULMONARY FUNCTION TESTS
PIF_VALUE: 0

## 2020-01-20 ASSESSMENT — PAIN SCALES - GENERAL: PAINLEVEL_OUTOF10: 0

## 2020-01-20 ASSESSMENT — ENCOUNTER SYMPTOMS: SHORTNESS OF BREATH: 1

## 2020-01-20 ASSESSMENT — PAIN - FUNCTIONAL ASSESSMENT: PAIN_FUNCTIONAL_ASSESSMENT: 0-10

## 2020-01-20 ASSESSMENT — PAIN DESCRIPTION - DESCRIPTORS: DESCRIPTORS: STABBING;SHARP

## 2020-01-20 NOTE — INTERVAL H&P NOTE
H&P Update    Patient's History and Physical from January 9, 2020 was reviewed. Patient examined. There has been no change.     Electronically signed by Darnell Quiles MD on 1/20/20 at 11:36 AM

## 2020-01-20 NOTE — OP NOTE
Pre-Procedure Note    Patient Name: Victor M Dodd   YOB: 1955  Medical Record Number: [id number]  Date: 20       Indication: Lower back and LLE pain     Consent: On file. Vital Signs:   Vitals:    20 1118   BP: 134/60   Pulse: 63   Resp: 17   Temp: 97.2 °F (36.2 °C)   SpO2: 98%       Past Medical History:   has a past medical history of Back pain, CAD (coronary artery disease), Chronic diastolic CHF (congestive heart failure) ([facility] [address] Colitis, Depression, Depression, Gastroesophageal reflux, GERD (gastroesophageal reflux disease), H/O endoscopy, Hyperlipidemia, Hypertension, Hypertension, Hypothyroidism, Incontinence of urine, Lordosis (acquired) (postural), Spondylosis of unspecified site without mention of myelopathy, Thoracic or lumbosacral neuritis or radiculitis, unspecified, Type II or unspecified type diabetes mellitus without mention of complication, not stated as uncontrolled, Unspecified sleep apnea, and Urinary incontinence. Past Surgical History:   has a past surgical history that includes  section (); Carpal tunnel release (Bilateral, 'S); Abdomen surgery (); Dilation and curettage of uterus; Hysterectomy (); cardiovascular stress test (2015); Colonoscopy (, ); Upper gastrointestinal endoscopy (); Cardiac catheterization (); other surgical history (Bilateral, 2018); pr inj dx/ther agnt paravert facet joint, lumbar/sac, 2nd level (Bilateral, 3/19/2018); Nerve Surgery (Bilateral, 2018); pr inj dx/ther agnt paravert facet joint, lumbar/sac, 2nd level (Bilateral, 2018); pr inject rx other periph nerve (Bilateral, 2018); pr office/outpt visit,procedure only (Right, 10/2/2018); Injection Procedure For Sacroiliac Joint (Right, 2018); Lumbar spine surgery (Bilateral, 2019); lumbar nerve block (Bilateral, 2019); and Nerve Surgery (Bilateral, 10/4/2019).     Pre-Sedation Documentation and Exam:   Vital signs have been reviewed (see flow sheet for vitals). Sedation/ Anesthesia Plan:   MAC    Patient is an appropriate candidate for plan of sedation: yes    Preoperative Diagnosis:  L-radiculopathy, L-disc displacement    Post-Op Dx: as above    Procedure Performed:  Transforaminal lumbar epidural steroid injection at the levels of L5 on the left side under fluoroscopic guidance . Indication for the Procedure: The patient failed conservative management  for pain in the low back radiating to lower extremities. As the patient is not responding to conservative management and pain is interfering with activities of daily living, we decided to proceed with transforaminal lumbar epidural steroid injection as symptoms are mostly following the nerve root distribution. The procedure and the risks were discussed with the patient and informed consent was obtained. Procedure:  Left     A meaningful communication was kept up with the patient throughout   the procedure. The patient is placed in prone position. The skin over the back was prepped and draped in sterile manner. Then the skin and deep tissues just above the tip of the transverse process of L-5 vertebra on Left side were infiltrated with about 5 ml of 1% lidocaine. The #20-gauge, 3-1/2 inch Tuohy needle/#22-gauge, 5 inch spinal needle was inserted through the skin wheal  and under fluoroscopy guidance was directed such that the tip of the needle lies in the neuroforamina at about the 6 o'clock position under the pedicle of the L-5 vertebra. This was confirmed with AP and lateral views of the fluoroscopy. Then after negative aspiration a total of 1 ml of Omnipaque-180 was injected through the needle and spread of the contrast in the epidural space as well as along the nerve root was observed. Then after negative aspiration a total of 6 mg of Dexamethasone and 2 ml of 0.2% ropivacaine MPF was injected through the needle.   The needle is removed and a Band-Aid was placed over the needle  insertion site. EBL-0    The patient's vital signs remained stable and the patient tolerated the procedure well. The patient was discharged home in stable condition and will be followed in the pain clinic in the next few weeks for further planning.       Electronically signed by Debora Evangelista MD on 1/20/20 at 12:03 PM

## 2020-01-20 NOTE — ANESTHESIA PRE PROCEDURE
Department of Anesthesiology  Preprocedure Note       Name:  Victor M Dodd   Age:  60 y.o.  :  1955                                          MRN:  [id number]         Date:  2020      Surgeon: Julianne Lama):  Ursula Gan MD    Procedure: TFESI L5 LEFT #1 (Bilateral )    Medications prior to admission:   Prior to Admission medications    Medication Sig Start Date End Date Taking? Authorizing Provider   pregabalin (LYRICA) 75 MG capsule Take 1 capsule by mouth 3 times daily for 30 days. 19 Yes RAIMUNDO San CNP   DULoxetine (CYMBALTA) 30 MG extended release capsule Take 90 mg by mouth daily Take 3 tablets (30mg each) daily. Yes Historical Provider, MD   acetaminophen (TYLENOL) 325 MG tablet Take 2 tablets by mouth every 4 hours as needed for Pain 19  Yes Campbell Singh MD   NONFORMULARY Take 8,400 mcg by mouth daily Indications: cranberry- 2 gelcaps daily   Yes Historical Provider, MD   insulin lispro (HUMALOG KWIKPEN) 100 UNIT/ML pen Inject 17 units before breakfast, 20 units before lunch and 28 units before dinner plus sliding scale. Patient uses a max of 96 units per day. 3/18/19  Yes RAIMUNDO Amanda CNP   Liraglutide (VICTOZA) 18 MG/3ML SOPN SC injection Inject 1.2 mg into the skin daily 3/18/19  Yes RAIMUNDO Amanda CNP   insulin glargine (LANTUS SOLOSTAR) 100 UNIT/ML injection pen 35 in the am, 29 in the pm 3/18/19  Yes RAIMUNDO Amanda CNP   metoprolol succinate (TOPROL XL) 50 MG extended release tablet Take 50 mg by mouth daily   Yes Historical Provider, MD   meclizine (ANTIVERT) 25 MG tablet Take 50 mg by mouth 3 times daily as needed    Yes Historical Provider, MD   lidocaine (XYLOCAINE) 5 % ointment Apply topically 3 times daily as needed for Pain Apply topically as needed.    Yes Historical Provider, MD   spironolactone (ALDACTONE) 25 MG tablet Take 25 mg by mouth daily   Yes Historical Provider, MD   traZODone (DESYREL) 50 MG tablet Take 50 mg by mouth nightly Indications: 1/2 tab    Yes Historical Provider, MD   Cyanocobalamin (VITAMIN B 12 PO) Take 500 mg by mouth daily    Yes Historical Provider, MD   busPIRone (BUSPAR) 30 MG tablet Take 30 mg by mouth 3 times daily  10/14/16  Yes Historical Provider, MD   lisinopril (PRINIVIL;ZESTRIL) 5 MG tablet Take 7.5 mg by mouth daily    Yes Historical Provider, MD   pantoprazole (PROTONIX) 40 MG tablet Take 1 tablet by mouth daily 8/18/16  Yes Lequita Frankel, MD   glucose blood VI test strips (FREESTYLE LITE) strip Check BS 4 times a day 4/5/16  Yes Danielle Clifford MD   Blood Glucose Monitoring Suppl (FREESTYLE LITE) EMILIE 1 Device by Does not apply route daily as needed 4/5/16  Yes Danielle Clifford MD   ammonium lactate (AMLACTIN) 12 % cream Apply topically 2 times daily Apply topically as needed. Yes Historical Provider, MD   atorvastatin (LIPITOR) 40 MG tablet Take 40 mg by mouth daily   Yes Historical Provider, MD   ferrous sulfate 325 (65 FE) MG tablet Take 325 mg by mouth daily (with breakfast)   Yes Historical Provider, MD   fenofibrate (TRICOR) 145 MG tablet Take 145 mg by mouth daily   Yes Historical Provider, MD   Levothyroxine Sodium (SYNTHROID PO) Take 150 mcg by mouth 6 days a week  Does NOT take on Sundays   Yes Historical Provider, MD   DOXEPIN HCL PO Take 200 mg by mouth nightly.    Yes Historical Provider, MD   lamoTRIgine (LAMICTAL) 100 MG tablet   Take by mouth daily 200MG AM AND 100MG AT SUPPER   Yes Historical Provider, MD   calcium carbonate (OSCAL) 500 MG TABS tablet Take 650 mg by mouth daily Indications: 2 chews daily    Historical Provider, MD   Omega-3 Fatty Acids (FISH OIL) 1000 MG CAPS Take 3,000 mg by mouth daily    Historical Provider, MD   Ascorbic Acid (VITAMIN C) 500 MG tablet Take 1,000 mg by mouth daily    Historical Provider, MD   aspirin 81 MG tablet Take 81 mg by mouth daily    Historical Provider, MD   Multiple Vitamins-Minerals (MULTI FOR HER PO) Take by mouth daily    Historical Provider, MD   metFORMIN (GLUCOPHAGE) 1000 MG tablet Take 1,000 mg by mouth 2 times daily (with meals) Indications: 1 tab if hr is >60 or SBP is >100     Historical Provider, MD       Current medications:    No current facility-administered medications for this encounter. Allergies: Allergies   Allergen Reactions    Ibuprofen Other (See Comments)     Due to kidney failure       Problem List:    Patient Active Problem List   Diagnosis Code    Dyspnea R06.00    Mor[facility] obesity [facility] E66.01    Excessive sleepiness G47.10    Type 2 diabetes mellitus with insulin therapy ([facility] E11.9, Z79.4    Chest pain R07.9    Hyperlipidemia E78.5    Back pain M54.9    Depression F32.9    Gastroesophageal reflux K21.9    Hypertension I10    Incontinence of urine R32    CKD (chronic kidney disease) stage 3, GFR 30-59 ml/min ([facility]) N18.3    Diarrhea R19.7    Collagenous colitis K52.831    SERENA on CPAP G47.33, Z99.89    Pharyngoesophageal dysphagia R13.14    Status post dilatation of esophageal stricture Z98.890, Z87.19    SIRS (systemic inflammatory response syndrome) ([facility]) R65.10    Hypomagnesemia E83.42    Chronic diastolic CHF (congestive heart failure) ([facility]) I50.32    Essential hypertension I10    Hypothyroidism E03.9    Obesity (BMI 30-39. 9) E66.9    Lumbar spondylosis M47.816    Sacroiliac inflammation ([facility]) M46.1    Disc displacement, lumbar M51.26    Leukocytosis D72.829    Elevated lactic acid level R79.89    KEENA (acute kidney injury) ([facility] N17.9    Coronary artery disease involving native coronary artery of native heart without angina pectoris I25.10    Lumbar disc displacement without myelopathy M51.26       Past Medical History:        Diagnosis Date    Back pain     with radiation    CAD (coronary artery disease)     Chronic diastolic CHF (congestive heart failure) ([facility]) 7/21/2017    Colitis     Depression     Depression     Gastroesophageal reflux     GERD (gastroesophageal reflux disease)     H/O endoscopy     stretch the eso    Hyperlipidemia     Hypertension     Hypertension     Hypothyroidism     Incontinence of urine     Lordosis (acquired) (postural)     Spondylosis of unspecified site without mention of myelopathy     Thoracic or lumbosacral neuritis or radiculitis, unspecified     Type II or unspecified type diabetes mellitus without mention of complication, not stated as uncontrolled     diagnosed 2006    Unspecified sleep apnea      cpap mask    Urinary incontinence        Past Surgical History:        Procedure Laterality Date    ABDOMEN SURGERY  1980'S    LAPAROSCOPY    CARDIAC CATHETERIZATION  2014    CARDIOVASCULAR STRESS TEST  6/2015    was not positive but proceeding cath   Keena Rajas Bilateral 1970'S   [place]  2010, 2016    DILATION AND CURETTAGE OF UTERUS      HC INJECTION PROCEDURE FOR SACROILIAC JOINT Right 12/7/2018    SACROILIAC JOINT INJECTION Right SI MBB #2, performed by Minna Dailey MD at [address] [facility]  2002    Total    [facility] Bilateral 8/22/2019    LESI L3 #1 performed by Minna Dailey MD at [address] [facility] Bilateral 6/27/2019    Lumbar RFA Bilateral L4-5,L5-S1 performed by Minna Dailey MD at [facility] Bilateral 05/08/2018    LUMBAR FACET MBB L3-4, L4-5, L5-S1    NERVE SURGERY Bilateral 10/4/2019    LESI L3 #2 performed by Minna Dailey MD at [facility] Bilateral 03/19/2018    Lumbar Facet MBB L3-4, L4-5, L5-S1    KS INJ DX/THER AGNT PARAVERT FACET JOINT, LUMBAR/SAC, 2ND LEVEL Bilateral 3/19/2018    LUMBAR FACET MBB   @L3-4, L4-5, L5-S1  BILATERAL performed by Minna Dailye MD at [address] [place] [address] DX/THER AGNT PARAVERT FACET JOINT, LUMBAR/SAC, 2ND LEVEL Bilateral 5/8/2018 LUMBAR FACET MBB   @L3-4, L4-5, L5-S1  BILATERAL performed by Meagan Gutiérrez MD at [address] Bilateral 7/2/2018    LUMBAR RFA  Bilateral @L3-4,4-5,5-S1, performed by Meagan Gutiérrez MD at [address] OFF[address]/OUTPT [address] Right 10/2/2018    SACROILIAC JOINT INJECTION SI MBB RIGHT SIDE, performed by Meagan Gutiérrez MD at [address] [facility] ENDOSCOPY  2016    [facility] Dr. Leny Duarte       Social History:    Social History     Tobacco Use    Smoking status: Passive Smoke Exposure - Never Smoker    Smokeless tobacco: Never Used   Substance Use Topics    Alcohol use: No     Alcohol/week: 0.0 standard drinks                                Counseling given: Not Answered      Vital Signs (Current):   Vitals:    01/20/20 1118   BP: 134/60   Pulse: 63   Resp: 17   Temp: 97.2 °F (36.2 °C)   TempSrc: Temporal   SpO2: 98%   Weight: 210 lb 6.4 oz (95.4 kg)   Height: 5' (1.524 m)                                              BP Readings from Last 3 Encounters:   01/20/20 134/60   12/12/19 126/72   12/02/19 132/68       NPO Status: Time of last liquid consumption: 2200                        Time of last solid consumption: 2200                        Date of last liquid consumption: 01/19/20                        Date of last solid food consumption: 01/19/20    BMI:   Wt Readings from Last 3 Encounters:   01/20/20 210 lb 6.4 oz (95.4 kg)   12/12/19 205 lb 0.4 oz (93 kg)   12/02/19 205 lb (93 kg)     Body mass index is 41.09 kg/m².     CBC:   Lab Results   Component Value Date    WBC 11.8 11/16/2019    RBC 3.85 11/16/2019    HGB 11.5 11/16/2019    HCT 36.0 11/16/2019    MCV 93.5 11/16/2019    RDW 15.2 06/20/2018     11/16/2019       CMP:   Lab Results   Component Value Date     11/16/2019    K 4.6 11/16/2019    K 4.3 09/25/2019     11/16/2019    CO2 22 11/16/2019    BUN 34 11/16/2019 CREATININE 1.9 11/16/2019    GFRAA 54 02/21/2019    LABGLOM 27 11/16/2019    GLUCOSE 76 11/16/2019    GLUCOSE 74 01/24/2018    PROT 7.0 11/16/2019    CALCIUM 9.4 11/16/2019    BILITOT 0.2 11/16/2019    ALKPHOS 75 11/16/2019    AST 20 11/16/2019    ALT 17 11/16/2019       POC Tests: No results for input(s): POCGLU, POCNA, POCK, POCCL, POCBUN, POCHEMO, POCHCT in the last 72 hours. Coags: No results found for: PROTIME, INR, APTT    HCG (If Applicable): No results found for: PREGTESTUR, PREGSERUM, HCG, HCGQUANT     ABGs: No results found for: PHART, PO2ART, JVF7LYD, AXC4RGH, BEART, B0GRUVWS     Type & Screen (If Applicable):  Lab Results   Component Value Date    [address] POS 07/24/2015       Anesthesia Evaluation    Airway: Mallampati: II       Mouth opening: > = 3 FB Dental:          Pulmonary:   (+) shortness of breath:  sleep apnea:                             Cardiovascular:    (+) hypertension:, CAD:, CHF:,                   Neuro/Psych:   (+) neuromuscular disease:,             GI/Hepatic/Renal:   (+) GERD:,           Endo/Other:    (+) Diabetes, . Abdominal:   (+) obese,         Vascular:                                        Anesthesia Plan      MAC     ASA 4             Anesthetic plan and risks discussed with patient. Plan discussed with CRNA.                   Miguel Longoria MD   1/20/2020

## 2020-01-20 NOTE — PROGRESS NOTES
1215: Patient to phase 2 recovery room via cart. Patient is awake and alert. Report received from surgical RN, Marcello Oakes. Patient's vitals obtained, see charting. 1217: Patient is denying pain and nausea at this time. IV is infusing into her left hand. Patient is denying any new numbness/tingling in lower extremities. Patient's pedal push/pull is weak bilaterally. 1219: Offered drink and snack provided to the patient. Bed is in the lowest position, call light is within reach and patient's  brought to the room. 1235: Patient is resting in bed and eating her snacks. No needs or complaints at this time. 1247: Patient is continuing to rest in bed. Another pepsi given. Patient's pedal push/pull is slightly stronger.  remains at the bedside. 1313: Patient's pedal push/pull is now strong and equal bilaterally. 1314: Discharge instructions given and explained to the patient and the patient's , both verbalized understanding. 1316: IV removed at this time, no complications and dressing applied. 1324: Patient wheeled to the car and discharged home in stable condition with her wife.

## 2020-01-20 NOTE — ANESTHESIA POSTPROCEDURE EVALUATION
Department of Anesthesiology  Postprocedure Note    Patient: Lulú Ojeda  MRN: [id number]  YOB: 1955  Date of evaluation: 1/20/2020  Time:  12:40 PM     Procedure Summary     Date:  01/20/20 Room / Location:  [address] [place] [address] 03 / [address] [facility]    Anesthesia Start:  5087 Anesthesia Stop:  6739    Procedure:  TFESI L5 LEFT #1 (Bilateral ) Diagnosis:  (disc displacement, lumbar)    Surgeon:  Tiffani Denson MD Responsible Provider:  David Anderson MD    Anesthesia Type:  MAC ASA Status:  4          Anesthesia Type: MAC    Will Phase I:      Will Phase II: Will Score: 10    Last vitals: Reviewed and per EMR flowsheets. Anesthesia Post Evaluation   [facility] [place]  POST-ANESTHESIA NOTE       Name:  Lulú Ojeda                                         Age:  60 y.o.   MRN:  [id number]      Last Vitals:  BP (!) 119/58   Pulse 71   Temp 97.5 °F (36.4 °C) (Temporal)   Resp 16   Ht 5' (1.524 m)   Wt 210 lb 6.4 oz (95.4 kg)   SpO2 94%   BMI 41.09 kg/m²   Patient Vitals for the past 4 hrs:   BP Temp Temp src Pulse Resp SpO2 Height Weight   01/20/20 1215 (!) 119/58 97.5 °F (36.4 °C) Temporal 71 16 94 % -- --   01/20/20 1118 134/60 97.2 °F (36.2 °C) Temporal 63 17 98 % 5' (1.524 m) 210 lb 6.4 oz (95.4 kg)       Level of Consciousness:  Awake    Respiratory:  Stable    Oxygen Saturation:  Stable    Cardiovascular:  Stable    Hydration:  Adequate    PONV:  Stable    Post-op Pain:  Adequate analgesia    Post-op Assessment:  No apparent anesthetic complications    Additional Follow-Up / Treatment / Comment:  None    David Anderson MD  January 20, 2020   12:40 PM

## 2020-02-05 NOTE — TELEPHONE ENCOUNTER
OARRS reviewed. UDS: + for  Gabapentin consistent. Narcan offered: not offered  Last seen: 12/12/2019.  Follow-up:   Future Appointments   Date Time Provider Monica Jacksoni   2/6/2020  9:30 AM RAIMUNDO Carrillo - CNP SRPX Pain [facility] [place] [address] [place] [address]   3/24/2020  2:00 PM Priscilla Jones MD [facility] K[facility]NEY [facility] [place] [address] [address] [place] [address]   5/21/2020  1:15 PM NATO FreemanC Pulm Med [address]   12/7/2020 11:00 AM Parminder De Souza MD [address] [place] [facility] [place] [address] [place] [address]

## 2020-02-06 RX ORDER — PREGABALIN 75 MG/1
75 CAPSULE ORAL 3 TIMES DAILY
Qty: 90 CAPSULE | Refills: 0 | Status: SHIPPED | OUTPATIENT
Start: 2020-02-06 | End: 2020-08-03

## 2020-02-21 ENCOUNTER — OFFICE VISIT (OUTPATIENT)
Dept: PHYSICAL MEDICINE AND REHAB | Age: 65
End: 2020-02-21
Payer: MEDICARE

## 2020-02-21 VITALS
SYSTOLIC BLOOD PRESSURE: 132 MMHG | BODY MASS INDEX: 41.29 KG/M2 | WEIGHT: 210.32 LBS | DIASTOLIC BLOOD PRESSURE: 78 MMHG | HEIGHT: 60 IN

## 2020-02-21 PROCEDURE — 99213 OFFICE O/P EST LOW 20 MIN: CPT | Performed by: NURSE PRACTITIONER

## 2020-02-21 ASSESSMENT — ENCOUNTER SYMPTOMS
COLOR CHANGE: 0
BACK PAIN: 1
CONSTIPATION: 0
ABDOMINAL PAIN: 0

## 2020-02-21 NOTE — PROGRESS NOTES
CAD (coronary artery disease), Chronic diastolic CHF (congestive heart failure) ([facility] Colitis, Depression, Depression, Gastroesophageal reflux, GERD (gastroesophageal reflux disease), H/O endoscopy, Hyperlipidemia, Hypertension, Hypertension, Hypothyroidism, Incontinence of urine, Lordosis (acquired) (postural), Spondylosis of unspecified site without mention of myelopathy, Thoracic or lumbosacral neuritis or radiculitis, unspecified, Type II or unspecified type diabetes mellitus without mention of complication, not stated as uncontrolled, Unspecified sleep apnea, and Urinary incontinence. Past Surgical History  The patient  has a past surgical history that includes  section (); Carpal tunnel release (Bilateral, ); Abdomen surgery (); Dilation and curettage of uterus; Hysterectomy (); cardiovascular stress test (2015); Colonoscopy (, ); Upper gastrointestinal endoscopy (); Cardiac catheterization (); other surgical history (Bilateral, 2018); pr inj dx/ther agnt paravert facet joint, lumbar/sac, 2nd level (Bilateral, 3/19/2018); Nerve Surgery (Bilateral, 2018); pr inj dx/ther agnt paravert facet joint, lumbar/sac, 2nd level (Bilateral, 2018); pr inject rx other periph nerve (Bilateral, 2018); pr office/outpt visit,procedure only (Right, 10/2/2018); Injection Procedure For Sacroiliac Joint (Right, 2018); Lumbar spine surgery (Bilateral, 2019); lumbar nerve block (Bilateral, 2019); Nerve Surgery (Bilateral, 10/4/2019); and Pain management procedure (Bilateral, 2020). Family History  This patient's family history includes Diabetes in her father, mother, and sister; Heart Disease in her father and mother; Stroke in her mother and sister; Thyroid Disease in her brother. Social History  Moy Travis  reports that she is a non-smoker but has been exposed to tobacco smoke.  She has never used smokeless tobacco. She reports that she tablet, Take 30 mg by mouth 3 times daily , Disp: , Rfl:     lisinopril (PRINIVIL;ZESTRIL) 5 MG tablet, Take 7.5 mg by mouth daily , Disp: , Rfl:     pantoprazole (PROTONIX) 40 MG tablet, Take 1 tablet by mouth daily, Disp: 30 tablet, Rfl: 12    glucose blood VI test strips (FREESTYLE LITE) strip, Check BS 4 times a day, Disp: 400 each, Rfl: 1    Blood Glucose Monitoring Suppl (FREESTYLE LITE) EMILIE, 1 Device by Does not apply route daily as needed, Disp: 1 Device, Rfl: 0    aspirin 81 MG tablet, Take 81 mg by mouth daily, Disp: , Rfl:     ammonium lactate (AMLACTIN) 12 % cream, Apply topically 2 times daily Apply topically as needed. , Disp: , Rfl:     Multiple Vitamins-Minerals (MULTI FOR HER PO), Take by mouth daily, Disp: , Rfl:     atorvastatin (LIPITOR) 40 MG tablet, Take 40 mg by mouth daily, Disp: , Rfl:     ferrous sulfate 325 (65 FE) MG tablet, Take 325 mg by mouth daily (with breakfast), Disp: , Rfl:     fenofibrate (TRICOR) 145 MG tablet, Take 145 mg by mouth daily, Disp: , Rfl:     Levothyroxine Sodium (SYNTHROID PO), Take 150 mcg by mouth 6 days a week Does NOT take on Sundays, Disp: , Rfl:     DOXEPIN HCL PO, Take 200 mg by mouth nightly., Disp: , Rfl:     lamoTRIgine (LAMICTAL) 100 MG tablet,  Take by mouth daily 200MG AM AND 100MG AT SUPPER, Disp: , Rfl:     metFORMIN (GLUCOPHAGE) 1000 MG tablet, Take 1,000 mg by mouth 2 times daily (with meals) Indications: 1 tab if hr is >60 or SBP is >100 , Disp: , Rfl:     Subjective:      Review of Systems   Constitutional: Positive for activity change and fatigue. Negative for chills and fever. Gastrointestinal: Negative for abdominal pain and constipation. Musculoskeletal: Positive for arthralgias, back pain, gait problem and myalgias. Skin: Negative for color change, pallor and rash. Neurological: Negative for numbness. Psychiatric/Behavioral: Positive for sleep disturbance.        Objective:     Vitals:    02/21/20 0827   BP: 132/78 pain, or bleeding. Medications: increased Lyrica to 100mg TID at next fill. Meds. Prescribed:   No orders of the defined types were placed in this encounter. Return for TFESI L5 LEFT #2, follow up after procedure.          Electronically signed by RAIMUNDO Kingsley CNP on2/21/2020 at 8:49 AM

## 2020-03-01 ENCOUNTER — HOSPITAL ENCOUNTER (OUTPATIENT)
Age: 65
Setting detail: OBSERVATION
Discharge: HOME | End: 2020-03-02
Attending: EMERGENCY MEDICINE | Admitting: INTERNAL MEDICINE
Payer: MEDICARE

## 2020-03-01 ENCOUNTER — APPOINTMENT (OUTPATIENT)
Dept: GENERAL RADIOLOGY | Age: 65
End: 2020-03-01
Payer: MEDICARE

## 2020-03-01 LAB
ALBUMIN SERPL-MCNC: 4.1 G/DL (ref 3.5–5.1)
ALP BLD-CCNC: 77 U/L (ref 38–126)
ALT SERPL-CCNC: 18 U/L (ref 11–66)
ANION GAP SERPL CALCULATED.3IONS-SCNC: 12 MEQ/L (ref 8–16)
AST SERPL-CCNC: 19 U/L (ref 5–40)
BASOPHILS # BLD: 0.5 %
BASOPHILS ABSOLUTE: 0.1 THOU/MM3 (ref 0–0.1)
BILIRUB SERPL-MCNC: 0.2 MG/DL (ref 0.3–1.2)
BUN BLDV-MCNC: 26 MG/DL (ref 7–22)
CALCIUM SERPL-MCNC: 9.8 MG/DL (ref 8.5–10.5)
CHLORIDE BLD-SCNC: 102 MEQ/L (ref 98–111)
CO2: 27 MEQ/L (ref 23–33)
CREAT SERPL-MCNC: 1.6 MG/DL (ref 0.4–1.2)
EOSINOPHIL # BLD: 1.2 %
EOSINOPHILS ABSOLUTE: 0.1 THOU/MM3 (ref 0–0.4)
ERYTHROCYTE [DISTWIDTH] IN BLOOD BY AUTOMATED COUNT: 14 % (ref 11.5–14.5)
ERYTHROCYTE [DISTWIDTH] IN BLOOD BY AUTOMATED COUNT: 48.2 FL (ref 35–45)
GFR SERPL CREATININE-BSD FRML MDRD: 33 ML/MIN/1.73M2
GLUCOSE BLD-MCNC: 138 MG/DL (ref 70–108)
GLUCOSE BLD-MCNC: 85 MG/DL (ref 70–108)
GLUCOSE BLD-MCNC: 90 MG/DL (ref 70–108)
HCT VFR BLD CALC: 36.1 % (ref 37–47)
HEMOGLOBIN: 11.4 GM/DL (ref 12–16)
IMMATURE GRANS (ABS): 0.07 THOU/MM3 (ref 0–0.07)
IMMATURE GRANULOCYTES: 0.6 %
LIPASE: 45.3 U/L (ref 5.6–51.3)
LYMPHOCYTES # BLD: 26 %
LYMPHOCYTES ABSOLUTE: 2.9 THOU/MM3 (ref 1–4.8)
MCH RBC QN AUTO: 29.6 PG (ref 26–33)
MCHC RBC AUTO-ENTMCNC: 31.6 GM/DL (ref 32.2–35.5)
MCV RBC AUTO: 93.8 FL (ref 81–99)
MONOCYTES # BLD: 9.1 %
MONOCYTES ABSOLUTE: 1 THOU/MM3 (ref 0.4–1.3)
NUCLEATED RED BLOOD CELLS: 0 /100 WBC
OSMOLALITY CALCULATION: 285.3 MOSMOL/KG (ref 275–300)
PLATELET # BLD: 276 THOU/MM3 (ref 130–400)
PMV BLD AUTO: 11.5 FL (ref 9.4–12.4)
POTASSIUM REFLEX MAGNESIUM: 4.6 MEQ/L (ref 3.5–5.2)
POTASSIUM REFLEX MAGNESIUM: 4.6 MEQ/L (ref 3.5–5.2)
PRO-BNP: 194.2 PG/ML (ref 0–900)
RBC # BLD: 3.85 MILL/MM3 (ref 4.2–5.4)
SEG NEUTROPHILS: 62.6 %
SEGMENTED NEUTROPHILS ABSOLUTE COUNT: 6.9 THOU/MM3 (ref 1.8–7.7)
SODIUM BLD-SCNC: 141 MEQ/L (ref 135–145)
TOTAL PROTEIN: 6.8 G/DL (ref 6.1–8)
TROPONIN T: < 0.01 NG/ML
TROPONIN T: < 0.01 NG/ML
WBC # BLD: 11 THOU/MM3 (ref 4.8–10.8)

## 2020-03-01 PROCEDURE — 71045 X-RAY EXAM CHEST 1 VIEW: CPT

## 2020-03-01 PROCEDURE — 99284 EMERGENCY DEPT VISIT MOD MDM: CPT

## 2020-03-01 PROCEDURE — 93005 ELECTROCARDIOGRAM TRACING: CPT | Performed by: EMERGENCY MEDICINE

## 2020-03-01 PROCEDURE — 83036 HEMOGLOBIN GLYCOSYLATED A1C: CPT

## 2020-03-01 PROCEDURE — G0378 HOSPITAL OBSERVATION PER HR: HCPCS

## 2020-03-01 PROCEDURE — 2580000003 HC RX 258: Performed by: INTERNAL MEDICINE

## 2020-03-01 PROCEDURE — 82948 REAGENT STRIP/BLOOD GLUCOSE: CPT

## 2020-03-01 PROCEDURE — 85025 COMPLETE CBC W/AUTO DIFF WBC: CPT

## 2020-03-01 PROCEDURE — 83880 ASSAY OF NATRIURETIC PEPTIDE: CPT

## 2020-03-01 PROCEDURE — 94761 N-INVAS EAR/PLS OXIMETRY MLT: CPT

## 2020-03-01 PROCEDURE — 83690 ASSAY OF LIPASE: CPT

## 2020-03-01 PROCEDURE — 99219 PR INITIAL OBSERVATION CARE/DAY 50 MINUTES: CPT | Performed by: INTERNAL MEDICINE

## 2020-03-01 PROCEDURE — 84484 ASSAY OF TROPONIN QUANT: CPT

## 2020-03-01 PROCEDURE — 80053 COMPREHEN METABOLIC PANEL: CPT

## 2020-03-01 PROCEDURE — 36415 COLL VENOUS BLD VENIPUNCTURE: CPT

## 2020-03-01 RX ORDER — PROMETHAZINE HYDROCHLORIDE 25 MG/1
12.5 TABLET ORAL EVERY 6 HOURS PRN
Status: DISCONTINUED | OUTPATIENT
Start: 2020-03-01 | End: 2020-03-02 | Stop reason: HOSPADM

## 2020-03-01 RX ORDER — FENOFIBRATE 160 MG/1
160 TABLET ORAL NIGHTLY
Status: DISCONTINUED | OUTPATIENT
Start: 2020-03-01 | End: 2020-03-02 | Stop reason: HOSPADM

## 2020-03-01 RX ORDER — ONDANSETRON 2 MG/ML
4 INJECTION INTRAMUSCULAR; INTRAVENOUS EVERY 6 HOURS PRN
Status: DISCONTINUED | OUTPATIENT
Start: 2020-03-01 | End: 2020-03-02 | Stop reason: HOSPADM

## 2020-03-01 RX ORDER — SODIUM CHLORIDE 9 MG/ML
INJECTION, SOLUTION INTRAVENOUS CONTINUOUS
Status: DISCONTINUED | OUTPATIENT
Start: 2020-03-01 | End: 2020-03-02 | Stop reason: HOSPADM

## 2020-03-01 RX ORDER — ACETAMINOPHEN 325 MG/1
650 TABLET ORAL EVERY 4 HOURS PRN
Status: DISCONTINUED | OUTPATIENT
Start: 2020-03-01 | End: 2020-03-02 | Stop reason: HOSPADM

## 2020-03-01 RX ORDER — ASPIRIN 81 MG/1
81 TABLET, CHEWABLE ORAL DAILY
Status: DISCONTINUED | OUTPATIENT
Start: 2020-03-02 | End: 2020-03-01 | Stop reason: SDUPTHER

## 2020-03-01 RX ORDER — ASPIRIN 81 MG/1
81 TABLET ORAL NIGHTLY
Status: DISCONTINUED | OUTPATIENT
Start: 2020-03-01 | End: 2020-03-02 | Stop reason: HOSPADM

## 2020-03-01 RX ORDER — SODIUM CHLORIDE 0.9 % (FLUSH) 0.9 %
10 SYRINGE (ML) INJECTION EVERY 12 HOURS SCHEDULED
Status: DISCONTINUED | OUTPATIENT
Start: 2020-03-01 | End: 2020-03-02 | Stop reason: HOSPADM

## 2020-03-01 RX ORDER — LEVOTHYROXINE SODIUM 0.15 MG/1
150 TABLET ORAL
Status: DISCONTINUED | OUTPATIENT
Start: 2020-03-02 | End: 2020-03-02 | Stop reason: HOSPADM

## 2020-03-01 RX ORDER — ATORVASTATIN CALCIUM 40 MG/1
40 TABLET, FILM COATED ORAL DAILY
Status: DISCONTINUED | OUTPATIENT
Start: 2020-03-02 | End: 2020-03-01

## 2020-03-01 RX ORDER — ACETAMINOPHEN 650 MG/1
650 SUPPOSITORY RECTAL EVERY 6 HOURS PRN
Status: DISCONTINUED | OUTPATIENT
Start: 2020-03-01 | End: 2020-03-02 | Stop reason: HOSPADM

## 2020-03-01 RX ORDER — ACETAMINOPHEN 325 MG/1
650 TABLET ORAL EVERY 6 HOURS PRN
Status: DISCONTINUED | OUTPATIENT
Start: 2020-03-01 | End: 2020-03-01 | Stop reason: ALTCHOICE

## 2020-03-01 RX ORDER — METOPROLOL SUCCINATE 50 MG/1
50 TABLET, EXTENDED RELEASE ORAL DAILY
Status: DISCONTINUED | OUTPATIENT
Start: 2020-03-02 | End: 2020-03-02 | Stop reason: HOSPADM

## 2020-03-01 RX ORDER — NICOTINE POLACRILEX 4 MG
15 LOZENGE BUCCAL PRN
Status: DISCONTINUED | OUTPATIENT
Start: 2020-03-01 | End: 2020-03-02 | Stop reason: HOSPADM

## 2020-03-01 RX ORDER — FENOFIBRATE 54 MG/1
145 TABLET ORAL DAILY
Status: DISCONTINUED | OUTPATIENT
Start: 2020-03-02 | End: 2020-03-01

## 2020-03-01 RX ORDER — SODIUM CHLORIDE 0.9 % (FLUSH) 0.9 %
10 SYRINGE (ML) INJECTION PRN
Status: DISCONTINUED | OUTPATIENT
Start: 2020-03-01 | End: 2020-03-02 | Stop reason: HOSPADM

## 2020-03-01 RX ORDER — DEXTROSE MONOHYDRATE 50 MG/ML
100 INJECTION, SOLUTION INTRAVENOUS PRN
Status: DISCONTINUED | OUTPATIENT
Start: 2020-03-01 | End: 2020-03-02 | Stop reason: HOSPADM

## 2020-03-01 RX ORDER — PANTOPRAZOLE SODIUM 40 MG/1
40 TABLET, DELAYED RELEASE ORAL DAILY
Status: DISCONTINUED | OUTPATIENT
Start: 2020-03-02 | End: 2020-03-02 | Stop reason: HOSPADM

## 2020-03-01 RX ORDER — DEXTROSE MONOHYDRATE 25 G/50ML
12.5 INJECTION, SOLUTION INTRAVENOUS PRN
Status: DISCONTINUED | OUTPATIENT
Start: 2020-03-01 | End: 2020-03-02 | Stop reason: HOSPADM

## 2020-03-01 RX ORDER — ATORVASTATIN CALCIUM 40 MG/1
40 TABLET, FILM COATED ORAL NIGHTLY
Status: DISCONTINUED | OUTPATIENT
Start: 2020-03-01 | End: 2020-03-02 | Stop reason: HOSPADM

## 2020-03-01 RX ORDER — TRAZODONE HYDROCHLORIDE 50 MG/1
25 TABLET ORAL NIGHTLY
Status: DISCONTINUED | OUTPATIENT
Start: 2020-03-01 | End: 2020-03-02 | Stop reason: HOSPADM

## 2020-03-01 RX ORDER — BUSPIRONE HYDROCHLORIDE 10 MG/1
30 TABLET ORAL 3 TIMES DAILY
Status: DISCONTINUED | OUTPATIENT
Start: 2020-03-01 | End: 2020-03-02 | Stop reason: HOSPADM

## 2020-03-01 RX ORDER — POLYETHYLENE GLYCOL 3350 17 G/17G
17 POWDER, FOR SOLUTION ORAL DAILY
Status: DISCONTINUED | OUTPATIENT
Start: 2020-03-02 | End: 2020-03-02 | Stop reason: HOSPADM

## 2020-03-01 RX ADMIN — SODIUM CHLORIDE: 9 INJECTION, SOLUTION INTRAVENOUS at 22:41

## 2020-03-01 ASSESSMENT — PAIN DESCRIPTION - PROGRESSION: CLINICAL_PROGRESSION: NOT CHANGED

## 2020-03-01 ASSESSMENT — PAIN DESCRIPTION - LOCATION
LOCATION: CHEST
LOCATION: CHEST

## 2020-03-01 ASSESSMENT — PAIN SCALES - GENERAL
PAINLEVEL_OUTOF10: 6
PAINLEVEL_OUTOF10: 6

## 2020-03-01 ASSESSMENT — PAIN DESCRIPTION - ONSET: ONSET: ON-GOING

## 2020-03-01 ASSESSMENT — PAIN DESCRIPTION - PAIN TYPE
TYPE: ACUTE PAIN
TYPE: ACUTE PAIN

## 2020-03-01 ASSESSMENT — PAIN DESCRIPTION - ORIENTATION: ORIENTATION: LEFT;MID

## 2020-03-01 ASSESSMENT — PAIN DESCRIPTION - FREQUENCY: FREQUENCY: CONTINUOUS

## 2020-03-01 ASSESSMENT — PAIN - FUNCTIONAL ASSESSMENT: PAIN_FUNCTIONAL_ASSESSMENT: ACTIVITIES ARE NOT PREVENTED

## 2020-03-01 ASSESSMENT — PAIN DESCRIPTION - DESCRIPTORS: DESCRIPTORS: ACHING;CRAMPING

## 2020-03-01 NOTE — ED PROVIDER NOTES
[facility] COMPLAINT       Chief Complaint   Patient presents with    Chest Pain       Nurses Notes reviewed and I agree except as noted in the HPI. HISTORY OF PRESENT ILLNESS    Isabella Ybarra is a 60 y.o. female present to the ED with SOB and chest pain. Patient states that SOB started 2 weeks ago and chest pains started this afternoon. Patient reports feeling weak and having trouble lying flat. He does have extensive previous cardiac history with history of cardiomyopathy. REVIEW OF SYSTEMS       No fever, no coughing, no lower extremity swelling, no vomiting or abdominal pain. Remainder of review of systems is otherwise reviewed as negative. PAST MEDICAL HISTORY    has a past medical history of Back pain, CAD (coronary artery disease), Chronic diastolic CHF (congestive heart failure) ([facility] Colitis, Depression, Depression, Gastroesophageal reflux, GERD (gastroesophageal reflux disease), H/O endoscopy, Hyperlipidemia, Hypertension, Hypertension, Hypothyroidism, Incontinence of urine, Lordosis (acquired) (postural), Spondylosis of unspecified site without mention of myelopathy, Thoracic or lumbosacral neuritis or radiculitis, unspecified, Type II or unspecified type diabetes mellitus without mention of complication, not stated as uncontrolled, Unspecified sleep apnea, and Urinary incontinence. SURGICAL HISTORY      has a past surgical history that includes  section (); Carpal tunnel release (Bilateral, 'S); Abdomen surgery (); Dilation and curettage of uterus; Hysterectomy (); cardiovascular stress test (2015); Colonoscopy (, ); Upper gastrointestinal endoscopy (); Cardiac catheterization (); other surgical history (Bilateral, 2018); pr inj dx/ther agnt paravert facet joint, lumbar/sac, 2nd level (Bilateral, 3/19/2018);  Nerve Surgery (Bilateral, 2018); pr inj dx/ther ointment Apply topically 3 times daily as needed for Pain Apply topically as needed. spironolactone (ALDACTONE) 25 MG tablet Take 25 mg by mouth daily      traZODone (DESYREL) 50 MG tablet Take 50 mg by mouth nightly Indications: 1/2 tab       Ascorbic Acid (VITAMIN C) 500 MG tablet Take 1,000 mg by mouth daily      Cyanocobalamin (VITAMIN B 12 PO) Take 500 mg by mouth daily       busPIRone (BUSPAR) 30 MG tablet Take 30 mg by mouth 3 times daily       lisinopril (PRINIVIL;ZESTRIL) 5 MG tablet Take 7.5 mg by mouth daily       pantoprazole (PROTONIX) 40 MG tablet Take 1 tablet by mouth daily  Qty: 30 tablet, Refills: 12    Associated Diagnoses: Gastroesophageal reflux disease, esophagitis presence not specified      glucose blood VI test strips (FREESTYLE LITE) strip Check BS 4 times a day  Qty: 400 each, Refills: 1    Associated Diagnoses: Type II diabetes mellitus, uncontrolled (HCC)      aspirin 81 MG tablet Take 81 mg by mouth daily      ammonium lactate (AMLACTIN) 12 % cream Apply topically 2 times daily Apply topically as needed. Multiple Vitamins-Minerals (MULTI FOR HER PO) Take by mouth daily      atorvastatin (LIPITOR) 40 MG tablet Take 40 mg by mouth daily      ferrous sulfate 325 (65 FE) MG tablet Take 325 mg by mouth nightly       fenofibrate (TRICOR) 145 MG tablet Take 145 mg by mouth daily      Levothyroxine Sodium (SYNTHROID PO) Take 150 mcg by mouth 6 days a week  Does NOT take on Sundays      DOXEPIN HCL PO Take 200 mg by mouth nightly.       lamoTRIgine (LAMICTAL) 100 MG tablet   Take by mouth daily 200MG AM AND 100MG AT SUPPER      metFORMIN (GLUCOPHAGE) 1000 MG tablet Take 1,000 mg by mouth 2 times daily (with meals) Indications: 1 tab if hr is >60 or SBP is >100       Omega-3 Fatty Acids (FISH OIL) 1000 MG CAPS Take 3,000 mg by mouth daily      Blood Glucose Monitoring Suppl (FREESTYLE LITE) EMILIE 1 Device by Does not apply route daily as needed  Qty: 1 Device, Refills: 0    Associated

## 2020-03-02 ENCOUNTER — APPOINTMENT (OUTPATIENT)
Dept: NON INVASIVE DIAGNOSTICS | Age: 65
End: 2020-03-02
Payer: MEDICARE

## 2020-03-02 VITALS
SYSTOLIC BLOOD PRESSURE: 140 MMHG | HEART RATE: 77 BPM | RESPIRATION RATE: 18 BRPM | TEMPERATURE: 98.2 F | HEIGHT: 60 IN | WEIGHT: 212.9 LBS | DIASTOLIC BLOOD PRESSURE: 63 MMHG | OXYGEN SATURATION: 97 % | BODY MASS INDEX: 41.8 KG/M2

## 2020-03-02 LAB
AVERAGE GLUCOSE: 141 MG/DL (ref 70–126)
CHOLESTEROL, TOTAL: 98 MG/DL (ref 100–199)
EKG ATRIAL RATE: 66 BPM
EKG ATRIAL RATE: 87 BPM
EKG P AXIS: 48 DEGREES
EKG P AXIS: 49 DEGREES
EKG P-R INTERVAL: 180 MS
EKG P-R INTERVAL: 186 MS
EKG Q-T INTERVAL: 350 MS
EKG Q-T INTERVAL: 396 MS
EKG QRS DURATION: 76 MS
EKG QRS DURATION: 84 MS
EKG QTC CALCULATION (BAZETT): 415 MS
EKG QTC CALCULATION (BAZETT): 421 MS
EKG R AXIS: 35 DEGREES
EKG R AXIS: 45 DEGREES
EKG T AXIS: 61 DEGREES
EKG T AXIS: 63 DEGREES
EKG VENTRICULAR RATE: 66 BPM
EKG VENTRICULAR RATE: 87 BPM
ERYTHROCYTE [DISTWIDTH] IN BLOOD BY AUTOMATED COUNT: 14 % (ref 11.5–14.5)
ERYTHROCYTE [DISTWIDTH] IN BLOOD BY AUTOMATED COUNT: 48 FL (ref 35–45)
GLUCOSE BLD-MCNC: 101 MG/DL (ref 70–108)
GLUCOSE BLD-MCNC: 119 MG/DL (ref 70–108)
GLUCOSE BLD-MCNC: 90 MG/DL (ref 70–108)
HBA1C MFR BLD: 6.7 % (ref 4.4–6.4)
HCT VFR BLD CALC: 35.2 % (ref 37–47)
HDLC SERPL-MCNC: 21 MG/DL
HEMOGLOBIN: 11.1 GM/DL (ref 12–16)
LDL CHOLESTEROL CALCULATED: 53 MG/DL
LV EF: 55 %
LVEF MODALITY: NORMAL
MCH RBC QN AUTO: 29.8 PG (ref 26–33)
MCHC RBC AUTO-ENTMCNC: 31.5 GM/DL (ref 32.2–35.5)
MCV RBC AUTO: 94.4 FL (ref 81–99)
PLATELET # BLD: 260 THOU/MM3 (ref 130–400)
PMV BLD AUTO: 11.7 FL (ref 9.4–12.4)
RBC # BLD: 3.73 MILL/MM3 (ref 4.2–5.4)
TRIGL SERPL-MCNC: 118 MG/DL (ref 0–199)
TROPONIN T: < 0.01 NG/ML
WBC # BLD: 10.2 THOU/MM3 (ref 4.8–10.8)

## 2020-03-02 PROCEDURE — 36415 COLL VENOUS BLD VENIPUNCTURE: CPT

## 2020-03-02 PROCEDURE — 96372 THER/PROPH/DIAG INJ SC/IM: CPT

## 2020-03-02 PROCEDURE — 6360000002 HC RX W HCPCS

## 2020-03-02 PROCEDURE — 80061 LIPID PANEL: CPT

## 2020-03-02 PROCEDURE — 99219 PR INITIAL OBSERVATION CARE/DAY 50 MINUTES: CPT | Performed by: NUCLEAR MEDICINE

## 2020-03-02 PROCEDURE — A9500 TC99M SESTAMIBI: HCPCS | Performed by: FAMILY MEDICINE

## 2020-03-02 PROCEDURE — 6370000000 HC RX 637 (ALT 250 FOR IP): Performed by: INTERNAL MEDICINE

## 2020-03-02 PROCEDURE — 99217 PR OBSERVATION CARE DISCHARGE MANAGEMENT: CPT | Performed by: FAMILY MEDICINE

## 2020-03-02 PROCEDURE — 85027 COMPLETE CBC AUTOMATED: CPT

## 2020-03-02 PROCEDURE — 93306 TTE W/DOPPLER COMPLETE: CPT

## 2020-03-02 PROCEDURE — 84484 ASSAY OF TROPONIN QUANT: CPT

## 2020-03-02 PROCEDURE — 3430000000 HC RX DIAGNOSTIC RADIOPHARMACEUTICAL: Performed by: FAMILY MEDICINE

## 2020-03-02 PROCEDURE — 78452 HT MUSCLE IMAGE SPECT MULT: CPT

## 2020-03-02 PROCEDURE — 93010 ELECTROCARDIOGRAM REPORT: CPT | Performed by: NUCLEAR MEDICINE

## 2020-03-02 PROCEDURE — 94760 N-INVAS EAR/PLS OXIMETRY 1: CPT

## 2020-03-02 PROCEDURE — G0378 HOSPITAL OBSERVATION PER HR: HCPCS

## 2020-03-02 PROCEDURE — 6360000002 HC RX W HCPCS: Performed by: INTERNAL MEDICINE

## 2020-03-02 PROCEDURE — 93017 CV STRESS TEST TRACING ONLY: CPT | Performed by: NUCLEAR MEDICINE

## 2020-03-02 PROCEDURE — 93005 ELECTROCARDIOGRAM TRACING: CPT | Performed by: INTERNAL MEDICINE

## 2020-03-02 PROCEDURE — 82948 REAGENT STRIP/BLOOD GLUCOSE: CPT

## 2020-03-02 PROCEDURE — 93010 ELECTROCARDIOGRAM REPORT: CPT | Performed by: INTERNAL MEDICINE

## 2020-03-02 RX ORDER — SODIUM CHLORIDE 0.9 % (FLUSH) 0.9 %
10 SYRINGE (ML) INJECTION PRN
Status: CANCELLED | OUTPATIENT
Start: 2020-03-02 | End: 2020-03-02

## 2020-03-02 RX ORDER — AMINOPHYLLINE DIHYDRATE 25 MG/ML
50 INJECTION, SOLUTION INTRAVENOUS PRN
Status: CANCELLED | OUTPATIENT
Start: 2020-03-02 | End: 2020-03-02

## 2020-03-02 RX ORDER — NITROGLYCERIN 0.4 MG/1
0.4 TABLET SUBLINGUAL EVERY 5 MIN PRN
Status: CANCELLED | OUTPATIENT
Start: 2020-03-02 | End: 2020-03-02

## 2020-03-02 RX ORDER — ATROPINE SULFATE 0.1 MG/ML
0.5 INJECTION INTRAVENOUS EVERY 5 MIN PRN
Status: CANCELLED | OUTPATIENT
Start: 2020-03-02 | End: 2020-03-02

## 2020-03-02 RX ORDER — METOPROLOL TARTRATE 5 MG/5ML
5 INJECTION INTRAVENOUS EVERY 5 MIN PRN
Status: CANCELLED | OUTPATIENT
Start: 2020-03-02 | End: 2020-03-02

## 2020-03-02 RX ORDER — ALBUTEROL SULFATE 90 UG/1
2 AEROSOL, METERED RESPIRATORY (INHALATION) PRN
Status: CANCELLED | OUTPATIENT
Start: 2020-03-02 | End: 2020-03-02

## 2020-03-02 RX ORDER — SODIUM CHLORIDE 9 MG/ML
500 INJECTION, SOLUTION INTRAVENOUS CONTINUOUS PRN
Status: CANCELLED | OUTPATIENT
Start: 2020-03-02 | End: 2020-03-02

## 2020-03-02 RX ADMIN — Medication 35 MILLICURIE: at 14:25

## 2020-03-02 RX ADMIN — METOPROLOL SUCCINATE 50 MG: 50 TABLET, FILM COATED, EXTENDED RELEASE ORAL at 10:31

## 2020-03-02 RX ADMIN — BUSPIRONE HYDROCHLORIDE 30 MG: 10 TABLET ORAL at 10:31

## 2020-03-02 RX ADMIN — PANTOPRAZOLE SODIUM 40 MG: 40 TABLET, DELAYED RELEASE ORAL at 10:32

## 2020-03-02 RX ADMIN — BUSPIRONE HYDROCHLORIDE 30 MG: 10 TABLET ORAL at 15:45

## 2020-03-02 RX ADMIN — FENOFIBRATE 160 MG: 160 TABLET ORAL at 00:26

## 2020-03-02 RX ADMIN — TRAZODONE HYDROCHLORIDE 25 MG: 50 TABLET ORAL at 00:26

## 2020-03-02 RX ADMIN — DULOXETINE HYDROCHLORIDE 90 MG: 60 CAPSULE, DELAYED RELEASE ORAL at 10:32

## 2020-03-02 RX ADMIN — BUSPIRONE HYDROCHLORIDE 30 MG: 10 TABLET ORAL at 00:26

## 2020-03-02 RX ADMIN — LISINOPRIL 7.5 MG: 5 TABLET ORAL at 10:31

## 2020-03-02 RX ADMIN — Medication 9.1 MILLICURIE: at 13:07

## 2020-03-02 RX ADMIN — LAMOTRIGINE 150 MG: 25 TABLET ORAL at 00:26

## 2020-03-02 RX ADMIN — LEVOTHYROXINE SODIUM 150 MCG: 150 TABLET ORAL at 10:30

## 2020-03-02 RX ADMIN — ASPIRIN 81 MG: 81 TABLET ORAL at 00:26

## 2020-03-02 RX ADMIN — ENOXAPARIN SODIUM 40 MG: 40 INJECTION SUBCUTANEOUS at 10:32

## 2020-03-02 RX ADMIN — LAMOTRIGINE 150 MG: 25 TABLET ORAL at 10:32

## 2020-03-02 RX ADMIN — ATORVASTATIN CALCIUM 40 MG: 40 TABLET, FILM COATED ORAL at 00:26

## 2020-03-02 ASSESSMENT — PAIN DESCRIPTION - ONSET: ONSET: ON-GOING

## 2020-03-02 ASSESSMENT — PAIN SCALES - GENERAL
PAINLEVEL_OUTOF10: 3
PAINLEVEL_OUTOF10: 3

## 2020-03-02 ASSESSMENT — PAIN DESCRIPTION - PROGRESSION: CLINICAL_PROGRESSION: GRADUALLY IMPROVING

## 2020-03-02 ASSESSMENT — PAIN DESCRIPTION - DESCRIPTORS: DESCRIPTORS: PRESSURE

## 2020-03-02 ASSESSMENT — PAIN - FUNCTIONAL ASSESSMENT: PAIN_FUNCTIONAL_ASSESSMENT: ACTIVITIES ARE NOT PREVENTED

## 2020-03-02 ASSESSMENT — PAIN DESCRIPTION - FREQUENCY: FREQUENCY: CONTINUOUS

## 2020-03-02 ASSESSMENT — PAIN DESCRIPTION - PAIN TYPE
TYPE: ACUTE PAIN
TYPE: ACUTE PAIN

## 2020-03-02 ASSESSMENT — PAIN DESCRIPTION - ORIENTATION: ORIENTATION: LEFT

## 2020-03-02 ASSESSMENT — PAIN DESCRIPTION - LOCATION: LOCATION: CHEST

## 2020-03-02 NOTE — ED NOTES
ED to inpatient nurses report    Chief Complaint   Patient presents with    Chest Pain      Present to ED from home  LOC: alert and orientated to name, place, date  Vital signs   Vitals:    03/01/20 1827 03/01/20 2027 03/01/20 2111 03/01/20 2127   BP: 102/77 (!) 124/55 (!) 99/47 (!) 102/37   Pulse: 76 74 71 69   Resp: 19 20 18 23   Temp:       TempSrc:       SpO2: 96% 97% 96% 95%   Weight:          Oxygen Baseline room air    Current needs required none Bipap/Cpap No  LDAs:   Peripheral IV 03/01/20 Left Antecubital (Active)   Site Assessment Clean;Dry; Intact 3/1/2020  5:39 PM   Line Status Normal saline locked 3/1/2020  5:39 PM   Dressing Status Clean;Dry; Intact 3/1/2020  5:39 PM     Mobility: Requires assistance * 1  Pending ED orders: none  Present condition: stable    Electronically signed by Basim Stratton RN on 3/1/2020 at 9:33 PM       Basim Stratton RN  03/01/20 2133

## 2020-03-02 NOTE — CARE COORDINATION
DISCHARGE PLANNING EVALUATION: OP/OBSERVATION        3/2/20, 9:31 AM    Lottie Green       Admitted from: ER 3/1/2020/ 1716   Location: [address] [place] [address] Reason for admit: Chest pain [R07.9]   Admit order signed?: yes    Procedure: Stress Test and Echo ordered. Pertinent Info/Orders/Treatment Plan:  Presented to ER with SOB and CP. Creat 1.6 on admit. Troponin and BNP normal. Cardiology consulted. On Telemetry. To have Echo and Stress Test.     PCP: Rudi Brittle, APRN - NP    Patient Goals/Plan/Treatment Preferences: Met with pt and spouse today. Pt is restful in bed. Awaiting Stress Test. She is from home with spouse and other family members. She has no home services but she does use a walker. She has a PCP, transportation and she denies issues getting her basic needs met and obtaining her meds. No current discharge services requested. Transportation/Food Security/Housekeeping Addressed:  No issues identified.

## 2020-03-02 NOTE — CONSULTS
[address] [place] [address]                                  CONSULTATION    PATIENT NAME: Lea Gilmore                  :        1955  MED REC NO:   [id number]                           ROOM:       [id number]  ACCOUNT NO:   [de-identified]                           ADMIT DATE: 2020  PROVIDER:     Khoa Gao M.D.    Alberto [place]:  2020    CARDIOLOGY CONSULTATION    REASON FOR THE CONSULTATION:  Chest pain in a patient with multiple risk  factors for coronary artery disease. REQUESTING PROVIDER:  Hospitalist Service. HISTORY OF PRESENT ILLNESS:  This is a very pleasant 60-year-old patient  with history of diastolic dysfunction, diabetes mellitus for several  years, hypertension, hyperlipidemia, obesity, sleep apnea, and  significant family history of coronary artery disease. The patient has  had chronic intermittent symptoms of chest pain and has followed with  Dr. Migdalia Couch, has had a previous stress testing; however, denies any  cardiac catheterization in the past.  She comes in with symptoms of  chest pain, both resting and exertional, probably more so with exertion. No specific triggers, no specific radiation, and some associated  shortness of breath. Location of the pain is in the middle of the chest  with some sharp element to it. Initial assessment in the ER with EKG  and cardiac enzymes showed no acute findings. She was admitted for  observation. We were consulted to assist in the management of the  patient. REVIEW OF SYSTEMS:  No fever, no chills, no weight loss. No hematuria  or dysuria. No abdominal pain, nausea, vomiting, or diarrhea. No  obvious suicidal ideation. Questionable underlying depression. No skin  rashes. No obvious dizziness, lightheadedness, or loss of  consciousness. No recent trauma. No bleeding problem. PAST MEDICAL HISTORY:  1.  Diabetes. 2.  Hypertension.   3.

## 2020-03-02 NOTE — ED NOTES
Patient updated on plan of care. Call light in reach. Voices no concerns at this time.  at bedside. Will continue to monitor.       Nohemy Aguilera RN  03/01/20 1833

## 2020-03-02 NOTE — H&P
cpap mask    Urinary incontinence      SHX:        Procedure Laterality Date    ABDOMEN SURGERY      LAPAROSCOPY    CARDIAC CATHETERIZATION      CARDIOVASCULAR STRESS TEST  2015    was not positive but proceeding cath    CARPAL TUNNEL RELEASE Bilateral 'S     SECTION  1975    COLONOSCOPY  , 2016    DILATION AND CURETTAGE OF UTERUS      HC INJECTION PROCEDURE FOR SACROILIAC JOINT Right 2018    SACROILIAC JOINT INJECTION Right SI MBB #2, performed by Giancarlo Hickman MD at [facility]  2002    Total    LUMBAR NERVE BLOCK Bilateral 2019    LESI L3 #1 performed by Giancarlo Hickman MD at [address] Bilateral 2019    Lumbar RFA Bilateral L4-5,L5-S1 performed by Giancarlo Hickman MD at [place] [address] Bilateral 2018    LUMBAR FACET MBB L3-4, L4-5, L5-S1    NERVE SURGERY Bilateral 10/4/2019    LESI L3 #2 performed by Giancarlo Hickman MD at [facility] Bilateral 2018    Lumbar Facet MBB L3-4, L4-5, L5-S1    PAIN MANAGEMENT PROCEDURE Bilateral 2020    TFESI L5 LEFT #1 performed by Giancarlo Hickman MD at [address] [place] [address] DX/THER AGNT PARAVERT FACET JOINT, LUMBAR/SAC, 2ND LEVEL Bilateral 3/19/2018    LUMBAR FACET MBB   @L3-4, L4-5, L5-S1  BILATERAL performed by Giancarlo Hickman MD at [address] [place] [address] DX/THER AGNT PARAVERT FACET JOINT, LUMBAR/SAC, 2ND LEVEL Bilateral 2018    LUMBAR FACET MBB   @L3-4, L4-5, L5-S1  BILATERAL performed by Giancarlo Hickman MD at [address] Bilateral 2018    LUMBAR RFA  Bilateral @L3-4,4-5,5-S1, performed by Giancarlo Hickman MD at [address] OFFICE/OUTPT [address] [facility] Right 10/2/2018    SACROILIAC JOINT INJECTION SI MBB RIGHT SIDE, performed by Mateusz Angel reactive to light. Extra ocular muscles intact. Conjunctivae/corneas clear. Neck: Supple, with full range of motion. no jugular venous distention. Trachea midline. no carotid bruits  Respiratory:  Normal respiratory effort. Clear to auscultation, bilaterally without Rales/Wheezes/Rhonchi. Breath sounds equal bilaterally  Cardiovascular:  Regular rate and rhythm with normal S1/S2 without murmurs, rubs or gallops. PMI non displaced  Abdomen: Soft, non-tender, non-distended with normal bowel sounds. No guarding, rebound. Musculoskeletal:  No clubbing, cyanosis or edema bilaterally. Full range of motion without deformity. Skin: Skin color, texture, turgor normal.  No rashes or lesions, or suspicious lesions. Neurologic:  Neurovascularly intact without any focal sensory/motor deficits. Cranial nerves: II-XII intact, grossly non-focal.  Psychiatric:  Alert and oriented, thought content appropriate, normal insight  Capillary Refill: Brisk,< 2 seconds   Peripheral Pulses: +2 palpable, equal bilaterally upper and lower extremities  Lymphatics: no lymphadenopathy    Data: (All radiographs, tracings, PFTs, and imaging are personally viewed and interpreted unless otherwise noted).  wBC 11.0   Platelets 083   Creatinine 1.6  Recent Labs     03/01/20  1730   WBC 11.0*   HGB 11.4*   HCT 36.1*         Recent Labs     03/01/20  1730      K 4.6  4.6      CO2 27   BUN 26*   CREATININE 1.6*   CALCIUM 9.8      Recent Labs     03/01/20  1730   AST 19   ALT 18   BILITOT 0.2*   ALKPHOS 77       No results for input(s): INR in the last 72 hours.  No results for input(s): Demetrio Marshall in the last 72 hours. Radiology reports-   Xr Chest Portable    Result Date: 3/1/2020  PROCEDURE: XR CHEST PORTABLE CLINICAL INFORMATION: short of breath. COMPARISON: Multiple previous most recent 11/16/2019 TECHNIQUE: AP upright view of the chest. FINDINGS: Mild underpenetration.  Moderate enlargement of the

## 2020-03-02 NOTE — PLAN OF CARE
Problem: Pain:  Goal: Pain level will decrease  Description  Pain level will decrease  Outcome: Ongoing  Note:   Patient voices chest pain 6/10. Patients pain goal is 0/10. PRN pain medications available per [facility] order. Non-pharmacological interventions include: rest, repositioning, heat/ice, and pillow support. Patient resting comfortably this shift. Cardiology to see in AM. Trop's negative. Will continue to monitor. Goal: Control of acute pain  Description  Control of acute pain  Outcome: Ongoing  Note:   See above. Goal: Control of chronic pain  Description  Control of chronic pain  Outcome: Ongoing  Note:   Denies chronic pain. Problem: Falls - Risk of:  Goal: Will remain free from falls  Description  Will remain free from falls  Outcome: Ongoing  Note:   Patient free from falls this shift. Patient continues on falling star program. Encouraged patient to wear non-skid slippers when ambulating. Patient is 1-2 assist. Side rails up x2. Continuing hourly checks, 5 p's monitored. Uses call light appropriately. Call light within reach. Goal: Absence of physical injury  Description  Absence of physical injury  Outcome: Ongoing  Note:   Patient free from physical injury this shift. Problem: Discharge Planning:  Goal: Discharged to appropriate level of care  Description  Discharged to appropriate level of care  Outcome: Ongoing  Note:   Patient plans to discharge home with family when medically appropriate. Denies additional home care needs at this time. Problem: Serum Glucose Level - Abnormal:  Goal: Ability to maintain appropriate glucose levels will improve  Description  Ability to maintain appropriate glucose levels will improve  Outcome: Ongoing  Note:   CHEMS ACHS. Sliding scale insulin per MAR ordered parameters. Problem: Tissue Perfusion - Cardiopulmonary, Altered:  Goal: Absence of angina  Description  Absence of angina  Outcome: Ongoing  Note:   Patient voices chest pain 6/10.  Patients pain goal is 0/10. PRN pain medications available per [facility] order. Non-pharmacological interventions include: rest, repositioning, heat/ice, and pillow support. Patient resting comfortably this shift. Cardiology to see in AM. Trop's negative. Will continue to monitor. Goal: Circulatory function within specified parameters  Description  Circulatory function within specified parameters  Outcome: Ongoing  Note:   VSS. Continues on IV fluids. Goal: Hemodynamic stability will improve  Description  Hemodynamic stability will improve  Outcome: Ongoing  Note:   VSS. Continues on IV fluids. See charting/assessment data. Care plan reviewed with patient. Patient verbalizes understanding of the plan of care and contribute to goal setting.

## 2020-03-03 NOTE — DISCHARGE SUMMARY
Hospitalist Discharge Summary        Patient: Torey Echeverria  YOB: 1955  MRN: [id number]   Acct: [de-identified]    Primary Care Physician: RAIMUNDO Rutherford NP    Admit date  3/1/2020    Discharge date:  3/2/2020 10:07 PM  Discharge Diagnoses: Active Hospital Problems    Diagnosis Date Noted    Morbid obesity ([facility] [E66.01] 09/05/2014     Priority: Medium    Leukocytosis [D72.829]     CKD (chronic kidney disease) stage 3, GFR 30-59 ml/min ([facility]) [N18.3] 01/18/2016    Hypertension [I10]     Chest pain [R07.9] 06/01/2015    Type 2 diabetes mellitus with insulin therapy ([facility] [E11.9, Z79.4] 05/26/2015       Code Status:  Prior     Chief Complaint on presentation :-  Chest pain      Initial admission HPI as below:-  82-year-old female presents the ED with shortness of breath and chest pain.  Patient states that shortness of breath started 2 weeks ago And chest pain started this afternoon.  Patient reports feeling weak and having trouble lying flat.  Patient denies cough, fever, leg swelling, belly pain, emesis, pulmonary or wheezing.  Patient denies having any cardiac stents.  Patient denies any recent travel patient has a history of coronary artery disease CHF and hypertension    Hospital problem list with assessment and plan updates:-  1. Atypical chest pain: EKG on admission shows low voltage QRS possible pulmonary disease, otherwise normal sinus rhythm, 3 sets of tropes were negative, BNP normal, last echocardiogram in the system as of 2017 showed EF 62% with grade 1 diastolic dysfunction. Repeat ECHO 3/2/20 showed EF 55% with normal left ventricular function. Nuclear stress test 3/2/20 was negative for ischemia. 2. Type II DM: controlled, last A1c 6.7 3/2/20. Continue home meds  3. HTN/controlled  4. CKD stage IIIb/stable  5. BMI > 40 morbid obes: life style modification  6.  Anemia of chronic disease 2/2 CKD: Hb stable 11.1        All appointments obtained for the patient at the day of discharge with other specialities including PCP in one week. All questions and concerns addressed. Patient verbalized understandings and was agreeable with discharge planning. Physical Exam:-  Vitals:   Patient Vitals for the past 24 hrs:   BP Temp Temp src Pulse Resp SpO2 Weight   03/02/20 1518 (!) 140/63 98.2 °F (36.8 °C) Oral 77 18 97 % --   03/02/20 1200 130/60 98.2 °F (36.8 °C) Oral 73 18 94 % --   03/02/20 1017 -- -- -- -- -- 94 % --   03/02/20 0830 126/81 98.5 °F (36.9 °C) Oral 75 18 96 % --   03/02/20 0341 (!) 138/51 98.3 °F (36.8 °C) Oral 72 20 92 % 212 lb 14.4 oz (96.6 kg)   03/01/20 2325 (!) 123/57 97.9 °F (36.6 °C) Oral 68 20 95 % --   03/01/20 2230 -- -- -- -- -- 97 % --     Weight:   Weight: 212 lb 14.4 oz (96.6 kg)   24 hour intake/output:     Intake/Output Summary (Last 24 hours) at 3/2/2020 2207  Last data filed at 3/2/2020 1618  Gross per 24 hour   Intake 369.5 ml   Output 2350 ml   Net -1980.5 ml       1. General appearance: No apparent distress, appears stated age and cooperative. 2. HEENT: Normal cephalic, atraumatic without obvious deformity. Pupils equal, round, and reactive to light. Extra ocular muscles intact. Conjunctivae/corneas clear. 3. Neck: Supple, with full range of motion. No jugular venous distention. Trachea midline. 4. Respiratory:  Normal respiratory effort. Clear to auscultation, bilaterally without Rales/Wheezes/Rhonchi. 5. Cardiovascular: Regular rate and rhythm with normal S1/S2 without murmurs, rubs or gallops. 6. Abdomen: Soft, increased abdominal girth, non-tender, non-distended with normal bowel sounds. 7. Musculoskeletal:  No clubbing, cyanosis or edema bilaterally. 8. Skin: Skin color, texture, turgor normal.  No rashes or lesions. 9. Neurologic:  Neurovascularly intact without any focal sensory/motor deficits.  Cranial nerves: II-XII intact, grossly non-focal.  10. Psychiatric: Alert and oriented, thought content appropriate, normal insight  11. Capillary Refill: Brisk,< 3 seconds   12. Peripheral Pulses: +2 palpable, equal bilaterally       Discharge Medications:-      Medication List      CONTINUE taking these medications    acetaminophen 325 MG tablet  Commonly known as:  TYLENOL  Take 2 tablets by mouth every 4 hours as needed for Pain     ammonium lactate 12 % cream  Commonly known as:  AMLACTIN     aspirin 81 MG tablet     atorvastatin 40 MG tablet  Commonly known as:  LIPITOR     blood glucose test strips strip  Commonly known as:  FREESTYLE LITE  Check BS 4 times a day     busPIRone 30 MG tablet  Commonly known as:  BUSPAR     calcium carbonate 500 MG Tabs tablet  Commonly known as:  OSCAL     DOXEPIN HCL PO     DULoxetine 30 MG extended release capsule  Commonly known as:  CYMBALTA     ferrous sulfate 325 (65 Fe) MG tablet     fish oil 1000 MG Caps     FreeStyle Lite Liya  1 Device by Does not apply route daily as needed     insulin glargine 100 UNIT/ML injection pen  Commonly known as:  Lantus SoloStar  35 in the am, 29 in the pm     insulin lispro 100 UNIT/ML pen  Commonly known as:  HumaLOG KwikPen  Inject 17 units before breakfast, 20 units before lunch and 28 units before dinner plus sliding scale. Patient uses a max of 96 units per day.      LaMICtal 100 MG tablet  Generic drug:  lamoTRIgine     lidocaine 5 % ointment  Commonly known as:  XYLOCAINE     Liraglutide 18 MG/3ML Sopn SC injection  Commonly known as:  VICTOZA  Inject 1.2 mg into the skin daily     lisinopril 5 MG tablet  Commonly known as:  PRINIVIL;ZESTRIL     meclizine 25 MG tablet  Commonly known as:  ANTIVERT     metFORMIN 1000 MG tablet  Commonly known as:  GLUCOPHAGE     metoprolol succinate 50 MG extended release tablet  Commonly known as:  TOPROL XL     MULTI FOR HER PO     NONFORMULARY     pantoprazole 40 MG tablet  Commonly known as:  Protonix  Take 1 tablet by mouth daily     pregabalin 75 MG capsule  Commonly known as:  LYRICA  Take 1 capsule by mouth 3 ng/ml   Lipase    Collection Time: 03/01/20  5:30 PM   Result Value Ref Range    Lipase 45.3 5.6 - 51.3 U/L   Comprehensive Metabolic Panel w/ Reflex to MG    Collection Time: 03/01/20  5:30 PM   Result Value Ref Range    Glucose 85 70 - 108 mg/dL    CREATININE 1.6 (H) 0.4 - 1.2 mg/dL    BUN 26 (H) 7 - 22 mg/dL    Sodium 141 135 - 145 meq/L    Potassium reflex Magnesium 4.6 3.5 - 5.2 meq/L    Chloride 102 98 - 111 meq/L    CO2 27 23 - 33 meq/L    Calcium 9.8 8.5 - 10.5 mg/dL    AST 19 5 - 40 U/L    Alkaline Phosphatase 77 38 - 126 U/L    Total Protein 6.8 6.1 - 8.0 g/dL    Alb 4.1 3.5 - 5.1 g/dL    Total Bilirubin 0.2 (L) 0.3 - 1.2 mg/dL    ALT 18 11 - 66 U/L   Anion Gap    Collection Time: 03/01/20  5:30 PM   Result Value Ref Range    Anion Gap 12.0 8.0 - 16.0 meq/L   Glomerular Filtration Rate, Estimated    Collection Time: 03/01/20  5:30 PM   Result Value Ref Range    Est, Glom Filt Rate 33 (A) ml/min/1.73m2   Osmolality    Collection Time: 03/01/20  5:30 PM   Result Value Ref Range    Osmolality Calc 285.3 275.0 - 300 mOsmol/kg   POCT glucose    Collection Time: 03/01/20 10:26 PM   Result Value Ref Range    POC Glucose 90 70 - 108 mg/dl   Troponin    Collection Time: 03/01/20 10:43 PM   Result Value Ref Range    Troponin T < 0.010 ng/ml   Hemoglobin A1c    Collection Time: 03/01/20 10:43 PM   Result Value Ref Range    Hemoglobin A1C 6.7 (H) 4.4 - 6.4 %    AVERAGE GLUCOSE 141 (H) 70 - 126 mg/dL   POCT glucose    Collection Time: 03/01/20 11:28 PM   Result Value Ref Range    POC Glucose 138 (H) 70 - 108 mg/dl   Troponin    Collection Time: 03/02/20  1:28 AM   Result Value Ref Range    Troponin T < 0.010 ng/ml   EKG 12 lead    Collection Time: 03/02/20  6:05 AM   Result Value Ref Range    Ventricular Rate 66 BPM    Atrial Rate 66 BPM    P-R Interval 186 ms    QRS Duration 84 ms    Q-T Interval 396 ms    QTc Calculation (Bazett) 415 ms    P Axis 49 degrees    R Axis 35 degrees    T Axis 61 degrees   CBC mouth daily Indications: 2 chews daily             Cyanocobalamin (VITAMIN B 12 PO)  Take 500 mg by mouth daily              DOXEPIN HCL PO  Take 200 mg by mouth nightly. DULoxetine (CYMBALTA) 30 MG extended release capsule  Take 120 mg by mouth daily              ferrous sulfate 325 (65 FE) MG tablet  Take 325 mg by mouth nightly              glucose blood VI test strips (FREESTYLE LITE) strip  Check BS 4 times a day             insulin glargine (LANTUS SOLOSTAR) 100 UNIT/ML injection pen  35 in the am, 29 in the pm             insulin lispro (HUMALOG KWIKPEN) 100 UNIT/ML pen  Inject 17 units before breakfast, 20 units before lunch and 28 units before dinner plus sliding scale. Patient uses a max of 96 units per day. lamoTRIgine (LAMICTAL) 100 MG tablet    Take by mouth daily 200MG AM AND 100MG AT SUPPER             Levothyroxine Sodium (SYNTHROID PO)  Take 150 mcg by mouth 6 days a week  Does NOT take on Sundays             lidocaine (XYLOCAINE) 5 % ointment  Apply topically 3 times daily as needed for Pain Apply topically as needed.              Liraglutide (VICTOZA) 18 MG/3ML SOPN SC injection  Inject 1.2 mg into the skin daily             lisinopril (PRINIVIL;ZESTRIL) 5 MG tablet  Take 7.5 mg by mouth daily              meclizine (ANTIVERT) 25 MG tablet  Take 50 mg by mouth 3 times daily as needed              metFORMIN (GLUCOPHAGE) 1000 MG tablet  Take 1,000 mg by mouth 2 times daily (with meals) Indications: 1 tab if hr is >60 or SBP is >100              metoprolol succinate (TOPROL XL) 50 MG extended release tablet  Take 50 mg by mouth daily             Multiple Vitamins-Minerals (MULTI FOR HER PO)  Take by mouth daily             NONFORMULARY  Take 8,400 mcg by mouth daily Indications: cranberry- 2 gelcaps daily             Omega-3 Fatty Acids (FISH OIL) 1000 MG CAPS  Take 3,000 mg by mouth daily             pantoprazole (PROTONIX) 40 MG tablet  Take 1 tablet by mouth daily pregabalin (LYRICA) 75 MG capsule  Take 1 capsule by mouth 3 times daily for 30 days.              spironolactone (ALDACTONE) 25 MG tablet  Take 25 mg by mouth daily             traZODone (DESYREL) 50 MG tablet  Take 50 mg by mouth nightly Indications: 1/2 tab                       Time Spent:- 25 minutes    Electronically signed by Torres Cervantes MD on 3/2/2020 at 10:07 PM  Discharging Hospitalist

## 2020-03-12 ENCOUNTER — TELEPHONE (OUTPATIENT)
Dept: CARDIOLOGY CLINIC | Age: 65
End: 2020-03-12

## 2020-03-12 NOTE — TELEPHONE ENCOUNTER
Pre op Risk Assessment    Procedure TFESI L5 Left #2  Physician [facility]  Date of surgery/procedure 3-19-20    Last OV 12-2-19  Last Stress 3-1-20  Last Echo 3-1-20  Last Cath 7-24-15  Last Stent None in Epic  Is patient on blood thinners ASA  Hold Meds/how many days 5 days    Pt had recent ED visit on 3-1-20 for chest pain-do you want pt seen in office prior to clearance?     Clearance can be faxed back to [phone]

## 2020-03-16 ENCOUNTER — TELEPHONE (OUTPATIENT)
Dept: PHYSICAL MEDICINE AND REHAB | Age: 65
End: 2020-03-16

## 2020-03-16 ENCOUNTER — HOSPITAL ENCOUNTER (OUTPATIENT)
Age: 65
Setting detail: SPECIMEN
Discharge: HOME | End: 2020-03-16
Payer: MEDICARE

## 2020-03-16 LAB
ALBUMIN SERPL-MCNC: 3.8 G/DL (ref 3.5–5.2)
ALBUMIN/GLOBULIN RATIO: 1.2 (ref 1–2.5)
ALP BLD-CCNC: 94 U/L (ref 35–104)
ALT SERPL-CCNC: 22 U/L (ref 5–33)
ANION GAP SERPL CALCULATED.3IONS-SCNC: 14 MMOL/L (ref 9–17)
AST SERPL-CCNC: 20 U/L
BILIRUB SERPL-MCNC: 0.21 MG/DL (ref 0.3–1.2)
BUN BLDV-MCNC: 17 MG/DL (ref 8–23)
BUN/CREAT BLD: ABNORMAL (ref 9–20)
CALCIUM SERPL-MCNC: 8.9 MG/DL (ref 8.6–10.4)
CHLORIDE BLD-SCNC: 104 MMOL/L (ref 98–107)
CO2: 27 MMOL/L (ref 20–31)
CREAT SERPL-MCNC: 1.24 MG/DL (ref 0.5–0.9)
CREATININE URINE: 173.4 MG/DL (ref 28–217)
GFR AFRICAN AMERICAN: 53 ML/MIN
GFR NON-AFRICAN AMERICAN: 44 ML/MIN
GFR SERPL CREATININE-BSD FRML MDRD: ABNORMAL ML/MIN/{1.73_M2}
GFR SERPL CREATININE-BSD FRML MDRD: ABNORMAL ML/MIN/{1.73_M2}
GLUCOSE BLD-MCNC: 128 MG/DL (ref 70–99)
HCT VFR BLD CALC: 41.7 % (ref 36.3–47.1)
HEMOGLOBIN: 12.8 G/DL (ref 11.9–15.1)
POTASSIUM SERPL-SCNC: 4.4 MMOL/L (ref 3.7–5.3)
SODIUM BLD-SCNC: 145 MMOL/L (ref 135–144)
TOTAL PROTEIN, URINE: 21 MG/DL
TOTAL PROTEIN: 7.1 G/DL (ref 6.4–8.3)
URINE TOTAL PROTEIN CREATININE RATIO: 0.12 (ref 0–0.2)

## 2020-03-20 RX ORDER — LIRAGLUTIDE 6 MG/ML
INJECTION SUBCUTANEOUS
Qty: 6 ML | Refills: 0 | OUTPATIENT
Start: 2020-03-20

## 2020-03-31 RX ORDER — LIRAGLUTIDE 6 MG/ML
INJECTION SUBCUTANEOUS
Qty: 6 ML | Refills: 0 | OUTPATIENT
Start: 2020-03-31

## 2020-03-31 RX ORDER — PREGABALIN 100 MG/1
100 CAPSULE ORAL 3 TIMES DAILY
Qty: 90 CAPSULE | Refills: 0 | Status: SHIPPED | OUTPATIENT
Start: 2020-03-31 | End: 2020-04-27 | Stop reason: SDUPTHER

## 2020-03-31 RX ORDER — INSULIN GLARGINE 100 [IU]/ML
INJECTION, SOLUTION SUBCUTANEOUS
Qty: 15 ML | Refills: 0 | OUTPATIENT
Start: 2020-03-31

## 2020-04-24 ENCOUNTER — TELEPHONE (OUTPATIENT)
Dept: PHYSICAL MEDICINE AND REHAB | Age: 65
End: 2020-04-24

## 2020-04-27 RX ORDER — LIRAGLUTIDE 6 MG/ML
INJECTION SUBCUTANEOUS
Qty: 6 ML | Refills: 0 | OUTPATIENT
Start: 2020-04-27

## 2020-04-27 RX ORDER — INSULIN GLARGINE 100 [IU]/ML
INJECTION, SOLUTION SUBCUTANEOUS
Qty: 15 ML | Refills: 0 | OUTPATIENT
Start: 2020-04-27

## 2020-04-28 RX ORDER — INSULIN GLARGINE 100 [IU]/ML
INJECTION, SOLUTION SUBCUTANEOUS
Qty: 5 PEN | Refills: 5 | Status: SHIPPED | OUTPATIENT
Start: 2020-04-28 | End: 2020-05-26

## 2020-04-28 RX ORDER — INSULIN GLARGINE 100 [IU]/ML
INJECTION, SOLUTION SUBCUTANEOUS
Qty: 15 ML | Refills: 0 | Status: SHIPPED | OUTPATIENT
Start: 2020-04-28 | End: 2021-05-25

## 2020-04-28 RX ORDER — PREGABALIN 100 MG/1
100 CAPSULE ORAL 3 TIMES DAILY
Qty: 90 CAPSULE | Refills: 0 | Status: SHIPPED | OUTPATIENT
Start: 2020-05-01 | End: 2020-06-03 | Stop reason: SDUPTHER

## 2020-04-28 NOTE — TELEPHONE ENCOUNTER
OARRS reviewed. UDS: + for  Gabapentin - consistent    Last seen: 2/21/2020.    Last UDS 10/2019    Follow-up: procedures need rescheduled

## 2020-05-26 ENCOUNTER — TELEPHONE (OUTPATIENT)
Dept: PHYSICAL MEDICINE AND REHAB | Age: 65
End: 2020-05-26

## 2020-05-26 ENCOUNTER — TELEPHONE (OUTPATIENT)
Dept: CARDIOLOGY CLINIC | Age: 65
End: 2020-05-26

## 2020-05-26 ENCOUNTER — OFFICE VISIT (OUTPATIENT)
Dept: NEPHROLOGY | Age: 65
End: 2020-05-26
Payer: MEDICARE

## 2020-05-26 VITALS
TEMPERATURE: 98.2 F | DIASTOLIC BLOOD PRESSURE: 61 MMHG | HEART RATE: 78 BPM | SYSTOLIC BLOOD PRESSURE: 117 MMHG | BODY MASS INDEX: 41.79 KG/M2 | WEIGHT: 214 LBS | OXYGEN SATURATION: 97 %

## 2020-05-26 PROCEDURE — 99213 OFFICE O/P EST LOW 20 MIN: CPT | Performed by: INTERNAL MEDICINE

## 2020-06-03 ENCOUNTER — HOSPITAL ENCOUNTER (OUTPATIENT)
Age: 65
Discharge: HOME | End: 2020-06-03
Payer: MEDICARE

## 2020-06-03 ENCOUNTER — HOSPITAL ENCOUNTER (EMERGENCY)
Age: 65
Discharge: HOME | End: 2020-06-03
Attending: EMERGENCY MEDICINE
Payer: MEDICARE

## 2020-06-03 ENCOUNTER — HOSPITAL ENCOUNTER (OUTPATIENT)
Dept: GENERAL RADIOLOGY | Age: 65
Discharge: HOME | End: 2020-06-03
Payer: MEDICARE

## 2020-06-03 VITALS
DIASTOLIC BLOOD PRESSURE: 58 MMHG | RESPIRATION RATE: 23 BRPM | SYSTOLIC BLOOD PRESSURE: 143 MMHG | TEMPERATURE: 98.7 F | HEART RATE: 98 BPM | BODY MASS INDEX: 41.82 KG/M2 | WEIGHT: 213 LBS | OXYGEN SATURATION: 95 % | HEIGHT: 60 IN

## 2020-06-03 LAB
ALBUMIN SERPL-MCNC: 3.9 G/DL (ref 3.5–5.1)
ALP BLD-CCNC: 144 U/L (ref 38–126)
ALT SERPL-CCNC: 31 U/L (ref 11–66)
ANION GAP SERPL CALCULATED.3IONS-SCNC: 13 MEQ/L (ref 8–16)
AST SERPL-CCNC: 24 U/L (ref 5–40)
BASOPHILS # BLD: 0.3 %
BASOPHILS ABSOLUTE: 0 THOU/MM3 (ref 0–0.1)
BILIRUB SERPL-MCNC: 0.3 MG/DL (ref 0.3–1.2)
BUN BLDV-MCNC: 12 MG/DL (ref 7–22)
CALCIUM SERPL-MCNC: 9.1 MG/DL (ref 8.5–10.5)
CHLORIDE BLD-SCNC: 101 MEQ/L (ref 98–111)
CO2: 23 MEQ/L (ref 23–33)
CREAT SERPL-MCNC: 0.9 MG/DL (ref 0.4–1.2)
EKG ATRIAL RATE: 99 BPM
EKG P AXIS: 44 DEGREES
EKG P-R INTERVAL: 216 MS
EKG Q-T INTERVAL: 340 MS
EKG QRS DURATION: 78 MS
EKG QTC CALCULATION (BAZETT): 436 MS
EKG R AXIS: 35 DEGREES
EKG T AXIS: 37 DEGREES
EKG VENTRICULAR RATE: 99 BPM
EOSINOPHIL # BLD: 0.8 %
EOSINOPHILS ABSOLUTE: 0.1 THOU/MM3 (ref 0–0.4)
ERYTHROCYTE [DISTWIDTH] IN BLOOD BY AUTOMATED COUNT: 13.3 % (ref 11.5–14.5)
ERYTHROCYTE [DISTWIDTH] IN BLOOD BY AUTOMATED COUNT: 45.1 FL (ref 35–45)
GFR SERPL CREATININE-BSD FRML MDRD: 63 ML/MIN/1.73M2
GLUCOSE BLD-MCNC: 137 MG/DL (ref 70–108)
HCT VFR BLD CALC: 40.8 % (ref 37–47)
HEMOGLOBIN: 12.7 GM/DL (ref 12–16)
IMMATURE GRANS (ABS): 0.13 THOU/MM3 (ref 0–0.07)
IMMATURE GRANULOCYTES: 0.9 %
LACTIC ACID: 2.6 MMOL/L (ref 0.5–2.2)
LYMPHOCYTES # BLD: 9.6 %
LYMPHOCYTES ABSOLUTE: 1.5 THOU/MM3 (ref 1–4.8)
MCH RBC QN AUTO: 28.8 PG (ref 26–33)
MCHC RBC AUTO-ENTMCNC: 31.1 GM/DL (ref 32.2–35.5)
MCV RBC AUTO: 92.5 FL (ref 81–99)
MONOCYTES # BLD: 4.3 %
MONOCYTES ABSOLUTE: 0.7 THOU/MM3 (ref 0.4–1.3)
NUCLEATED RED BLOOD CELLS: 0 /100 WBC
OSMOLALITY CALCULATION: 275.7 MOSMOL/KG (ref 275–300)
PLATELET # BLD: 255 THOU/MM3 (ref 130–400)
PMV BLD AUTO: 11.4 FL (ref 9.4–12.4)
POTASSIUM REFLEX MAGNESIUM: 4.4 MEQ/L (ref 3.5–5.2)
PRO-BNP: 213.4 PG/ML (ref 0–900)
RBC # BLD: 4.41 MILL/MM3 (ref 4.2–5.4)
SARS-COV-2, NAAT: NOT DETECTED
SEG NEUTROPHILS: 84.1 %
SEGMENTED NEUTROPHILS ABSOLUTE COUNT: 12.8 THOU/MM3 (ref 1.8–7.7)
SODIUM BLD-SCNC: 137 MEQ/L (ref 135–145)
TOTAL PROTEIN: 6.7 G/DL (ref 6.1–8)
TROPONIN T: < 0.01 NG/ML
WBC # BLD: 15.2 THOU/MM3 (ref 4.8–10.8)

## 2020-06-03 PROCEDURE — 84484 ASSAY OF TROPONIN QUANT: CPT

## 2020-06-03 PROCEDURE — 80053 COMPREHEN METABOLIC PANEL: CPT

## 2020-06-03 PROCEDURE — U0002 COVID-19 LAB TEST NON-CDC: HCPCS

## 2020-06-03 PROCEDURE — 93005 ELECTROCARDIOGRAM TRACING: CPT | Performed by: EMERGENCY MEDICINE

## 2020-06-03 PROCEDURE — 83880 ASSAY OF NATRIURETIC PEPTIDE: CPT

## 2020-06-03 PROCEDURE — 6370000000 HC RX 637 (ALT 250 FOR IP): Performed by: EMERGENCY MEDICINE

## 2020-06-03 PROCEDURE — 87040 BLOOD CULTURE FOR BACTERIA: CPT

## 2020-06-03 PROCEDURE — 99283 EMERGENCY DEPT VISIT LOW MDM: CPT

## 2020-06-03 PROCEDURE — 94640 AIRWAY INHALATION TREATMENT: CPT

## 2020-06-03 PROCEDURE — 71046 X-RAY EXAM CHEST 2 VIEWS: CPT

## 2020-06-03 PROCEDURE — 85025 COMPLETE CBC W/AUTO DIFF WBC: CPT

## 2020-06-03 PROCEDURE — 83605 ASSAY OF LACTIC ACID: CPT

## 2020-06-03 PROCEDURE — 36415 COLL VENOUS BLD VENIPUNCTURE: CPT

## 2020-06-03 RX ORDER — IPRATROPIUM BROMIDE AND ALBUTEROL SULFATE 2.5; .5 MG/3ML; MG/3ML
1 SOLUTION RESPIRATORY (INHALATION) ONCE
Status: COMPLETED | OUTPATIENT
Start: 2020-06-03 | End: 2020-06-03

## 2020-06-03 RX ORDER — PREDNISONE 20 MG/1
20 TABLET ORAL 2 TIMES DAILY
Qty: 10 TABLET | Refills: 0 | Status: SHIPPED | OUTPATIENT
Start: 2020-06-03 | End: 2020-06-08

## 2020-06-03 RX ADMIN — IPRATROPIUM BROMIDE AND ALBUTEROL SULFATE 1 AMPULE: .5; 3 SOLUTION RESPIRATORY (INHALATION) at 19:20

## 2020-06-03 NOTE — ED PROVIDER NOTES
a day    INSULIN GLARGINE (LANTUS SOLOSTAR) 100 UNIT/ML INJECTION PEN    INJECT 35 UNITS SUBCUTANEOUSLY IN THE MORNING AND 29 UNITS SUBCUTANEOUSLY IN THE EVENING    INSULIN LISPRO (HUMALOG) 100 UNIT/ML PEN    Inject 17 units before breakfast, 20 units before lunch and 28 units before dinner plus sliding scale. Patient uses a max of 96 units per day. LAMOTRIGINE (LAMICTAL) 100 MG TABLET    Take 100 mg by mouth daily     LEVOTHYROXINE SODIUM (SYNTHROID PO)    Take 150 mcg by mouth 6 days a week  Does NOT take on Sundays    LIDOCAINE (XYLOCAINE) 5 % OINTMENT    Apply topically 3 times daily as needed for Pain Apply topically as needed. LIRAGLUTIDE (VICTOZA) 18 MG/3ML SOPN SC INJECTION    Inject 1.2 mg into the skin daily    LISINOPRIL (PRINIVIL;ZESTRIL) 5 MG TABLET    Take 7.5 mg by mouth daily     MECLIZINE (ANTIVERT) 25 MG TABLET    Take 50 mg by mouth 3 times daily as needed     METFORMIN (GLUCOPHAGE) 1000 MG TABLET    Take 1,000 mg by mouth 2 times daily (with meals) Indications: 1 tab if hr is >60 or SBP is >100     METOPROLOL SUCCINATE (TOPROL XL) 50 MG EXTENDED RELEASE TABLET    Take 50 mg by mouth daily    MULTIPLE VITAMINS-MINERALS (MULTI FOR HER PO)    Take by mouth daily    PANTOPRAZOLE (PROTONIX) 40 MG TABLET    Take 1 tablet by mouth daily    PREGABALIN (LYRICA) 100 MG CAPSULE    Take 1 capsule by mouth 3 times daily for 30 days. PREGABALIN (LYRICA) 75 MG CAPSULE    Take 1 capsule by mouth 3 times daily for 30 days. SPIRONOLACTONE (ALDACTONE) 25 MG TABLET    Take 25 mg by mouth as needed     TRAZODONE (DESYREL) 50 MG TABLET    Take 50 mg by mouth nightly Indications: 1/2 tab        ALLERGIES     is allergic to ibuprofen. FAMILY HISTORY     She indicated that her mother is . She indicated that her father is . She indicated that both of her sisters are alive. She indicated that both of her brothers are alive. She indicated that her child is alive.    family history includes while here and seems to be feeling better. Her pulse ox is okay. Respiratory rate has improved. She says she feels more comfortable. There is no evidence of pneumonia. She did have some wheezing. There is no evidence of edema. She has tested negative for COVID. She feels okay about going home at this point we can put her on some prednisone for a few days. Return to ER for worsening breathing or any new symptoms or concerns. CRITICAL CARE:   none         FINAL IMPRESSION    Bronchitis/COPD exacerbation    DISPOSITION/PLAN   Discharged    DISCHARGE MEDICATIONS:  New Prescriptions    No medications on file       (Please note that portions of this note were completed with a voice recognition program.  Efforts were made to edit the dictations but occasionally words are mis-transcribed.)    This document serves as a record of the services and decisions personally performed and made by Oxana Rosario DO. . It was created on their behalf by Rena Herr, a trained medical scribe. The creation of this document is based the provider's statements to the medical scribe. Signed by: Jade Diamond, 6:02 PM 06/03/20     Provider: The information in this document, created by the medical scribe for me, accurately reflects the services I personally performed and the decisions made by me.      Oxana Rosario DO. 6:02 PM 06/03/20           Oxana Rosario DO  06/03/20 2016

## 2020-06-03 NOTE — TELEPHONE ENCOUNTER
OARRS reviewed. UDS:no recent screen. Narcan offered: not offered  Last seen: 2/21/2020.  Follow-up:   Future Appointments   Date Time Provider Monica Leta   6/29/2020  2:00 PM [facility] [address]   12/7/2020 11:00 AM Alexa Tavares MD [address] [facility] - [address] [facility]   5/25/2021  2:20 PM Andria Schumacher MD [facility] - [address] [place] [address]

## 2020-06-03 NOTE — ED NOTES
Reassessment of the patients Shortness of Breath   is unchanged, the patients pain reassessment is a 0/10, Side rails up times 2, call light in reach, will continue to monitor.        Walter Zamora RN  06/03/20 [id number]

## 2020-06-04 ENCOUNTER — CARE COORDINATION (OUTPATIENT)
Dept: FAMILY MEDICINE CLINIC | Age: 65
End: 2020-06-04

## 2020-06-04 RX ORDER — PREGABALIN 100 MG/1
100 CAPSULE ORAL 3 TIMES DAILY
Qty: 90 CAPSULE | Refills: 0 | Status: SHIPPED | OUTPATIENT
Start: 2020-06-04 | End: 2020-07-08 | Stop reason: SDUPTHER

## 2020-06-09 LAB
BLOOD CULTURE, ROUTINE: NORMAL
BLOOD CULTURE, ROUTINE: NORMAL

## 2020-06-10 ENCOUNTER — TELEPHONE (OUTPATIENT)
Dept: PHYSICAL MEDICINE AND REHAB | Age: 65
End: 2020-06-10

## 2020-06-18 ENCOUNTER — CARE COORDINATION (OUTPATIENT)
Dept: FAMILY MEDICINE CLINIC | Age: 65
End: 2020-06-18

## 2020-06-23 NOTE — OP NOTE
bilateral under fluoroscopic guidance     Indication for the Procedure: The patient has ahistory of chronic low back pain that is not responding well to the conservative treatment. Patient's pain is mostly axial in nature. Pain is interfering with the activities of daily living. Physical examination revealed facet tenderness and facet loading is positive. Patient had undergone lumbar facet joint injections with pain relief that lasted for only a short period of time and had greater than 70% pain relief with confirmatory median branch blocks. Hence we decided to do radiofrequency abalation of median branches for long term pain releif. The procedure and risks  were discussed with the patient and an informed consent was obtained. Procedure:  Bilateral   A meaningful communication was kept up with the patient throughout the procedure. The patient is placed in prone position and skin over the back was prepped and draped in sterile manner. Then using fluoroscopy the junction of the transverse process of the vertebra with the superior process of the facet joint was observed and the view was optimized. The skin and deep tissues posterior were infiltrated with 18 ml of  1% xylocaine . The RF canula with the 15 mm active tip was introduced through the skin wheal under fluoroscopy guidance such that the tip of the needle lies in the groove of the transverse process with the superior processes of the facet joint. Then a lateral view of the lumbar spine was obtained to make sure the tip of needle is not in the neural foramen. Then electric impedence was checked to make sure it is acceptable. Then a sensory stimulus was applied at 50 Hz up to 1 volt and concordant pain symptoms were reproduced. Then a motor stimulus was applied at 2 Hz up to 2 volts and no motor stimulation was seen in lower extremities. Some multifidus stimulus was seen.   Then after negative aspiration a mixture of Celestone 12 mg and 0.25% Marcaine 3 cc was injected through the needle in divided doses. Then a initial lesion was done at 80 degrees centigrade for 90 seconds X 2. For L5 median branch block the junction of the ala of  the sacrum with the superior articular process of the facet joint was taken as a reference point. For the L4 median branch the junction of the transverse process of L5 with the superolateral possible facet joint was taken as a reference point and for S1 median branch the most lateral and superior aspect of S1 foramina was taken as a reference point,. For L3 median branch the junction of L4 transverse process and superior articular process of facet joint was taken as reference point and so on. Patient's vital signs and neurological status remained stable throughout the procedure and post procedural period. The patient tolerated the procedure well and was discharged home in stable condition.     Electronically signed by Gregg Weinberg MD on 7/2/2018 at 10:20 AM No

## 2020-06-29 ENCOUNTER — OFFICE VISIT (OUTPATIENT)
Dept: PULMONOLOGY | Age: 65
End: 2020-06-29
Payer: MEDICARE

## 2020-06-29 VITALS
OXYGEN SATURATION: 98 % | HEART RATE: 106 BPM | BODY MASS INDEX: 41.19 KG/M2 | HEIGHT: 60 IN | SYSTOLIC BLOOD PRESSURE: 128 MMHG | DIASTOLIC BLOOD PRESSURE: 74 MMHG | WEIGHT: 209.8 LBS | TEMPERATURE: 97.6 F

## 2020-06-29 PROCEDURE — 99214 OFFICE O/P EST MOD 30 MIN: CPT | Performed by: PHYSICIAN ASSISTANT

## 2020-06-29 ASSESSMENT — ENCOUNTER SYMPTOMS
EYES NEGATIVE: 1
WHEEZING: 0
NAUSEA: 0
STRIDOR: 0
CHEST TIGHTNESS: 0
COUGH: 0
SHORTNESS OF BREATH: 1
ALLERGIC/IMMUNOLOGIC NEGATIVE: 1
BACK PAIN: 0
DIARRHEA: 0

## 2020-06-29 NOTE — PROGRESS NOTES
MD Amirah at [address] [facility] DX/THER AGNT PARAVERT FACET JOINT, LUMBAR/SAC, 2ND LEVEL Bilateral 5/8/2018    LUMBAR FACET MBB   @L3-4, L4-5, L5-S1  BILATERAL performed by Keith Tejeda MD at [address] Bilateral 7/2/2018    LUMBAR RFA  Bilateral @L3-4,4-5,5-S1, performed by Keith Tejeda MD at [address] OFFICE/OUTPT [address] Right 10/2/2018    SACROILIAC JOINT INJECTION SI MBB RIGHT SIDE, performed by Keith Tejeda MD at [address] [facility]  2016    [facility] Dr. Torrez Favors History     Tobacco Use    Smoking status: Passive Smoke Exposure - Never Smoker    Smokeless tobacco: Never Used   Substance Use Topics    Alcohol use: No     Alcohol/week: 0.0 standard drinks    Drug use: No       Allergies   Allergen Reactions    Ibuprofen Other (See Comments)     Due to kidney failure       Current Outpatient Medications   Medication Sig Dispense Refill    pregabalin (LYRICA) 100 MG capsule Take 1 capsule by mouth 3 times daily for 30 days. 90 capsule 0    insulin glargine (LANTUS SOLOSTAR) 100 UNIT/ML injection pen INJECT 35 UNITS SUBCUTANEOUSLY IN THE MORNING AND 29 UNITS SUBCUTANEOUSLY IN THE EVENING 15 mL 0    insulin lispro (HUMALOG) 100 UNIT/ML pen Inject 17 units before breakfast, 20 units before lunch and 28 units before dinner plus sliding scale. Patient uses a max of 96 units per day.  10 pen 5    Liraglutide (VICTOZA) 18 MG/3ML SOPN SC injection Inject 1.2 mg into the skin daily 4 pen 5    DULoxetine (CYMBALTA) 30 MG extended release capsule Take 120 mg by mouth daily       acetaminophen (TYLENOL) 325 MG tablet Take 2 tablets by mouth every 4 hours as needed for Pain 120 tablet 3    calcium carbonate (OSCAL) 500 MG TABS tablet Take 650 mg by mouth daily Indications: 2 chews daily      metoprolol succinate (TOPROL XL) 50 MG extended release tablet Take 50 mg by mouth daily      meclizine (ANTIVERT) 25 MG tablet Take 50 mg by mouth 3 times daily as needed       lidocaine (XYLOCAINE) 5 % ointment Apply topically 3 times daily as needed for Pain Apply topically as needed.  spironolactone (ALDACTONE) 25 MG tablet Take 25 mg by mouth as needed       traZODone (DESYREL) 50 MG tablet Take 50 mg by mouth nightly Indications: 1/2 tab       Ascorbic Acid (VITAMIN C) 500 MG tablet Take 1,000 mg by mouth daily      Cyanocobalamin (VITAMIN B 12 PO) Take 500 mg by mouth daily       busPIRone (BUSPAR) 30 MG tablet Take 30 mg by mouth 3 times daily       lisinopril (PRINIVIL;ZESTRIL) 5 MG tablet Take 7.5 mg by mouth daily       pantoprazole (PROTONIX) 40 MG tablet Take 1 tablet by mouth daily 30 tablet 12    glucose blood VI test strips (FREESTYLE LITE) strip Check BS 4 times a day 400 each 1    Blood Glucose Monitoring Suppl (FREESTYLE LITE) EMILIE 1 Device by Does not apply route daily as needed 1 Device 0    aspirin 81 MG tablet Take 81 mg by mouth daily      ammonium lactate (AMLACTIN) 12 % cream Apply topically 2 times daily Apply topically as needed.  Multiple Vitamins-Minerals (MULTI FOR HER PO) Take by mouth daily      atorvastatin (LIPITOR) 40 MG tablet Take 40 mg by mouth daily      ferrous sulfate 325 (65 FE) MG tablet Take 325 mg by mouth nightly       Levothyroxine Sodium (SYNTHROID PO) Take 150 mcg by mouth 6 days a week  Does NOT take on Sundays      DOXEPIN HCL PO Take 200 mg by mouth nightly.  lamoTRIgine (LAMICTAL) 100 MG tablet Take 100 mg by mouth daily       metFORMIN (GLUCOPHAGE) 1000 MG tablet Take 1,000 mg by mouth 2 times daily (with meals) Indications: 1 tab if hr is >60 or SBP is >100       pregabalin (LYRICA) 75 MG capsule Take 1 capsule by mouth 3 times daily for 30 days.  (Patient taking differently: Take 100 mg by mouth 3 times daily. ) 90 capsule 0     No current facility-administered

## 2020-07-03 ENCOUNTER — HOSPITAL ENCOUNTER (OUTPATIENT)
Age: 65
Discharge: HOME | End: 2020-07-03
Payer: MEDICARE

## 2020-07-03 PROCEDURE — U0002 COVID-19 LAB TEST NON-CDC: HCPCS

## 2020-07-05 LAB
PERFORMING LAB: NORMAL
REPORT: NORMAL
SARS-COV-2: NOT DETECTED

## 2020-07-06 ENCOUNTER — HOSPITAL ENCOUNTER (OUTPATIENT)
Dept: PULMONOLOGY | Age: 65
Discharge: HOME | End: 2020-07-06
Payer: MEDICARE

## 2020-07-06 PROCEDURE — 94060 EVALUATION OF WHEEZING: CPT

## 2020-07-06 PROCEDURE — 94729 DIFFUSING CAPACITY: CPT

## 2020-07-06 PROCEDURE — 94726 PLETHYSMOGRAPHY LUNG VOLUMES: CPT

## 2020-07-06 NOTE — PROGRESS NOTES
Prescreening performed prior to testing. The following symptons may indicate COVID-19 infection:        One of the following criteria:   Temperature taken, patient temperature was 97.9 F. Fever greater 100.0 F -no  Cough -  no  New onset shortness of breath -no  New onset difficulty breathing -no        And/or   Two or more of the following criteria:  Muscle aches -no  Headache -no  Sore throat -no  New onset loss of smell/taste -no  New onset diarrhea -no    Patient's screening was negative. PFT will be performed.

## 2020-07-08 ENCOUNTER — HOSPITAL ENCOUNTER (OUTPATIENT)
Age: 65
Setting detail: SPECIMEN
Discharge: HOME | End: 2020-07-08
Payer: MEDICARE

## 2020-07-08 LAB
ABSOLUTE EOS #: 0.12 K/UL (ref 0–0.44)
ABSOLUTE IMMATURE GRANULOCYTE: 0.06 K/UL (ref 0–0.3)
ABSOLUTE LYMPH #: 3.46 K/UL (ref 1.1–3.7)
ABSOLUTE MONO #: 0.93 K/UL (ref 0.1–1.2)
ANION GAP SERPL CALCULATED.3IONS-SCNC: 20 MMOL/L (ref 9–17)
BASOPHILS # BLD: 0 % (ref 0–2)
BASOPHILS ABSOLUTE: 0.05 K/UL (ref 0–0.2)
BUN BLDV-MCNC: 11 MG/DL (ref 8–23)
BUN/CREAT BLD: ABNORMAL (ref 9–20)
CALCIUM SERPL-MCNC: 8.5 MG/DL (ref 8.6–10.4)
CHLORIDE BLD-SCNC: 99 MMOL/L (ref 98–107)
CHOLESTEROL/HDL RATIO: 5.2
CHOLESTEROL: 120 MG/DL
CO2: 23 MMOL/L (ref 20–31)
CREAT SERPL-MCNC: 0.97 MG/DL (ref 0.5–0.9)
DIFFERENTIAL TYPE: ABNORMAL
EOSINOPHILS RELATIVE PERCENT: 1 % (ref 1–4)
GFR AFRICAN AMERICAN: >60 ML/MIN
GFR NON-AFRICAN AMERICAN: 58 ML/MIN
GFR SERPL CREATININE-BSD FRML MDRD: ABNORMAL ML/MIN/{1.73_M2}
GFR SERPL CREATININE-BSD FRML MDRD: ABNORMAL ML/MIN/{1.73_M2}
GLUCOSE BLD-MCNC: 163 MG/DL (ref 70–99)
HCT VFR BLD CALC: 41.9 % (ref 36.3–47.1)
HDLC SERPL-MCNC: 23 MG/DL
HEMOGLOBIN: 12.7 G/DL (ref 11.9–15.1)
IMMATURE GRANULOCYTES: 1 %
LDL CHOLESTEROL: 58 MG/DL (ref 0–130)
LYMPHOCYTES # BLD: 27 % (ref 24–43)
MCH RBC QN AUTO: 28.9 PG (ref 25.2–33.5)
MCHC RBC AUTO-ENTMCNC: 30.3 G/DL (ref 28.4–34.8)
MCV RBC AUTO: 95.2 FL (ref 82.6–102.9)
MONOCYTES # BLD: 7 % (ref 3–12)
NRBC AUTOMATED: 0 PER 100 WBC
PDW BLD-RTO: 14.7 % (ref 11.8–14.4)
PLATELET # BLD: 282 K/UL (ref 138–453)
PLATELET ESTIMATE: ABNORMAL
PMV BLD AUTO: 11.8 FL (ref 8.1–13.5)
POTASSIUM SERPL-SCNC: 5 MMOL/L (ref 3.7–5.3)
RBC # BLD: 4.4 M/UL (ref 3.95–5.11)
RBC # BLD: ABNORMAL 10*6/UL
SEG NEUTROPHILS: 64 % (ref 36–65)
SEGMENTED NEUTROPHILS ABSOLUTE COUNT: 8.2 K/UL (ref 1.5–8.1)
SODIUM BLD-SCNC: 142 MMOL/L (ref 135–144)
TRIGL SERPL-MCNC: 193 MG/DL
TSH SERPL DL<=0.05 MIU/L-ACNC: 3.58 MIU/L (ref 0.3–5)
VITAMIN D 25-HYDROXY: 29.5 NG/ML (ref 30–100)
VLDLC SERPL CALC-MCNC: ABNORMAL MG/DL (ref 1–30)
WBC # BLD: 12.8 K/UL (ref 3.5–11.3)
WBC # BLD: ABNORMAL 10*3/UL

## 2020-07-08 NOTE — TELEPHONE ENCOUNTER
OARRS reviewed. UDS: + for  Gabapentin consistent. Narcan offered: Offered  Last seen: 02/21/2020.  Follow-up: no follow up

## 2020-07-09 LAB
ESTIMATED AVERAGE GLUCOSE: 200 MG/DL
HBA1C MFR BLD: 8.6 % (ref 4–6)

## 2020-07-09 RX ORDER — PREGABALIN 100 MG/1
100 CAPSULE ORAL 3 TIMES DAILY
Qty: 90 CAPSULE | Refills: 0 | Status: SHIPPED | OUTPATIENT
Start: 2020-07-09 | End: 2020-08-03

## 2020-07-14 ENCOUNTER — TELEPHONE (OUTPATIENT)
Dept: PHYSICAL MEDICINE AND REHAB | Age: 65
End: 2020-07-14

## 2020-07-16 ENCOUNTER — TELEPHONE (OUTPATIENT)
Dept: PHYSICAL MEDICINE AND REHAB | Age: 65
End: 2020-07-16

## 2020-07-16 NOTE — TELEPHONE ENCOUNTER
Per Urmila, pt has not seen Vaughn Avery, [facility] yet for follow up due to cancelling but we do have approved clearance. Urmila says after follow up patient may be seen.

## 2020-08-03 ENCOUNTER — OFFICE VISIT (OUTPATIENT)
Dept: PULMONOLOGY | Age: 65
End: 2020-08-03
Payer: MEDICARE

## 2020-08-03 ENCOUNTER — OFFICE VISIT (OUTPATIENT)
Dept: PHYSICAL MEDICINE AND REHAB | Age: 65
End: 2020-08-03
Payer: MEDICARE

## 2020-08-03 VITALS
BODY MASS INDEX: 41.78 KG/M2 | HEIGHT: 60 IN | SYSTOLIC BLOOD PRESSURE: 122 MMHG | HEART RATE: 61 BPM | OXYGEN SATURATION: 98 % | WEIGHT: 212.8 LBS | TEMPERATURE: 98.1 F | DIASTOLIC BLOOD PRESSURE: 68 MMHG

## 2020-08-03 VITALS
HEIGHT: 60 IN | TEMPERATURE: 97 F | DIASTOLIC BLOOD PRESSURE: 74 MMHG | SYSTOLIC BLOOD PRESSURE: 128 MMHG | WEIGHT: 209 LBS | BODY MASS INDEX: 41.03 KG/M2

## 2020-08-03 PROCEDURE — 99214 OFFICE O/P EST MOD 30 MIN: CPT | Performed by: PHYSICIAN ASSISTANT

## 2020-08-03 PROCEDURE — 99214 OFFICE O/P EST MOD 30 MIN: CPT | Performed by: NURSE PRACTITIONER

## 2020-08-03 RX ORDER — PREGABALIN 150 MG/1
150 CAPSULE ORAL 3 TIMES DAILY
Qty: 90 CAPSULE | Refills: 0 | Status: SHIPPED | OUTPATIENT
Start: 2020-08-08 | End: 2020-11-03

## 2020-08-03 ASSESSMENT — ENCOUNTER SYMPTOMS
CHEST TIGHTNESS: 0
STRIDOR: 0
ABDOMINAL PAIN: 0
NAUSEA: 0
ALLERGIC/IMMUNOLOGIC NEGATIVE: 1
WHEEZING: 0
SHORTNESS OF BREATH: 1
CONSTIPATION: 0
DIARRHEA: 0
BACK PAIN: 1
SHORTNESS OF BREATH: 1
EYES NEGATIVE: 1
COLOR CHANGE: 0
BACK PAIN: 1
COUGH: 0

## 2020-08-03 NOTE — PROGRESS NOTES
Patient understanding to finish out current Rx. Medications reviewed. Pain scale with out pain medications or at its worst is 9/10. Pain scale with pain medications or at its best is 6/10. Drug screen reviewed from 10/22/19 and was appropriate      The patientis allergic to ibuprofen. Past Medical History  Daisy Vincent  has a past medical history of Back pain, CAD (coronary artery disease), Chronic diastolic CHF (congestive heart failure) ([facility] [address] Colitis, Depression, Depression, Gastroesophageal reflux, GERD (gastroesophageal reflux disease), H/O endoscopy, Hyperlipidemia, Hypertension, Hypertension, Hypothyroidism, Incontinence of urine, Lordosis (acquired) (postural), Spondylosis of unspecified site without mention of myelopathy, Thoracic or lumbosacral neuritis or radiculitis, unspecified, Type II or unspecified type diabetes mellitus without mention of complication, not stated as uncontrolled, Unspecified sleep apnea, and Urinary incontinence. Past Surgical History  The patient  has a past surgical history that includes  section (); Carpal tunnel release (Bilateral, 'S); Abdomen surgery (); Dilation and curettage of uterus; Hysterectomy (); cardiovascular stress test (2015); Colonoscopy (, ); Upper gastrointestinal endoscopy (); Cardiac catheterization (); other surgical history (Bilateral, 2018); pr inj dx/ther agnt paravert facet joint, lumbar/sac, 2nd level (Bilateral, 3/19/2018); Nerve Surgery (Bilateral, 2018); pr inj dx/ther agnt paravert facet joint, lumbar/sac, 2nd level (Bilateral, 2018); pr inject rx other periph nerve (Bilateral, 2018); pr office/outpt visit,procedure only (Right, 10/2/2018); Injection Procedure For Sacroiliac Joint (Right, 2018); Lumbar spine surgery (Bilateral, 2019); lumbar nerve block (Bilateral, 2019);  Nerve Surgery (Bilateral, 10/4/2019); and Pain management procedure (Bilateral, 1/20/2020). Family History  This patient's family history includes Diabetes in her father, mother, and sister; Heart Disease in her father and mother; Stroke in her mother and sister; Thyroid Disease in her brother. Social History  Nicky James  reports that she is a non-smoker but has been exposed to tobacco smoke. She has never used smokeless tobacco. She reports that she does not drink alcohol or use drugs. Medications    Current Outpatient Medications:     [START ON 8/8/2020] pregabalin (LYRICA) 150 MG capsule, Take 1 capsule by mouth 3 times daily for 30 days. , Disp: 90 capsule, Rfl: 0    insulin glargine (LANTUS SOLOSTAR) 100 UNIT/ML injection pen, INJECT 35 UNITS SUBCUTANEOUSLY IN THE MORNING AND 29 UNITS SUBCUTANEOUSLY IN THE EVENING (Patient taking differently: INJECT 45 UNITS SUBCUTANEOUSLY IN THE MORNING AND 37 UNITS SUBCUTANEOUSLY IN THE EVENING), Disp: 15 mL, Rfl: 0    insulin lispro (HUMALOG) 100 UNIT/ML pen, Inject 17 units before breakfast, 20 units before lunch and 28 units before dinner plus sliding scale.  Patient uses a max of 96 units per day., Disp: 10 pen, Rfl: 5    Liraglutide (VICTOZA) 18 MG/3ML SOPN SC injection, Inject 1.2 mg into the skin daily (Patient taking differently: Inject 1.8 mg into the skin daily ), Disp: 4 pen, Rfl: 5    DULoxetine (CYMBALTA) 30 MG extended release capsule, Take 120 mg by mouth daily , Disp: , Rfl:     acetaminophen (TYLENOL) 325 MG tablet, Take 2 tablets by mouth every 4 hours as needed for Pain, Disp: 120 tablet, Rfl: 3    calcium carbonate (OSCAL) 500 MG TABS tablet, Take 650 mg by mouth daily Indications: 2 chews daily, Disp: , Rfl:     metoprolol succinate (TOPROL XL) 50 MG extended release tablet, Take 50 mg by mouth daily, Disp: , Rfl:     meclizine (ANTIVERT) 25 MG tablet, Take 50 mg by mouth 3 times daily as needed , Disp: , Rfl:     lidocaine (XYLOCAINE) 5 % ointment, Apply topically 3 times daily as needed for Pain Apply topically as needed. , Disp: , Rfl:     spironolactone (ALDACTONE) 25 MG tablet, Take 25 mg by mouth as needed , Disp: , Rfl:     traZODone (DESYREL) 50 MG tablet, Take 50 mg by mouth nightly Indications: 1/2 tab , Disp: , Rfl:     Ascorbic Acid (VITAMIN C) 500 MG tablet, Take 1,000 mg by mouth daily, Disp: , Rfl:     Cyanocobalamin (VITAMIN B 12 PO), Take 500 mg by mouth daily , Disp: , Rfl:     busPIRone (BUSPAR) 30 MG tablet, Take 30 mg by mouth 3 times daily , Disp: , Rfl:     lisinopril (PRINIVIL;ZESTRIL) 5 MG tablet, Take 7.5 mg by mouth daily , Disp: , Rfl:     pantoprazole (PROTONIX) 40 MG tablet, Take 1 tablet by mouth daily, Disp: 30 tablet, Rfl: 12    glucose blood VI test strips (FREESTYLE LITE) strip, Check BS 4 times a day, Disp: 400 each, Rfl: 1    Blood Glucose Monitoring Suppl (FREESTYLE LITE) EMILIE, 1 Device by Does not apply route daily as needed, Disp: 1 Device, Rfl: 0    aspirin 81 MG tablet, Take 81 mg by mouth daily, Disp: , Rfl:     ammonium lactate (AMLACTIN) 12 % cream, Apply topically 2 times daily Apply topically as needed. , Disp: , Rfl:     Multiple Vitamins-Minerals (MULTI FOR HER PO), Take by mouth daily, Disp: , Rfl:     atorvastatin (LIPITOR) 40 MG tablet, Take 40 mg by mouth daily, Disp: , Rfl:     ferrous sulfate 325 (65 FE) MG tablet, Take 325 mg by mouth nightly , Disp: , Rfl:     Levothyroxine Sodium (SYNTHROID PO), Take 150 mcg by mouth 6 days a week Does NOT take on Sundays, Disp: , Rfl:     DOXEPIN HCL PO, Take 200 mg by mouth nightly., Disp: , Rfl:     lamoTRIgine (LAMICTAL) 100 MG tablet, Take 100 mg by mouth daily , Disp: , Rfl:     metFORMIN (GLUCOPHAGE) 1000 MG tablet, Take 1,000 mg by mouth 2 times daily (with meals) Indications: 1 tab if hr is >60 or SBP is >100 , Disp: , Rfl:     Subjective:      Review of Systems   Constitutional: Positive for activity change and fatigue. Negative for chills and fever.    Respiratory: Positive for shortness of breath (with reviewed  Patient told can not receive any pain medications from any other source. No evidence of abuse, diversion or aberrant behavior. Medications and/or procedures to improve function and quality of life- patient understanding with this and that may not be pain free  Discussed with patient about safe storage of medications at home  Discussed possible weaning of medication dosing dependent on treatment/procedure results. Testing: none   Procedures: TFESI L5 LEFT #2  Planning SI injections after TFESI  Discussed with patient about risks with procedure including infection, reaction to medication, increased pain, or bleeding. Medications: increase Lyrica to 150 TID      Meds. Prescribed:   Orders Placed This Encounter   Medications    pregabalin (LYRICA) 150 MG capsule     Sig: Take 1 capsule by mouth 3 times daily for 30 days. Dispense:  90 capsule     Refill:  0       Return for TFESI L5 LEFT #2, follow up after procedure.              Electronically signed by RAIMUNDO Mckeon CNP on8/3/2020 at 8:15 AM

## 2020-08-03 NOTE — PROGRESS NOTES
[facility] [address] [place] [address], 60 y.o.   : 1955  8/3/2020         Subjective     Chief Complaint   Patient presents with    Follow-up     discuss pft 2020        JAMISON Suarez Adjutant is here for follow up for PFT results. She was is ED for bronchitis and was treated. She was given albuterol inhaler and not using currently, no improvement. She had an ECHO in march. She is not coughing or wheezing. Pain limits her activity. She gets QUINTANILLA with activity.      Past Medical hx   PMH:  Past Medical History:   Diagnosis Date    Back pain     with radiation    CAD (coronary artery disease)     Chronic diastolic CHF (congestive heart failure) ([facility]) 2017    Colitis     Depression     Depression     Gastroesophageal reflux     GERD (gastroesophageal reflux disease)     H/O endoscopy     stretch the eso    Hyperlipidemia     Hypertension     Hypertension     Hypothyroidism     Incontinence of urine     Lordosis (acquired) (postural)     Spondylosis of unspecified site without mention of myelopathy     Thoracic or lumbosacral neuritis or radiculitis, unspecified     Type II or unspecified type diabetes mellitus without mention of complication, not stated as uncontrolled     diagnosed     Unspecified sleep apnea      cpap mask    Urinary incontinence      SURGICAL HISTORY:  Past Surgical History:   Procedure Laterality Date    ABDOMEN SURGERY      LAPAROSCOPY    CARDIAC CATHETERIZATION      CARDIOVASCULAR STRESS TEST  2015    was not positive but proceeding cath    CARPAL TUNNEL RELEASE Bilateral 'S     SECTION  1975    COLONOSCOPY  , 2016    DILATION AND CURETTAGE OF UTERUS      HC INJECTION PROCEDURE FOR SACROILIAC JOINT Right 2018    SACROILIAC JOINT INJECTION Right SI MBB #2, performed by Leesa Meigs, MD at [facility]  2002    Total    LUMBAR NERVE BLOCK Bilateral 8/22/2019    LESI L3 #1 performed by Tyron Gottlieb MD at [address] Bilateral 6/27/2019    Lumbar RFA Bilateral L4-5,L5-S1 performed by Tyron Gottlieb MD at [facility] 98 Bilateral 05/08/2018    LUMBAR FACET MBB L3-4, L4-5, L5-S1    NERVE SURGERY Bilateral 10/4/2019    LESI L3 #2 performed by Tyron Gottlieb MD at [facility] Bilateral 03/19/2018    Lumbar Facet MBB L3-4, L4-5, L5-S1    PAIN MANAGEMENT PROCEDURE Bilateral 1/20/2020    TFESI L5 LEFT #1 performed by Tyron Gottlieb MD at [address] DX/THER AGNT PARAVERT FACET JOINT, LUMBAR/SAC, 2ND LEVEL Bilateral 3/19/2018    LUMBAR FACET MBB   @L3-4, L4-5, L5-S1  BILATERAL performed by Tyron Gottlieb MD at [address] DX/THER AGNT PARAVERT FACET JOINT, LUMBAR/SAC, 2ND LEVEL Bilateral 5/8/2018    LUMBAR FACET MBB   @L3-4, L4-5, L5-S1  BILATERAL performed by Tyron Gottlieb MD at [address] Bilateral 7/2/2018    LUMBAR RFA  Bilateral @L3-4,4-5,5-S1, performed by Tyron Gottlieb MD at [address] [facility] OFFICE/OUTPT [address] Right 10/2/2018    SACROILIAC JOINT INJECTION SI MBB RIGHT SIDE, performed by Tyron Gottlieb MD at [facility]  2016    [facility] Dr. Stone Valerio:  Social History     Tobacco Use    Smoking status: Passive Smoke Exposure - Never Smoker    Smokeless tobacco: Never Used   Substance Use Topics    Alcohol use: No     Alcohol/week: 0.0 standard drinks    Drug use: No     ALLERGIES:  Allergies   Allergen Reactions    Ibuprofen Other (See Comments)     Due to kidney failure     FAMILY HISTORY:  Family History   Problem Relation Age of Onset    Diabetes Mother     Heart Disease Mother     Stroke Mother     Diabetes Father     Heart Disease Father     Diabetes Sister     Stroke Sister     Thyroid Disease Brother      CURRENT MEDICATIONS:  Current Outpatient Medications   Medication Sig Dispense Refill    [START ON 8/8/2020] pregabalin (LYRICA) 150 MG capsule Take 1 capsule by mouth 3 times daily for 30 days. 90 capsule 0    insulin glargine (LANTUS SOLOSTAR) 100 UNIT/ML injection pen INJECT 35 UNITS SUBCUTANEOUSLY IN THE MORNING AND 29 UNITS SUBCUTANEOUSLY IN THE EVENING (Patient taking differently: INJECT 45 UNITS SUBCUTANEOUSLY IN THE MORNING AND 37 UNITS SUBCUTANEOUSLY IN THE EVENING) 15 mL 0    insulin lispro (HUMALOG) 100 UNIT/ML pen Inject 17 units before breakfast, 20 units before lunch and 28 units before dinner plus sliding scale. Patient uses a max of 96 units per day. 10 pen 5    Liraglutide (VICTOZA) 18 MG/3ML SOPN SC injection Inject 1.2 mg into the skin daily (Patient taking differently: Inject 1.8 mg into the skin daily ) 4 pen 5    DULoxetine (CYMBALTA) 30 MG extended release capsule Take 120 mg by mouth daily       acetaminophen (TYLENOL) 325 MG tablet Take 2 tablets by mouth every 4 hours as needed for Pain 120 tablet 3    calcium carbonate (OSCAL) 500 MG TABS tablet Take 650 mg by mouth daily Indications: 2 chews daily      metoprolol succinate (TOPROL XL) 50 MG extended release tablet Take 50 mg by mouth daily      meclizine (ANTIVERT) 25 MG tablet Take 50 mg by mouth 3 times daily as needed       lidocaine (XYLOCAINE) 5 % ointment Apply topically 3 times daily as needed for Pain Apply topically as needed.       spironolactone (ALDACTONE) 25 MG tablet Take 25 mg by mouth as needed       traZODone (DESYREL) 50 MG tablet Take 50 mg by mouth nightly Indications: 1/2 tab       Ascorbic Acid (VITAMIN C) 500 MG tablet Take 1,000 mg by mouth daily      Cyanocobalamin (VITAMIN B 12 PO) Take 500 mg by mouth daily       busPIRone (BUSPAR) 30 MG tablet Take 30 mg by mouth 3 times daily       lisinopril (PRINIVIL;ZESTRIL) 5 MG tablet Take 7.5 mg by mouth daily       pantoprazole (PROTONIX) 40 MG tablet Take 1 tablet by mouth daily 30 tablet 12    glucose blood VI test strips (FREESTYLE LITE) strip Check BS 4 times a day 400 each 1    Blood Glucose Monitoring Suppl (FREESTYLE LITE) EMILIE 1 Device by Does not apply route daily as needed 1 Device 0    aspirin 81 MG tablet Take 81 mg by mouth daily      ammonium lactate (AMLACTIN) 12 % cream Apply topically 2 times daily Apply topically as needed.  Multiple Vitamins-Minerals (MULTI FOR HER PO) Take by mouth daily      atorvastatin (LIPITOR) 40 MG tablet Take 40 mg by mouth daily      ferrous sulfate 325 (65 FE) MG tablet Take 325 mg by mouth nightly       Levothyroxine Sodium (SYNTHROID PO) Take 150 mcg by mouth 6 days a week  Does NOT take on Sundays      DOXEPIN HCL PO Take 200 mg by mouth nightly.  lamoTRIgine (LAMICTAL) 100 MG tablet Take 100 mg by mouth daily       metFORMIN (GLUCOPHAGE) 1000 MG tablet Take 1,000 mg by mouth 2 times daily (with meals) Indications: 1 tab if hr is >60 or SBP is >100        No current facility-administered medications for this visit. [facility]   Review of Systems   Constitutional: Negative for activity change, appetite change, chills and fever. HENT: Negative for congestion and postnasal drip. Eyes: Negative. Respiratory: Positive for shortness of breath. Negative for cough, chest tightness, wheezing and stridor. Cardiovascular: Positive for chest pain. Negative for leg swelling. Gastrointestinal: Negative for diarrhea and nausea. Endocrine: Negative. Genitourinary: Negative. Musculoskeletal: Positive for arthralgias and back pain. Skin: Negative. Allergic/Immunologic: Negative. Neurological: Negative. Negative for dizziness and light-headedness. Psychiatric/Behavioral: Negative. All other systems reviewed and are negative.        Physical exam   /68 (Site: Left Upper Arm, Position: Sitting, Cuff Size: Medium Adult)   Pulse 61   Temp 98.1 °F (36.7 °C)   Ht 5' (1.524 m)   Wt 212 lb 12.8 oz (96.5 kg)   SpO2 98% Comment: room air at rest  BMI 41.56 kg/m²    Neck Circumference -     Mallampati -     Physical Exam  Constitutional:       Appearance: She is well-developed. HENT:      Head: Normocephalic and atraumatic. Right Ear: External ear normal.      Left Ear: External ear normal.   Eyes:      Conjunctiva/sclera: Conjunctivae normal.      Pupils: Pupils are equal, round, and reactive to light. Neck:      Musculoskeletal: Normal range of motion and neck supple. Cardiovascular:      Rate and Rhythm: Normal rate and regular rhythm. Heart sounds: Normal heart sounds. Pulmonary:      Effort: Pulmonary effort is normal.      Breath sounds: Normal breath sounds. Musculoskeletal: Normal range of motion. Skin:     General: Skin is warm and dry. Neurological:      Mental Status: She is alert and oriented to person, place, and time. Psychiatric:         Behavior: Behavior normal.         Thought Content: Thought content normal.         Judgment: Judgment normal.          Test results   PFT's - no obstruction, restriction noted             Assessment      Diagnosis Orders   1. SERENA on CPAP     2. Morbid obesity with BMI of 40.0-44.9, adult ([address]     3. Restrictive lung disease secondary to obesity     4.  SOB (shortness of breath)           Plan   Discussed results of PFT's  No COPD- only restriction from obesity  Needs to focus on weight loss  Activity limited from pain  Continue CPAP for SERENA  Needs to continue with cardiology for chest pain  Will see [facility] in: 1 year for SERENA, no further eval for pulm    Bryn Powers PA-C, MPAS  8/3/2020

## 2020-08-17 ENCOUNTER — HOSPITAL ENCOUNTER (OUTPATIENT)
Age: 65
Discharge: HOME | End: 2020-08-17
Payer: MEDICARE

## 2020-08-17 PROCEDURE — U0002 COVID-19 LAB TEST NON-CDC: HCPCS

## 2020-08-18 LAB
PERFORMING LAB: NORMAL
REPORT: NORMAL
SARS-COV-2: NOT DETECTED

## 2020-08-20 ENCOUNTER — HOSPITAL ENCOUNTER (OUTPATIENT)
Age: 65
Setting detail: OUTPATIENT SURGERY
Discharge: HOME | End: 2020-08-20
Attending: PAIN MEDICINE | Admitting: PAIN MEDICINE
Payer: MEDICARE

## 2020-08-20 ENCOUNTER — APPOINTMENT (OUTPATIENT)
Dept: GENERAL RADIOLOGY | Age: 65
End: 2020-08-20
Attending: PAIN MEDICINE
Payer: MEDICARE

## 2020-08-20 ENCOUNTER — ANESTHESIA EVENT (OUTPATIENT)
Dept: OPERATING ROOM | Age: 65
End: 2020-08-20
Payer: MEDICARE

## 2020-08-20 ENCOUNTER — ANESTHESIA (OUTPATIENT)
Dept: OPERATING ROOM | Age: 65
End: 2020-08-20
Payer: MEDICARE

## 2020-08-20 VITALS
WEIGHT: 215 LBS | BODY MASS INDEX: 42.21 KG/M2 | HEIGHT: 60 IN | TEMPERATURE: 98.6 F | OXYGEN SATURATION: 95 % | RESPIRATION RATE: 14 BRPM | DIASTOLIC BLOOD PRESSURE: 63 MMHG | HEART RATE: 94 BPM | SYSTOLIC BLOOD PRESSURE: 132 MMHG

## 2020-08-20 VITALS
DIASTOLIC BLOOD PRESSURE: 74 MMHG | RESPIRATION RATE: 11 BRPM | OXYGEN SATURATION: 100 % | SYSTOLIC BLOOD PRESSURE: 164 MMHG

## 2020-08-20 LAB — GLUCOSE BLD-MCNC: 183 MG/DL (ref 70–108)

## 2020-08-20 PROCEDURE — 3700000000 HC ANESTHESIA ATTENDED CARE: Performed by: PAIN MEDICINE

## 2020-08-20 PROCEDURE — 6360000002 HC RX W HCPCS: Performed by: PAIN MEDICINE

## 2020-08-20 PROCEDURE — 82948 REAGENT STRIP/BLOOD GLUCOSE: CPT

## 2020-08-20 PROCEDURE — 2500000003 HC RX 250 WO HCPCS: Performed by: PAIN MEDICINE

## 2020-08-20 PROCEDURE — 3600000054 HC PAIN LEVEL 3 BASE: Performed by: PAIN MEDICINE

## 2020-08-20 PROCEDURE — 6360000004 HC RX CONTRAST MEDICATION: Performed by: PAIN MEDICINE

## 2020-08-20 PROCEDURE — 2580000003 HC RX 258: Performed by: NURSE ANESTHETIST, CERTIFIED REGISTERED

## 2020-08-20 PROCEDURE — 7100000010 HC PHASE II RECOVERY - FIRST 15 MIN: Performed by: PAIN MEDICINE

## 2020-08-20 PROCEDURE — 7100000011 HC PHASE II RECOVERY - ADDTL 15 MIN: Performed by: PAIN MEDICINE

## 2020-08-20 PROCEDURE — 64483 NJX AA&/STRD TFRM EPI L/S 1: CPT | Performed by: PAIN MEDICINE

## 2020-08-20 PROCEDURE — 3209999900 FLUORO FOR SURGICAL PROCEDURES

## 2020-08-20 PROCEDURE — 6360000002 HC RX W HCPCS: Performed by: NURSE ANESTHETIST, CERTIFIED REGISTERED

## 2020-08-20 PROCEDURE — 2709999900 HC NON-CHARGEABLE SUPPLY: Performed by: PAIN MEDICINE

## 2020-08-20 RX ORDER — FENTANYL CITRATE 50 UG/ML
INJECTION, SOLUTION INTRAMUSCULAR; INTRAVENOUS PRN
Status: DISCONTINUED | OUTPATIENT
Start: 2020-08-20 | End: 2020-08-20 | Stop reason: SDUPTHER

## 2020-08-20 RX ORDER — DEXAMETHASONE SODIUM PHOSPHATE 4 MG/ML
INJECTION, SOLUTION INTRA-ARTICULAR; INTRALESIONAL; INTRAMUSCULAR; INTRAVENOUS; SOFT TISSUE PRN
Status: DISCONTINUED | OUTPATIENT
Start: 2020-08-20 | End: 2020-08-20 | Stop reason: ALTCHOICE

## 2020-08-20 RX ORDER — SODIUM CHLORIDE 9 MG/ML
INJECTION, SOLUTION INTRAVENOUS CONTINUOUS PRN
Status: DISCONTINUED | OUTPATIENT
Start: 2020-08-20 | End: 2020-08-20 | Stop reason: SDUPTHER

## 2020-08-20 RX ORDER — LIDOCAINE HYDROCHLORIDE 10 MG/ML
INJECTION, SOLUTION INFILTRATION; PERINEURAL PRN
Status: DISCONTINUED | OUTPATIENT
Start: 2020-08-20 | End: 2020-08-20 | Stop reason: ALTCHOICE

## 2020-08-20 RX ORDER — BUPIVACAINE HYDROCHLORIDE 2.5 MG/ML
INJECTION, SOLUTION EPIDURAL; INFILTRATION; INTRACAUDAL PRN
Status: DISCONTINUED | OUTPATIENT
Start: 2020-08-20 | End: 2020-08-20 | Stop reason: ALTCHOICE

## 2020-08-20 RX ADMIN — FENTANYL CITRATE 50 MCG: 50 INJECTION, SOLUTION INTRAMUSCULAR; INTRAVENOUS at 10:33

## 2020-08-20 RX ADMIN — SODIUM CHLORIDE: 9 INJECTION, SOLUTION INTRAVENOUS at 10:31

## 2020-08-20 RX ADMIN — FENTANYL CITRATE 50 MCG: 50 INJECTION, SOLUTION INTRAMUSCULAR; INTRAVENOUS at 10:32

## 2020-08-20 ASSESSMENT — PULMONARY FUNCTION TESTS
PIF_VALUE: 0

## 2020-08-20 ASSESSMENT — ENCOUNTER SYMPTOMS: SHORTNESS OF BREATH: 1

## 2020-08-20 ASSESSMENT — PAIN DESCRIPTION - DESCRIPTORS: DESCRIPTORS: ACHING

## 2020-08-20 ASSESSMENT — PAIN - FUNCTIONAL ASSESSMENT: PAIN_FUNCTIONAL_ASSESSMENT: 0-10

## 2020-08-20 NOTE — ANESTHESIA PRE PROCEDURE
25 mg by mouth as needed    Yes Historical Provider, MD   traZODone (DESYREL) 50 MG tablet Take 50 mg by mouth nightly Indications: 1/2 tab    Yes Historical Provider, MD   Ascorbic Acid (VITAMIN C) 500 MG tablet Take 1,000 mg by mouth daily   Yes Historical Provider, MD   Cyanocobalamin (VITAMIN B 12 PO) Take 500 mg by mouth daily    Yes Historical Provider, MD   busPIRone (BUSPAR) 30 MG tablet Take 30 mg by mouth 3 times daily  10/14/16  Yes Historical Provider, MD   lisinopril (PRINIVIL;ZESTRIL) 5 MG tablet Take 7.5 mg by mouth daily    Yes Historical Provider, MD   pantoprazole (PROTONIX) 40 MG tablet Take 1 tablet by mouth daily 8/18/16  Yes Chirag Leonard MD   aspirin 81 MG tablet Take 81 mg by mouth daily   Yes Historical Provider, MD   Multiple Vitamins-Minerals (MULTI FOR HER PO) Take by mouth daily   Yes Historical Provider, MD   atorvastatin (LIPITOR) 40 MG tablet Take 40 mg by mouth daily   Yes Historical Provider, MD   ferrous sulfate 325 (65 FE) MG tablet Take 325 mg by mouth nightly    Yes Historical Provider, MD   Levothyroxine Sodium (SYNTHROID PO) Take 150 mcg by mouth 6 days a week  Does NOT take on Sundays   Yes Historical Provider, MD   DOXEPIN HCL PO Take 200 mg by mouth nightly. Yes Historical Provider, MD   lamoTRIgine (LAMICTAL) 100 MG tablet Take 100 mg by mouth daily    Yes Historical Provider, MD   metFORMIN (GLUCOPHAGE) 1000 MG tablet Take 1,000 mg by mouth 2 times daily (with meals) Indications: 1 tab if hr is >60 or SBP is >100    Yes Historical Provider, MD   lidocaine (XYLOCAINE) 5 % ointment Apply topically 3 times daily as needed for Pain Apply topically as needed.     Historical Provider, MD   glucose blood VI test strips (FREESTYLE LITE) strip Check BS 4 times a day 4/5/16   Joslyn Gonzales MD   Blood Glucose Monitoring Suppl (FREESTYLE LITE) EMILIE 1 Device by Does not apply route daily as needed 4/5/16   Joslyn Gonzales MD   ammonium lactate (AMLACTIN) 12 % cream Apply topically 2 times daily Apply topically as needed. Historical Provider, MD       Current medications:    No current facility-administered medications for this encounter. Allergies: Allergies   Allergen Reactions    Ibuprofen Other (See Comments)     Due to kidney failure       Problem List:    Patient Active Problem List   Diagnosis Code    Dyspnea R06.00    Morbid obesity ([address] E66.01    Excessive sleepiness G47.10    Type 2 diabetes mellitus with insulin therapy ([address] E11.9, Z79.4    Atypical chest pain R07.89    Hyperlipidemia E78.5    Back pain M54.9    Depression F32.9    Gastroesophageal reflux K21.9    Hypertension I10    Incontinence of urine R32    CKD (chronic kidney disease) stage 3, GFR 30-59 ml/min ([facility]) N18.3    Diarrhea R19.7    Collagenous colitis K52.831    SERENA on CPAP G47.33, Z99.89    Pharyngoesophageal dysphagia R13.14    Status post dilatation of esophageal stricture Z98.890, Z87.19    SIRS (systemic inflammatory response syndrome) ([facility]) R65.10    Hypomagnesemia E83.42    Chronic diastolic CHF (congestive heart failure) ([facility]) I50.32    Essential hypertension I10    Hypothyroidism E03.9    Obesity (BMI 30-39. 9) E66.9    Lumbar spondylosis M47.816    Sacroiliac inflammation ([facility]) M46.1    Disc displacement, lumbar M51.26    Leukocytosis D72.829    Elevated lactic acid level R79.89    KEENA (acute kidney injury) ([address] N17.9    Coronary artery disease involving native coronary artery of native heart without angina pectoris I25.10    Lumbar disc displacement without myelopathy M51.26    Lumbar radiculopathy M54.16       Past Medical History:        Diagnosis Date    Back pain     with radiation    CAD (coronary artery disease)     Chronic diastolic CHF (congestive heart failure) ([facility]) 7/21/2017    Colitis     Depression     Depression     Gastroesophageal reflux     GERD (gastroesophageal reflux disease)     H/O endoscopy     stretch the eso    Hyperlipidemia  Hypertension     Hypertension     Hypothyroidism     Incontinence of urine     Lordosis (acquired) (postural)     Spondylosis of unspecified site without mention of myelopathy     Thoracic or lumbosacral neuritis or radiculitis, unspecified     Type II or unspecified type diabetes mellitus without mention of complication, not stated as uncontrolled     diagnosed 2006    Unspecified sleep apnea      cpap mask    Urinary incontinence        Past Surgical History:        Procedure Laterality Date    ABDOMEN SURGERY  1980'S    LAPAROSCOPY    CARDIAC CATHETERIZATION  2014    CARDIOVASCULAR STRESS TEST  6/2015    was not positive but proceeding cath   [address] Bilateral 1970'S   [place]  2010, 2016    DILATION AND CURETTAGE OF UTERUS      HC INJECTION PROCEDURE FOR SACROILIAC JOINT Right 12/7/2018    SACROILIAC JOINT INJECTION Right SI MBB #2, performed by Christos Rg MD at [address] [facility]  2002    Total    [facility] Bilateral 8/22/2019    LESI L3 #1 performed by Christos Rg MD at [address] [facility] Bilateral 6/27/2019    Lumbar RFA Bilateral L4-5,L5-S1 performed by Christos Rg MD at [facility] 98 Bilateral 05/08/2018    LUMBAR FACET MBB L3-4, L4-5, L5-S1    NERVE SURGERY Bilateral 10/4/2019    LESI L3 #2 performed by [facility]os Rg MD at [facility] 104 Bilateral 03/19/2018    Lumbar Facet MBB L3-4, L4-5, L5-S1    PAIN MANAGEMENT PROCEDURE Bilateral 1/20/2020    TFESI L5 LEFT #1 performed by Christos Rg MD at [address] [place] [address] DX/THER AGNT PARAVERT FACET JOINT, LUMBAR/SAC, 2ND LEVEL Bilateral 3/19/2018    LUMBAR FACET MBB   @L3-4, L4-5, L5-S1  BILATERAL performed by Christos Rg MD at [address] [place] [address] DX/THER AGNT [address] [place], 07/08/2020    CO2 23 07/08/2020    BUN 11 07/08/2020    CREATININE 0.97 07/08/2020    GFRAA >60 07/08/2020    LABGLOM 58 07/08/2020    LABGLOM 63 06/03/2020    GLUCOSE 163 07/08/2020    GLUCOSE 74 01/24/2018    PROT 6.7 06/03/2020    CALCIUM 8.5 07/08/2020    BILITOT 0.3 06/03/2020    ALKPHOS 144 06/03/2020    AST 24 06/03/2020    ALT 31 06/03/2020       POC Tests: No results for input(s): POCGLU, POCNA, POCK, POCCL, POCBUN, POCHEMO, POCHCT in the last 72 hours. Coags: No results found for: PROTIME, INR, APTT    HCG (If Applicable): No results found for: PREGTESTUR, PREGSERUM, HCG, HCGQUANT     ABGs: No results found for: PHART, PO2ART, UNO3IQY, AUL0XQG, BEART, F9GUIIIC     Type & Screen (If Applicable):  Lab Results   Component Value Date    LABRH POS 07/24/2015       Drug/Infectious Status (If Applicable):  No results found for: HIV, HEPCAB    COVID-19 Screening (If Applicable):   Lab Results   Component Value Date    COVID19 NOT DETECTED 08/17/2020         Anesthesia Evaluation    Airway: Mallampati: III       Mouth opening: > = 3 FB Dental:          Pulmonary:   (+) shortness of breath:  sleep apnea:                             Cardiovascular:    (+) hypertension:, CAD:, CHF:,                   Neuro/Psych:   (+) neuromuscular disease:, psychiatric history:            GI/Hepatic/Renal:   (+) GERD:,           Endo/Other:    (+) Diabetes, hypothyroidism: arthritis:., .                 Abdominal:   (+) obese,         Vascular:                                        Anesthesia Plan      MAC     ASA 4             Anesthetic plan and risks discussed with patient. Plan discussed with CRNA.                   Efraín Murray MD   8/20/2020

## 2020-08-20 NOTE — PROGRESS NOTES
1040: Patient arrived to Phase II recovery via cart. Awake and alert. Report received from surgical RN.  8517: Patient's /69 to R upper arm. Patient denies pain. Requesting to rest at this time. Will reassess BP prior to discharge. Pedal push and pull strong bilaterally and patient denies numbness and tingling. Call light within reach. 1100: Patient awake and HOB elevated. Snack and drink provided. IVF continues to infuse and site remains clean, dry and intact. 1115: Patient's BP rechecked and reading 132/63.   1140: IV removed without complications. Bandaid applied. Patient dressed at bedside. 1145: Discharged via wheelchair to vehicle in stable condition home with .

## 2020-08-20 NOTE — OP NOTE
Operative Note  Pre-Procedure Note    Patient Name: Long Garcia   YOB: 1955  Medical Record Number: [id number]  Date: 20       Indication:  Lower back and LLE pain    Consent: On file. Vital Signs:   Vitals:    20 0941   BP: (!) 148/68   Pulse: 87   Resp: 16   Temp: 97.1 °F (36.2 °C)   SpO2: 98%       Past Medical History:   has a past medical history of Back pain, CAD (coronary artery disease), Chronic diastolic CHF (congestive heart failure) ([facility] Colitis, Depression, Depression, Gastroesophageal reflux, GERD (gastroesophageal reflux disease), H/O endoscopy, Hyperlipidemia, Hypertension, Hypertension, Hypothyroidism, Incontinence of urine, Lordosis (acquired) (postural), Spondylosis of unspecified site without mention of myelopathy, Thoracic or lumbosacral neuritis or radiculitis, unspecified, Type II or unspecified type diabetes mellitus without mention of complication, not stated as uncontrolled, Unspecified sleep apnea, and Urinary incontinence. Past Surgical History:   has a past surgical history that includes  section (); Carpal tunnel release (Bilateral, ); Abdomen surgery (); Dilation and curettage of uterus; Hysterectomy (); cardiovascular stress test (2015); Colonoscopy (, ); Upper gastrointestinal endoscopy (); Cardiac catheterization (); other surgical history (Bilateral, 2018); pr inj dx/ther agnt paravert facet joint, lumbar/sac, 2nd level (Bilateral, 3/19/2018); Nerve Surgery (Bilateral, 2018); pr inj dx/ther agnt paravert facet joint, lumbar/sac, 2nd level (Bilateral, 2018); pr inject rx other periph nerve (Bilateral, 2018); pr office/outpt visit,procedure only (Right, 10/2/2018); Injection Procedure For Sacroiliac Joint (Right, 2018); Lumbar spine surgery (Bilateral, 2019); lumbar nerve block (Bilateral, 2019);  Nerve Surgery (Bilateral, 10/4/2019); and Pain management procedure

## 2020-08-20 NOTE — H&P
[address] [address]  History and Physical Update    Pt Name: Kelsy Best  MRN: [id number]  YOB: 1955  Date of evaluation: 8/20/2020      I have examined the patient and reviewed the H&P/Consult and there are no changes to the patient or plans.         Electronically signed by Rush Baker MD on 8/20/2020 at 9:32 AM

## 2020-08-20 NOTE — ANESTHESIA POSTPROCEDURE EVALUATION
Department of Anesthesiology  Postprocedure Note    Patient: Zac Thakkar  MRN: [id number]  YOB: 1955  Date of evaluation: 8/20/2020  Time:  11:10 AM     Procedure Summary     Date:  08/20/20 Room / Location:  [address] [place] [address] 03 / [address] [facility]    Anesthesia Start:  1031 Anesthesia Stop:  3365    Procedure:  TFESI L5 LEFT #2 (Left ) Diagnosis:  (disc displacement, lumbar)    Surgeon:  Omar Diallo MD Responsible Provider:  Tesha Gee MD    Anesthesia Type:  MAC ASA Status:  4          Anesthesia Type: MAC    Will Phase I:      Will Phase II: Will Score: 10    Last vitals: Reviewed and per EMR flowsheets.        Anesthesia Post Evaluation

## 2020-09-08 ENCOUNTER — NURSE ONLY (OUTPATIENT)
Dept: LAB | Age: 65
End: 2020-09-08

## 2020-09-08 ENCOUNTER — OFFICE VISIT (OUTPATIENT)
Dept: PHYSICAL MEDICINE AND REHAB | Age: 65
End: 2020-09-08
Payer: MEDICARE

## 2020-09-08 VITALS
DIASTOLIC BLOOD PRESSURE: 82 MMHG | TEMPERATURE: 97.4 F | SYSTOLIC BLOOD PRESSURE: 126 MMHG | BODY MASS INDEX: 42.21 KG/M2 | WEIGHT: 215 LBS | HEIGHT: 60 IN

## 2020-09-08 LAB
C-REACTIVE PROTEIN: 1.03 MG/DL (ref 0–1)
RHEUMATOID FACTOR: < 10 IU/ML (ref 0–13)
SEDIMENTATION RATE, ERYTHROCYTE: 26 MM/HR (ref 0–20)
TOTAL CK: 102 U/L (ref 30–135)
URIC ACID: 6.7 MG/DL (ref 2.4–5.7)

## 2020-09-08 PROCEDURE — 99214 OFFICE O/P EST MOD 30 MIN: CPT | Performed by: NURSE PRACTITIONER

## 2020-09-08 ASSESSMENT — ENCOUNTER SYMPTOMS
SHORTNESS OF BREATH: 1
CONSTIPATION: 0
BACK PAIN: 1
ABDOMINAL PAIN: 0
COLOR CHANGE: 0

## 2020-09-08 NOTE — PROGRESS NOTES
[address] [facility]  [address]  Dept: [phone]  Dept Fax: [phone]: [phone]    Visit Date: 9/8/2020    Functionality Assessment/Goals Worksheet     On a scale of 0 (Does not Interfere) to 10 (Completely Interferes)     1. Which number describes how during the past week pain has interfered with       the following:  A. General Activity:  10  B. Mood: 10  C. Walking Ability:  10  D. Normal Work (Includes both work outside the home and housework):  10  E. Relations with Other People:   10  F. Sleep:   9  G. Enjoyment of Life:   10    2. Patient Prefers to Take their Pain Medications:     [x]  On a regular basis   []  Only when necessary    []  Does not take pain medications    3. What are the Patient's Goals/Expectations for Visiting Pain Management? []  Learn about my pain    []  Receive Medication   []  Physical Therapy     []  Treat Depression   []  Receive Injections    []  Treat Sleep   []  Deal with Anxiety and Stress   []  Treat Opoid Dependence/Addiction   [x]  Other:  Help reduce my pain  What other options do I have? Besides shots        HPI:   Corrinne Hailstone is a 60 y.o. female is here today for    Chief Complaint: Low back pain and Left leg pain    HPI     Transforaminal lumbar epidural steroid injection at the levels of L5 on the left side under fluoroscopic guidance with Dr Lina Newsome on 8/20/2020. Patient state that the injection only last a couple days then pain returned patient states that both thighs and both hips are also hurting. Patient states these are \"worse than ever\". Patient with main complaints of radicular pain and numbness. Taking oral CBD. LESi #2 no relief  Previous lumbar RFA was helpful, but repeat this year had no relief. First TFESI with great relief, repeat without good result.    SI MBB with short term relief, but insurance won't cover RFA. Continues on Lyrica 150mg TID. Cymbalta 120mg daily. Lamictal, Buspar and trazodone. Gabapentin hasn't worked in the past either. Saw Dr Geoff Borjas in 2017. Discussed second opinion with another surgeon. Patient will consider. Discussed with patient about aquatic therapy, she is open to this. Also asked about acupuncture as jennifereye covers some treatment. Patient asking about if other things should be considered. Discussed arthritis, states grandmother had RA. Patient with continued chronic pain without significant findings. Will send lab work for autoimmune work up. Also discussed treatment for fibromyalgia and already on medications which would treat this, but no upper body or arm pain. Medications reviewed. PROCEDURE: MRI LUMBAR SPINE WO CONTRAST         CLINICAL INFORMATION: Disc displacement, lumbar, Lumbar radiculopathy. Bilateral leg pain and weakness for 8 months.         COMPARISON: None available. Correlation is made to radiographs of the lumbar spine dated May 9, 2017.         TECHNIQUE: Sagittal and axial T1 and T2-weighted images were obtained through the lumbar spine.         FINDINGS:         The lumbar spine is imaged from T11 to the inferior sacrum. The conus medullaris terminates at the L1-L2 level. No abnormal signal or expansion is present within the conus. The visualized nerve roots are evenly distributed throughout the thecal sac.         There is preservation of the expected lumbar lordosis. Minimal retrolisthesis is present of L2 on L3. Type I Modic changes are also present at the endplates of L2 on L3. A hemangioma is noted within the inferior aspect of the L4 vertebral body. No acute    compression fracture deformity or suspicious marrow replacing lesion is identified.         Degenerative facet arthropathy is present at every level within the lumbar spine.  With regards to the disc spaces, there is suggestion of a disc extrusion at the T11-T12 level which extends cranially and causes spinal canal narrowing. This is not further     evaluated on the axial sequences.         At L1-L2, there is disc space narrowing and disc desiccation. The spinal canal and neural foramina are patent.         At L2-L3, there is a disc osteophyte complex which flattens the ventral thecal sac and causes mild spinal canal narrowing. Mild neural foraminal narrowing is present bilaterally.         At L3-L4, there is a disc osteophyte complex. The spinal canal and neural foramina are patent.         At L4-L5, there is a disc osteophyte complex which flattens the ventral thecal sac without causing significant spinal canal narrowing. The neural foramina are patent.         At L5-S1, the spinal canal is patent. Mild neural foraminal narrowing is present bilaterally.         No suspicious finding is identified within the visualized retroperitoneal and paraspinal soft tissues.              Impression         1. There is suggestion of a disc extrusion at the T11-T12 level which extends cranially and causes spinal canal narrowing which is not further evaluated on the axial sequences. Further evaluation with a dedicated MRI of the thoracic spine may be    considered, if clinically warranted.         2. Multilevel degenerative changes are present throughout the lumbar spine and are further discussed by level in the findings. These are most significant at L2-L3 where there is a disc osteophyte complex causing mild spinal canal narrowing. Degenerative    facet arthropathy is also present which contributes to mild neural foraminal narrowing bilaterally at L2-L3 and L5-S1.               PROCEDURE: MRI THORACIC SPINE WO CONTRAST         CLINICAL INFORMATION: Acute bilateral thoracic back pain. Back pain and bilateral leg pain and weakness.         COMPARISON: CT chest dated 7/21/2017.         TECHNIQUE: Sagittal T1, T2 and STIR sequences were obtained through the thoracic spine.  Axial T2-weighted images were obtained through the discs.         FINDINGS:    Ricka Backers is anatomic vertebral body height and alignment. There is a small Schmorl's node at the inferior endplate of T9, stable compared to prior CT. There are a few scattered focal areas of hyperintense T1 and T2 signal is evidence for hemangiomas. Marrow signal otherwise appears within normal limits. The thoracic spinal cord is normal in caliber without focal area of abnormal T2 signal identified. Paraspinal soft tissues are unremarkable. The T3-4 there is a minimal disc bulge without significant spinal canal or neuroforaminal stenosis. At T5-6 there is a minimal disc bulge without significant spinal canal or neuroforaminal stenosis. At T9-10 there is a minimal disc bulge without significant spinal canal stenosis and moderate bilateral neural foraminal stenosis in association with facet hypertrophy and ligament flavum thickening. At T11-12 there is a right paracentral extrusion which extends cephalad to the mid T11 vertebral body and causes mild spinal canal stenosis and appears to contact the ventral aspect of spinal cord without abnormal signal in the cord. There is no    significant neural foraminal stenosis. There is no significant spinal canal or neuroforaminal stenosis at any other thoracic level.              Impression     Right paracentral extrusion at T11-12 which causes mild spinal canal stenosis and appears to contact the ventral aspect of the cord without abnormal signal in the cord. The patientis allergic to ibuprofen.     Past Medical History  Nicki Phillips  has a past medical history of Back pain, CAD (coronary artery disease), Chronic diastolic CHF (congestive heart failure) ([address] Colitis, Depression, Depression, Gastroesophageal reflux, GERD (gastroesophageal reflux disease), H/O endoscopy, Hyperlipidemia, Hypertension, Hypertension, Hypothyroidism, Incontinence of urine, Lordosis (acquired) (postural), Spondylosis of unspecified site without mention of myelopathy, Thoracic or lumbosacral neuritis or radiculitis, unspecified, Type II or unspecified type diabetes mellitus without mention of complication, not stated as uncontrolled, Unspecified sleep apnea, and Urinary incontinence. Past Surgical History  The patient  has a past surgical history that includes  section (); Carpal tunnel release (Bilateral, ); Abdomen surgery (); Dilation and curettage of uterus; Hysterectomy (); cardiovascular stress test (2015); Colonoscopy (, ); Upper gastrointestinal endoscopy (); Cardiac catheterization (); other surgical history (Bilateral, 2018); pr inj dx/ther agnt paravert facet joint, lumbar/sac, 2nd level (Bilateral, 3/19/2018); Nerve Surgery (Bilateral, 2018); pr inj dx/ther agnt paravert facet joint, lumbar/sac, 2nd level (Bilateral, 2018); pr inject rx other periph nerve (Bilateral, 2018); pr office/outpt visit,procedure only (Right, 10/2/2018); Injection Procedure For Sacroiliac Joint (Right, 2018); Lumbar spine surgery (Bilateral, 2019); lumbar nerve block (Bilateral, 2019); Nerve Surgery (Bilateral, 10/4/2019); Pain management procedure (Bilateral, 2020); and Pain management procedure (Left, 2020). Family History  This patient's family history includes Diabetes in her father, mother, and sister; Heart Disease in her father and mother; Stroke in her mother and sister; Thyroid Disease in her brother. Social History  Akosua Keen  reports that she is a non-smoker but has been exposed to tobacco smoke. She has never used smokeless tobacco. She reports that she does not drink alcohol or use drugs.     Medications    Current Outpatient Medications:     insulin glargine (LANTUS SOLOSTAR) 100 UNIT/ML injection pen, INJECT 35 UNITS SUBCUTANEOUSLY IN THE MORNING AND 29 UNITS SUBCUTANEOUSLY IN THE EVENING (Patient taking differently: INJECT 45 UNITS SUBCUTANEOUSLY IN THE MORNING AND 37 UNITS SUBCUTANEOUSLY IN THE EVENING), Disp: 15 mL, Rfl: 0    insulin lispro (HUMALOG) 100 UNIT/ML pen, Inject 17 units before breakfast, 20 units before lunch and 28 units before dinner plus sliding scale. Patient uses a max of 96 units per day., Disp: 10 pen, Rfl: 5    Liraglutide (VICTOZA) 18 MG/3ML SOPN SC injection, Inject 1.2 mg into the skin daily (Patient taking differently: Inject 1.8 mg into the skin daily ), Disp: 4 pen, Rfl: 5    DULoxetine (CYMBALTA) 30 MG extended release capsule, Take 120 mg by mouth daily , Disp: , Rfl:     acetaminophen (TYLENOL) 325 MG tablet, Take 2 tablets by mouth every 4 hours as needed for Pain, Disp: 120 tablet, Rfl: 3    calcium carbonate (OSCAL) 500 MG TABS tablet, Take 650 mg by mouth daily Indications: 2 chews daily, Disp: , Rfl:     metoprolol succinate (TOPROL XL) 50 MG extended release tablet, Take 50 mg by mouth daily, Disp: , Rfl:     meclizine (ANTIVERT) 25 MG tablet, Take 50 mg by mouth 3 times daily as needed , Disp: , Rfl:     lidocaine (XYLOCAINE) 5 % ointment, Apply topically 3 times daily as needed for Pain Apply topically as needed. , Disp: , Rfl:     spironolactone (ALDACTONE) 25 MG tablet, Take 25 mg by mouth as needed , Disp: , Rfl:     traZODone (DESYREL) 50 MG tablet, Take 50 mg by mouth nightly Indications: 1/2 tab , Disp: , Rfl:     Ascorbic Acid (VITAMIN C) 500 MG tablet, Take 1,000 mg by mouth daily, Disp: , Rfl:     Cyanocobalamin (VITAMIN B 12 PO), Take 500 mg by mouth daily , Disp: , Rfl:     busPIRone (BUSPAR) 30 MG tablet, Take 30 mg by mouth 3 times daily , Disp: , Rfl:     lisinopril (PRINIVIL;ZESTRIL) 5 MG tablet, Take 7.5 mg by mouth daily , Disp: , Rfl:     pantoprazole (PROTONIX) 40 MG tablet, Take 1 tablet by mouth daily, Disp: 30 tablet, Rfl: 12    glucose blood VI test strips (FREESTYLE LITE) strip, Check BS 4 times a day, Disp: 400 each, Rfl: 1    Blood Glucose Monitoring Suppl (FREESTYLE LITE) EMILIE, 1 Device by Does not apply route daily as needed, Disp: 1 Device, Rfl: 0    aspirin 81 MG tablet, Take 81 mg by mouth daily, Disp: , Rfl:     ammonium lactate (AMLACTIN) 12 % cream, Apply topically 2 times daily Apply topically as needed. , Disp: , Rfl:     Multiple Vitamins-Minerals (MULTI FOR HER PO), Take by mouth daily, Disp: , Rfl:     atorvastatin (LIPITOR) 40 MG tablet, Take 40 mg by mouth daily, Disp: , Rfl:     ferrous sulfate 325 (65 FE) MG tablet, Take 325 mg by mouth nightly , Disp: , Rfl:     Levothyroxine Sodium (SYNTHROID PO), Take 150 mcg by mouth 6 days a week Does NOT take on Sundays, Disp: , Rfl:     DOXEPIN HCL PO, Take 200 mg by mouth nightly., Disp: , Rfl:     lamoTRIgine (LAMICTAL) 100 MG tablet, Take 100 mg by mouth daily , Disp: , Rfl:     metFORMIN (GLUCOPHAGE) 1000 MG tablet, Take 1,000 mg by mouth 2 times daily (with meals) Indications: 1 tab if hr is >60 or SBP is >100 , Disp: , Rfl:     pregabalin (LYRICA) 150 MG capsule, Take 1 capsule by mouth 3 times daily for 30 days. , Disp: 90 capsule, Rfl: 0    Subjective:      Review of Systems   Constitutional: Positive for activity change and fatigue. Negative for chills and fever. Respiratory: Positive for shortness of breath (with activity). Gastrointestinal: Negative for abdominal pain and constipation. Musculoskeletal: Positive for arthralgias, back pain, gait problem and myalgias. Skin: Negative for color change, pallor and rash. Neurological: Negative for numbness. Psychiatric/Behavioral: Positive for sleep disturbance. Objective:     Vitals:    09/08/20 1022   BP: 126/82   Temp: 97.4 °F (36.3 °C)   Weight: 215 lb (97.5 kg)   Height: 5' (1.524 m)       Physical Exam  Constitutional:       General: She is not in acute distress. Appearance: She is well-developed. She is not diaphoretic. HENT:      Head: Normocephalic and atraumatic.       Right Ear: External ear normal.      Left Ear: External ear normal.   Eyes:      General:         Right eye: No discharge. Left eye: No discharge. Neck:      Musculoskeletal: Normal range of motion and neck supple. Pulmonary:      Effort: Pulmonary effort is normal. No respiratory distress. Musculoskeletal:         General: Tenderness present. Lumbar back: She exhibits decreased range of motion, tenderness and pain. Back:         Legs:    Skin:     General: Skin is warm and dry. Findings: No erythema. Neurological:      Mental Status: She is alert and oriented to person, place, and time. Psychiatric:         Behavior: Behavior normal.         Thought Content: Thought content normal.         Judgment: Judgment normal.            Assessment:     1. Disc displacement, lumbar    2. Lumbar radiculopathy    3. DDD (degenerative disc disease), lumbar    4. Spondylosis of lumbar region without myelopathy or radiculopathy    5. Lumbar pain    6. SI (sacroiliac) joint inflammation (HCC)    7. Acute bilateral thoracic back pain    8. Chronic pain syndrome    9. Myalgia            Plan:      OARRS reviewed. Current MED: 0  Patient was not offered naloxone for home. Discussed long term side effects of medications, tolerance, dependency and addiction. Previous UDS reviewed  Patient told can not receive any pain medications from any other source. No evidence of abuse, diversion or aberrant behavior. Medications and/or procedures to improve function and quality of life- patient understanding with this and that may not be pain free  Discussed with patient about safe storage of medications at home  Discussed possible weaning of medication dosing dependent on treatment/procedure results. Testing: arthritis workup  Procedures: none  Referral for PT aquatic  Discussed with patient about risks with procedure including infection, reaction to medication, increased pain, or bleeding. Medications: continue lyrica 150mg TID       Meds. Prescribed:   No orders of the defined types were placed in this encounter. Return in about 6 weeks (around 10/20/2020) for follow up after PT.          Electronically signed by RAIMUNDO Greco CNP on9/8/2020 at 11:19 AM

## 2020-09-09 ENCOUNTER — HOSPITAL ENCOUNTER (OUTPATIENT)
Dept: WOMENS IMAGING | Age: 65
Discharge: HOME | End: 2020-09-09
Payer: MEDICARE

## 2020-09-09 PROCEDURE — 77063 BREAST TOMOSYNTHESIS BI: CPT

## 2020-09-11 LAB — ANA SCREEN: NORMAL

## 2020-09-14 ENCOUNTER — HOSPITAL ENCOUNTER (OUTPATIENT)
Dept: PHYSICAL THERAPY | Age: 65
Setting detail: THERAPIES SERIES
Discharge: HOME | End: 2020-09-14
Payer: MEDICARE

## 2020-09-14 PROCEDURE — 97162 PT EVAL MOD COMPLEX 30 MIN: CPT

## 2020-09-14 NOTE — FLOWSHEET NOTE
** PLEASE SIGN, DATE AND TIME CERTIFICATION BELOW AND RETURN TO [facility] OUTPATIENT REHABILITATION (FAX #: [phone]). ATTEST/CO-SIGN IF ACCESSING VIA [employer]. THANK YOU.**    I certify that I have examined the patient below and determined that Physical Medicine and Rehabilitation service is necessary and that I approve the established plan of care for up to 90 days or as specifically noted. Attestation, signature or co-signature of physician indicates approval of certification requirements.    ________________________ ____________ __________  Physician Signature   Date   Time  [address] [facility]  PHYSICAL THERAPY  [x] EVALUATION  [] DAILY NOTE (LAND) [] DAILY NOTE (AQUATIC ) [] PROGRESS NOTE [] DISCHARGE NOTE    [x] OUTPATIENT REHABILITATION [facility]   [] [place] [address]    [] [facility]   [] Aleyda Edward    Date: 2020  Patient Name:  Thania Romero  : 1955  MRN: [id number]    Referring Practitioner Nicolas Butt Lips, APR*   Diagnosis Other intervertebral disc displacement, lumbar region [M51.26]  Radiculopathy, lumbar region [M54.16]  Other intervertebral disc degeneration, lumbar region [M51.36]  Spondylosis without myelopathy or radiculopathy, lumbar region [M47.816]  Low back pain [M54.5]  Chronic pain syndrome [G89.4]    Treatment Diagnosis Low back pain with LE radiculopathy aggravated with standing and walking   Date of Evaluation 20    Additional Pertinent History DM, Spondylosis, HTN, chronic diastolic CHF, CAD, incontinence with urgency of urine, depression, back pain, history of SI injections, Lumbar RFA, Stage 3 kidney failure. Stage 1 heart failure. Vertigo, lightheadedness symptoms with sit to stand.        Functional Outcome Measure Used Oswestry    Functional Outcome Score  56% (20)       Insurance: Primary: Payor: Fei De Guzman /  /  / ,   Secondary:    Authorization Information: Precertification required, eval only approved. Precert is needed with Andrey Cotton for further authorization of treatment. Aquatic yes, modalities yes, telehealth not covered. Visit # 1, 1/10 for progress note   Visits Allowed:    Recertification Date: 08/2/9298   Physician Follow-Up:    Physician Orders: Aquatic therapy for lumbar pain and radiculopathy. History of Present Illness: Chronic back pain for past 5 years. Follows with pain management for back and LE radiculopathy, history of PT at [facility].  12 visits but back pain worsened. SUBJECTIVE: Patient has had back pain for past 5 years. At presents having 10/10 pain across low back left LE to posterior calf region. Bilateral buttock/mid gluteal pain as well as lateral thighs. Pins and needles. Partial numbness/tingling in feet due to neuropathy. Standing and walking aggravate her symptoms. Bending forward aggravates her back and leg symptoms. Sitting pain level is less but at an 8/10. Patient reports that PT has not helped in the past. She has had ablations and nerve blocks and has received no relief from these either. To manage pain she takes Tylenol occasionally. Patient on Lyrica, Cymbalta, Trazadone. for pain. MRI and Xrays completed. Has rollator but does not use at home or at present. Social/Functional History and Current Status:  Medications and Allergies have been reviewed and are listed on Medical History Questionnaire. Amadeo Knox lives with spouse in a multiple floor home with ability to complete ADL's on main floor with a ramp to enter.     Task Previous Current   ADLs  Independent Independent   IADL's Independent Modified Independent   Ambulation Independent Independent   Transfers Modified Independent Modified Independent   Recreation Dependent/Unable Dependent/Unable sewing, knitting, cross stitch. quilting   Community Integration Not Applicable Not Applicable   Driving Active  Active    Work On Disability  On Disability     OBJECTIVE:    Pain: 10/10    Palpation Note moderate tension and muscle gaurding bilateral lumbar region. Mid gluteal . Note tenderness right>left lateral hip and glut medius region. Observation Noted mild gaurding with sit<>stand with heavy use of UEs on armrests of chair. Mild gaurding supine to sit from mat table with therapist assist.    Posture Note moderate forward shoulder. Flattened lumbar posture with reduced lumbar lordosis. Range of Motion Major restriction with lumbar flexion seated and standing. Extension major restriction. LE ROM right hip flexion 85 degrees, left hip flexion 80 degrees. SLR 30-40 degrees right LE, 40 degrees LLE. Strength Hip flexion painful so MMT not completed. Knee quads and hamstrings 4/5, ankle df 4/5. Bilaterally. Sensation Impaired bilateral feet. Bed Mobility Sit to supine independent, supine to sit min assist +1 person   Transfers Sit<>stand heavy lean of UEs on armrests of chair delayed timing to completed. Ambulation Patient ambulates with decreased trunk rotation and armswing. Note ambulates with slow minh. Mild forward flexed trunk, forward shoulders. Shortened step length           Special Tests Note movement testing: flexion increases and aggravates symptoms as well as lumbar extension. Prefers to lay in supine hooklying or sidelying position with knees flexed. TREATMENT   Precautions: Optimal neutral posture of lumbar spine. DM, history of cardiac and kidney stage III. Pain: 10/10     X in shaded column indicates activity completed today   Modalities Parameters/  Location  Notes                     Manual Therapy Time/Technique  Notes                     Exercise/Intervention   Notes   Aquatic therapy guidelines reviewed with patient    x    Pool consent signed and pool tour given.     x           Core engagement with abdominals    x Advised patient to practice this in prep for pool to work on optimal lumbar neutral;                                                       Specific Interventions Next Treatment: Therapeutic pool with aquatic exercise. Core strengthening, LE ROM painfree ranges, Progress to on land as pain managed and able to progress. modalities    Activity/Treatment Tolerance:Discussed abdominal engagement with core work. May practice in sitting, standing, walking  [x]  Patient tolerated treatment well  []  Patient limited by fatigue  []  Patient limited by pain   []  Patient limited by medical complications  []  Other:     Assessment: Patient referred to PT for lumbar pain with radiculopathy. Patient having increased pain symptoms with standing and walking and less pain with sitting. Note major restriction with lumbar ROM. Note slight forward flexed trunk in standing and walking. Note decreased abdominal strength. Difficulty with transitions from sit to stand. Mild gaurding with bed mobility supine to sit. Independent with bed mobility sit to supine using logroll technique. Note decreased functional strength of LEs with sit<>stand, core with bed mobility. Will follow 2x per week to address pain management, mobility, strength as noted above. Initiate therapeutic pool exercises in next session. Body Structures/Functions/Activity Limitations: impaired activity tolerance, impaired balance, impaired endurance, impaired ROM, impaired sensation, impaired strength and pain  Prognosis: good    GOALS:  Patient Goal: Patient would like to have less back pain and be able to tolerate standing and walking. Short Term Goals:  Time Frame: 4 weeks  1. Patient to report of decreased pain levels 10/10 to no greater than 6/10 bilateral lumbar region and less symptoms into LLE with standing and walking. 2. Patient to demonstrate increased lumbar painfree ROM in standing from major restriction to 50% limitation with increased ease with dressing lower body and ease with retrieving objects from floor.   3.

## 2020-09-15 ENCOUNTER — TELEPHONE (OUTPATIENT)
Dept: PHYSICAL MEDICINE AND REHAB | Age: 65
End: 2020-09-15

## 2020-09-15 NOTE — TELEPHONE ENCOUNTER
----- Message from RAIMUNDO Caballero CNP sent at 9/14/2020 10:57 AM EDT -----  Please let patient know there were some slight elevations in some of her lab work, but nothing significant enough to represent an autoimmune issue. Continue with therapy and follow up as scheduled.

## 2020-09-29 ENCOUNTER — HOSPITAL ENCOUNTER (OUTPATIENT)
Dept: PHYSICAL THERAPY | Age: 65
Setting detail: THERAPIES SERIES
Discharge: HOME | End: 2020-09-29
Payer: MEDICARE

## 2020-10-01 ENCOUNTER — HOSPITAL ENCOUNTER (OUTPATIENT)
Dept: PHYSICAL THERAPY | Age: 65
Setting detail: THERAPIES SERIES
Discharge: HOME | End: 2020-10-01
Payer: MEDICARE

## 2020-10-01 PROCEDURE — 97113 AQUATIC THERAPY/EXERCISES: CPT

## 2020-10-01 NOTE — PROGRESS NOTES
[address] [facility]  PHYSICAL THERAPY  [] EVALUATION  [] DAILY NOTE (LAND) [x] DAILY NOTE (AQUATIC ) [] PROGRESS NOTE [] DISCHARGE NOTE    [x] OUTPATIENT REHABILITATION [facility]   [] [place] [address]    [] [facility]   [] Terance Snellen    Date: 10/1/2020  Patient Name:  Chrissie Gonsalez  : 1955  MRN: [id number]    Referring Practitioner Sridhar Butt, APR*   Diagnosis Other intervertebral disc displacement, lumbar region [M51.26]  Radiculopathy, lumbar region [M54.16]  Other intervertebral disc degeneration, lumbar region [M51.36]  Spondylosis without myelopathy or radiculopathy, lumbar region [M47.816]  Low back pain [M54.5]  Chronic pain syndrome [G89.4]    Treatment Diagnosis Low back pain with LE radiculopathy aggravated with standing and walking   Date of Evaluation 20    Additional Pertinent History DM, Spondylosis, HTN, chronic diastolic CHF, CAD, incontinence with urgency of urine, depression, back pain, history of SI injections, Lumbar RFA, Stage 3 kidney failure. Stage 1 heart failure. Vertigo, lightheadedness symptoms with sit to stand. Functional Outcome Measure Used Oswestry    Functional Outcome Score  56% (20)       Insurance: Primary: Payor: Fei De Guzman /  /  / ,   Secondary:    Authorization Information: Precertification required, eval only approved. Precert is needed with [facility] for further authorization of treatment. Aquatic yes, modalities yes, telehealth not covered. Visit # 2, 2/10 for progress note   Visits Allowed:    Recertification Date:    Physician Follow-Up:    Physician Orders: Aquatic therapy for lumbar pain and radiculopathy. History of Present Illness: Chronic back pain for past 5 years. Follows with pain management for back and LE radiculopathy, history of PT at [facility].  12 visits but back pain worsened.       SUBJECTIVE: Patient reports is very painful today. States is hoping that the pool therapy helps. AQUATICS TREATMENT   Precautions:    Pain: 8/10 back pain    X in shaded column indicates activity completed today   Exercise/Intervention Sets/Sec  Notes   Walk Forward 2 laps  x    Walk Backward 1.5 laps  x Did last and had to stop due to legs getting sore   Walk Sideways 2 laps  x           Lower Extremity Exercises:       Heel/Toe Raises 5-10x  x    Marches 5-10x  x    Squats 5-10x  x    3 Way Hip 5-10x  x Pain in left hip with extension so held that motion   Hamstring Curls 5-10x  x    Lunges       Step-Ups              Lower Extremity Stretches:                     HS stretch sitting on bench 3x 10-15 seconds x    Seated Exercises:marching   x Unable to do due to bothers bilat hips                                LAQ 10x  x                                 Abduction 10x  x Small range   Upper Extremity Exercises:       Shoulder Flexion 10x  x    Shoulder ABD/ADD 10x      Shoulder Horizontal ABD/ADD 10x  x    Shoulder IR/ER       Shoulder Circles       Shoulder Shrugs       Rows 10x  x    Bicep Curls              Upper Extremity Stretches:              Balance:              Dynamic Gait:              Deep Water:       Hang 7 minutes  x    Bicycle   x Caused pain in thigh so stopped   Hip ABD/ADD 10x  x    Hip Flex/Ext 10x  x        Specific Interventions Next Treatment: Therapeutic pool with aquatic exercise. Core strengthening, LE ROM painfree ranges, Progress to on land as pain managed and able to progress. modalities    Activity/Treatment Tolerance:Discussed abdominal engagement with core work. May practice in sitting, standing, walking  [x]  Patient tolerated treatment well  []  Patient limited by fatigue  []  Patient limited by pain   []  Patient limited by medical complications  []  Other:     Assessment: Patient able to start most exercises in the pool but had to stop certain ones due to pain in left leg.  Slow motions and had to split repetitions up so to not put as much pressure through her legs and back. Patient reported hanging with the noodle felt good. Stated feeling shaky coming up out of the pool and needed to sit in the chair for a little bit. GOALS:  Patient Goal: Patient would like to have less back pain and be able to tolerate standing and walking. Short Term Goals:  Time Frame: 4 weeks  1. Patient to report of decreased pain levels 10/10 to no greater than 6/10 bilateral lumbar region and less symptoms into LLE with standing and walking. 2. Patient to demonstrate increased lumbar painfree ROM in standing from major restriction to 50% limitation with increased ease with dressing lower body and ease with retrieving objects from floor. 3. Patient to demonstrate proper body mechanics with bed mobility, transfers. Lifting only at waist height with light objects. 4. Patient to demonstrate increased ease with sit<>stand transfers/transitions with less heavy lean on UEs and using hinge technique. Long Term Goals:  Time Frame: 8 weeks  1. Oswestry 56% to no greater than 30%  2. Patient to demonstrate independence in HEP with progression in core strengthening, flexibility and pain management with improved functional mobility and activity level. Patient Education:   [x]  HEP/Education Completed: Plan of Care, Goals, See how feels after pool therapy session   [employer] Access Code:  []  No new Education completed  []  Reviewed Prior HEP      [x]  Patient verbalized and/or demonstrated understanding of education provided. []  Patient unable to verbalize and/or demonstrate understanding of education provided. Will continue education. [x]  Barriers to learning:none    PLAN:  []  Plan of care initiated. Plan to see patient 2 times per week for 8 weeks to address the treatment planned outlined above.   [x]  Continue with current plan of care  []  Modify plan of care as follows:    []  Hold pending physician visit  []  Discharge    Time In 1516   Time Out 1556   Timed Code Minutes:  40 min   Total Treatment Time:  40 min       Electronically Signed by: Carlitos Erazo

## 2020-10-05 ENCOUNTER — HOSPITAL ENCOUNTER (OUTPATIENT)
Dept: PHYSICAL THERAPY | Age: 65
Setting detail: THERAPIES SERIES
Discharge: HOME | End: 2020-10-05
Payer: MEDICARE

## 2020-10-05 PROCEDURE — 97113 AQUATIC THERAPY/EXERCISES: CPT

## 2020-10-05 NOTE — PROGRESS NOTES
[address] [facility]  PHYSICAL THERAPY  [] EVALUATION  [] DAILY NOTE (LAND) [x] DAILY NOTE (AQUATIC ) [] PROGRESS NOTE [] DISCHARGE NOTE    [x] OUTPATIENT REHABILITATION [facility]   [] [place] [address]    [] [facility]   [] Estephanie Hand    Date: 10/5/2020  Patient Name:  Dougie Loomis  : 1955  MRN: [id number]    Referring Practitioner Graciela Butt Marker, APR*   Diagnosis Other intervertebral disc displacement, lumbar region [M51.26]  Radiculopathy, lumbar region [M54.16]  Other intervertebral disc degeneration, lumbar region [M51.36]  Spondylosis without myelopathy or radiculopathy, lumbar region [M47.816]  Low back pain [M54.5]  Chronic pain syndrome [G89.4]    Treatment Diagnosis Low back pain with LE radiculopathy aggravated with standing and walking   Date of Evaluation 20    Additional Pertinent History DM, Spondylosis, HTN, chronic diastolic CHF, CAD, incontinence with urgency of urine, depression, back pain, history of SI injections, Lumbar RFA, Stage 3 kidney failure. Stage 1 heart failure. Vertigo, lightheadedness symptoms with sit to stand. Functional Outcome Measure Used Oswestry    Functional Outcome Score  56% (20)       Insurance: Primary: Payor: Fei De Guzman /  /  / ,   Secondary:    Authorization Information: Precertification required, eval only approved. Precert is needed with [facility] for further authorization of treatment. Aquatic yes, modalities yes, telehealth not covered. Visit # 3, 3/10 for progress note   Visits Allowed:    Recertification Date: 480   Physician Follow-Up:    Physician Orders: Aquatic therapy for lumbar pain and radiculopathy. History of Present Illness: Chronic back pain for past 5 years. Follows with pain management for back and LE radiculopathy, history of PT at [facility].  12 visits but back pain worsened.       SUBJECTIVE: Patient reports is very painful today. States is hoping that the pool therapy helps. AQUATICS TREATMENT   Precautions:    Pain: 9/10 Lower back, anterior thighs    X in shaded column indicates activity completed today   Exercise/Intervention Sets/Sec  Notes   Walk Forward 2 laps  X    Walk Backward 2 laps  X    Walk Sideways 2 laps  X           Lower Extremity Exercises:       Heel/Toe Raises 10x  X    Marches 10x  X    Squats 10x  X    3 Way Hip 10x  X Pain in left hip with extension so held that motion   Hamstring Curls 10x  X    Lunges       Step-Ups              Lower Extremity Stretches: Hamstring stretch sitting on bench 3x 10-15 seconds X    Seated Exercises: marching 10x   Unable to do due to bothers bilat hips                                LAQ 10x  X                                 Abduction 10x  X Small range   Upper Extremity Exercises:       Shoulder Flexion 10x  X    Shoulder ABD/ADD 10x  X    Shoulder Horizontal ABD/ADD 10x  X    Shoulder IR/ER       Shoulder Circles       Shoulder Shrugs       Rows 10x  X    Bicep Curls              Upper Extremity Stretches:              Balance:              Dynamic Gait:              Deep Water:       Hang 8 minutes  X    Bicycle    Caused pain in thigh so stopped   Hip ABD/ADD 10x  X    Hip Flex/Ext 10x  X        Specific Interventions Next Treatment: Therapeutic pool with aquatic exercise. Core strengthening, LE ROM painfree ranges, Progress to on land as pain managed and able to progress. modalities    Activity/Treatment Tolerance:Discussed abdominal engagement with core work. May practice in sitting, standing, walking  [x]  Patient tolerated treatment well  []  Patient limited by fatigue  []  Patient limited by pain   []  Patient limited by medical complications  []  Other:     Assessment: Patient tolerated exercises in the pool fairly well today. Pain abolished while hanging with the noodle. Still unable to tolerate bicycling with the noodle because of increased thigh pain. GOALS:  Patient Goal: Patient would like to have less back pain and be able to tolerate standing and walking. Short Term Goals:  Time Frame: 4 weeks  1. Patient to report of decreased pain levels 10/10 to no greater than 6/10 bilateral lumbar region and less symptoms into LLE with standing and walking. 2. Patient to demonstrate increased lumbar painfree ROM in standing from major restriction to 50% limitation with increased ease with dressing lower body and ease with retrieving objects from floor. 3. Patient to demonstrate proper body mechanics with bed mobility, transfers. Lifting only at waist height with light objects. 4. Patient to demonstrate increased ease with sit<>stand transfers/transitions with less heavy lean on UEs and using hinge technique. Long Term Goals:  Time Frame: 8 weeks  1. Oswestry 56% to no greater than 30%  2. Patient to demonstrate independence in HEP with progression in core strengthening, flexibility and pain management with improved functional mobility and activity level. Patient Education:   [x]  HEP/Education Completed: See how feels after pool therapy session. Abdominal bracing with all activities.  [employer] Access Code:  []  No new Education completed  []  Reviewed Prior HEP      [x]  Patient verbalized and/or demonstrated understanding of education provided. []  Patient unable to verbalize and/or demonstrate understanding of education provided. Will continue education. []  Barriers to learning:none    PLAN:  []  Plan of care initiated. Plan to see patient 2 times per week for 8 weeks to address the treatment planned outlined above.   [x]  Continue with current plan of care  []  Modify plan of care as follows:    []  Hold pending physician visit  []  Discharge    Time In 1345   Time Out 1430   Timed Code Minutes:  45 min   Total Treatment Time:  45 min       Electronically Signed by: Lulu Wen

## 2020-10-08 ENCOUNTER — HOSPITAL ENCOUNTER (OUTPATIENT)
Dept: PHYSICAL THERAPY | Age: 65
Setting detail: THERAPIES SERIES
Discharge: HOME | End: 2020-10-08
Payer: MEDICARE

## 2020-10-08 PROCEDURE — 97113 AQUATIC THERAPY/EXERCISES: CPT

## 2020-10-08 NOTE — PROGRESS NOTES
[address] [facility]  PHYSICAL THERAPY  [] EVALUATION  [] DAILY NOTE (LAND) [x] DAILY NOTE (AQUATIC ) [] PROGRESS NOTE [] DISCHARGE NOTE    [x] OUTPATIENT REHABILITATION [facility]   [] [place] [address]    [] [facility]   [] Macy Lights    Date: 10/8/2020  Patient Name:  Amanda Chi  : 1955  MRN: [id number]    Referring Practitioner Amara Butt, APR*   Diagnosis Other intervertebral disc displacement, lumbar region [M51.26]  Radiculopathy, lumbar region [M54.16]  Other intervertebral disc degeneration, lumbar region [M51.36]  Spondylosis without myelopathy or radiculopathy, lumbar region [M47.816]  Low back pain [M54.5]  Chronic pain syndrome [G89.4]    Treatment Diagnosis Low back pain with LE radiculopathy aggravated with standing and walking   Date of Evaluation 20    Additional Pertinent History DM, Spondylosis, HTN, chronic diastolic CHF, CAD, incontinence with urgency of urine, depression, back pain, history of SI injections, Lumbar RFA, Stage 3 kidney failure. Stage 1 heart failure. Vertigo, lightheadedness symptoms with sit to stand. Functional Outcome Measure Used Oswestry    Functional Outcome Score  56% (20)       Insurance: Primary: Payor: Fei De Guzman /  /  / ,   Secondary:    Authorization Information: Precertification required, eval only approved. Precert is needed with [facility] for further authorization of treatment. Aquatic yes, modalities yes, telehealth not covered. Visit # 4, 4/10 for progress note   Visits Allowed:    Recertification Date:    Physician Follow-Up:    Physician Orders: Aquatic therapy for lumbar pain and radiculopathy. History of Present Illness: Chronic back pain for past 5 years. Follows with pain management for back and LE radiculopathy, history of PT at [facility].  12 visits but back pain worsened.       SUBJECTIVE: Patient states she doesn't think the pool therapy has helped yet. Still having trouble walking through the house and being able to stand long enough to cook a meal. Complaints of posterior thigh pain today when thigh pain is usually anterior. AQUATICS TREATMENT   Precautions:    Pain: 9/10 Lower back, posterior thighs    X in shaded column indicates activity completed today   Exercise/Intervention Sets/Sec  Notes   Walk Forward 2 laps  X    Walk Backward 2 laps  X    Walk Sideways 2 laps  X           Lower Extremity Exercises:       Heel/Toe Raises 10x  X    Marches 10x  X    Squats 10x  X    2-Way Hip (flexion/abduction) 10x  X Pain in left hip with extension so held that motion   Hamstring Curls 10x  X    Lunges       Step-Ups              Lower Extremity Stretches: Hamstring stretch sitting on bench 3x 10-15 seconds X    Seated Exercises: marching 10x   Unable to do due to bothers bilat hips                                LAQ 10x  X                                 Abduction 10x  X Small range   Upper Extremity Exercises:       Shoulder Flexion 10x  X    Shoulder ABD/ADD 10x  X    Shoulder Horizontal ABD/ADD 10x  X    Shoulder IR/ER       Shoulder Circles       Shoulder Shrugs       Rows 10x  X    Bicep Curls              Upper Extremity Stretches:              Balance:              Dynamic Gait:              Deep Water:       Hang 8 minutes  X    Bicycle    Caused pain in thigh so stopped   Hip ABD/ADD 15x  X    Hip Flex/Ext 15x  X        Specific Interventions Next Treatment: Therapeutic pool with aquatic exercise. Core strengthening, LE ROM painfree ranges, Progress to on land as pain managed and able to progress. modalities    Activity/Treatment Tolerance:Discussed abdominal engagement with core work.  May practice in sitting, standing, walking  [x]  Patient tolerated treatment well  []  Patient limited by fatigue  []  Patient limited by pain   []  Patient limited by medical complications  []  Other:     Assessment: Patient needing cues to maintain abdominal bracing with activities. Denies pain while hanging with noodle. Pain decreased to 4/10 by end of session. GOALS:  Patient Goal: Patient would like to have less back pain and be able to tolerate standing and walking. Short Term Goals:  Time Frame: 4 weeks  1. Patient to report of decreased pain levels 10/10 to no greater than 6/10 bilateral lumbar region and less symptoms into LLE with standing and walking. 2. Patient to demonstrate increased lumbar painfree ROM in standing from major restriction to 50% limitation with increased ease with dressing lower body and ease with retrieving objects from floor. 3. Patient to demonstrate proper body mechanics with bed mobility, transfers. Lifting only at waist height with light objects. 4. Patient to demonstrate increased ease with sit<>stand transfers/transitions with less heavy lean on UEs and using hinge technique. Long Term Goals:  Time Frame: 8 weeks  1. Oswestry 56% to no greater than 30%  2. Patient to demonstrate independence in HEP with progression in core strengthening, flexibility and pain management with improved functional mobility and activity level. Patient Education:   [x]  HEP/Education Completed: See how feels after pool therapy session. Abdominal bracing with all activities.  [employer] Access Code:  []  No new Education completed  []  Reviewed Prior HEP      [x]  Patient verbalized and/or demonstrated understanding of education provided. []  Patient unable to verbalize and/or demonstrate understanding of education provided. Will continue education. []  Barriers to learning:none    PLAN:  []  Plan of care initiated. Plan to see patient 2 times per week for 8 weeks to address the treatment planned outlined above.   [x]  Continue with current plan of care  []  Modify plan of care as follows:    []  Hold pending physician visit  []  Discharge    Time In 1430   Time Out 1515   Timed Code Minutes:  45 min   Total Treatment Time:  45 min       Electronically Signed by: Harshil Britton

## 2020-10-09 ENCOUNTER — HOSPITAL ENCOUNTER (EMERGENCY)
Age: 65
Discharge: HOME | End: 2020-10-09
Payer: MEDICARE

## 2020-10-09 VITALS
OXYGEN SATURATION: 99 % | HEART RATE: 74 BPM | RESPIRATION RATE: 20 BRPM | WEIGHT: 215 LBS | HEIGHT: 60 IN | SYSTOLIC BLOOD PRESSURE: 136 MMHG | BODY MASS INDEX: 42.21 KG/M2 | DIASTOLIC BLOOD PRESSURE: 63 MMHG | TEMPERATURE: 98.3 F

## 2020-10-09 LAB
ALBUMIN SERPL-MCNC: 3.7 G/DL (ref 3.5–5.1)
ALP BLD-CCNC: 141 U/L (ref 38–126)
ALT SERPL-CCNC: 22 U/L (ref 11–66)
ANION GAP SERPL CALCULATED.3IONS-SCNC: 8 MEQ/L (ref 8–16)
AST SERPL-CCNC: 20 U/L (ref 5–40)
BACTERIA: ABNORMAL /HPF
BASOPHILS # BLD: 0.3 %
BASOPHILS ABSOLUTE: 0 THOU/MM3 (ref 0–0.1)
BILIRUB SERPL-MCNC: 0.2 MG/DL (ref 0.3–1.2)
BILIRUBIN URINE: NEGATIVE
BLOOD, URINE: NEGATIVE
BUN BLDV-MCNC: 18 MG/DL (ref 7–22)
CALCIUM SERPL-MCNC: 8.7 MG/DL (ref 8.5–10.5)
CASTS UA: ABNORMAL /LPF
CHARACTER, URINE: CLEAR
CHLORIDE BLD-SCNC: 101 MEQ/L (ref 98–111)
CO2: 26 MEQ/L (ref 23–33)
COLOR: YELLOW
CREAT SERPL-MCNC: 1.2 MG/DL (ref 0.4–1.2)
CRYSTALS, UA: ABNORMAL
EOSINOPHIL # BLD: 1.6 %
EOSINOPHILS ABSOLUTE: 0.2 THOU/MM3 (ref 0–0.4)
EPITHELIAL CELLS, UA: ABNORMAL /HPF
ERYTHROCYTE [DISTWIDTH] IN BLOOD BY AUTOMATED COUNT: 13.5 % (ref 11.5–14.5)
ERYTHROCYTE [DISTWIDTH] IN BLOOD BY AUTOMATED COUNT: 46.5 FL (ref 35–45)
GFR SERPL CREATININE-BSD FRML MDRD: 45 ML/MIN/1.73M2
GLUCOSE BLD-MCNC: 123 MG/DL (ref 70–108)
GLUCOSE URINE: NEGATIVE MG/DL
HCT VFR BLD CALC: 39.1 % (ref 37–47)
HEMOGLOBIN: 12.3 GM/DL (ref 12–16)
IMMATURE GRANS (ABS): 0.04 THOU/MM3 (ref 0–0.07)
IMMATURE GRANULOCYTES: 0.3 %
KETONES, URINE: NEGATIVE
LEUKOCYTE ESTERASE, URINE: ABNORMAL
LYMPHOCYTES # BLD: 25.5 %
LYMPHOCYTES ABSOLUTE: 2.9 THOU/MM3 (ref 1–4.8)
MCH RBC QN AUTO: 29.6 PG (ref 26–33)
MCHC RBC AUTO-ENTMCNC: 31.5 GM/DL (ref 32.2–35.5)
MCV RBC AUTO: 94.2 FL (ref 81–99)
MONOCYTES # BLD: 9.2 %
MONOCYTES ABSOLUTE: 1 THOU/MM3 (ref 0.4–1.3)
NITRITE, URINE: NEGATIVE
NUCLEATED RED BLOOD CELLS: 0 /100 WBC
OSMOLALITY CALCULATION: 273.4 MOSMOL/KG (ref 275–300)
PH UA: 5 (ref 5–9)
PLATELET # BLD: 234 THOU/MM3 (ref 130–400)
PMV BLD AUTO: 11.6 FL (ref 9.4–12.4)
POTASSIUM REFLEX MAGNESIUM: 5.3 MEQ/L (ref 3.5–5.2)
PROTEIN UA: NEGATIVE
RBC # BLD: 4.15 MILL/MM3 (ref 4.2–5.4)
RBC URINE: ABNORMAL /HPF
SEG NEUTROPHILS: 63.1 %
SEGMENTED NEUTROPHILS ABSOLUTE COUNT: 7.2 THOU/MM3 (ref 1.8–7.7)
SODIUM BLD-SCNC: 135 MEQ/L (ref 135–145)
SPECIFIC GRAVITY, URINE: 1.01 (ref 1–1.03)
TOTAL PROTEIN: 6.7 G/DL (ref 6.1–8)
TSH SERPL DL<=0.05 MIU/L-ACNC: 1.08 UIU/ML (ref 0.4–4.2)
UROBILINOGEN, URINE: 0.2 EU/DL (ref 0–1)
WBC # BLD: 11.4 THOU/MM3 (ref 4.8–10.8)
WBC UA: ABNORMAL /HPF

## 2020-10-09 PROCEDURE — 96372 THER/PROPH/DIAG INJ SC/IM: CPT

## 2020-10-09 PROCEDURE — 85025 COMPLETE CBC W/AUTO DIFF WBC: CPT

## 2020-10-09 PROCEDURE — 36415 COLL VENOUS BLD VENIPUNCTURE: CPT

## 2020-10-09 PROCEDURE — 99282 EMERGENCY DEPT VISIT SF MDM: CPT

## 2020-10-09 PROCEDURE — 84443 ASSAY THYROID STIM HORMONE: CPT

## 2020-10-09 PROCEDURE — 80053 COMPREHEN METABOLIC PANEL: CPT

## 2020-10-09 PROCEDURE — 99281 EMR DPT VST MAYX REQ PHY/QHP: CPT

## 2020-10-09 PROCEDURE — 6360000002 HC RX W HCPCS: Performed by: EMERGENCY MEDICINE

## 2020-10-09 PROCEDURE — 81001 URINALYSIS AUTO W/SCOPE: CPT

## 2020-10-09 RX ORDER — TIZANIDINE 4 MG/1
4 TABLET ORAL EVERY 6 HOURS PRN
Qty: 20 TABLET | Refills: 0 | Status: SHIPPED | OUTPATIENT
Start: 2020-10-09 | End: 2022-01-17 | Stop reason: SDUPTHER

## 2020-10-09 RX ORDER — ORPHENADRINE CITRATE 30 MG/ML
60 INJECTION INTRAMUSCULAR; INTRAVENOUS ONCE
Status: COMPLETED | OUTPATIENT
Start: 2020-10-09 | End: 2020-10-09

## 2020-10-09 RX ADMIN — ORPHENADRINE CITRATE 60 MG: 30 INJECTION INTRAMUSCULAR; INTRAVENOUS at 22:01

## 2020-10-09 ASSESSMENT — PAIN DESCRIPTION - LOCATION: LOCATION: BACK;LEG

## 2020-10-09 ASSESSMENT — PAIN DESCRIPTION - PAIN TYPE: TYPE: ACUTE PAIN;CHRONIC PAIN

## 2020-10-09 ASSESSMENT — PAIN SCALES - GENERAL: PAINLEVEL_OUTOF10: 10

## 2020-10-09 NOTE — ED PROVIDER NOTES
[address] COMPLAINT       Chief Complaint   Patient presents with    Fatigue       Nurses Notes reviewed and I agree except as noted in the HPI. HISTORY OF PRESENT ILLNESS    Eva Mccarthy is a 60 y.o. female who presents to the Emergency Department for the evaluation of generalized pain, myalgias. Patient states symptoms have been increasing for 3 to 4 days. Patient states that she has a history of degenerative disc disease in her lower spine but is currently having pain from her head to her feet. Patient reports that she has recently been involved in aqua therapy for degenerative disc disease treatment. She states since starting that seems to have irritated her pain. Patient's pain is nonspecific and states is generally located central neck but also has thoracic and lumbar spine pain. Also pain in bilateral hips. Patient also complains of fatigue and generalized not feeling well. Denies any fever, no chills, no cough or shortness of breath. No nausea, vomiting, or diarrhea. She states her urine output is about normal for her. The HPI was provided by the patient. REVIEW OF SYSTEMS     Review of Systems   Constitutional: Positive for fatigue. Negative for chills, diaphoresis and fever. HENT: Negative for sinus pressure, sinus pain and sore throat. Respiratory: Negative for cough, shortness of breath and wheezing. Cardiovascular: Negative for chest pain. Gastrointestinal: Negative for abdominal distention, abdominal pain, nausea and vomiting. Genitourinary: Negative for difficulty urinating, dysuria, frequency and urgency. Musculoskeletal: Positive for arthralgias, back pain, myalgias and neck pain. Skin: Negative for color change, rash and wound. Neurological: Negative for weakness, light-headedness and headaches. Psychiatric/Behavioral: Negative for behavioral problems.        PAST MEDICAL HISTORY has a past medical history of Back pain, CAD (coronary artery disease), Chronic diastolic CHF (congestive heart failure) ([facility] [address] Colitis, Depression, Depression, Gastroesophageal reflux, GERD (gastroesophageal reflux disease), H/O endoscopy, Hyperlipidemia, Hypertension, Hypertension, Hypothyroidism, Incontinence of urine, Lordosis (acquired) (postural), Spondylosis of unspecified site without mention of myelopathy, Thoracic or lumbosacral neuritis or radiculitis, unspecified, Type II or unspecified type diabetes mellitus without mention of complication, not stated as uncontrolled, Unspecified sleep apnea, and Urinary incontinence. SURGICAL HISTORY      has a past surgical history that includes  section (); Carpal tunnel release (Bilateral, ); Abdomen surgery (); Dilation and curettage of uterus; Hysterectomy (); cardiovascular stress test (2015); Colonoscopy (, ); Upper gastrointestinal endoscopy (); Cardiac catheterization (); other surgical history (Bilateral, 2018); pr inj dx/ther agnt paravert facet joint, lumbar/sac, 2nd level (Bilateral, 3/19/2018); Nerve Surgery (Bilateral, 2018); pr inj dx/ther agnt paravert facet joint, lumbar/sac, 2nd level (Bilateral, 2018); pr inject rx other periph nerve (Bilateral, 2018); pr office/outpt visit,procedure only (Right, 10/2/2018); Injection Procedure For Sacroiliac Joint (Right, 2018); Lumbar spine surgery (Bilateral, 2019); lumbar nerve block (Bilateral, 2019); Nerve Surgery (Bilateral, 10/4/2019); Pain management procedure (Bilateral, 2020); and Pain management procedure (Left, 2020). CURRENT MEDICATIONS       Discharge Medication List as of 10/9/2020  9:54 PM      CONTINUE these medications which have NOT CHANGED    Details   pregabalin (LYRICA) 150 MG capsule Take 1 capsule by mouth 3 times daily for 30 days. , Disp-90 capsule,R-0Normal      insulin glargine (LANTUS Monitoring Suppl (FREESTYLE LITE) EMILIE DAILY PRN Starting 2016, Disp-1 Device, R-0, Normal      aspirin 81 MG tablet Take 81 mg by mouth dailyHistorical Med      ammonium lactate (AMLACTIN) 12 % cream Apply topically 2 times daily Apply topically as needed., Topical, Historical Med      Multiple Vitamins-Minerals (MULTI FOR HER PO) Take by mouth dailyHistorical Med      atorvastatin (LIPITOR) 40 MG tablet Take 40 mg by mouth dailyHistorical Med      ferrous sulfate 325 (65 FE) MG tablet Take 325 mg by mouth nightly Historical Med      Levothyroxine Sodium (SYNTHROID PO) Take 150 mcg by mouth 6 days a week  Does NOT take on SundaysHistorical Med      DOXEPIN HCL PO Take 200 mg by mouth nightly. Historical Med      lamoTRIgine (LAMICTAL) 100 MG tablet Take 100 mg by mouth daily Historical Med      metFORMIN (GLUCOPHAGE) 1000 MG tablet Take 1,000 mg by mouth 2 times daily (with meals) Indications: 1 tab if hr is >60 or SBP is >100 Historical Med             ALLERGIES     is allergic to ibuprofen. FAMILY HISTORY     She indicated that her mother is . She indicated that her father is . She indicated that both of her sisters are alive. She indicated that both of her brothers are alive. She indicated that her child is alive. family history includes Diabetes in her father, mother, and sister; Heart Disease in her father and mother; Stroke in her mother and sister; Thyroid Disease in her brother. SOCIAL HISTORY      reports that she is a non-smoker but has been exposed to tobacco smoke. She has never used smokeless tobacco. She reports that she does not drink alcohol or use drugs. PHYSICAL EXAM     INITIAL VITALS:  height is 5' (1.524 m) and weight is 215 lb (97.5 kg). Her oral temperature is 98.3 °F (36.8 °C). Her blood pressure is 136/63 and her pulse is 74. Her respiration is 20 and oxygen saturation is 99%. Physical Exam  Constitutional:       Appearance: She is obese.  She is not ill-appearing. HENT:      Head: Normocephalic. Nose: Nose normal.   Eyes:      Pupils: Pupils are equal, round, and reactive to light. Cardiovascular:      Rate and Rhythm: Normal rate and regular rhythm. Pulses: Normal pulses. Heart sounds: Normal heart sounds. Pulmonary:      Effort: Pulmonary effort is normal. No respiratory distress. Breath sounds: Normal breath sounds. Abdominal:      General: Abdomen is flat. Bowel sounds are normal. There is no distension. Tenderness: There is no abdominal tenderness. Musculoskeletal:      Right hip: Normal.      Left hip: Normal.      Cervical back: She exhibits pain. She exhibits no swelling and no edema. Thoracic back: She exhibits edema and pain. She exhibits no swelling and no spasm. Lumbar back: She exhibits tenderness and pain. She exhibits no swelling and no spasm. Skin:     General: Skin is warm and dry. Capillary Refill: Capillary refill takes less than 2 seconds. Neurological:      General: No focal deficit present. Mental Status: She is alert and oriented to person, place, and time.    Psychiatric:         Mood and Affect: Mood normal.         Behavior: Behavior normal.          DIFFERENTIAL DIAGNOSIS:   Musculoskeletal soreness related to aqua therapy, chronic pain, viral illness, dehydration, hypothyroidism, electrolyte abnormality, anemia, KEENA, UTI     DIAGNOSTIC RESULTS     EKG: All EKG's are interpreted by the Emergency Department Physician who either signs or Co-signs this chart in the absence of a cardiologist.    None    RADIOLOGY: non-plainfilm images(s) such as CT, Ultrasound and MRI are read by the radiologist.    No orders to display       LABS:     Labs Reviewed   CBC WITH AUTO DIFFERENTIAL - Abnormal; Notable for the following components:       Result Value    WBC 11.4 (*)     RBC 4.15 (*)     MCHC 31.5 (*)     RDW-SD 46.5 (*)     All other components within normal limits   COMPREHENSIVE METABOLIC PANEL W/ REFLEX TO MG FOR LOW K - Abnormal; Notable for the following components:    Glucose 123 (*)     Potassium reflex Magnesium 5.3 (*)     Alkaline Phosphatase 141 (*)     Total Bilirubin 0.2 (*)     All other components within normal limits   GLOMERULAR FILTRATION RATE, ESTIMATED - Abnormal; Notable for the following components:    Est, Glom Filt Rate 45 (*)     All other components within normal limits   OSMOLALITY - Abnormal; Notable for the following components:    Osmolality Calc 273.4 (*)     All other components within normal limits   URINE WITH REFLEXED MICRO - Abnormal; Notable for the following components:    Leukocyte Esterase, Urine MODERATE (*)     All other components within normal limits   TSH WITHOUT REFLEX   ANION GAP       EMERGENCY DEPARTMENT COURSE:   Vitals:    Vitals:    10/09/20 1917   BP: 136/63   Pulse: 74   Resp: 20   Temp: 98.3 °F (36.8 °C)   TempSrc: Oral   SpO2: 99%   Weight: 215 lb (97.5 kg)   Height: 5' (1.524 m)       7:57 PM EDT: The patient was seen and evaluated. Appropriate labs were ordered. No imaging done as patient has all over arthralgias and myalgias. Patient's labs are reassuring. Mild leukocytosis but blood cell count 11.4. Hemoglobin 12.3, hematocrit 39.1. Patient's electrolytes are essentially normal with mild hyperkalemia 5.3. Creatinine 1.2. Liver enzymes essentially normal.  Urine has moderate leukocyte esterase but no bacteria noted. Will await culture as patient denies urinary symptoms    MDM:  Patient's increase musculoskeletal pain to the neck and back likely due to increased activity with aqua therapy. Will place on muscle relaxer, Zanaflex to see if she has improvement of her symptoms. Patient is also advised take Tylenol as needed for pain. Drink plenty of fluids. Follow-up primary care provider if no improvement 2 to 3 days. Return to the emergency department if worse.     CRITICAL CARE: none    CONSULTS:  None    PROCEDURES:  None    FINAL IMPRESSION      1. Generalized pain          DISPOSITION/PLAN   discharge    PATIENT REFERRED TO:  RAIMUNDO Ratliff NP  [address] [facility]  [phone]            DISCHARGE MEDICATIONS:  Discharge Medication List as of 10/9/2020  9:54 PM      START taking these medications    Details   tiZANidine (ZANAFLEX) 4 MG tablet Take 1 tablet by mouth every 6 hours as needed (muscle pain), Disp-20 tablet,R-0Print             (Please note that portions of this note were completed with a voice recognition program.  Efforts were made to edit the dictations but occasionally words are mis-transcribed.)    The patient was given an opportunity to see the Emergency Attending. The patient voiced understanding that I was a Mid-LevelProvider and was in agreement with being seen independently by myself.           RAIMUNDO Lau CNP  10/10/20 0010

## 2020-10-09 NOTE — ED TRIAGE NOTES
To ED from home with complaints of generalized fatigue x3 days. Pt denies fevers. Pt states pain 10/10 at this time. Pt vital signs stable.

## 2020-10-10 ASSESSMENT — ENCOUNTER SYMPTOMS
SORE THROAT: 0
WHEEZING: 0
SHORTNESS OF BREATH: 0
NAUSEA: 0
SINUS PAIN: 0
COLOR CHANGE: 0
VOMITING: 0
SINUS PRESSURE: 0
ABDOMINAL PAIN: 0
ABDOMINAL DISTENTION: 0
COUGH: 0
BACK PAIN: 1

## 2020-10-12 ENCOUNTER — HOSPITAL ENCOUNTER (OUTPATIENT)
Dept: PHYSICAL THERAPY | Age: 65
Setting detail: THERAPIES SERIES
Discharge: HOME | End: 2020-10-12
Payer: MEDICARE

## 2020-10-15 ENCOUNTER — HOSPITAL ENCOUNTER (OUTPATIENT)
Dept: PHYSICAL THERAPY | Age: 65
Setting detail: THERAPIES SERIES
Discharge: HOME | End: 2020-10-15
Payer: MEDICARE

## 2020-10-15 PROCEDURE — 97113 AQUATIC THERAPY/EXERCISES: CPT

## 2020-10-15 NOTE — PROGRESS NOTES
[address] [facility]  PHYSICAL THERAPY  [] EVALUATION  [] DAILY NOTE (LAND) [x] DAILY NOTE (AQUATIC ) [] PROGRESS NOTE [] DISCHARGE NOTE    [x] OUTPATIENT REHABILITATION [facility]   [] [place] [address]    [] [facility]   [] Jennie Roblero    Date: 10/15/2020  Patient Name:  Leonard Chu  : 1955  MRN: [id number]    Referring Practitioner Juan Carlos Butt, APR*   Diagnosis Other intervertebral disc displacement, lumbar region [M51.26]  Radiculopathy, lumbar region [M54.16]  Other intervertebral disc degeneration, lumbar region [M51.36]  Spondylosis without myelopathy or radiculopathy, lumbar region [M47.816]  Low back pain [M54.5]  Chronic pain syndrome [G89.4]    Treatment Diagnosis Low back pain with LE radiculopathy aggravated with standing and walking   Date of Evaluation 20    Additional Pertinent History DM, Spondylosis, HTN, chronic diastolic CHF, CAD, incontinence with urgency of urine, depression, back pain, history of SI injections, Lumbar RFA, Stage 3 kidney failure. Stage 1 heart failure. Vertigo, lightheadedness symptoms with sit to stand. Functional Outcome Measure Used Oswestry    Functional Outcome Score  56% (20)       Insurance: Primary: Payor: Fei De Guzman /  /  / ,   Secondary:    Authorization Information: Precertification required, eval only approved. Precert is needed with Suzy Buchanan for further authorization of treatment. Aquatic yes, modalities yes, telehealth not covered. Visit # 5, 5/10 for progress note   Visits Allowed:    Recertification Date:    Physician Follow-Up:    Physician Orders: Aquatic therapy for lumbar pain and radiculopathy. History of Present Illness: Chronic back pain for past 5 years. Follows with pain management for back and LE radiculopathy, history of PT at [facility].  12 visits but back pain worsened.       SUBJECTIVE: Patient states she feels a little better when she is in the pool. Reports that she went to the ER over the weekend because she was hurting so bad from her neck to her knees. Patient was given a prescription for muscle relaxors. AQUATICS TREATMENT   Precautions:    Pain: 9/10 Lower back, Bilateral hips    X in shaded column indicates activity completed today   Exercise/Intervention Sets/Sec  Notes   Walk Forward 2 laps  X    Walk Backward 2 laps  X    Walk Sideways 2 laps  X           Lower Extremity Exercises:       Heel/Toe Raises 10x  X    Marches 10x  X    Squats 10x  X    2-Way Hip (flexion/abduction) 10x  X Pain in left hip with extension so held that motion   Hamstring Curls 10x  X    Lunges       Step-Ups              Lower Extremity Stretches: Hamstring stretch sitting on bench 3x 10-15 seconds X    Seated Exercises: marching 10x   Unable to do due to bothers bilat hips                                LAQ 10x  X                                 Abduction 10x  X Small range   Upper Extremity Exercises:       Shoulder Flexion 10x  X    Shoulder ABD/ADD 10x  X    Shoulder Horizontal ABD/ADD 10x  X    Shoulder IR/ER       Shoulder Circles       Shoulder Shrugs       Rows 10x  X    Bicep Curls              Upper Extremity Stretches:              Balance:              Dynamic Gait:              [place]:       Hang at start of session 5 minutes  X Decreased pain to 7/10    Hang at end of session 5 minutes  X Pain decreased to 5/10    Bicycle    Caused pain in thigh so stopped   Hip ABD/ADD 15x  X    Hip Flex/Ext 15x  X        Specific Interventions Next Treatment: Therapeutic pool with aquatic exercise. Core strengthening, LE ROM painfree ranges, Progress to on land as pain managed and able to progress. modalities    Activity/Treatment Tolerance:Discussed abdominal engagement with core work.  May practice in sitting, standing, walking  [x]  Patient tolerated treatment well  []  Patient limited by fatigue  []  Patient limited by pain   [] Patient limited by medical complications  []  Other:     Assessment: Patient responds well to unloading with the noodle. Patient with decreased pain to 5/10 by end of session. GOALS:  Patient Goal: Patient would like to have less back pain and be able to tolerate standing and walking. Short Term Goals:  Time Frame: 4 weeks  1. Patient to report of decreased pain levels 10/10 to no greater than 6/10 bilateral lumbar region and less symptoms into LLE with standing and walking. 2. Patient to demonstrate increased lumbar painfree ROM in standing from major restriction to 50% limitation with increased ease with dressing lower body and ease with retrieving objects from floor. 3. Patient to demonstrate proper body mechanics with bed mobility, transfers. Lifting only at waist height with light objects. 4. Patient to demonstrate increased ease with sit<>stand transfers/transitions with less heavy lean on UEs and using hinge technique. Long Term Goals:  Time Frame: 8 weeks  1. Oswestry 56% to no greater than 30%  2. Patient to demonstrate independence in HEP with progression in core strengthening, flexibility and pain management with improved functional mobility and activity level. Patient Education:   [x]  HEP/Education Completed: See how feels after pool therapy session. Abdominal bracing with all activities.  [employer] Access Code:  []  No new Education completed  []  Reviewed Prior HEP      [x]  Patient verbalized and/or demonstrated understanding of education provided. []  Patient unable to verbalize and/or demonstrate understanding of education provided. Will continue education. []  Barriers to learning:none    PLAN:  []  Plan of care initiated. Plan to see patient 2 times per week for 8 weeks to address the treatment planned outlined above.   [x]  Continue with current plan of care  []  Modify plan of care as follows:    []  Hold pending physician visit  []  Discharge    Time In [id number] Time Out 1443   Timed Code Minutes:  43 min   Total Treatment Time:  43 min       Electronically Signed by: Lorena Tse

## 2020-10-20 NOTE — DISCHARGE SUMMARY
[facility]    Patient Name: Pascual Patel        CSN: [id number]   YOB: 1955  Gender: female  Sebastián Butt Kaitlynn, APR*,    Other intervertebral disc displacement, lumbar region [M51.26]  Radiculopathy, lumbar region [M54.16]  Other intervertebral disc degeneration, lumbar region [M51.36]  Spondylosis without myelopathy or radiculopathy, lumbar region [M47.816]  Low back pain [M54.5]  Chronic pain syndrome [G89.4] ,      Patient is discharged from Physical Therapy services at this time. See last note for details related to results of therapy and goal achievement. Reason for discharge: Patient did not show for appointment on 10/19/2020. Patient was contacted by Danika Anderson PTA and patient requesting discharge from PT. [place] PT treatments with aquatic ex was not helping. Continuing to have increased pain. Discharged on 10/20/2020 due to patient request. . Discharge note completed 10/20/2020.         Basilia Snider, [address]

## 2020-10-22 ENCOUNTER — APPOINTMENT (OUTPATIENT)
Dept: PHYSICAL THERAPY | Age: 65
End: 2020-10-22
Payer: MEDICARE

## 2020-10-26 ENCOUNTER — APPOINTMENT (OUTPATIENT)
Dept: PHYSICAL THERAPY | Age: 65
End: 2020-10-26
Payer: MEDICARE

## 2020-10-29 ENCOUNTER — APPOINTMENT (OUTPATIENT)
Dept: PHYSICAL THERAPY | Age: 65
End: 2020-10-29
Payer: MEDICARE

## 2020-10-30 ENCOUNTER — APPOINTMENT (OUTPATIENT)
Dept: PHYSICAL THERAPY | Age: 65
End: 2020-10-30
Payer: MEDICARE

## 2020-11-02 ENCOUNTER — HOSPITAL ENCOUNTER (OUTPATIENT)
Age: 65
Setting detail: SPECIMEN
Discharge: HOME | End: 2020-11-02
Payer: MEDICARE

## 2020-11-03 ENCOUNTER — OFFICE VISIT (OUTPATIENT)
Dept: PHYSICAL MEDICINE AND REHAB | Age: 65
End: 2020-11-03
Payer: MEDICARE

## 2020-11-03 VITALS
DIASTOLIC BLOOD PRESSURE: 60 MMHG | SYSTOLIC BLOOD PRESSURE: 112 MMHG | WEIGHT: 215 LBS | TEMPERATURE: 97.4 F | BODY MASS INDEX: 42.21 KG/M2 | HEIGHT: 60 IN | HEART RATE: 80 BPM

## 2020-11-03 PROBLEM — I10 HYPERTENSION: Status: RESOLVED | Noted: 2020-11-03 | Resolved: 2020-11-03

## 2020-11-03 PROCEDURE — 99214 OFFICE O/P EST MOD 30 MIN: CPT | Performed by: NURSE PRACTITIONER

## 2020-11-03 RX ORDER — ACETAMINOPHEN AND CODEINE PHOSPHATE 300; 30 MG/1; MG/1
1 TABLET ORAL EVERY 8 HOURS PRN
Qty: 42 TABLET | Refills: 0 | Status: SHIPPED | OUTPATIENT
Start: 2020-11-03 | End: 2020-11-17

## 2020-11-03 ASSESSMENT — ENCOUNTER SYMPTOMS
COLOR CHANGE: 0
CONSTIPATION: 0
BACK PAIN: 1
SHORTNESS OF BREATH: 1
ABDOMINAL PAIN: 0

## 2020-11-03 NOTE — PROGRESS NOTES
[address] [facility]  [address]  Dept: [phone]  Dept Fax: [phone]: [phone]    Visit Date: 11/3/2020    Functionality Assessment/Goals Worksheet     On a scale of 0 (Does not Interfere) to 10 (Completely Interferes)     1. Which number describes how during the past week pain has interfered with       the following:  A. General Activity:  10  B. Mood: 8  C. Walking Ability:  8  D. Normal Work (Includes both work outside the home and housework):  10  E. Relations with Other People:   8  F. Sleep:   7  G. Enjoyment of Life:   8    2. Patient Prefers to Take their Pain Medications:     [x]  On a regular basis   []  Only when necessary    []  Does not take pain medications    3. What are the Patient's Goals/Expectations for Visiting Pain Management? [x]  Learn about my pain    []  Receive Medication   []  Physical Therapy     []  Treat Depression   []  Receive Injections    []  Treat Sleep   []  Deal with Anxiety and Stress   []  Treat Opoid Dependence/Addiction   []  Other:      HPI:   Leonard Chu is a 60 y.o. female is here today for    Chief Complaint: Low back pain    HPI     Patient with follow up today after PT. Patient states she had to stop due to not being able to walk after the sessions. Arthritis panel sent, relatively negative. No indications for autoimmune issues. Minimal increase CRP and Uric acid, could be related to obesity though. LESi L3 #2 no relief  Previous lumbar RFA was helpful, but repeat this year had no relief. First TFESI with great relief, repeat without good result. SI MBB with short term relief, but insurance won't cover RFA. Patient stopped Lyrica 150mg TID, and didn't notice a change. Patient with gabapentin in the past with poor results. Cymbalta 120mg daily. Lamictal, Buspar and trazodone.     Discussed with patient about out of options for our treatment at this time. Would like patient to follow up with surgeon just to evaluate and be sure that surgery isn't needed. Patient understanding and would like to see Dr Harish Jurado. Medications reviewed. PROCEDURE: MRI THORACIC SPINE WO CONTRAST         CLINICAL INFORMATION: Acute bilateral thoracic back pain. Back pain and bilateral leg pain and weakness.         COMPARISON: CT chest dated 7/21/2017.         TECHNIQUE: Sagittal T1, T2 and STIR sequences were obtained through the thoracic spine. Axial T2-weighted images were obtained through the discs.         FINDINGS:    Alberto Levans is anatomic vertebral body height and alignment. There is a small Schmorl's node at the inferior endplate of T9, stable compared to prior CT. There are a few scattered focal areas of hyperintense T1 and T2 signal is evidence for hemangiomas. Marrow signal otherwise appears within normal limits. The thoracic spinal cord is normal in caliber without focal area of abnormal T2 signal identified. Paraspinal soft tissues are unremarkable. The T3-4 there is a minimal disc bulge without significant spinal canal or neuroforaminal stenosis. At T5-6 there is a minimal disc bulge without significant spinal canal or neuroforaminal stenosis. At T9-10 there is a minimal disc bulge without significant spinal canal stenosis and moderate bilateral neural foraminal stenosis in association with facet hypertrophy and ligament flavum thickening. At T11-12 there is a right paracentral extrusion which extends cephalad to the mid T11 vertebral body and causes mild spinal canal stenosis and appears to contact the ventral aspect of spinal cord without abnormal signal in the cord. There is no    significant neural foraminal stenosis.     There is no significant spinal canal or neuroforaminal stenosis at any other thoracic level.              Impression     Right paracentral extrusion at T11-12 which causes mild spinal canal stenosis and appears to contact the ventral aspect of the cord without abnormal signal in the cord.               The patientis allergic to ibuprofen. Past Medical History  Ricardo Gonzalez  has a past medical history of Back pain, CAD (coronary artery disease), Chronic diastolic CHF (congestive heart failure) ([facility] [address] Colitis, Depression, Depression, Gastroesophageal reflux, GERD (gastroesophageal reflux disease), H/O endoscopy, Hyperlipidemia, Hypertension, Hypertension, Hypothyroidism, Incontinence of urine, Lordosis (acquired) (postural), Spondylosis of unspecified site without mention of myelopathy, Thoracic or lumbosacral neuritis or radiculitis, unspecified, Type II or unspecified type diabetes mellitus without mention of complication, not stated as uncontrolled, Unspecified sleep apnea, and Urinary incontinence. Past Surgical History  The patient  has a past surgical history that includes  section (); Carpal tunnel release (Bilateral, ); Abdomen surgery (); Dilation and curettage of uterus; Hysterectomy (); cardiovascular stress test (2015); Colonoscopy (, ); Upper gastrointestinal endoscopy (); Cardiac catheterization (); other surgical history (Bilateral, 2018); pr inj dx/ther agnt paravert facet joint, lumbar/sac, 2nd level (Bilateral, 3/19/2018); Nerve Surgery (Bilateral, 2018); pr inj dx/ther agnt paravert facet joint, lumbar/sac, 2nd level (Bilateral, 2018); pr inject rx other periph nerve (Bilateral, 2018); pr office/outpt visit,procedure only (Right, 10/2/2018); Injection Procedure For Sacroiliac Joint (Right, 2018); Lumbar spine surgery (Bilateral, 2019); lumbar nerve block (Bilateral, 2019); Nerve Surgery (Bilateral, 10/4/2019); Pain management procedure (Bilateral, 2020); and Pain management procedure (Left, 2020).     Family History  This patient's family history includes Diabetes in her father, mother, and sister; Heart Disease in her father and mother; Stroke in her mother and sister; Thyroid Disease in her brother. Social History  Irma Saavedra  reports that she is a non-smoker but has been exposed to tobacco smoke. She has never used smokeless tobacco. She reports that she does not drink alcohol or use drugs. Medications    Current Outpatient Medications:     acetaminophen-codeine (TYLENOL/CODEINE #3) 300-30 MG per tablet, Take 1 tablet by mouth every 8 hours as needed for Pain for up to 14 days. Intended supply: 3 days. Take lowest dose possible to manage pain, Disp: 42 tablet, Rfl: 0    tiZANidine (ZANAFLEX) 4 MG tablet, Take 1 tablet by mouth every 6 hours as needed (muscle pain), Disp: 20 tablet, Rfl: 0    pregabalin (LYRICA) 150 MG capsule, Take 1 capsule by mouth 3 times daily for 30 days. , Disp: 90 capsule, Rfl: 0    insulin glargine (LANTUS SOLOSTAR) 100 UNIT/ML injection pen, INJECT 35 UNITS SUBCUTANEOUSLY IN THE MORNING AND 29 UNITS SUBCUTANEOUSLY IN THE EVENING (Patient taking differently: INJECT 45 UNITS SUBCUTANEOUSLY IN THE MORNING AND 37 UNITS SUBCUTANEOUSLY IN THE EVENING), Disp: 15 mL, Rfl: 0    insulin lispro (HUMALOG) 100 UNIT/ML pen, Inject 17 units before breakfast, 20 units before lunch and 28 units before dinner plus sliding scale.  Patient uses a max of 96 units per day., Disp: 10 pen, Rfl: 5    Liraglutide (VICTOZA) 18 MG/3ML SOPN SC injection, Inject 1.2 mg into the skin daily (Patient taking differently: Inject 1.8 mg into the skin daily ), Disp: 4 pen, Rfl: 5    DULoxetine (CYMBALTA) 30 MG extended release capsule, Take 120 mg by mouth daily , Disp: , Rfl:     acetaminophen (TYLENOL) 325 MG tablet, Take 2 tablets by mouth every 4 hours as needed for Pain, Disp: 120 tablet, Rfl: 3    calcium carbonate (OSCAL) 500 MG TABS tablet, Take 650 mg by mouth daily Indications: 2 chews daily, Disp: , Rfl:     metoprolol succinate (TOPROL XL) 50 MG extended release tablet, Take 50 mg by mouth daily, Disp: , Rfl:     meclizine (ANTIVERT) 25 MG tablet, Take 50 mg by mouth 3 times daily as needed , Disp: , Rfl:     lidocaine (XYLOCAINE) 5 % ointment, Apply topically 3 times daily as needed for Pain Apply topically as needed. , Disp: , Rfl:     spironolactone (ALDACTONE) 25 MG tablet, Take 25 mg by mouth as needed , Disp: , Rfl:     traZODone (DESYREL) 50 MG tablet, Take 50 mg by mouth nightly Indications: 1/2 tab , Disp: , Rfl:     Ascorbic Acid (VITAMIN C) 500 MG tablet, Take 1,000 mg by mouth daily, Disp: , Rfl:     Cyanocobalamin (VITAMIN B 12 PO), Take 500 mg by mouth daily , Disp: , Rfl:     busPIRone (BUSPAR) 30 MG tablet, Take 30 mg by mouth 3 times daily , Disp: , Rfl:     lisinopril (PRINIVIL;ZESTRIL) 5 MG tablet, Take 7.5 mg by mouth daily , Disp: , Rfl:     pantoprazole (PROTONIX) 40 MG tablet, Take 1 tablet by mouth daily, Disp: 30 tablet, Rfl: 12    glucose blood VI test strips (FREESTYLE LITE) strip, Check BS 4 times a day, Disp: 400 each, Rfl: 1    Blood Glucose Monitoring Suppl (FREESTYLE LITE) EMILIE, 1 Device by Does not apply route daily as needed, Disp: 1 Device, Rfl: 0    aspirin 81 MG tablet, Take 81 mg by mouth daily, Disp: , Rfl:     ammonium lactate (AMLACTIN) 12 % cream, Apply topically 2 times daily Apply topically as needed. , Disp: , Rfl:     Multiple Vitamins-Minerals (MULTI FOR HER PO), Take by mouth daily, Disp: , Rfl:     atorvastatin (LIPITOR) 40 MG tablet, Take 40 mg by mouth daily, Disp: , Rfl:     ferrous sulfate 325 (65 FE) MG tablet, Take 325 mg by mouth nightly , Disp: , Rfl:     Levothyroxine Sodium (SYNTHROID PO), Take 150 mcg by mouth 6 days a week Does NOT take on Sundays, Disp: , Rfl:     DOXEPIN HCL PO, Take 200 mg by mouth nightly., Disp: , Rfl:     lamoTRIgine (LAMICTAL) 100 MG tablet, Take 100 mg by mouth daily , Disp: , Rfl:     metFORMIN (GLUCOPHAGE) 1000 MG tablet, Take 1,000 mg by mouth 2 times daily (with meals) Indications: 1 tab if hr is >60 or SBP is >100 , Disp: , Rfl:     Subjective:      Review of Systems   Constitutional: Positive for activity change and fatigue. Negative for chills and fever. Respiratory: Positive for shortness of breath (with activity). Gastrointestinal: Negative for abdominal pain and constipation. Musculoskeletal: Positive for arthralgias, back pain, gait problem and myalgias. Skin: Negative for color change, pallor and rash. Neurological: Negative for numbness. Psychiatric/Behavioral: Positive for sleep disturbance. Objective:     Vitals:    11/03/20 1045   BP: 112/60   Site: Left Upper Arm   Position: Sitting   Cuff Size: Medium Adult   Pulse: 80   Temp: 97.4 °F (36.3 °C)   TempSrc: Temporal   Weight: 215 lb (97.5 kg)   Height: 5' (1.524 m)       Physical Exam  Constitutional:       General: She is not in acute distress. Appearance: She is well-developed. She is not diaphoretic. HENT:      Head: Normocephalic and atraumatic. Right Ear: External ear normal.      Left Ear: External ear normal.   Eyes:      General:         Right eye: No discharge. Left eye: No discharge. Neck:      Musculoskeletal: Normal range of motion and neck supple. Pulmonary:      Effort: Pulmonary effort is normal. No respiratory distress. Musculoskeletal:         General: Tenderness present. Lumbar back: She exhibits decreased range of motion, tenderness and pain. Back:         Legs:    Skin:     General: Skin is warm and dry. Findings: No erythema. Neurological:      Mental Status: She is alert and oriented to person, place, and time. Psychiatric:         Behavior: Behavior normal.         Thought Content: Thought content normal.         Judgment: Judgment normal.          Assessment:     1. Lumbar radiculopathy    2. Disc displacement, lumbar    3. DDD (degenerative disc disease), lumbar    4.  Spondylosis of lumbar region without myelopathy or radiculopathy    5. Lumbar pain    6. SI (sacroiliac) joint inflammation (HCC)    7. Chronic pain syndrome            Plan:      OARRS reviewed. Current MED: 0  Patient was not offered naloxone for home. Discussed long term side effects of medications, tolerance, dependency and addiction. Previous UDS reviewed  UDS preformed today for compliance. Patient told can not receive any pain medications from any other source. No evidence of abuse, diversion or aberrant behavior. Medications and/or procedures to improve function and quality of life- patient understanding with this and that may not be pain free  Discussed with patient about safe storage of medications at home  Discussed possible weaning of medication dosing dependent on treatment/procedure results. Testing: none  Procedures: none  Discussed with patient about risks with procedure including infection, reaction to medication, increased pain, or bleeding. Medications: Tylenol #3 TID PRN  Referral to Dr Sheri Zhang. Meds. Prescribed:   Orders Placed This Encounter   Medications    acetaminophen-codeine (TYLENOL/CODEINE #3) 300-30 MG per tablet     Sig: Take 1 tablet by mouth every 8 hours as needed for Pain for up to 14 days. Intended supply: 3 days. Take lowest dose possible to manage pain     Dispense:  42 tablet     Refill:  0     Reduce doses taken as pain becomes manageable       Return in about 3 months (around 2/3/2021) for follow up for Medications.            Electronically signed by RAIMUNDO Sultana CNP on11/3/2020 at 11:28 AM

## 2020-11-04 LAB
CULTURE: ABNORMAL
DIRECT EXAM: ABNORMAL
DIRECT EXAM: ABNORMAL
Lab: ABNORMAL
SPECIMEN DESCRIPTION: ABNORMAL

## 2020-11-23 ENCOUNTER — APPOINTMENT (OUTPATIENT)
Dept: CT IMAGING | Age: 65
End: 2020-11-23
Payer: MEDICARE

## 2020-11-23 ENCOUNTER — APPOINTMENT (OUTPATIENT)
Dept: GENERAL RADIOLOGY | Age: 65
End: 2020-11-23
Payer: MEDICARE

## 2020-11-23 ENCOUNTER — HOSPITAL ENCOUNTER (EMERGENCY)
Age: 65
Discharge: HOME | End: 2020-11-23
Attending: EMERGENCY MEDICINE
Payer: MEDICARE

## 2020-11-23 VITALS
DIASTOLIC BLOOD PRESSURE: 62 MMHG | SYSTOLIC BLOOD PRESSURE: 133 MMHG | WEIGHT: 205 LBS | RESPIRATION RATE: 17 BRPM | TEMPERATURE: 97.8 F | HEART RATE: 77 BPM | OXYGEN SATURATION: 98 % | BODY MASS INDEX: 40.04 KG/M2

## 2020-11-23 LAB
ALBUMIN SERPL-MCNC: 4.1 G/DL (ref 3.5–5.1)
ALP BLD-CCNC: 113 U/L (ref 38–126)
ALT SERPL-CCNC: 23 U/L (ref 11–66)
ANION GAP SERPL CALCULATED.3IONS-SCNC: 12 MEQ/L (ref 8–16)
AST SERPL-CCNC: 24 U/L (ref 5–40)
BASOPHILS # BLD: 0.3 %
BASOPHILS ABSOLUTE: 0 THOU/MM3 (ref 0–0.1)
BILIRUB SERPL-MCNC: 0.3 MG/DL (ref 0.3–1.2)
BUN BLDV-MCNC: 21 MG/DL (ref 7–22)
CALCIUM SERPL-MCNC: 9.9 MG/DL (ref 8.5–10.5)
CHLORIDE BLD-SCNC: 99 MEQ/L (ref 98–111)
CO2: 26 MEQ/L (ref 23–33)
CREAT SERPL-MCNC: 1.2 MG/DL (ref 0.4–1.2)
D-DIMER QUANTITATIVE: 979 NG/ML FEU (ref 0–500)
EKG ATRIAL RATE: 92 BPM
EKG P AXIS: 43 DEGREES
EKG P-R INTERVAL: 174 MS
EKG Q-T INTERVAL: 334 MS
EKG QRS DURATION: 74 MS
EKG QTC CALCULATION (BAZETT): 413 MS
EKG R AXIS: 20 DEGREES
EKG T AXIS: 51 DEGREES
EKG VENTRICULAR RATE: 92 BPM
EOSINOPHIL # BLD: 1.3 %
EOSINOPHILS ABSOLUTE: 0.1 THOU/MM3 (ref 0–0.4)
ERYTHROCYTE [DISTWIDTH] IN BLOOD BY AUTOMATED COUNT: 13.9 % (ref 11.5–14.5)
ERYTHROCYTE [DISTWIDTH] IN BLOOD BY AUTOMATED COUNT: 46.3 FL (ref 35–45)
GFR SERPL CREATININE-BSD FRML MDRD: 45 ML/MIN/1.73M2
GLUCOSE BLD-MCNC: 110 MG/DL (ref 70–108)
HCT VFR BLD CALC: 41.2 % (ref 37–47)
HEMOGLOBIN: 13.2 GM/DL (ref 12–16)
IMMATURE GRANS (ABS): 0.03 THOU/MM3 (ref 0–0.07)
IMMATURE GRANULOCYTES: 0.3 %
LYMPHOCYTES # BLD: 29.3 %
LYMPHOCYTES ABSOLUTE: 2.8 THOU/MM3 (ref 1–4.8)
MCH RBC QN AUTO: 29.4 PG (ref 26–33)
MCHC RBC AUTO-ENTMCNC: 32 GM/DL (ref 32.2–35.5)
MCV RBC AUTO: 91.8 FL (ref 81–99)
MONOCYTES # BLD: 7.6 %
MONOCYTES ABSOLUTE: 0.7 THOU/MM3 (ref 0.4–1.3)
NUCLEATED RED BLOOD CELLS: 0 /100 WBC
OSMOLALITY CALCULATION: 277.4 MOSMOL/KG (ref 275–300)
PLATELET # BLD: 246 THOU/MM3 (ref 130–400)
PMV BLD AUTO: 11.3 FL (ref 9.4–12.4)
POTASSIUM REFLEX MAGNESIUM: 5.4 MEQ/L (ref 3.5–5.2)
PRO-BNP: 26.1 PG/ML (ref 0–900)
RBC # BLD: 4.49 MILL/MM3 (ref 4.2–5.4)
SEG NEUTROPHILS: 61.2 %
SEGMENTED NEUTROPHILS ABSOLUTE COUNT: 5.9 THOU/MM3 (ref 1.8–7.7)
SODIUM BLD-SCNC: 137 MEQ/L (ref 135–145)
TOTAL PROTEIN: 7.5 G/DL (ref 6.1–8)
TROPONIN T: < 0.01 NG/ML
TROPONIN T: < 0.01 NG/ML
WBC # BLD: 9.7 THOU/MM3 (ref 4.8–10.8)

## 2020-11-23 PROCEDURE — 84484 ASSAY OF TROPONIN QUANT: CPT

## 2020-11-23 PROCEDURE — 96374 THER/PROPH/DIAG INJ IV PUSH: CPT

## 2020-11-23 PROCEDURE — 85025 COMPLETE CBC W/AUTO DIFF WBC: CPT

## 2020-11-23 PROCEDURE — 71046 X-RAY EXAM CHEST 2 VIEWS: CPT

## 2020-11-23 PROCEDURE — 83880 ASSAY OF NATRIURETIC PEPTIDE: CPT

## 2020-11-23 PROCEDURE — 71275 CT ANGIOGRAPHY CHEST: CPT

## 2020-11-23 PROCEDURE — 80053 COMPREHEN METABOLIC PANEL: CPT

## 2020-11-23 PROCEDURE — 6360000002 HC RX W HCPCS: Performed by: EMERGENCY MEDICINE

## 2020-11-23 PROCEDURE — 6360000004 HC RX CONTRAST MEDICATION: Performed by: EMERGENCY MEDICINE

## 2020-11-23 PROCEDURE — 99283 EMERGENCY DEPT VISIT LOW MDM: CPT

## 2020-11-23 PROCEDURE — 96361 HYDRATE IV INFUSION ADD-ON: CPT

## 2020-11-23 PROCEDURE — 2580000003 HC RX 258: Performed by: EMERGENCY MEDICINE

## 2020-11-23 PROCEDURE — 93005 ELECTROCARDIOGRAM TRACING: CPT | Performed by: EMERGENCY MEDICINE

## 2020-11-23 PROCEDURE — 36415 COLL VENOUS BLD VENIPUNCTURE: CPT

## 2020-11-23 PROCEDURE — 85379 FIBRIN DEGRADATION QUANT: CPT

## 2020-11-23 PROCEDURE — 93010 ELECTROCARDIOGRAM REPORT: CPT | Performed by: NUCLEAR MEDICINE

## 2020-11-23 RX ORDER — KETOROLAC TROMETHAMINE 30 MG/ML
30 INJECTION, SOLUTION INTRAMUSCULAR; INTRAVENOUS ONCE
Status: COMPLETED | OUTPATIENT
Start: 2020-11-23 | End: 2020-11-23

## 2020-11-23 RX ORDER — SODIUM CHLORIDE 9 MG/ML
INJECTION, SOLUTION INTRAVENOUS CONTINUOUS
Status: DISCONTINUED | OUTPATIENT
Start: 2020-11-23 | End: 2020-11-23 | Stop reason: HOSPADM

## 2020-11-23 RX ADMIN — SODIUM CHLORIDE: 9 INJECTION, SOLUTION INTRAVENOUS at 19:35

## 2020-11-23 RX ADMIN — KETOROLAC TROMETHAMINE 30 MG: 30 INJECTION, SOLUTION INTRAMUSCULAR; INTRAVENOUS at 17:08

## 2020-11-23 RX ADMIN — IOPAMIDOL 80 ML: 755 INJECTION, SOLUTION INTRAVENOUS at 19:29

## 2020-11-23 ASSESSMENT — ENCOUNTER SYMPTOMS
WHEEZING: 0
TROUBLE SWALLOWING: 0
BACK PAIN: 0
CHEST TIGHTNESS: 0
NAUSEA: 0
SINUS PRESSURE: 0
VOMITING: 0
SHORTNESS OF BREATH: 0
CONSTIPATION: 0
VOICE CHANGE: 0
ABDOMINAL PAIN: 0
COUGH: 0
RHINORRHEA: 0
DIARRHEA: 0
SORE THROAT: 0

## 2020-11-23 ASSESSMENT — HEART SCORE: ECG: 0

## 2020-11-23 ASSESSMENT — PAIN DESCRIPTION - PAIN TYPE: TYPE: ACUTE PAIN

## 2020-11-23 ASSESSMENT — PAIN SCALES - GENERAL: PAINLEVEL_OUTOF10: 6

## 2020-11-23 ASSESSMENT — PAIN DESCRIPTION - LOCATION: LOCATION: CHEST

## 2020-11-23 NOTE — ED TRIAGE NOTES
Pt to the ED with c/o tachycardia and  Chest pain. Pt states she was seen at her PCP last Friday and was told she was tchycardia. Pt states she was sent home with an outpatient EKG order. Pt states she was told to come to the ED today if she developed CP.

## 2020-11-23 NOTE — ED NOTES
Pt resting in bed. Resp regular. Denies needs. Call light in reach. Family at side.       Pat Lawson RN  11/23/20 0662

## 2020-11-23 NOTE — ED PROVIDER NOTES
[address] [facility] COMPLAINT    Chief Complaint   Patient presents with    Tachycardia       Nurses Notes reviewed and I agree except as noted in the HPI. HPI    Urban Cousin is a 60 y.o. female who presents for evaluation of tachycardia of 101 this morning upon waking up at about 1130 with accompanying chest pain and the epigastric and left substernal area. Pain is characterized as sharp pain that goes to the left shoulder, feeling weak however denies any diaphoresis no palpitation no nausea vomiting. Patient has a I & D of a sebaceous cyst on the right back at the [facility] that was done 2 weeks ago and was placed on Keflex and doxycycline which the patient claims that she only took it for 1 week because of nausea vomiting. Initially her chest pain was 6/10 however today it is 10/10 worse with movement and deep breathing      REVIEW OF SYSTEMS    Review of Systems   Constitutional: Negative for appetite change, chills, diaphoresis, fatigue and fever. HENT: Negative for congestion, ear pain, postnasal drip, rhinorrhea, sinus pressure, sneezing, sore throat, trouble swallowing and voice change. Respiratory: Negative for cough, chest tightness, shortness of breath and wheezing. Cardiovascular: Positive for chest pain and palpitations. Negative for leg swelling. Gastrointestinal: Negative for abdominal pain, constipation, diarrhea, nausea and vomiting. Musculoskeletal: Negative for arthralgias, back pain, joint swelling, myalgias, neck pain and neck stiffness. Neurological: Negative for dizziness, syncope, weakness, light-headedness, numbness and headaches.        PAST MEDICAL HISTORY     has a past medical history of Back pain, CAD (coronary artery disease), Chronic diastolic CHF (congestive heart failure) ([facility] [address] Colitis, Depression, Depression, Gastroesophageal reflux, GERD (gastroesophageal reflux disease), H/O endoscopy, Hyperlipidemia, Hypertension, Hypertension, Hypothyroidism, Incontinence of urine, Lordosis (acquired) (postural), Spondylosis of unspecified site without mention of myelopathy, Thoracic or lumbosacral neuritis or radiculitis, unspecified, Type II or unspecified type diabetes mellitus without mention of complication, not stated as uncontrolled, Unspecified sleep apnea, and Urinary incontinence. SURGICAL HISTORY   has a past surgical history that includes  section (); Carpal tunnel release (Bilateral, ); Abdomen surgery (); Dilation and curettage of uterus; Hysterectomy (); cardiovascular stress test (2015); Colonoscopy (, ); Upper gastrointestinal endoscopy (); Cardiac catheterization (); other surgical history (Bilateral, 2018); pr inj dx/ther agnt paravert facet joint, lumbar/sac, 2nd level (Bilateral, 3/19/2018); Nerve Surgery (Bilateral, 2018); pr inj dx/ther agnt paravert facet joint, lumbar/sac, 2nd level (Bilateral, 2018); pr inject rx other periph nerve (Bilateral, 2018); pr office/outpt visit,procedure only (Right, 10/2/2018); Injection Procedure For Sacroiliac Joint (Right, 2018); Lumbar spine surgery (Bilateral, 2019); lumbar nerve block (Bilateral, 2019); Nerve Surgery (Bilateral, 10/4/2019); Pain management procedure (Bilateral, 2020); and Pain management procedure (Left, 2020). CURRENT MEDICATIONS    Previous Medications    ACETAMINOPHEN (TYLENOL) 325 MG TABLET    Take 2 tablets by mouth every 4 hours as needed for Pain    AMMONIUM LACTATE (AMLACTIN) 12 % CREAM    Apply topically 2 times daily Apply topically as needed.     ASCORBIC ACID (VITAMIN C) 500 MG TABLET    Take 1,000 mg by mouth daily    ASPIRIN 81 MG TABLET    Take 81 mg by mouth daily    ATORVASTATIN (LIPITOR) 40 MG TABLET    Take 40 mg by mouth daily    BLOOD GLUCOSE MONITORING SUPPL (FREESTYLE LITE) EMILIE    1 Device by Does not apply route daily as needed    BUSPIRONE (BUSPAR) 30 MG TABLET    Take 30 mg by mouth 3 times daily     CALCIUM CARBONATE (OSCAL) 500 MG TABS TABLET    Take 650 mg by mouth daily Indications: 2 chews daily    CYANOCOBALAMIN (VITAMIN B 12 PO)    Take 500 mg by mouth daily     DOXEPIN HCL PO    Take 200 mg by mouth nightly. DULOXETINE (CYMBALTA) 30 MG EXTENDED RELEASE CAPSULE    Take 120 mg by mouth daily     FERROUS SULFATE 325 (65 FE) MG TABLET    Take 325 mg by mouth nightly     GLUCOSE BLOOD VI TEST STRIPS (FREESTYLE LITE) STRIP    Check BS 4 times a day    INSULIN GLARGINE (LANTUS SOLOSTAR) 100 UNIT/ML INJECTION PEN    INJECT 35 UNITS SUBCUTANEOUSLY IN THE MORNING AND 29 UNITS SUBCUTANEOUSLY IN THE EVENING    INSULIN LISPRO (HUMALOG) 100 UNIT/ML PEN    Inject 17 units before breakfast, 20 units before lunch and 28 units before dinner plus sliding scale. Patient uses a max of 96 units per day. LAMOTRIGINE (LAMICTAL) 100 MG TABLET    Take 100 mg by mouth daily     LEVOTHYROXINE SODIUM (SYNTHROID PO)    Take 150 mcg by mouth 6 days a week  Does NOT take on Sundays    LIDOCAINE (XYLOCAINE) 5 % OINTMENT    Apply topically 3 times daily as needed for Pain Apply topically as needed.     LIRAGLUTIDE (VICTOZA) 18 MG/3ML SOPN SC INJECTION    Inject 1.2 mg into the skin daily    LISINOPRIL (PRINIVIL;ZESTRIL) 5 MG TABLET    Take 7.5 mg by mouth daily     MECLIZINE (ANTIVERT) 25 MG TABLET    Take 50 mg by mouth 3 times daily as needed     METFORMIN (GLUCOPHAGE) 1000 MG TABLET    Take 1,000 mg by mouth 2 times daily (with meals) Indications: 1 tab if hr is >60 or SBP is >100     METOPROLOL SUCCINATE (TOPROL XL) 50 MG EXTENDED RELEASE TABLET    Take 50 mg by mouth daily    MULTIPLE VITAMINS-MINERALS (MULTI FOR HER PO)    Take by mouth daily    PANTOPRAZOLE (PROTONIX) 40 MG TABLET    Take 1 tablet by mouth daily    SPIRONOLACTONE (ALDACTONE) 25 MG TABLET    Take 25 mg by mouth as needed     TIZANIDINE (ZANAFLEX) 4 MG TABLET    Take 1 tablet by mouth every 6 hours as needed (muscle pain)    TRAZODONE (DESYREL) 50 MG TABLET    Take 50 mg by mouth nightly Indications: 1/2 tab        ALLERGIES    is allergic to ibuprofen. FAMILY HISTORY    She indicated that her mother is . She indicated that her father is . She indicated that both of her sisters are alive. She indicated that both of her brothers are alive. She indicated that her child is alive. family history includes Diabetes in her father, mother, and sister; Heart Disease in her father and mother; Stroke in her mother and sister; Thyroid Disease in her brother. SOCIAL HISTORY     reports that she is a non-smoker but has been exposed to tobacco smoke. She has never used smokeless tobacco. She reports that she does not drink alcohol or use drugs. PHYSICAL EXAM      INITIAL VITALS: /62   Pulse 77   Temp 97.8 °F (36.6 °C) (Oral)   Resp 17   Wt 205 lb (93 kg)   SpO2 98%   BMI 40.04 kg/m² Estimated body mass index is 40.04 kg/m² as calculated from the following:    Height as of 11/3/20: 5' (1.524 m). Weight as of this encounter: 205 lb (93 kg). Physical Exam  Vitals signs reviewed. Constitutional:       Appearance: She is well-developed. HENT:      Head: Normocephalic and atraumatic. Right Ear: External ear normal.      Left Ear: External ear normal.      Nose: Nose normal.   Eyes:      General: No scleral icterus. Conjunctiva/sclera: Conjunctivae normal.      Pupils: Pupils are equal, round, and reactive to light. Neck:      Musculoskeletal: Normal range of motion and neck supple. Thyroid: No thyromegaly. Vascular: No JVD. Cardiovascular:      Rate and Rhythm: Normal rate and regular rhythm. Heart sounds: No murmur. No friction rub. Pulmonary:      Effort: Pulmonary effort is normal.      Breath sounds: Normal breath sounds. No wheezing or rales.    Chest:      Chest wall: Tenderness (Tenderness noted on the fourth and fifth costochondral junction primarily left side more than the right) present. Abdominal:      General: Bowel sounds are normal.      Palpations: Abdomen is soft. There is no mass. Tenderness: There is no abdominal tenderness. Lymphadenopathy:      Cervical: No cervical adenopathy. Skin:     Findings: No rash. Neurological:      Mental Status: She is alert and oriented to person, place, and time. Psychiatric:         Behavior: Behavior is cooperative. MEDICAL DECISION MAKING    DIFFERENTIAL DIAGNOSIS:  Chest pain, tachycardia, ACS, pneumonia electrolyte and metabolic disorder diabetes      DIAGNOSTIC RESULTS    EKG   Interpreted by Andree Castellanos MD      Rhythm: Normal sinus rhythm  Rate: normal  Axis: Normal  Ectopy: none  Conduction: normal  ST Segments: no acute change  T Waves: no acute change  Q Waves: none    Clinical Impression: Normal sinus rhythm low voltage QRS borderline. EKG      Andree Castellanos MD      RADIOLOGY:  I have reviewed radiologic plain film image(s). The plain films will be read or overread by the radiologist.All other non-plain film images(s) such as CT, Ultrasound and MRI have been read by the radiologist.  [address]   Final Result      No acute pulmonary embolism within limitations of exam.      Mild groundglass opacity in the lung bases, nonspecific. Findings could reflect mild pneumonitis in the correct clinical setting. Correlation is advised. **This report has been created using voice recognition software. It may contain minor errors which are inherent in voice recognition technology. **      Final report electronically signed by Dr. Gilma Disla on 11/23/2020 8:12 PM      XR CHEST (2 VW)   Final Result    IMPRESSION: No acute findings. **This report has been created using voice recognition software. It may contain minor errors which are inherent in voice recognition technology. **      Final report electronically were amenable to my decision. They were discharged home, and they will return to the ED if their symptoms become more severein nature, or otherwise change. Heart Score for chest pain patients  History: Slightly Suspicious  ECG: Normal  Patient Age: > 39 and < 65 years  *Risk factors for Atherosclerotic disease: Hypertension, Hypercholesterolemia, Diabetes Mellitus, Obesity, Coronary Artery Disease  Risk Factors: > 3 Risk factors or history of atherosclerotic disease*  Troponin: < 1X normal limit  Heart Score Total: 3      CRITICAL CARE:   None. CONSULTS:  None    PROCEDURES:  None. FINAL IMPRESSION       1. Atypical chest pain    2. Tachycardia          DISPOSITION/PLAN  PATIENT REFERRED TO:  [facility] [address] [place] [address]  [phone]  In 1 day  atypical chest pain 11:00 AM    [facility] EMERGENCY DEPT  [address] [place] [address]  [phone]    As needed    DISCHARGE MEDICATIONS:  New Prescriptions    No medications on file         (Please note that portions of this note were completed with a voice recognition program and electronically transcribed. Efforts were [facility] edit the dictations but occasionally words are mis-transcribed . The transcription may contain errors not detected in proofreading.   This transcription was electronically signed.)     11/23/20 9:12 PM      Justin Haynes MD      Emergency room physician              Justin Haynes MD  11/23/20 2110       Justin Haynes MD  11/23/20 2112

## 2020-11-24 ENCOUNTER — OFFICE VISIT (OUTPATIENT)
Dept: CARDIOLOGY CLINIC | Age: 65
End: 2020-11-24
Payer: MEDICARE

## 2020-11-24 VITALS
HEIGHT: 59 IN | HEART RATE: 104 BPM | BODY MASS INDEX: 41.33 KG/M2 | DIASTOLIC BLOOD PRESSURE: 60 MMHG | WEIGHT: 205 LBS | SYSTOLIC BLOOD PRESSURE: 124 MMHG

## 2020-11-24 PROCEDURE — 99214 OFFICE O/P EST MOD 30 MIN: CPT | Performed by: INTERNAL MEDICINE

## 2020-11-24 RX ORDER — DOXEPIN HYDROCHLORIDE 100 MG/1
CAPSULE ORAL
COMMUNITY
Start: 2020-11-10 | End: 2020-11-24

## 2020-11-24 RX ORDER — FUROSEMIDE 40 MG/1
40 TABLET ORAL 2 TIMES DAILY
Qty: 60 TABLET | Refills: 3 | Status: SHIPPED | OUTPATIENT
Start: 2020-11-24 | End: 2020-12-03

## 2020-11-24 RX ORDER — DULOXETIN HYDROCHLORIDE 60 MG/1
120 CAPSULE, DELAYED RELEASE ORAL DAILY
COMMUNITY
Start: 2020-11-10 | End: 2022-05-18

## 2020-11-24 NOTE — ED NOTES
Notified by Dr. Kendra Calabrese that he discharged pt and they had left.       Lora Sabillon RN  11/23/20 2128

## 2020-11-24 NOTE — ED NOTES
Dr. Alec Guevara d/c pt. IV not removed by RN or provider. In room to see if pt was still here to remove. Pt not in room. IV catheter noted to be on bed. Pt removed own IV.       Pat Lawson RN  11/23/20 3499

## 2020-11-24 NOTE — PROGRESS NOTES
[facility],[place] [address] [address] [place] [address]  Dept: [phone]  Dept Fax: [phone]  Loc: [phone]    Visit Date: 11/24/2020    Ms. Issac Barahona is a 60 y.o. female  who presented for:  Chief Complaint   Patient presents with    Follow-Up from Hospital      ER chest pain -     Chest Pain    Palpitations     Dr Duarte's patient  Seen in ER yesterday for chest pain  Palpitations       HPI:   JAMISON Goff is a pleasant 60year old female patient who  has a past medical history of Back pain, CAD (coronary artery disease), Chronic diastolic CHF (congestive heart failure) ([facility] [address] Colitis, Depression, Depression, Gastroesophageal reflux, GERD (gastroesophageal reflux disease), H/O endoscopy, Hyperlipidemia, Hypertension, Hypertension, Hypothyroidism, Incontinence of urine, Lordosis (acquired) (postural), Spondylosis of unspecified site without mention of myelopathy, Thoracic or lumbosacral neuritis or radiculitis, unspecified, Type II or unspecified type diabetes mellitus without mention of complication, not stated as uncontrolled, Unspecified sleep apnea, and Urinary incontinence. Dr Dhaval Laura patient  Seen in ER yesterday for chest pain  Palpitations   Chest pains, 6/10, retrosternal, radiates to sholuder and neck, more with walking  Also, has worseing SOB, QUINTANILLA  Weight gain reported by patient  bnp normal  Stress test was negative in 03/2020  Echo revealed preserved EF       RADIOLOGY:  I have reviewed radiologic plain film image(s). The plain films will be read or overread by the radiologist.All other non-plain film images(s) such as CT, Ultrasound and MRI have been read by the radiologist.  [address]   Final Result       No acute pulmonary embolism within limitations of exam.       Mild groundglass opacity in the lung bases, nonspecific. Findings could reflect mild pneumonitis in the correct clinical setting. Correlation is advised.               **This report has been created using voice recognition software. It may contain minor errors which are inherent in voice recognition technology. **       Final report electronically signed by Dr. Heber Jean on 11/23/2020 8:12 PM       XR CHEST (2 VW)   Final Result    IMPRESSION: No acute findings.       **This report has been created using voice recognition software. It may contain minor errors which are inherent in voice recognition technology. **          Summary   This Nuclear Medicine study was negative for ischemia . normal EF      Recommendation   Medical management. Signatures      ----------------------------------------------------------------   Electronically signed by Robbin Lafleur MD (Interpreting   Cardiologist) on 03/02/2020 at 16:32   ----------------------------------------------------------------       Summary   Technically difficult examination. Ejection fraction is visually estimated at 55%.    Overall left ventricular function is normal.      Signature      ----------------------------------------------------------------   Electronically signed by Robbin Lafleur MD (Interpreting   physician) on 03/02/2020 at 05:20 PM   ----------------------------------------------------------------           Current Outpatient Medications:     Acetaminophen-Codeine (TYLENOL WITH CODEINE #3 PO), Take by mouth as needed For back pain, Disp: , Rfl:     DULoxetine (CYMBALTA) 60 MG extended release capsule, Take 120 mg by mouth daily , Disp: , Rfl:     tiZANidine (ZANAFLEX) 4 MG tablet, Take 1 tablet by mouth every 6 hours as needed (muscle pain), Disp: 20 tablet, Rfl: 0    insulin glargine (LANTUS SOLOSTAR) 100 UNIT/ML injection pen, INJECT 35 UNITS SUBCUTANEOUSLY IN THE MORNING AND 29 UNITS SUBCUTANEOUSLY IN THE EVENING (Patient taking differently: INJECT 45 UNITS SUBCUTANEOUSLY IN THE MORNING AND 37 UNITS SUBCUTANEOUSLY IN THE EVENING), Disp: 15 mL, Rfl: 0    insulin lispro (HUMALOG) 100 UNIT/ML pen, Inject 17 units before breakfast, 20 units before lunch and 28 units before dinner plus sliding scale. Patient uses a max of 96 units per day., Disp: 10 pen, Rfl: 5    Liraglutide (VICTOZA) 18 MG/3ML SOPN SC injection, Inject 1.2 mg into the skin daily (Patient taking differently: Inject 1.8 mg into the skin daily ), Disp: 4 pen, Rfl: 5    acetaminophen (TYLENOL) 325 MG tablet, Take 2 tablets by mouth every 4 hours as needed for Pain, Disp: 120 tablet, Rfl: 3    calcium carbonate (OSCAL) 500 MG TABS tablet, Take 650 mg by mouth daily Indications: 2 chews daily, Disp: , Rfl:     metoprolol succinate (TOPROL XL) 50 MG extended release tablet, Take 50 mg by mouth daily, Disp: , Rfl:     meclizine (ANTIVERT) 25 MG tablet, Take 50 mg by mouth 3 times daily as needed , Disp: , Rfl:     lidocaine (XYLOCAINE) 5 % ointment, Apply topically 3 times daily as needed for Pain Apply topically as needed. , Disp: , Rfl:     spironolactone (ALDACTONE) 25 MG tablet, Take 25 mg by mouth as needed , Disp: , Rfl:     traZODone (DESYREL) 50 MG tablet, Take 50 mg by mouth nightly Indications: 1/2 tab , Disp: , Rfl:     Ascorbic Acid (VITAMIN C) 500 MG tablet, Take 1,000 mg by mouth daily, Disp: , Rfl:     Cyanocobalamin (VITAMIN B 12 PO), Take 500 mg by mouth daily , Disp: , Rfl:     busPIRone (BUSPAR) 30 MG tablet, Take 30 mg by mouth 3 times daily , Disp: , Rfl:     lisinopril (PRINIVIL;ZESTRIL) 5 MG tablet, Take 7.5 mg by mouth daily , Disp: , Rfl:     pantoprazole (PROTONIX) 40 MG tablet, Take 1 tablet by mouth daily, Disp: 30 tablet, Rfl: 12    glucose blood VI test strips (FREESTYLE LITE) strip, Check BS 4 times a day, Disp: 400 each, Rfl: 1    Blood Glucose Monitoring Suppl (FREESTYLE LITE) EMILIE, 1 Device by Does not apply route daily as needed, Disp: 1 Device, Rfl: 0    aspirin 81 MG tablet, Take 81 mg by mouth daily, Disp: , Rfl:     ammonium lactate (AMLACTIN) 12 % cream, Apply topically 2 times daily Apply topically as needed. , Disp: , Rfl:     Multiple Vitamins-Minerals (MULTI FOR HER PO), Take by mouth daily, Disp: , Rfl:     atorvastatin (LIPITOR) 40 MG tablet, Take 40 mg by mouth daily, Disp: , Rfl:     ferrous sulfate 325 (65 FE) MG tablet, Take 325 mg by mouth nightly , Disp: , Rfl:     Levothyroxine Sodium (SYNTHROID PO), Take 150 mcg by mouth 6 days a week Does NOT take on Sundays, Disp: , Rfl:     DOXEPIN HCL PO, Take 200 mg by mouth nightly., Disp: , Rfl:     lamoTRIgine (LAMICTAL) 100 MG tablet, Take 100 mg by mouth daily , Disp: , Rfl:     metFORMIN (GLUCOPHAGE) 1000 MG tablet, Take 1,000 mg by mouth 2 times daily (with meals) Indications: 1 tab if hr is >60 or SBP is >100 , Disp: , Rfl:     Past Medical History  Libia Moctezuma  has a past medical history of Back pain, CAD (coronary artery disease), Chronic diastolic CHF (congestive heart failure) ([facility] [address] Colitis, Depression, Depression, Gastroesophageal reflux, GERD (gastroesophageal reflux disease), H/O endoscopy, Hyperlipidemia, Hypertension, Hypertension, Hypothyroidism, Incontinence of urine, Lordosis (acquired) (postural), Spondylosis of unspecified site without mention of myelopathy, Thoracic or lumbosacral neuritis or radiculitis, unspecified, Type II or unspecified type diabetes mellitus without mention of complication, not stated as uncontrolled, Unspecified sleep apnea, and Urinary incontinence. Social History  Libia Moctezuma  reports that she is a non-smoker but has been exposed to tobacco smoke. She has never used smokeless tobacco. She reports that she does not drink alcohol or use drugs. Family History  Libia Moctezuma family history includes Diabetes in her father, mother, and sister; Heart Disease in her father and mother; Stroke in her mother and sister; Thyroid Disease in her brother.     Past Surgical History   Past Surgical History:   Procedure Laterality Date    ABDOMEN SURGERY  1980'S LAPAROSCOPY    CARDIAC CATHETERIZATION      CARDIOVASCULAR STRESS TEST  2015    was not positive but proceeding cath    CARPAL TUNNEL RELEASE Bilateral 1970'S     SECTION  1975    COLONOSCOPY  , 2016    DILATION AND CURETTAGE OF UTERUS      HC INJECTION PROCEDURE FOR SACROILIAC JOINT Right 2018    SACROILIAC JOINT INJECTION Right SI MBB #2, performed by [facility]am [facility]ar [facility] at [facility]  2002    Total    LUMBAR NERVE BLOCK Bilateral 2019    LESI L3 #1 performed by Miryam Spear MD at [address] Bilateral 2019    Lumbar RFA Bilateral L4-5,L5-S1 performed by Miryam Spear MD at [facility] Bilateral 2018    LUMBAR FACET MBB L3-4, L4-5, L5-S1    NERVE SURGERY Bilateral 10/4/2019    LESI L3 #2 performed by Miryam Spear MD at [facility] 104 Bilateral 2018    Lumbar Facet MBB L3-4, L4-5, L5-S1    PAIN MANAGEMENT PROCEDURE Bilateral 2020    TFESI L5 LEFT #1 performed by Miryam Spear MD at [facility] 113 Left 2020    TFESI L5 LEFT #2 performed by Miryam Spear MD at [address] [place] [address] DX/THER AGNT PARAVERT FACET JOINT, LUMBAR/SAC, 2ND LEVEL Bilateral 3/19/2018    LUMBAR FACET MBB   @L3-4, L4-5, L5-S1  BILATERAL performed by Miryam Spear MD at [address] [place] [address] DX/THER AGNT PARAVERT FACET JOINT, LUMBAR/SAC, 2ND LEVEL Bilateral 2018    LUMBAR FACET MBB   @L3-4, L4-5, L5-S1  BILATERAL performed by Miryam Spear MD at [address] Bilateral 2018    LUMBAR RFA  Bilateral @L3-4,4-5,5-S1, performed by Miryam Spear MD at [address] OFFICE/OUTPT VISIT,PROCEDURE ONLY Right 10/2/2018    SACROILIAC JOINT INJECTION SI MBB RIGHT SIDE, performed by Juni Dent MD at [facility] ENDOSCOPY  2016    [facility] Dr. Obed Rothman       Review of Systems   Constitutional: Negative for chills and fever  HENT: Negative for congestion, sinus pressure, sneezing and sore throat. Eyes: Negative for pain, discharge, redness and itching. Respiratory: Negative for apnea, cough  Gastrointestinal: Negative for blood in stool, constipation, diarrhea   Endocrine: Negative for cold intolerance, heat intolerance, polydipsia. Genitourinary: Negative for dysuria, enuresis, flank pain and hematuria. Musculoskeletal: Negative for arthralgias, joint swelling and neck pain. Neurological: Negative for numbness and headaches. Psychiatric/Behavioral: Negative for agitation, confusion, decreased concentration and dysphoric mood. Objective:     /60   Pulse 104   Ht 4' 11\" (1.499 m)   Wt 205 lb (93 kg)   BMI 41.40 kg/m²     Wt Readings from Last 3 Encounters:   11/24/20 205 lb (93 kg)   11/23/20 205 lb (93 kg)   11/03/20 215 lb (97.5 kg)     BP Readings from Last 3 Encounters:   11/24/20 124/60   11/23/20 133/62   11/03/20 112/60       Nursing note and vitals reviewed. Physical Exam   Constitutional: Oriented to person, place, and time. Appears well-developed and well-nourished. ENT: Moist mucous membranes. No bleeding. Tongue is midline. Head: Normocephalic and atraumatic. Eyes: EOM are normal. Pupils are equal, round, and reactive to light. Neck: Normal range of motion. Neck supple. No JVD present. Cardiovascular: Normal rate, regular rhythm,  murmur, no rubs, and intact distal pulses. Pulmonary/Chest: Effort normal and breath sounds normal. No respiratory distress. No wheezes. No rales. Abdominal: Soft. Bowel sounds are normal. No distension. There is no tenderness. Musculoskeletal: Normal range of motion. no edema. Neurological: Alert and oriented to person, place, and time.  No cranial 03/02/2020   Number      Date of Birth   1955       Referring Physician  Clarita Perkins MD                                                         Margot Sams NP      Age             60 year(s)       Norbert Rivas,                                                         [facility]                                       Interpreting         Clarita Perkins MD                                    Physician     Procedure    Type of Study      TTE procedure:ECHOCARDIOGRAM COMPLETE 2D W DOPPLER W COLOR. Procedure Date  Date: 03/02/2020 Start: 09:39 AM    Study Location: Bedside  Technical Quality: Poor visualization due to body habitus. Indications:Chest pain. Additional Medical History: Morbid Obesity, Diabetic, Chest Pain,  Hypertension, Chronic Kidney disease, Congestive heart failure,  Hypothyroidism, Obstructive sleep apnea, Hyperlipidemia, GERD, Coronary  artery disease. Patient Status: Routine    Height: 60 inches Weight: 212 pounds BSA: 1.91 m^2 BMI: 41.4 kg/m^2    BP: 126/81 mmHg     Conclusions      Summary   Technically difficult examination. Ejection fraction is visually estimated at 55%. Overall left ventricular function is normal.      Signature      ----------------------------------------------------------------   Electronically signed by Clarita Perkins MD (Interpreting   physician) on 03/02/2020 at 05:20 PM   ----------------------------------------------------------------      Findings      Mitral Valve   The mitral valve structure was normal with normal leaflet separation. DOPPLER: The transmitral velocity was within the normal range with no   evidence for mitral stenosis. There was no evidence of mitral   regurgitation. Aortic Valve   The aortic valve leaflets were not well visualized. Aortic valve appears tricuspid. Tricuspid Valve   The tricuspid valve structure was normal with normal leaflet separation.    DOPPLER: There was no evidence of tricuspid stenosis. There was no   evidence of tricuspid regurgitation. Pulmonic Valve   The pulmonic valve was not well visualized . Trivial pulmonic regurgitation visualized. Left Atrium   Left atrial size was normal.      Left Ventricle   Ejection fraction is visually estimated at 55%. Overall left ventricular function is normal.      Right Atrium   Right atrial size was normal.      Right Ventricle   The right ventricular size was normal with normal systolic function and   wall thickness. Pericardial Effusion   The pericardium was normal in appearance with no evidence of a pericardial   effusion. Pleural Effusion   No evidence of pleural effusion. Aorta / Great Vessels   -Aortic root dimension within normal limits.   -The Pulmonary artery is within normal limits. -IVC size is within normal limits with normal respiratory phasic changes.      M-Mode/2D Measurements & Calculations      LV Diastolic   LV Systolic Dimension:    AV Cusp Separation: 1.7 cmLA   Dimension: 4.6 3.3 cm                    Dimension: 3.6 cmAO Root   cm             LV Volume Diastolic: 14.9 Dimension: 2.4 cmLA Area: 17.6   LV FS:28.3 %   ml                        cm^2   LV PW          LV Volume Systolic: 40.0   Diastolic: 0.9 ml   cm             LV EDV/LV EDV Index: 97.3   Septum         ml/51 m^2LV ESV/LV ESV    RV Diastolic Dimension: 2.7 cm   Diastolic: 0.8 Index: 30.9 ml/23 m^2   cm             EF Calculated: 54.7 %     LA/Aorta: 1.5                                            Ascending Aorta: 2.8 cm                                            LA volume/Index: 48.8 ml /26m^2     Doppler Measurements & Calculations      MV Peak E-Wave: 122 cm/s   AV Peak Velocity: 169   LVOT Peak Velocity: 90   MV Peak A-Wave: 63.8 cm/s  cm/s                    cm/s   MV E/A Ratio: 1.91         AV Peak Gradient: 11.42 LVOT Peak Gradient: 3   MV Peak Gradient: 5.95     mmHg                    mmHg   mmHg TV Peak E-Wave: 51.8   MV Deceleration Time: 187                          cm/s   msec                                               TV Peak A-Wave: 43.7                              IVRT: 63 msec           cm/s      MV E' Septal Velocity: 8.3                         TV Peak Gradient: 1.07   cm/s                       AV DVI (Vmax):0.53      mmHg   MV A' Septal Velocity: 7.2                         TR Velocity:250 cm/s   cm/s                                               TR Gradient:25 mmHg   MV E' Lateral Velocity: 10                         PV Peak Velocity: 64.3   cm/s                                               cm/s   MV A' Lateral Velocity:                            PV Peak Gradient: 1.65   5.5 cm/s                                           mmHg   E/E' septal: 14.7   E/E' lateral: 12.2   MR Velocity: 425 cm/s     [id number]  [id number]        AssessmentPlan:       Heber Brambila is a pleasant 60year old female patient who  has a past medical history of Back pain, CAD (coronary artery disease), Chronic diastolic CHF (congestive heart failure) ([facility] [address] Colitis, Depression, Depression, Gastroesophageal reflux, GERD (gastroesophageal reflux disease), H/O endoscopy, Hyperlipidemia, Hypertension, Hypertension, Hypothyroidism, Incontinence of urine, Lordosis (acquired) (postural), Spondylosis of unspecified site without mention of myelopathy, Thoracic or lumbosacral neuritis or radiculitis, unspecified, Type II or unspecified type diabetes mellitus without mention of complication, not stated as uncontrolled, Unspecified sleep apnea, and Urinary incontinence.   Dr Mathieu Rodriguez patient  Seen in ER yesterday for chest pain  Palpitations   Chest pains, 6/10, retrosternal, radiates to sholuder and neck, more with walking  Also, has worseing SOB, QUINTANILLA  Weight gain reported by patient  bnp normal  Stress test was negative in 03/2020  Echo revealed preserved EF     HFpEF  Start lasix 40 mg po daily  STOP ALDACTONE (K 5.4)  BMP in one week  FU with Dr Ramone Flores. Consider Cath / Stress test if symptoms persist  Patient was instructed to call office for update on her symptoms   Holter  On ASA  Follows with Nephrology, will call and see soon    Above findings and plan of care were discussed with patient in details, patient's questions were answered.        Electronically signed by Luzmaria Schaffer MD, [facility]    11/24/2020 at 11:10 AM EST

## 2020-11-30 ENCOUNTER — HOSPITAL ENCOUNTER (OUTPATIENT)
Dept: NON INVASIVE DIAGNOSTICS | Age: 65
Discharge: HOME | End: 2020-11-30
Payer: MEDICARE

## 2020-11-30 ENCOUNTER — HOSPITAL ENCOUNTER (OUTPATIENT)
Age: 65
Discharge: HOME | End: 2020-11-30
Payer: MEDICARE

## 2020-11-30 LAB
ANION GAP SERPL CALCULATED.3IONS-SCNC: 14 MEQ/L (ref 8–16)
BUN BLDV-MCNC: 31 MG/DL (ref 7–22)
CALCIUM SERPL-MCNC: 8.8 MG/DL (ref 8.5–10.5)
CHLORIDE BLD-SCNC: 97 MEQ/L (ref 98–111)
CO2: 26 MEQ/L (ref 23–33)
CREAT SERPL-MCNC: 1.9 MG/DL (ref 0.4–1.2)
GFR SERPL CREATININE-BSD FRML MDRD: 27 ML/MIN/1.73M2
GLUCOSE BLD-MCNC: 163 MG/DL (ref 70–108)
POTASSIUM SERPL-SCNC: 3.9 MEQ/L (ref 3.5–5.2)
SODIUM BLD-SCNC: 137 MEQ/L (ref 135–145)

## 2020-11-30 PROCEDURE — 93225 XTRNL ECG REC<48 HRS REC: CPT

## 2020-11-30 PROCEDURE — 80048 BASIC METABOLIC PNL TOTAL CA: CPT

## 2020-11-30 PROCEDURE — 93226 XTRNL ECG REC<48 HR SCAN A/R: CPT

## 2020-11-30 PROCEDURE — 36415 COLL VENOUS BLD VENIPUNCTURE: CPT

## 2020-12-03 ENCOUNTER — OFFICE VISIT (OUTPATIENT)
Dept: NEPHROLOGY | Age: 65
End: 2020-12-03
Payer: MEDICARE

## 2020-12-03 VITALS
BODY MASS INDEX: 42.82 KG/M2 | TEMPERATURE: 97.3 F | DIASTOLIC BLOOD PRESSURE: 67 MMHG | WEIGHT: 212 LBS | OXYGEN SATURATION: 100 % | HEART RATE: 80 BPM | SYSTOLIC BLOOD PRESSURE: 118 MMHG

## 2020-12-03 PROCEDURE — 99213 OFFICE O/P EST LOW 20 MIN: CPT | Performed by: INTERNAL MEDICINE

## 2020-12-03 RX ORDER — FUROSEMIDE 40 MG/1
20 TABLET ORAL DAILY
Qty: 60 TABLET | Refills: 3 | Status: SHIPPED | OUTPATIENT
Start: 2020-12-03 | End: 2022-05-18

## 2020-12-03 NOTE — PROGRESS NOTES
[facility]          Outpatient Follow-Up note         12/3/2020 11:03 AM    Patient Name:   Micha Grider  YOB: 1955  Primary Care Physician:  RAIMUNDO Mckeon NP     Chief Complaint / Reason for follow-up : Follow Up of CKD     Interval History :  Patient seen and examined by me. Feels well. No cp or SOB. Was in ER recently for tachycardia and K was high. Aldactone switched to lasix .        Past History :  Past Medical History:   Diagnosis Date    Back pain     with radiation    CAD (coronary artery disease)     Chronic diastolic CHF (congestive heart failure) (HCC) 2017    Colitis     Depression     Depression     Gastroesophageal reflux     GERD (gastroesophageal reflux disease)     H/O endoscopy     stretch the eso    Hyperlipidemia     Hypertension     Hypertension     Hypothyroidism     Incontinence of urine     Lordosis (acquired) (postural)     Spondylosis of unspecified site without mention of myelopathy     Thoracic or lumbosacral neuritis or radiculitis, unspecified     Type II or unspecified type diabetes mellitus without mention of complication, not stated as uncontrolled     diagnosed     Unspecified sleep apnea      cpap mask    Urinary incontinence      Past Surgical History:   Procedure Laterality Date    ABDOMEN SURGERY      LAPAROSCOPY    CARDIAC CATHETERIZATION      CARDIOVASCULAR STRESS TEST  2015    was not positive but proceeding cath    CARPAL TUNNEL RELEASE Bilateral 'S     SECTION  1975    COLONOSCOPY  , 2016    DILATION AND CURETTAGE OF UTERUS      HC INJECTION PROCEDURE FOR SACROILIAC JOINT Right 2018    SACROILIAC JOINT INJECTION Right SI MBB #2, performed by Yadiel Labm MD at [address] [facility]      Total    LUMBAR NERVE BLOCK Bilateral 2019    LESI L3 #1 performed by Yadiel Lamb MD at [facility] tablet Take 1 tablet by mouth every 6 hours as needed (muscle pain) 20 tablet 0    insulin glargine (LANTUS SOLOSTAR) 100 UNIT/ML injection pen INJECT 35 UNITS SUBCUTANEOUSLY IN THE MORNING AND 29 UNITS SUBCUTANEOUSLY IN THE EVENING (Patient taking differently: INJECT 45 UNITS SUBCUTANEOUSLY IN THE MORNING AND 37 UNITS SUBCUTANEOUSLY IN THE EVENING) 15 mL 0    insulin lispro (HUMALOG) 100 UNIT/ML pen Inject 17 units before breakfast, 20 units before lunch and 28 units before dinner plus sliding scale. Patient uses a max of 96 units per day. 10 pen 5    Liraglutide (VICTOZA) 18 MG/3ML SOPN SC injection Inject 1.2 mg into the skin daily (Patient taking differently: Inject 1.8 mg into the skin daily ) 4 pen 5    acetaminophen (TYLENOL) 325 MG tablet Take 2 tablets by mouth every 4 hours as needed for Pain 120 tablet 3    calcium carbonate (OSCAL) 500 MG TABS tablet Take 650 mg by mouth daily Indications: 2 chews daily      metoprolol succinate (TOPROL XL) 50 MG extended release tablet Take 50 mg by mouth daily      meclizine (ANTIVERT) 25 MG tablet Take 50 mg by mouth 3 times daily as needed       lidocaine (XYLOCAINE) 5 % ointment Apply topically 3 times daily as needed for Pain Apply topically as needed.       traZODone (DESYREL) 50 MG tablet Take 50 mg by mouth nightly Indications: 1/2 tab       Ascorbic Acid (VITAMIN C) 500 MG tablet Take 1,000 mg by mouth daily      Cyanocobalamin (VITAMIN B 12 PO) Take 500 mg by mouth daily       busPIRone (BUSPAR) 30 MG tablet Take 30 mg by mouth 3 times daily       lisinopril (PRINIVIL;ZESTRIL) 5 MG tablet Take 7.5 mg by mouth daily       pantoprazole (PROTONIX) 40 MG tablet Take 1 tablet by mouth daily 30 tablet 12    glucose blood VI test strips (FREESTYLE LITE) strip Check BS 4 times a day 400 each 1    Blood Glucose Monitoring Suppl (FREESTYLE LITE) EMILIE 1 Device by Does not apply route daily as needed 1 Device 0    aspirin 81 MG tablet Take 81 mg by mouth she is on Lasix 40 twice a day and lisinopril   -Advised to hold the Lasix and lisinopril until Sunday. Restart at 20 mg/day starting Monday. BMP on Monday   -Her volume status looks well at this time. 2. Essential hypertension-reasonable continue current medications  3. Diabetes mellitus-currently running well. 4. Grade 1 diastolic dysfunction-well compensated  5. Medications reviewed and discussed with the patient. 6. FU in 5 months as scheduled . Tests and orders placed this Encounter     No orders of the defined types were placed in this encounter. Marsha Navarro M.D  [facility].

## 2020-12-03 NOTE — PROGRESS NOTES
ER 11/23/20 Tachycardia  They said Potassium was high and wanted to make sure it didn't effect her kidneys    Spironolactone stopped and Lasix started

## 2020-12-04 LAB
ACQUISITION DURATION: NORMAL S
AVERAGE HEART RATE: 85 BPM
FASTEST SUPRAVENTRICULAR RATE: 161 BPM
HOOKUP DATE: NORMAL
HOOKUP TIME: NORMAL
LONGEST SUPRAVENTRICULAR RATE: 161 BPM
MAX HEART RATE TIME/DATE: NORMAL
MAX HEART RATE: 134 BPM
MIN HEART RATE TIME/DATE: NORMAL
MIN HEART RATE: 55 BPM
NUMBER OF FASTEST SUPRAVENTRICULAR BEATS: 4
NUMBER OF LONGEST SUPRAVENTRICULAR BEATS: 4
NUMBER OF QRS COMPLEXES: NORMAL
NUMBER OF SUPRAVENTRICULAR BEATS IN RUNS: 4
NUMBER OF SUPRAVENTRICULAR COUPLETS: 0
NUMBER OF SUPRAVENTRICULAR ECTOPICS: 13
NUMBER OF SUPRAVENTRICULAR ISOLATED BEATS: 9
NUMBER OF SUPRAVENTRICULAR RUNS: 1
NUMBER OF VENTRICULAR BEATS IN RUNS: 0
NUMBER OF VENTRICULAR BIGEMINAL CYCLES: 0
NUMBER OF VENTRICULAR COUPLETS: 0
NUMBER OF VENTRICULAR ECTOPICS: 0
NUMBER OF VENTRICULAR ISOLATED BEATS: 0
NUMBER OF VENTRICULAR RUNS: 0

## 2020-12-07 ENCOUNTER — OFFICE VISIT (OUTPATIENT)
Dept: CARDIOLOGY CLINIC | Age: 65
End: 2020-12-07
Payer: MEDICARE

## 2020-12-07 VITALS
BODY MASS INDEX: 42.94 KG/M2 | SYSTOLIC BLOOD PRESSURE: 130 MMHG | DIASTOLIC BLOOD PRESSURE: 72 MMHG | HEART RATE: 66 BPM | WEIGHT: 213 LBS | HEIGHT: 59 IN

## 2020-12-07 PROCEDURE — 99213 OFFICE O/P EST LOW 20 MIN: CPT | Performed by: INTERNAL MEDICINE

## 2020-12-07 RX ORDER — PEN NEEDLE, DIABETIC 29 G X1/2"
NEEDLE, DISPOSABLE MISCELLANEOUS
COMMUNITY
Start: 2020-12-04

## 2020-12-07 RX ORDER — LANCETS
EACH MISCELLANEOUS
COMMUNITY
Start: 2020-12-04 | End: 2022-09-30

## 2020-12-07 NOTE — PROGRESS NOTES
[facility],[place] [address] [address] [place] [address]  Dept: [phone]  Dept Fax: [phone]  Loc: [phone]    Visit Date: 12/7/2020    Ms. Marielos Mcdonald is a 63 y.o. female  who presented for:  Chief Complaint   Patient presents with    1 Year Follow Up       HPI:   64 yo F c hx of DM, HTN, HLD, SERENA on CPaP, Obesity, Diastolic HF, is here for a follow up. Patient was in the ER for chest pain and tachycardia in 11/23/20. Had seen Dr. Asif Zarate in 11/24 who d/c aldactazide. Repeat BMP showed worsening Cr. She follows up with nephrology for worsening Cr. Currently her diuretics are on hold. Palpitations, chest pain and shortness of breath has slightly worsened. Current Outpatient Medications:     Accu-Chek Softclix Lancets MISC, , Disp: , Rfl:     furosemide (LASIX) 40 MG tablet, Take 0.5 tablets by mouth daily, Disp: 60 tablet, Rfl: 3    Acetaminophen-Codeine (TYLENOL WITH CODEINE #3 PO), Take by mouth as needed For back pain, Disp: , Rfl:     DULoxetine (CYMBALTA) 60 MG extended release capsule, Take 120 mg by mouth daily , Disp: , Rfl:     tiZANidine (ZANAFLEX) 4 MG tablet, Take 1 tablet by mouth every 6 hours as needed (muscle pain), Disp: 20 tablet, Rfl: 0    insulin glargine (LANTUS SOLOSTAR) 100 UNIT/ML injection pen, INJECT 35 UNITS SUBCUTANEOUSLY IN THE MORNING AND 29 UNITS SUBCUTANEOUSLY IN THE EVENING (Patient taking differently: INJECT 45 UNITS SUBCUTANEOUSLY IN THE MORNING AND 37 UNITS SUBCUTANEOUSLY IN THE EVENING), Disp: 15 mL, Rfl: 0    insulin lispro (HUMALOG) 100 UNIT/ML pen, Inject 17 units before breakfast, 20 units before lunch and 28 units before dinner plus sliding scale.  Patient uses a max of 96 units per day., Disp: 10 pen, Rfl: 5    Liraglutide (VICTOZA) 18 MG/3ML SOPN SC injection, Inject 1.2 mg into the skin daily (Patient taking differently: Inject 1.8 mg into the skin daily ), Disp: 4 pen, Rfl: 5   acetaminophen (TYLENOL) 325 MG tablet, Take 2 tablets by mouth every 4 hours as needed for Pain, Disp: 120 tablet, Rfl: 3    calcium carbonate (OSCAL) 500 MG TABS tablet, Take 650 mg by mouth daily Indications: 2 chews daily, Disp: , Rfl:     metoprolol succinate (TOPROL XL) 50 MG extended release tablet, Take 50 mg by mouth daily, Disp: , Rfl:     meclizine (ANTIVERT) 25 MG tablet, Take 50 mg by mouth 3 times daily as needed , Disp: , Rfl:     lidocaine (XYLOCAINE) 5 % ointment, Apply topically 3 times daily as needed for Pain Apply topically as needed. , Disp: , Rfl:     traZODone (DESYREL) 50 MG tablet, Take 50 mg by mouth nightly Indications: 1/2 tab , Disp: , Rfl:     Ascorbic Acid (VITAMIN C) 500 MG tablet, Take 1,000 mg by mouth daily, Disp: , Rfl:     Cyanocobalamin (VITAMIN B 12 PO), Take 500 mg by mouth daily , Disp: , Rfl:     busPIRone (BUSPAR) 30 MG tablet, Take 30 mg by mouth 3 times daily , Disp: , Rfl:     lisinopril (PRINIVIL;ZESTRIL) 5 MG tablet, Take 7.5 mg by mouth daily , Disp: , Rfl:     pantoprazole (PROTONIX) 40 MG tablet, Take 1 tablet by mouth daily, Disp: 30 tablet, Rfl: 12    glucose blood VI test strips (FREESTYLE LITE) strip, Check BS 4 times a day, Disp: 400 each, Rfl: 1    Blood Glucose Monitoring Suppl (FREESTYLE LITE) EMILIE, 1 Device by Does not apply route daily as needed, Disp: 1 Device, Rfl: 0    aspirin 81 MG tablet, Take 81 mg by mouth daily, Disp: , Rfl:     ammonium lactate (AMLACTIN) 12 % cream, Apply topically 2 times daily Apply topically as needed. , Disp: , Rfl:     Multiple Vitamins-Minerals (MULTI FOR HER PO), Take by mouth daily, Disp: , Rfl:     atorvastatin (LIPITOR) 40 MG tablet, Take 40 mg by mouth daily, Disp: , Rfl:     ferrous sulfate 325 (65 FE) MG tablet, Take 325 mg by mouth nightly , Disp: , Rfl:     Levothyroxine Sodium (SYNTHROID PO), Take 150 mcg by mouth 6 days a week Does NOT take on Sundays, Disp: , Rfl:     DOXEPIN HCL PO, Take 200 mg by mouth nightly., Disp: , Rfl:     lamoTRIgine (LAMICTAL) 100 MG tablet, Take 100 mg by mouth daily , Disp: , Rfl:     metFORMIN (GLUCOPHAGE) 1000 MG tablet, Take 1,000 mg by mouth 2 times daily (with meals) Indications: 1 tab if hr is >60 or SBP is >100 , Disp: , Rfl:     ULTICARE MINI PEN NEEDLES 31G X 6 MM MISC, , Disp: , Rfl:     Past Medical History  Daniel Jane  has a past medical history of Back pain, CAD (coronary artery disease), Chronic diastolic CHF (congestive heart failure) ([facility] [address] Colitis, Depression, Depression, Gastroesophageal reflux, GERD (gastroesophageal reflux disease), H/O endoscopy, Hyperlipidemia, Hypertension, Hypertension, Hypothyroidism, Incontinence of urine, Lordosis (acquired) (postural), Spondylosis of unspecified site without mention of myelopathy, Thoracic or lumbosacral neuritis or radiculitis, unspecified, Type II or unspecified type diabetes mellitus without mention of complication, not stated as uncontrolled, Unspecified sleep apnea, and Urinary incontinence. Social History  Daniel Jane  reports that she is a non-smoker but has been exposed to tobacco smoke. She has never used smokeless tobacco. She reports that she does not drink alcohol or use drugs. Family History  Daniel Jane family history includes Diabetes in her father, mother, and sister; Heart Disease in her father and mother; Stroke in her mother and sister; Thyroid Disease in her brother.     Past Surgical History   Past Surgical History:   Procedure Laterality Date    ABDOMEN SURGERY      LAPAROSCOPY    CARDIAC CATHETERIZATION      CARDIOVASCULAR STRESS TEST  2015    was not positive but proceeding cath   [address] Bilateral 'S     SECTION  1975    COLONOSCOPY  , 2016    DILATION AND CURETTAGE OF UTERUS      HC INJECTION PROCEDURE FOR SACROILIAC JOINT Right 2018    SACROILIAC JOINT INJECTION Right SI MBB #2, performed by Poonam Vu MD at [facility] [place] HYSTERECTOMY  2002    Total    LUMBAR NERVE BLOCK Bilateral 8/22/2019    LESI L3 #1 performed by Humaira Franks MD at [address] Bilateral 6/27/2019    Lumbar RFA Bilateral L4-5,L5-S1 performed by Humaira Franks MD at [facility] 98 Bilateral 05/08/2018    LUMBAR FACET MBB L3-4, L4-5, L5-S1    NERVE SURGERY Bilateral 10/4/2019    LESI L3 #2 performed by Humaira Franks MD at [facility] 104 Bilateral 03/19/2018    Lumbar Facet MBB L3-4, L4-5, L5-S1    PAIN MANAGEMENT PROCEDURE Bilateral 1/20/2020    TFESI L5 LEFT #1 performed by Humaira Franks MD at [facility] 113 Left 8/20/2020    TFESI L5 LEFT #2 performed by Humaira Franks MD at [address] [place] [address] DX/THER AGNT PARAVERT FACET JOINT, LUMBAR/SAC, 2ND LEVEL Bilateral 3/19/2018    LUMBAR FACET MBB   @L3-4, L4-5, L5-S1  BILATERAL performed by Humaira Franks MD at [address] [place] [address] DX/THER AGNT PARAVERT FACET JOINT, LUMBAR/SAC, 2ND LEVEL Bilateral 5/8/2018    LUMBAR FACET MBB   @L3-4, L4-5, L5-S1  BILATERAL performed by Humaira Franks MD at [address] Bilateral 7/2/2018    LUMBAR RFA  Bilateral @L3-4,4-5,5-S1, performed by Humaira Franks MD at [address] OFFICE/OUTPT VISIT,PROCEDURE ONLY Right 10/2/2018    SACROILIAC JOINT INJECTION SI MBB RIGHT SIDE, performed by Humaira Franks MD at [facility]  2016    [facility] Dr. Alejandro Rhodes       Subjective:     REVIEW OF SYSTEMS  Constitutional: denies sweats, chills and fever  HENT: denies  congestion, sinus pressure, sneezing and sore throat. Eyes: denies  pain, discharge, redness and itching.    Respiratory: denies apnea, cough  Gastrointestinal: denies blood in stool, constipation, diarrhea   Endocrine: denies cold intolerance, heat intolerance, polydipsia. Genitourinary: denies dysuria, enuresis, flank pain and hematuria. Musculoskeletal: denies arthralgias, joint swelling and neck pain. Neurological: denies numbness and headaches. Psychiatric/Behavioral: denies agitation, confusion, decreased concentration and dysphoric mood    All others reviewed and are negative. Objective:     /72   Pulse 66   Ht 4' 11\" (1.499 m)   Wt 213 lb (96.6 kg)   BMI 43.02 kg/m²     Wt Readings from Last 3 Encounters:   12/07/20 213 lb (96.6 kg)   12/03/20 212 lb (96.2 kg)   11/24/20 205 lb (93 kg)     BP Readings from Last 3 Encounters:   12/07/20 130/72   12/03/20 118/67   11/24/20 124/60       PHYSICAL EXAM  Constitutional: Oriented to person, place, and time. Appears well-developed and well-nourished. HENT:   Head: Normocephalic and atraumatic. Eyes: EOM are normal. Pupils are equal, round, and reactive to light. Neck: Normal range of motion. Neck supple. No JVD present. Cardiovascular: Normal rate , normal heart sounds and intact distal pulses. Pulmonary/Chest: Effort normal and breath sounds normal. No respiratory distress. No wheezes. No rales. Abdominal: Soft. Bowel sounds are normal. No distension. There is no tenderness. Musculoskeletal: Normal range of motion. No edema. Neurological: Alert and oriented to person, place, and time. No cranial nerve deficit. Coordination normal.   Skin: Skin is warm and dry. Psychiatric: Normal mood and affect.        Lab Results   Component Value Date    CKTOTAL 102 09/08/2020       Lab Results   Component Value Date    WBC 9.7 11/23/2020    RBC 4.49 11/23/2020    HGB 13.2 11/23/2020    HCT 41.2 11/23/2020    MCV 91.8 11/23/2020    MCH 29.4 11/23/2020    MCHC 32.0 11/23/2020    RDW 14.7 07/08/2020     11/23/2020    MPV 11.3 11/23/2020       Lab Results   Component Value Date     11/30/2020    K 3.9 11/30/2020 K 5.4 11/23/2020    CL 97 11/30/2020    CO2 26 11/30/2020    BUN 31 11/30/2020    LABALBU 4.1 11/23/2020    CREATININE 1.9 11/30/2020    CALCIUM 8.8 11/30/2020    GFRAA >60 07/08/2020    LABGLOM 27 11/30/2020    GLUCOSE 163 11/30/2020    GLUCOSE 74 01/24/2018       Lab Results   Component Value Date    ALKPHOS 113 11/23/2020    ALT 23 11/23/2020    AST 24 11/23/2020    PROT 7.5 11/23/2020    BILITOT 0.3 11/23/2020    BILIDIR <0.2 04/10/2019    LABALBU 4.1 11/23/2020       Lab Results   Component Value Date    MG 1.7 02/24/2019       No results found for: INR, PROTIME      Lab Results   Component Value Date    LABA1C 8.6 07/08/2020       Lab Results   Component Value Date    TRIG 193 07/08/2020    HDL 23 07/08/2020    LDLCALC 53 03/02/2020    LABVLDL 24 10/25/2017       Lab Results   Component Value Date    TSH 1.080 10/09/2020         Testing Reviewed:      I haveindividually reviewed the below cardiac tests    EKG:    ECHO:   Results for orders placed during the hospital encounter of 07/21/17   ECHO Complete 2D W Doppler W Color    Narrative Transthoracic Echocardiography Report (TTE)     Demographics      Patient Name    Gricel Granados  Gender               Female                   F      MR #            [id number]        Race                                                        Ethnicity      Account #       [de-identified]        Room Number          0028      Accession       [id number]        Date of Study        07/21/2017   Number      Date of Birth   1955       Referring Physician  Dante Barrera MD      Age             63 year(s)       Norbert Rivas,                                                         SOMMER                                       Interpreting         Claudette Jain MD                                    Physician evidence for regurgitation. Left Atrium   Left atrial size was normal.      Left Ventricle   Normal left ventricle size and systolic function. Ejection fraction was   estimated at 62%. There were no regional left ventricular wall motion   abnormalities and wall thickness was within normal limits. Doppler parameters were consistent with abnormal left ventricular   relaxation (grade 1 diastolic dysfunction). Right Atrium   Right atrial size was normal.      Right Ventricle   The right ventricular size was normal with normal systolic function and   wall thickness. Pericardial Effusion   The pericardium was normal in appearance with no evidence of a pericardial   effusion. Pleural Effusion   No evidence of pleural effusion. Aorta / Great Vessels   -Aortic root dimension within normal limits.   -The Pulmonary artery is within normal limits. -IVC size is within normal limits with normal respiratory phasic changes.      M-Mode/2D Measurements & Calculations      LV Diastolic      LV Systolic Dimension: 3.1 cm    LA Dimension: 3.6 cmAO   Dimension: 4.2 cm LV Volume Diastolic: 54.5 ml     Root Dimension: 2.4 cm   LV FS:26.2 %      LV Volume Systolic: 82.6 ml   LV PW Diastolic:  LV EDV/LV EDV Index: 78.6 ml/42   1.1 cm            m^2LV ESV/LV ESV Index: 95.8   Septum Diastolic: LO/89 m^2                        RV Diastolic Dimension:   1.1 cm            EF Calculated: 51.8 %            2.4 cm                                                         LA/Aorta: 1.5     Doppler Measurements & Calculations      MV Peak E-Wave: 78 cm/s    AV Peak Velocity: 198  LVOT Peak Velocity: 129   MV Peak A-Wave: 97.2 cm/s  cm/s                   cm/s   MV E/A Ratio: 0.8          AV Peak Gradient:      LVOT Peak Gradient: 7   MV Peak Gradient: 2.43     15.68 mmHg             mmHg   mmHg                                                     TV Peak E-Wave: 73.5   MV Deceleration Time: 335                         cm/s msec                                              TV Peak A-Wave: 70.6                              IVRT: 67 msec          cm/s      MV E' Septal Velocity:                            TV Peak Gradient: 2.16   8.77 cm/s                  AV DVI (Vmax):0.65     mmHg   MV A' Septal Velocity:                            TR Velocity:186 cm/s   13.8 cm/s                                         TR Gradient:13.84 mmHg   MV E' Lateral Velocity:                           PV Peak Velocity: 119   9.16 cm/s                                         cm/s   MV A' Lateral Velocity:                           PV Peak Gradient: 5.66   11.8 cm/s                                         mmHg   E/E' septal: 8.89   E/E' lateral: 8.52     [id number]       STRESS:    CATH:    Assessment/Plan       Diagnosis Orders   1. Dyspnea, unspecified type       Palpitations  Diastolic dysfunction  DM  HTN  HLD  Obesity  SERENA on CPAP  CKD--1.9     Reports palpitations  Holter monitor showed no significant findings  Follows up with nephrology for rising Cr  Currently on hold of lasix and lisinopril  Will repeat Echo  Reviewed prior workup  BP well controlled  Continue Aspirin, lisinopril, metoprolol,  and statin  The patient is asked to make an attempt to improve diet and exercise patterns to aid in medical management of this problem. Advised patient to call office or seek immediate medical attention if there is any new onset of  any chest pain, sob, palpitations, lightheadedness, dizziness, orthopnea, PND or pedal edema. Thank youfor allowing me to participate in the care of this patient. Please do not hesitate to contact me for any further questions. Return in about 6 months (around 6/7/2021) for Regular follow up, Review testing.        Electronically signed by Christopher Massey MD [facility]  12/7/2020 at 11:01 AM

## 2020-12-10 ENCOUNTER — HOSPITAL ENCOUNTER (OUTPATIENT)
Age: 65
Setting detail: SPECIMEN
Discharge: HOME | End: 2020-12-10
Payer: MEDICARE

## 2020-12-10 LAB
ANION GAP SERPL CALCULATED.3IONS-SCNC: 20 MMOL/L (ref 9–17)
BUN BLDV-MCNC: 19 MG/DL (ref 8–23)
BUN/CREAT BLD: ABNORMAL (ref 9–20)
CALCIUM SERPL-MCNC: 9.1 MG/DL (ref 8.6–10.4)
CHLORIDE BLD-SCNC: 99 MMOL/L (ref 98–107)
CO2: 24 MMOL/L (ref 20–31)
CREAT SERPL-MCNC: 1.23 MG/DL (ref 0.5–0.9)
GFR AFRICAN AMERICAN: 53 ML/MIN
GFR NON-AFRICAN AMERICAN: 44 ML/MIN
GFR SERPL CREATININE-BSD FRML MDRD: ABNORMAL ML/MIN/{1.73_M2}
GFR SERPL CREATININE-BSD FRML MDRD: ABNORMAL ML/MIN/{1.73_M2}
GLUCOSE BLD-MCNC: 131 MG/DL (ref 70–99)
POTASSIUM SERPL-SCNC: 4.8 MMOL/L (ref 3.7–5.3)
SODIUM BLD-SCNC: 143 MMOL/L (ref 135–144)

## 2020-12-30 ENCOUNTER — HOSPITAL ENCOUNTER (OUTPATIENT)
Dept: NON INVASIVE DIAGNOSTICS | Age: 65
Discharge: HOME | End: 2020-12-30
Payer: MEDICARE

## 2020-12-30 PROCEDURE — 93307 TTE W/O DOPPLER COMPLETE: CPT

## 2021-01-16 ENCOUNTER — HOSPITAL ENCOUNTER (OUTPATIENT)
Age: 66
Setting detail: SPECIMEN
Discharge: HOME | End: 2021-01-16
Payer: MEDICARE

## 2021-01-18 ENCOUNTER — TELEPHONE (OUTPATIENT)
Dept: CARDIOLOGY CLINIC | Age: 66
End: 2021-01-18

## 2021-01-18 NOTE — TELEPHONE ENCOUNTER
I recently had a visit with Luz Johnson 12. I was told my blood was 98/50 and was told that is too low and to let you know. Please call me with any concerns or questions. Thank you.

## 2021-01-19 LAB
CULTURE: ABNORMAL
CULTURE: ABNORMAL
DIRECT EXAM: ABNORMAL
Lab: ABNORMAL
SPECIMEN DESCRIPTION: ABNORMAL

## 2021-01-20 NOTE — TELEPHONE ENCOUNTER
Spoke with pt. Pt voiced understanding. Pt will call back in a week with BP readings. Please leave encounter open.

## 2021-01-21 NOTE — TELEPHONE ENCOUNTER
Pt LM on VM stating her BP 74/32  . She was asking if she should go to the ER. Called pt. She denies any symptoms. /64     She is holding Lisinopril.

## 2021-01-21 NOTE — TELEPHONE ENCOUNTER
Verbal order Dr Yury Samaniego:  Stop Metoprolol. Let us know id symptoms occur. Will call BP's next week. Please agree with verbal order.

## 2021-03-08 ENCOUNTER — HOSPITAL ENCOUNTER (OUTPATIENT)
Age: 66
Setting detail: SPECIMEN
Discharge: HOME | End: 2021-03-08
Payer: MEDICARE

## 2021-03-08 LAB
ABSOLUTE EOS #: 0.3 K/UL (ref 0–0.44)
ABSOLUTE IMMATURE GRANULOCYTE: 0.04 K/UL (ref 0–0.3)
ABSOLUTE LYMPH #: 3.1 K/UL (ref 1.1–3.7)
ABSOLUTE MONO #: 0.68 K/UL (ref 0.1–1.2)
ANION GAP SERPL CALCULATED.3IONS-SCNC: 16 MMOL/L (ref 9–17)
BASOPHILS # BLD: 1 % (ref 0–2)
BASOPHILS ABSOLUTE: 0.05 K/UL (ref 0–0.2)
BUN BLDV-MCNC: 18 MG/DL (ref 8–23)
BUN/CREAT BLD: ABNORMAL (ref 9–20)
CALCIUM SERPL-MCNC: 9 MG/DL (ref 8.6–10.4)
CHLORIDE BLD-SCNC: 100 MMOL/L (ref 98–107)
CHOLESTEROL/HDL RATIO: 4.8
CHOLESTEROL: 116 MG/DL
CO2: 22 MMOL/L (ref 20–31)
CREAT SERPL-MCNC: 1.27 MG/DL (ref 0.5–0.9)
DIFFERENTIAL TYPE: ABNORMAL
EOSINOPHILS RELATIVE PERCENT: 3 % (ref 1–4)
ESTIMATED AVERAGE GLUCOSE: 160 MG/DL
GFR AFRICAN AMERICAN: 52 ML/MIN
GFR NON-AFRICAN AMERICAN: 43 ML/MIN
GFR SERPL CREATININE-BSD FRML MDRD: ABNORMAL ML/MIN/{1.73_M2}
GFR SERPL CREATININE-BSD FRML MDRD: ABNORMAL ML/MIN/{1.73_M2}
GLUCOSE BLD-MCNC: 223 MG/DL (ref 70–99)
HBA1C MFR BLD: 7.2 % (ref 4–6)
HCT VFR BLD CALC: 38.9 % (ref 36.3–47.1)
HDLC SERPL-MCNC: 24 MG/DL
HEMOGLOBIN: 11.5 G/DL (ref 11.9–15.1)
IMMATURE GRANULOCYTES: 0 %
LDL CHOLESTEROL: 64 MG/DL (ref 0–130)
LYMPHOCYTES # BLD: 28 % (ref 24–43)
MCH RBC QN AUTO: 28.6 PG (ref 25.2–33.5)
MCHC RBC AUTO-ENTMCNC: 29.6 G/DL (ref 28.4–34.8)
MCV RBC AUTO: 96.8 FL (ref 82.6–102.9)
MONOCYTES # BLD: 6 % (ref 3–12)
NRBC AUTOMATED: 0 PER 100 WBC
PDW BLD-RTO: 13.8 % (ref 11.8–14.4)
PLATELET # BLD: 276 K/UL (ref 138–453)
PLATELET ESTIMATE: ABNORMAL
PMV BLD AUTO: 11.9 FL (ref 8.1–13.5)
POTASSIUM SERPL-SCNC: 4.5 MMOL/L (ref 3.7–5.3)
RBC # BLD: 4.02 M/UL (ref 3.95–5.11)
RBC # BLD: ABNORMAL 10*6/UL
SEG NEUTROPHILS: 62 % (ref 36–65)
SEGMENTED NEUTROPHILS ABSOLUTE COUNT: 6.84 K/UL (ref 1.5–8.1)
SODIUM BLD-SCNC: 138 MMOL/L (ref 135–144)
TRIGL SERPL-MCNC: 142 MG/DL
TSH SERPL DL<=0.05 MIU/L-ACNC: 1.72 MIU/L (ref 0.3–5)
VLDLC SERPL CALC-MCNC: ABNORMAL MG/DL (ref 1–30)
WBC # BLD: 11 K/UL (ref 3.5–11.3)
WBC # BLD: ABNORMAL 10*3/UL

## 2021-05-18 ENCOUNTER — HOSPITAL ENCOUNTER (OUTPATIENT)
Age: 66
Setting detail: SPECIMEN
Discharge: HOME | End: 2021-05-18
Payer: MEDICARE

## 2021-05-18 DIAGNOSIS — I10 ESSENTIAL HYPERTENSION: ICD-10-CM

## 2021-05-18 DIAGNOSIS — N18.30 CKD (CHRONIC KIDNEY DISEASE), STAGE III (HCC): ICD-10-CM

## 2021-05-18 LAB
ANION GAP SERPL CALCULATED.3IONS-SCNC: 17 MMOL/L (ref 9–17)
BUN BLDV-MCNC: 16 MG/DL (ref 8–23)
BUN/CREAT BLD: ABNORMAL (ref 9–20)
CALCIUM SERPL-MCNC: 9.7 MG/DL (ref 8.6–10.4)
CHLORIDE BLD-SCNC: 98 MMOL/L (ref 98–107)
CO2: 23 MMOL/L (ref 20–31)
CREAT SERPL-MCNC: 1.1 MG/DL (ref 0.5–0.9)
CREATININE URINE: 49.1 MG/DL (ref 28–217)
GFR AFRICAN AMERICAN: >60 ML/MIN
GFR NON-AFRICAN AMERICAN: 50 ML/MIN
GFR SERPL CREATININE-BSD FRML MDRD: ABNORMAL ML/MIN/{1.73_M2}
GFR SERPL CREATININE-BSD FRML MDRD: ABNORMAL ML/MIN/{1.73_M2}
GLUCOSE BLD-MCNC: 216 MG/DL (ref 70–99)
POTASSIUM SERPL-SCNC: 4.5 MMOL/L (ref 3.7–5.3)
SODIUM BLD-SCNC: 138 MMOL/L (ref 135–144)
TOTAL PROTEIN, URINE: 7 MG/DL
URINE TOTAL PROTEIN CREATININE RATIO: 0.14 (ref 0–0.2)

## 2021-05-25 ENCOUNTER — OFFICE VISIT (OUTPATIENT)
Dept: NEPHROLOGY | Age: 66
End: 2021-05-25
Payer: MEDICARE

## 2021-05-25 VITALS
SYSTOLIC BLOOD PRESSURE: 114 MMHG | TEMPERATURE: 97.3 F | BODY MASS INDEX: 40.6 KG/M2 | HEART RATE: 82 BPM | WEIGHT: 201 LBS | DIASTOLIC BLOOD PRESSURE: 65 MMHG | OXYGEN SATURATION: 97 %

## 2021-05-25 DIAGNOSIS — I10 ESSENTIAL HYPERTENSION: Primary | ICD-10-CM

## 2021-05-25 DIAGNOSIS — N18.32 STAGE 3B CHRONIC KIDNEY DISEASE (HCC): ICD-10-CM

## 2021-05-25 PROCEDURE — 99213 OFFICE O/P EST LOW 20 MIN: CPT | Performed by: INTERNAL MEDICINE

## 2021-05-25 RX ORDER — LAMOTRIGINE 150 MG/1
TABLET ORAL
COMMUNITY
Start: 2021-04-23 | End: 2022-05-18

## 2021-05-25 RX ORDER — LISINOPRIL 5 MG/1
5 TABLET ORAL DAILY
Qty: 90 TABLET | Refills: 4
Start: 2021-07-25 | End: 2022-09-30

## 2021-05-25 RX ORDER — DOXEPIN HYDROCHLORIDE 100 MG/1
200 CAPSULE ORAL NIGHTLY
COMMUNITY
Start: 2021-04-23 | End: 2022-09-30

## 2021-05-25 RX ORDER — POLOXAMER 188/SORBITOL/UREA
CLEANSER (ML) TOPICAL
COMMUNITY
Start: 2020-10-02

## 2021-05-25 RX ORDER — INSULIN DETEMIR 100 [IU]/ML
INJECTION, SOLUTION SUBCUTANEOUS
COMMUNITY
Start: 2021-04-30

## 2021-05-25 RX ORDER — GINGER ROOT/GINGER ROOT EXT 262.5 MG
CAPSULE ORAL
COMMUNITY
Start: 2020-10-02

## 2021-05-25 NOTE — PROGRESS NOTES
[facility]          Outpatient Follow-Up note         2021 2:36 PM    Patient Name:   April Lai  YOB: 1955  Primary Care Physician:  RAIMUNDO Hartley NP     Chief Complaint / Reason for follow-up : Follow Up of CKD     Interval History :  Patient seen and examined by me. Feels well. No cp or SOB.   No hospitalizations and no med changes     Past History :  Past Medical History:   Diagnosis Date    Back pain     with radiation    CAD (coronary artery disease)     Chronic diastolic CHF (congestive heart failure) (HCC) 2017    Colitis     Depression     Depression     Gastroesophageal reflux     GERD (gastroesophageal reflux disease)     H/O endoscopy     stretch the eso    Hyperlipidemia     Hypertension     Hypertension     Hypothyroidism     Incontinence of urine     Lordosis (acquired) (postural)     Spondylosis of unspecified site without mention of myelopathy     Thoracic or lumbosacral neuritis or radiculitis, unspecified     Type II or unspecified type diabetes mellitus without mention of complication, not stated as uncontrolled     diagnosed     Unspecified sleep apnea      cpap mask    Urinary incontinence      Past Surgical History:   Procedure Laterality Date    ABDOMEN SURGERY      LAPAROSCOPY    CARDIAC CATHETERIZATION      CARDIOVASCULAR STRESS TEST  2015    was not positive but proceeding cath    CARPAL TUNNEL RELEASE Bilateral 'S     SECTION  1975    COLONOSCOPY  , 2016    DILATION AND CURETTAGE OF UTERUS      HC INJECTION PROCEDURE FOR SACROILIAC JOINT Right 2018    SACROILIAC JOINT INJECTION Right SI MBB #2, performed by Edy Ha MD at [address] [facility]  2002    Total    LUMBAR NERVE BLOCK Bilateral 2019    LESI L3 #1 performed by [facility] [address] Bilateral 2019 Lumbar RFA Bilateral L4-5,L5-S1 performed by Jennifer Banda MD at [address] [facility] Bilateral 05/08/2018    LUMBAR FACET MBB L3-4, L4-5, L5-S1    NERVE SURGERY Bilateral 10/4/2019    LESI L3 #2 performed by Jennifer Banda MD at [facility] 104 Bilateral 03/19/2018    Lumbar Facet MBB L3-4, L4-5, L5-S1    PAIN MANAGEMENT PROCEDURE Bilateral 1/20/2020    TFESI L5 LEFT #1 performed by Jennifer Banda MD at [facility] 113 Left 8/20/2020    TFESI L5 LEFT #2 performed by Jennifer Banda MD at [address] [place] [address] DX/THER AGNT PARAVERT FACET JOINT, LUMBAR/SAC, 2ND LEVEL Bilateral 3/19/2018    LUMBAR FACET MBB   @L3-4, L4-5, L5-S1  BILATERAL performed by Jennifer Banda MD at [address] [place] [address] DX/THER AGNT PARAVERT FACET JOINT, LUMBAR/SAC, 2ND LEVEL Bilateral 5/8/2018    LUMBAR FACET MBB   @L3-4, L4-5, L5-S1  BILATERAL performed by Jennifer Banda MD at [address] Bilateral 7/2/2018    LUMBAR RFA  Bilateral @L3-4,4-5,5-S1, performed by Jennifer Banda MD at [address] [facility] OFFICE/OUTPT VISIT,PROCEDURE ONLY Right 10/2/2018    SACROILIAC JOINT INJECTION SI MBB RIGHT SIDE, performed by Jennifer Banda MD at [facility]  2016    [facility] Dr. Riat Nunes        Medications :     Outpatient Medications Marked as Taking for the 5/25/21 encounter (Office Visit) with Zuri Coronado MD   Medication Sig Dispense Refill    Calcium Carb-Cholecalciferol (CALCIUM + D3) 600-800 MG-UNIT TABS Take by mouth      doxepin (SINEQUAN) 100 MG capsule 200 mg nightly       NOVOLOG 100 UNIT/ML injection vial       LEVEMIR 100 UNIT/ML injection vial 46 in am  43 at bedtime      lamoTRIgine (LAMICTAL) 150 MG tablet       MULTIPLE VITAMIN PO Take by mouth      psyllium Pulse 82   Temp 97.3 °F (36.3 °C) (Temporal)   Wt 201 lb (91.2 kg)   SpO2 97%   BMI 40.60 kg/m²  Wt Readings from Last 3 Encounters:   05/25/21 201 lb (91.2 kg)   12/07/20 213 lb (96.6 kg)   12/03/20 212 lb (96.2 kg)        Physical Exam     General -- well developed and well nourished  Lungs -- clear  Heart -- S1, S2 heard, JVD - no  Abdomen - soft, non-tender  Extremities -- no edema  CNS - awake and alert    Labs, Radiology and Tests    Labs -    7/16 7/17 2/19 3/20 11/20 5/21    Potassium 4 4.4 4.5 4.4 3.9 4.5    BUN 19 16 22 17 31 16    Creatinine 1.05 1.2 1.3 1.24 1.9 1.10    eGFR 54 46 42 44 27 50              UPCR - < 100 80 120      UMCR                      Renal Ultrasound Scan -- 1/2016  Right kidney 11.39 cm and left kidney 11.95 cm   Normal echogenicity and normal ultrasound scan. Assessment    1. Renal-most likely chronic kidney disease stage III, with estimated GFR of around 50 ML / Minute. This is most likely secondary to her long-standing hypertension, diabetes and chronic nonsteroidal anti-inflammatory use. - overall back to baseline   2. Essential hypertension-reasonable continue current medications  3. Diabetes mellitus-currently running well. A1c - 7.2 in 3/2021  4. Grade 1 diastolic dysfunction-well compensated on lasix  5. Medications reviewed and discussed with the patient. 6. FU in 1 year. 7. Simplify lisinopril make 5 po daily  Tests and orders placed this Encounter     Orders Placed This Encounter   Procedures    Basic Metabolic Panel    Microalbumin / Creatinine Urine Ratio    Protein / creatinine ratio, urine       Chau Buck M.D  [facility].

## 2021-06-02 ENCOUNTER — HOSPITAL ENCOUNTER (OUTPATIENT)
Age: 66
Setting detail: SPECIMEN
Discharge: HOME | End: 2021-06-02
Payer: MEDICARE

## 2021-06-25 ENCOUNTER — OFFICE VISIT (OUTPATIENT)
Dept: CARDIOLOGY CLINIC | Age: 66
End: 2021-06-25
Payer: MEDICARE

## 2021-06-25 VITALS
DIASTOLIC BLOOD PRESSURE: 52 MMHG | HEIGHT: 59 IN | BODY MASS INDEX: 40.32 KG/M2 | HEART RATE: 76 BPM | SYSTOLIC BLOOD PRESSURE: 118 MMHG | WEIGHT: 200 LBS

## 2021-06-25 DIAGNOSIS — R00.2 PALPITATIONS: Primary | ICD-10-CM

## 2021-06-25 PROCEDURE — 99213 OFFICE O/P EST LOW 20 MIN: CPT | Performed by: INTERNAL MEDICINE

## 2021-06-25 RX ORDER — L. ACIDOPHILUS/LACTOBAC SPOR 35MM-25MM
TABLET ORAL
COMMUNITY
Start: 2021-06-07 | End: 2022-09-30

## 2021-06-25 RX ORDER — LEVOTHYROXINE SODIUM 0.15 MG/1
112 TABLET ORAL
COMMUNITY
Start: 2021-06-08

## 2021-06-25 NOTE — PROGRESS NOTES
[address] [place] [address],[place] [address] 159 [facility] 2K  [place] [address]  Dept: [phone]  Dept Fax: [phone]  Loc: [phone]    Visit Date: 6/25/2021    Ms. Za Rankin is a 63 y.o. female  who presented for:  Chief Complaint   Patient presents with    Check-Up    Congestive Heart Failure       HPI:   62 yo F c hx of DM, HTN, HLD, SERENA on CPAP, Obesity, Diastolic HF, is here for a follow up. Patient was in the ER for chest pain and tachycardia in 11/23/20. Had seen Dr. Nori Medina in 11/24 who d/c aldactazide. Currently her diuretics are on hold. Palpitations, chest pain and shortness of breath has slightly worsened.         Current Outpatient Medications:     Lactobacillus (ACIDOPHILUS/L-SPOROGENES) TABS, , Disp: , Rfl:     levothyroxine (SYNTHROID) 150 MCG tablet, , Disp: , Rfl:     Calcium Carb-Cholecalciferol (CALCIUM + D3) 600-800 MG-UNIT TABS, Take by mouth, Disp: , Rfl:     doxepin (SINEQUAN) 100 MG capsule, 200 mg nightly , Disp: , Rfl:     NOVOLOG 100 UNIT/ML injection vial, , Disp: , Rfl:     LEVEMIR 100 UNIT/ML injection vial, 46 in am 43 at bedtime, Disp: , Rfl:     lamoTRIgine (LAMICTAL) 150 MG tablet, , Disp: , Rfl:     MULTIPLE VITAMIN PO, Take by mouth, Disp: , Rfl:     psyllium ([employer] DAILY FIBER) 0.52 g capsule, Take by mouth, Disp: , Rfl:     [START ON 7/25/2021] lisinopril (PRINIVIL;ZESTRIL) 5 MG tablet, Take 1 tablet by mouth daily, Disp: 90 tablet, Rfl: 4    ULTICARE MINI PEN NEEDLES 31G X 6 MM MISC, , Disp: , Rfl:     Accu-Chek Softclix Lancets MISC, , Disp: , Rfl:     furosemide (LASIX) 40 MG tablet, Take 0.5 tablets by mouth daily, Disp: 60 tablet, Rfl: 3    DULoxetine (CYMBALTA) 60 MG extended release capsule, Take 120 mg by mouth daily , Disp: , Rfl:     tiZANidine (ZANAFLEX) 4 MG tablet, Take 1 tablet by mouth every 6 hours as needed (muscle pain), Disp: 20 tablet, Rfl: 0    Liraglutide (VICTOZA) 18 MG/3ML SOPN SC injection, Inject 1.2 mg into the skin daily (Patient taking differently: Inject 1.8 mg into the skin daily ), Disp: 4 pen, Rfl: 5    acetaminophen (TYLENOL) 325 MG tablet, Take 2 tablets by mouth every 4 hours as needed for Pain, Disp: 120 tablet, Rfl: 3    metoprolol succinate (TOPROL XL) 50 MG extended release tablet, Take 50 mg by mouth daily, Disp: , Rfl:     traZODone (DESYREL) 50 MG tablet, Take 50 mg by mouth nightly Indications: 1/2 tab , Disp: , Rfl:     Ascorbic Acid (VITAMIN C) 500 MG tablet, Take 1,000 mg by mouth daily, Disp: , Rfl:     Cyanocobalamin (VITAMIN B 12 PO), Take 500 mg by mouth daily , Disp: , Rfl:     busPIRone (BUSPAR) 30 MG tablet, Take 30 mg by mouth 3 times daily , Disp: , Rfl:     pantoprazole (PROTONIX) 40 MG tablet, Take 1 tablet by mouth daily, Disp: 30 tablet, Rfl: 12    aspirin 81 MG tablet, Take 81 mg by mouth daily, Disp: , Rfl:     atorvastatin (LIPITOR) 40 MG tablet, Take 40 mg by mouth daily, Disp: , Rfl:     ferrous sulfate 325 (65 FE) MG tablet, Take 325 mg by mouth nightly , Disp: , Rfl:     metFORMIN (GLUCOPHAGE) 1000 MG tablet, Take 1,000 mg by mouth 2 times daily (with meals) Indications: 1 tab if hr is >60 or SBP is >100 , Disp: , Rfl:     Past Medical History  Alpheus Oppenheim  has a past medical history of Back pain, CAD (coronary artery disease), Chronic diastolic CHF (congestive heart failure) (HCC), Colitis, Depression, Depression, Gastroesophageal reflux, GERD (gastroesophageal reflux disease), H/O endoscopy, Hyperlipidemia, Hypertension, Hypertension, Hypothyroidism, Incontinence of urine, Lordosis (acquired) (postural), Spondylosis of unspecified site without mention of myelopathy, Thoracic or lumbosacral neuritis or radiculitis, unspecified, Type II or unspecified type diabetes mellitus without mention of complication, not stated as uncontrolled, Unspecified sleep apnea, and Urinary incontinence.     Social History  Alpheus Oppenheim  reports that she is a non-smoker but has been exposed to tobacco smoke. She has never used smokeless tobacco. She reports that she does not drink alcohol and does not use drugs. Family History  Carmen Ellis family history includes Diabetes in her father, mother, and sister; Heart Disease in her father and mother; Stroke in her mother and sister; Thyroid Disease in her brother.     Past Surgical History   Past Surgical History:   Procedure Laterality Date    ABDOMEN SURGERY      LAPAROSCOPY    CARDIAC CATHETERIZATION      CARDIOVASCULAR STRESS TEST  2015    was not positive but proceeding cath    CARPAL TUNNEL RELEASE Bilateral 'S     SECTION  1975    COLONOSCOPY  ,     DILATION AND CURETTAGE OF UTERUS      HC INJECTION PROCEDURE FOR SACROILIAC JOINT Right 2018    SACROILIAC JOINT INJECTION Right SI MBB #2, performed by Vania Rothman MD at [address] [place] [address]  2002    Total    LUMBAR NERVE BLOCK Bilateral 2019    LESI L3 #1 performed by Vania Rothman MD at [address] [facility] Bilateral 2019    Lumbar RFA Bilateral L4-5,L5-S1 performed by Vania Rothman MD at [facility] Bilateral 2018    LUMBAR FACET MBB L3-4, L4-5, L5-S1    NERVE SURGERY Bilateral 10/4/2019    LESI L3 #2 performed by Vania Rothman MD at [facility] 104 Bilateral 2018    Lumbar Facet MBB L3-4, L4-5, L5-S1    PAIN MANAGEMENT PROCEDURE Bilateral 2020    TFESI L5 LEFT #1 performed by Vania Rothman MD at [facility] 113 Left 2020    TFESI L5 LEFT #2 performed by Vania Rothman MD at [address] [place] [address] DX/THER AGNT PARAVERT FACET JOINT, LUMBAR/SAC, 2ND LEVEL Bilateral 3/19/2018    LUMBAR FACET MBB   @L3-4, L4-5, L5-S1  BILATERAL performed by Vania Rothman MD at [address] [place] [address] DX/THER AGNT PARAVERT FACET JOINT, LUMBAR/SAC, 2ND LEVEL Bilateral 5/8/2018    LUMBAR FACET MBB   @L3-4, L4-5, L5-S1  BILATERAL performed by Khoa Grubbs MD at [address] Bilateral 7/2/2018    LUMBAR RFA  Bilateral @L3-4,4-5,5-S1, performed by Khoa Grubbs MD at [address] OFFICE/OUTPT VISIT,PROCEDURE ONLY Right 10/2/2018    SACROILIAC JOINT INJECTION SI MBB RIGHT SIDE, performed by Khoa Grubbs MD at [address] [facility]  2016    [facility] [address] Sanchez       Subjective:     REVIEW OF SYSTEMS  Constitutional: denies sweats, chills and fever  HENT: denies  congestion, sinus pressure, sneezing and sore throat. Eyes: denies  pain, discharge, redness and itching. Respiratory: denies apnea, cough  Gastrointestinal: denies blood in stool, constipation, diarrhea   Endocrine: denies cold intolerance, heat intolerance, polydipsia. Genitourinary: denies dysuria, enuresis, flank pain and hematuria. Musculoskeletal: denies arthralgias, joint swelling and neck pain. Neurological: denies numbness and headaches. Psychiatric/Behavioral: denies agitation, confusion, decreased concentration and dysphoric mood    All others reviewed and are negative. Objective:     BP (!) 118/52   Pulse 76   Ht 4' 11\" (1.499 m)   Wt 200 lb (90.7 kg)   BMI 40.40 kg/m²     Wt Readings from Last 3 Encounters:   06/25/21 200 lb (90.7 kg)   05/25/21 201 lb (91.2 kg)   12/07/20 213 lb (96.6 kg)     BP Readings from Last 3 Encounters:   06/25/21 (!) 118/52   05/25/21 114/65   12/07/20 130/72       PHYSICAL EXAM  Constitutional: Oriented to person, place, and time. Appears well-developed and well-nourished. HENT:   Head: Normocephalic and atraumatic. Eyes: EOM are normal. Pupils are equal, round, and reactive to light. Neck: Normal range of motion. Neck supple. No JVD present.    Cardiovascular: Normal rate , normal heart sounds and intact distal pulses. Pulmonary/Chest: Effort normal and breath sounds normal. No respiratory distress. No wheezes. No rales. Abdominal: Soft. Bowel sounds are normal. No distension. There is no tenderness. Musculoskeletal: Normal range of motion. No edema. Neurological: Alert and oriented to person, place, and time. No cranial nerve deficit. Coordination normal.   Skin: Skin is warm and dry. Psychiatric: Normal mood and affect.        Lab Results   Component Value Date    CKTOTAL 102 09/08/2020       Lab Results   Component Value Date    WBC 11.0 03/08/2021    RBC 4.02 03/08/2021    HGB 11.5 03/08/2021    HCT 38.9 03/08/2021    MCV 96.8 03/08/2021    MCH 28.6 03/08/2021    MCHC 29.6 03/08/2021    RDW 13.8 03/08/2021     03/08/2021    MPV 11.9 03/08/2021       Lab Results   Component Value Date     05/18/2021    K 4.5 05/18/2021    K 5.4 11/23/2020    CL 98 05/18/2021    CO2 23 05/18/2021    BUN 16 05/18/2021    LABALBU 4.1 11/23/2020    CREATININE 1.10 05/18/2021    CALCIUM 9.7 05/18/2021    GFRAA >60 05/18/2021    LABGLOM 50 05/18/2021    LABGLOM 27 11/30/2020    GLUCOSE 216 05/18/2021    GLUCOSE 74 01/24/2018       Lab Results   Component Value Date    ALKPHOS 113 11/23/2020    ALT 23 11/23/2020    AST 24 11/23/2020    PROT 7.5 11/23/2020    BILITOT 0.3 11/23/2020    BILIDIR <0.2 04/10/2019    LABALBU 4.1 11/23/2020       Lab Results   Component Value Date    MG 1.7 02/24/2019       No results found for: INR, PROTIME      Lab Results   Component Value Date    LABA1C 7.2 03/08/2021       Lab Results   Component Value Date    TRIG 142 03/08/2021    HDL 24 03/08/2021    LDLCALC 53 03/02/2020    LABVLDL 24 10/25/2017       Lab Results   Component Value Date    TSH 1.72 03/08/2021         Testing Reviewed:      I haveindividually reviewed the below cardiac tests    EKG:    ECHO:   Results for orders placed during the hospital encounter of 07/21/17   ECHO Complete 2D W Doppler W Color    Narrative Transthoracic Echocardiography Report (TTE)     Demographics      Patient Name    Lenny Chavez  Gender               Female                   F      MR #            [id number]        Race                                                        Ethnicity      Account #       [de-identified]        Room Number          0028      Accession       [id number]        Date of Study        07/21/2017   Number      Date of Birth   1955       Referring Physician  Kipp Pew MD Maxie Ormond, [place] Jalency      Age             63 year(s)       Sherrie Covington,                                                         [facility] [[email or web address]]                                       Interpreting         Migdalia Andre MD                                    Physician     Procedure    Type of Study      TTE procedure:ECHOCARDIOGRAM COMPLETE 2D W DOPPLER W COLOR. Procedure Date  Date: 07/21/2017 Start: 10:51 AM    Study Location: Echo Lab  Technical Quality: Poor visualization due to body habitus. Indications:Shortness of breath. Additional Medical History:Diabetic, Hyperlipidemia, GERD, Hypertension,  Obstructive sleep apnea, Chronic Kidney disease. Patient Status: Routine    Height: 60 inches Weight: 201 pounds BSA: 1.87 m^2 BMI: 39.26 kg/m^2    BP: 149/59 mmHg     Conclusions      Summary   Normal left ventricle size and systolic function. Ejection fraction was   estimated at 62%. Doppler parameters were consistent with abnormal left ventricular   relaxation (grade 1 diastolic dysfunction). Normal function of all valves.       Signature      ----------------------------------------------------------------   Electronically signed by Migdalia Andre MD (Interpreting   physician) on 07/22/2017 at 12:43 PM   ----------------------------------------------------------------      Findings Mitral Valve   The mitral valve structure was normal with normal leaflet separation. DOPPLER: The transmitral velocity was within the normal range with no   evidence for mitral stenosis. There was no evidence of mitral   regurgitation. Aortic Valve   The aortic valve was trileaflet with normal thickness and cuspal   separation. DOPPLER: Transaortic velocity was within the normal range with   no evidence of aortic stenosis. There was no evidence of aortic   regurgitation. Tricuspid Valve   The tricuspid valve structure was normal with normal leaflet separation. DOPPLER: There was no evidence of tricuspid stenosis. There was no   evidence of tricuspid regurgitation. Pulmonic Valve   The pulmonic valve leaflets exhibited normal thickness, no calcification,   and normal cuspal separation. DOPPLER: The transpulmonic velocity was   within the normal range with no evidence for regurgitation. Left Atrium   Left atrial size was normal.      Left Ventricle   Normal left ventricle size and systolic function. Ejection fraction was   estimated at 62%. There were no regional left ventricular wall motion   abnormalities and wall thickness was within normal limits. Doppler parameters were consistent with abnormal left ventricular   relaxation (grade 1 diastolic dysfunction). Right Atrium   Right atrial size was normal.      Right Ventricle   The right ventricular size was normal with normal systolic function and   wall thickness. Pericardial Effusion   The pericardium was normal in appearance with no evidence of a pericardial   effusion. Pleural Effusion   No evidence of pleural effusion. Aorta / Great Vessels   -Aortic root dimension within normal limits.   -The Pulmonary artery is within normal limits. -IVC size is within normal limits with normal respiratory phasic changes.      M-Mode/2D Measurements & Calculations      LV Diastolic      LV Systolic Dimension: 3.1 cm    LA CPAP  CKD--1.9-->1. 1     Holter monitor showed no significant findings  Currently on hold of lasix and lisinopril  Reviewed Echo, normal LVSF  Reviewed prior workup  BP well controlled  Continue Aspirin, lisinopril, metoprolol,  and statin  The patient is asked to make an attempt to improve diet and exercise patterns to aid in medical management of this problem. Advised patient to call office or seek immediate medical attention if there is any new onset of  any chest pain, sob, palpitations, lightheadedness, dizziness, orthopnea, PND or pedal edema. Thank youfor allowing me to participate in the care of this patient. Please do not hesitate to contact me for any further questions. Return if symptoms worsen or fail to improve, for Regular follow up, Review testing.        Electronically signed by Victoria Grimes MD [facility]  6/25/2021 at 11:01 AM

## 2021-07-15 ENCOUNTER — TELEPHONE (OUTPATIENT)
Dept: CARDIOLOGY CLINIC | Age: 66
End: 2021-07-15

## 2021-08-03 ENCOUNTER — OFFICE VISIT (OUTPATIENT)
Dept: PULMONOLOGY | Age: 66
End: 2021-08-03
Payer: MEDICARE

## 2021-08-03 VITALS
OXYGEN SATURATION: 97 % | DIASTOLIC BLOOD PRESSURE: 74 MMHG | HEIGHT: 59 IN | BODY MASS INDEX: 41.33 KG/M2 | HEART RATE: 69 BPM | TEMPERATURE: 97.8 F | SYSTOLIC BLOOD PRESSURE: 130 MMHG | WEIGHT: 205 LBS

## 2021-08-03 DIAGNOSIS — E66.01 MORBID OBESITY WITH BMI OF 40.0-44.9, ADULT (HCC): ICD-10-CM

## 2021-08-03 DIAGNOSIS — Z99.89 OSA ON CPAP: Primary | ICD-10-CM

## 2021-08-03 DIAGNOSIS — G47.10 HYPERSOMNIA: ICD-10-CM

## 2021-08-03 DIAGNOSIS — G47.33 OSA ON CPAP: Primary | ICD-10-CM

## 2021-08-03 PROCEDURE — 99213 OFFICE O/P EST LOW 20 MIN: CPT | Performed by: PHYSICIAN ASSISTANT

## 2021-08-03 ASSESSMENT — ENCOUNTER SYMPTOMS
WHEEZING: 0
STRIDOR: 0
CHEST TIGHTNESS: 0
DIARRHEA: 0
SHORTNESS OF BREATH: 0
EYES NEGATIVE: 1
ALLERGIC/IMMUNOLOGIC NEGATIVE: 1
COUGH: 0
NAUSEA: 0
BACK PAIN: 0

## 2021-08-03 NOTE — PROGRESS NOTES
[facility]      Santa Aranda         [id number]  8/3/2021   Chief Complaint   Patient presents with    Follow-up     SERENA 1 year with download         Pt of Michael Tineo    PAP Download:   Original or initial AHI: 102.7     Date of initial study: 9/24/2014      Compliant  100%     Noncompliant 0 %     PAP Type CPAP Level  10   Avg Hrs/Day 8 hr 51 min  AHI: 0.1   Recorded compliance dates , 7/4/2021  to 8/2/2021   Machine/Mfg:   [] ResMed    [x] Respironics/Dreamstation   Interface:   [x] Nasal    [] Nasal pillows   [] FFM      Provider:      [x] [facility]     [][facility]     [] [employer]    [] [facility]    [] [facility]               [] [facility]      [] [employer]    [] [address]      [] Other    Neck Size: 18.75  Mallampati Mallampati 4  ESS: 17   SAQLI: 39    Here is a scan of the most recent download:            Presentation:   Rhiannon Cabrera presents for sleep medicine follow up for obstructive sleep apnea  Since the last visit, Rhiannon Cabrera is doing well with PAP. She is on several medications for sleep including doxepin and trazodone per Moon's. She is tired all the time. She relates it to stress. Equipment issues: The pressure is  acceptable, the mask is acceptable     Sleep issues:  Do you feel better? Yes and No  More rested? Sometimes   Better concentration? yes    Progress History:   Since last visit any new medical issues? Yes tachycardia   New ER or hospital visits? No  Any new or changes in medicines? No  Any new sleep medicines? No    Review of Systems -   Review of Systems   Constitutional: Negative for activity change, appetite change, chills and fever. HENT: Negative for congestion and postnasal drip. Eyes: Negative. Respiratory: Negative for cough, chest tightness, shortness of breath, wheezing and stridor. Cardiovascular: Negative for chest pain and leg swelling. Gastrointestinal: Negative for diarrhea and nausea. Endocrine: Negative.     Genitourinary: Negative. Musculoskeletal: Negative. Negative for arthralgias and back pain. Skin: Negative. Allergic/Immunologic: Negative. Neurological: Negative. Negative for dizziness and light-headedness. Psychiatric/Behavioral: Negative. All other systems reviewed and are negative. Physical Exam:    BMI:  Body mass index is 41.4 kg/m². Wt Readings from Last 3 Encounters:   08/03/21 205 lb (93 kg)   06/25/21 200 lb (90.7 kg)   05/25/21 201 lb (91.2 kg)     Weight stable / unchanged  Vitals: /74 (Site: Right Upper Arm, Position: Sitting, Cuff Size: Large Adult)   Pulse 69   Temp 97.8 °F (36.6 °C) (Temporal)   Ht 4' 11\" (1.499 m)   Wt 205 lb (93 kg)   SpO2 97% Comment: rm air at rest  BMI 41.40 kg/m²       Physical Exam  Constitutional:       Appearance: Normal appearance. She is normal weight. HENT:      Head: Normocephalic and atraumatic. Right Ear: External ear normal.      Left Ear: External ear normal.      Nose: Nose normal.   Eyes:      Extraocular Movements: Extraocular movements intact. Conjunctiva/sclera: Conjunctivae normal.      Pupils: Pupils are equal, round, and reactive to light. Pulmonary:      Effort: Pulmonary effort is normal.   Musculoskeletal:      Cervical back: Normal range of motion and neck supple. Neurological:      General: No focal deficit present. Mental Status: She is alert and oriented to person, place, and time. Psychiatric:         Attention and Perception: Attention and perception normal.         Mood and Affect: Mood and affect normal.         Speech: Speech normal.         Behavior: Behavior normal. Behavior is cooperative. Thought Content: Thought content normal.         Cognition and Memory: Cognition normal.         Judgment: Judgment normal.           ASSESSMENT/DIAGNOSIS     Diagnosis Orders   1. SERENA on CPAP  DME Order for CPAP as OP   2. Morbid obesity with BMI of 40.0-44.9, adult ([address]     3.  Hypersomnia Plan   Do you need any equipment today? Yes update supplies  - Medications and stress contributing to hypersomnia   - Download reviewed and discussed with patient  - She  was advised to continue current positive airway pressure therapy with above described pressure. - She  advised to keep good compliance with current recommended pressure to get optimal results and clinical improvement  - Recommend 7-9 hours of sleep with PAP  - She was advised to call [employer] regarding supplies if needed.   -She call my office for earlier appointment if needed for worsening of sleep symptoms.   - She was instructed on weight loss  - Radha Garvin was educated about my impression and plan. Patient verbalizesunderstanding.   We will see Awilda Davenportdon back in: 1 year with download    Information added by my medical assistant/LPN was reviewed today         Nahomy Birch PA-C, MPAS  8/3/2021

## 2021-09-10 ENCOUNTER — HOSPITAL ENCOUNTER (EMERGENCY)
Age: 66
Discharge: HOME | End: 2021-09-11
Attending: EMERGENCY MEDICINE
Payer: MEDICARE

## 2021-09-10 DIAGNOSIS — M79.601 RIGHT ARM PAIN: Primary | ICD-10-CM

## 2021-09-10 LAB
ALBUMIN SERPL-MCNC: 3.5 G/DL (ref 3.5–5.1)
ALP BLD-CCNC: 136 U/L (ref 38–126)
ALT SERPL-CCNC: 33 U/L (ref 11–66)
ANION GAP SERPL CALCULATED.3IONS-SCNC: 7 MEQ/L (ref 8–16)
AST SERPL-CCNC: 24 U/L (ref 5–40)
BASOPHILS # BLD: 0.4 %
BASOPHILS ABSOLUTE: 0 THOU/MM3 (ref 0–0.1)
BILIRUB SERPL-MCNC: 0.2 MG/DL (ref 0.3–1.2)
BUN BLDV-MCNC: 14 MG/DL (ref 7–22)
CALCIUM SERPL-MCNC: 9.1 MG/DL (ref 8.5–10.5)
CHLORIDE BLD-SCNC: 105 MEQ/L (ref 98–111)
CO2: 29 MEQ/L (ref 23–33)
CREAT SERPL-MCNC: 0.9 MG/DL (ref 0.4–1.2)
EOSINOPHIL # BLD: 1.8 %
EOSINOPHILS ABSOLUTE: 0.2 THOU/MM3 (ref 0–0.4)
ERYTHROCYTE [DISTWIDTH] IN BLOOD BY AUTOMATED COUNT: 13.8 % (ref 11.5–14.5)
ERYTHROCYTE [DISTWIDTH] IN BLOOD BY AUTOMATED COUNT: 45.2 FL (ref 35–45)
GFR SERPL CREATININE-BSD FRML MDRD: 63 ML/MIN/1.73M2
GLUCOSE BLD-MCNC: 123 MG/DL (ref 70–108)
HCT VFR BLD CALC: 38.2 % (ref 37–47)
HEMOGLOBIN: 12.3 GM/DL (ref 12–16)
IMMATURE GRANS (ABS): 0.04 THOU/MM3 (ref 0–0.07)
IMMATURE GRANULOCYTES: 0.4 %
LYMPHOCYTES # BLD: 25.3 %
LYMPHOCYTES ABSOLUTE: 2.5 THOU/MM3 (ref 1–4.8)
MCH RBC QN AUTO: 29.2 PG (ref 26–33)
MCHC RBC AUTO-ENTMCNC: 32.2 GM/DL (ref 32.2–35.5)
MCV RBC AUTO: 90.7 FL (ref 81–99)
MONOCYTES # BLD: 11 %
MONOCYTES ABSOLUTE: 1.1 THOU/MM3 (ref 0.4–1.3)
NUCLEATED RED BLOOD CELLS: 0 /100 WBC
OSMOLALITY CALCULATION: 283.1 MOSMOL/KG (ref 275–300)
PLATELET # BLD: 245 THOU/MM3 (ref 130–400)
PMV BLD AUTO: 11.1 FL (ref 9.4–12.4)
POTASSIUM SERPL-SCNC: 4.5 MEQ/L (ref 3.5–5.2)
RBC # BLD: 4.21 MILL/MM3 (ref 4.2–5.4)
SEG NEUTROPHILS: 61.1 %
SEGMENTED NEUTROPHILS ABSOLUTE COUNT: 6 THOU/MM3 (ref 1.8–7.7)
SODIUM BLD-SCNC: 141 MEQ/L (ref 135–145)
TOTAL PROTEIN: 5.9 G/DL (ref 6.1–8)
WBC # BLD: 9.9 THOU/MM3 (ref 4.8–10.8)

## 2021-09-10 PROCEDURE — 99283 EMERGENCY DEPT VISIT LOW MDM: CPT

## 2021-09-10 PROCEDURE — 80053 COMPREHEN METABOLIC PANEL: CPT

## 2021-09-10 PROCEDURE — 85025 COMPLETE CBC W/AUTO DIFF WBC: CPT

## 2021-09-10 PROCEDURE — 36415 COLL VENOUS BLD VENIPUNCTURE: CPT

## 2021-09-10 RX ORDER — ACETAMINOPHEN 500 MG
1000 TABLET ORAL ONCE
Status: COMPLETED | OUTPATIENT
Start: 2021-09-11 | End: 2021-09-11

## 2021-09-10 ASSESSMENT — PAIN SCALES - GENERAL: PAINLEVEL_OUTOF10: 7

## 2021-09-10 ASSESSMENT — PAIN DESCRIPTION - PAIN TYPE: TYPE: ACUTE PAIN

## 2021-09-10 ASSESSMENT — PAIN DESCRIPTION - LOCATION: LOCATION: ARM;LEG

## 2021-09-10 ASSESSMENT — PAIN DESCRIPTION - ORIENTATION: ORIENTATION: RIGHT

## 2021-09-11 VITALS
WEIGHT: 196 LBS | HEIGHT: 59 IN | BODY MASS INDEX: 39.51 KG/M2 | RESPIRATION RATE: 16 BRPM | SYSTOLIC BLOOD PRESSURE: 156 MMHG | DIASTOLIC BLOOD PRESSURE: 69 MMHG | OXYGEN SATURATION: 99 % | TEMPERATURE: 98.2 F | HEART RATE: 65 BPM

## 2021-09-11 LAB
EKG ATRIAL RATE: 59 BPM
EKG P AXIS: 53 DEGREES
EKG P-R INTERVAL: 178 MS
EKG Q-T INTERVAL: 438 MS
EKG QRS DURATION: 76 MS
EKG QTC CALCULATION (BAZETT): 433 MS
EKG R AXIS: 30 DEGREES
EKG T AXIS: 55 DEGREES
EKG VENTRICULAR RATE: 59 BPM
TROPONIN T: < 0.01 NG/ML

## 2021-09-11 PROCEDURE — 93005 ELECTROCARDIOGRAM TRACING: CPT | Performed by: STUDENT IN AN ORGANIZED HEALTH CARE EDUCATION/TRAINING PROGRAM

## 2021-09-11 PROCEDURE — 6370000000 HC RX 637 (ALT 250 FOR IP): Performed by: STUDENT IN AN ORGANIZED HEALTH CARE EDUCATION/TRAINING PROGRAM

## 2021-09-11 PROCEDURE — 84484 ASSAY OF TROPONIN QUANT: CPT

## 2021-09-11 PROCEDURE — 36415 COLL VENOUS BLD VENIPUNCTURE: CPT

## 2021-09-11 RX ADMIN — ACETAMINOPHEN 1000 MG: 500 TABLET ORAL at 00:06

## 2021-09-11 ASSESSMENT — ENCOUNTER SYMPTOMS
CHEST TIGHTNESS: 0
DIARRHEA: 0
SHORTNESS OF BREATH: 0
NAUSEA: 0
COLOR CHANGE: 0
CONSTIPATION: 0
ABDOMINAL PAIN: 0
VOMITING: 0
TROUBLE SWALLOWING: 0

## 2021-09-11 NOTE — ED NOTES
Pt arrives to ER with complaints of right arm pain. Pt denies any injury. She reports she has fatigue all this time. Reports arm pain started at [address]. States pain is from shoulder down to wrist. No obvious swelling.   Breathing easy and unlabored will monitor      Alysia Wilcox, RN  09/10/21 [address] RN  09/10/21 [id number]

## 2021-09-11 NOTE — ED PROVIDER NOTES
[facility] ENCOUNTER          Pt Name: Sumit Gatica  MRN: [id number]  [place] 1955  Date of evaluation: 9/10/2021  Treating Resident Physician: Lio Evans MD  Supervising Physician: Tony Bowie, [address] [place] [address]       Chief Complaint   Patient presents with    Arm Pain    Fatigue     History obtained from chart review and the patient. HISTORY OF PRESENT ILLNESS    Sumit Gatiac is a 63 y.o. female who presents to the emergency department for evaluation of right arm pain. Irma Saavedra was tired this evening went to bed early, around 1930 this evening she started having pain in her right arm describes it as dull, travels between her shoulder and her wrist, currently 7 out of 10, feels weird. She noted that this happened to her last year with a similar pain in her legs. No trauma to the area, did not lay on that arm. She has not taken any analgesia for this at home. She also notes she is decided to wean herself off multiple medications due to constant nausea and concern over worsening of her liver and renal failure. She has discontinued duloxetine, trazodone, Lamictal, buspirone, lisinopril, statin, Metformin. She continues on her insulin and checks her sugar multiple times a day. She has not discussed medication discontinuation with her primary care provider, however she is scheduled to see them early next week. The patient has no other acute complaints at this time. REVIEW OF SYSTEMS   Review of Systems   Constitutional: Negative for chills and fever. HENT: Negative for trouble swallowing. Respiratory: Negative for chest tightness and shortness of breath. Cardiovascular: Negative for chest pain. Gastrointestinal: Negative for abdominal pain, constipation, diarrhea, nausea and vomiting. Genitourinary: Negative for dysuria, vaginal bleeding and vaginal discharge.    Musculoskeletal:        Pain in right arm   Skin: Negative for color change, pallor, rash and wound. Neurological: Positive for headaches. Negative for seizures, syncope and weakness. All other systems reviewed and are negative.         PAST MEDICAL AND SURGICAL HISTORY     Past Medical History:   Diagnosis Date    Back pain     with radiation    CAD (coronary artery disease)     Chronic diastolic CHF (congestive heart failure) ([facility] [address] 2017    Colitis     Depression     Depression     Gastroesophageal reflux     GERD (gastroesophageal reflux disease)     H/O endoscopy     stretch the eso    Hyperlipidemia     Hypertension     Hypertension     Hypothyroidism     Incontinence of urine     Lordosis (acquired) (postural)     Spondylosis of unspecified site without mention of myelopathy     Thoracic or lumbosacral neuritis or radiculitis, unspecified     Type II or unspecified type diabetes mellitus without mention of complication, not stated as uncontrolled     diagnosed     Unspecified sleep apnea      cpap mask    Urinary incontinence      Past Surgical History:   Procedure Laterality Date    ABDOMEN SURGERY      LAPAROSCOPY    CARDIAC CATHETERIZATION      CARDIOVASCULAR STRESS TEST  2015    was not positive but proceeding cath    CARPAL TUNNEL RELEASE Bilateral 'S     SECTION  1975    COLONOSCOPY  , 2016    DILATION AND CURETTAGE OF UTERUS      HC INJECTION PROCEDURE FOR SACROILIAC JOINT Right 2018    SACROILIAC JOINT INJECTION Right SI MBB #2, performed by Christina Locke MD at [facility] [place] [place]  2002    Total    LUMBAR NERVE BLOCK Bilateral 2019    LESI L3 #1 [facility] Bilateral 2019    Lumbar RFA Bilateral L4-5,L5-S1 performed by Christina Locke MD at [place] [address] Bilateral 2018    LUMBAR FACET MBB L3-4, L4-5, L5-S1    NERVE SURGERY Bilateral 10/4/2019    LESI L3 #2 performed by Juni Dent MD at [facility] Bilateral 03/19/2018    Lumbar Facet MBB L3-4, L4-5, L5-S1    PAIN MANAGEMENT PROCEDURE Bilateral 1/20/2020    TFESI L5 LEFT #1 performed by Juni Dent MD at [facility] Left 8/20/2020    TFESI L5 LEFT #2 performed by Juni Dent MD at [address] [place] [address] DX/THER AGNT PARAVERT FACET JOINT, LUMBAR/SAC, 2ND LEVEL Bilateral 3/19/2018    LUMBAR FACET MBB   @L3-4, L4-5, L5-S1  BILATERAL performed by Juni Dent MD at [address] [place] [address] DX/THER AGNT PARAVERT FACET JOINT, LUMBAR/SAC, 2ND LEVEL Bilateral 5/8/2018    LUMBAR FACET MBB   @L3-4, L4-5, L5-S1  BILATERAL performed by Juni Dent MD at [address] Bilateral 7/2/2018    LUMBAR RFA  Bilateral @L3-4,4-5,5-S1, performed by Juni Dent MD at [address] [facility] OFFICE/OUTPT VISIT,PROCEDURE ONLY Right 10/2/2018    SACROILIAC JOINT INJECTION SI MBB RIGHT SIDE, performed by Juni Dent MD at [address] Edwardo Patiño   No current facility-administered medications for this encounter.     Current Outpatient Medications:     Lactobacillus (ACIDOPHILUS/L-SPOROGENES) TABS, , Disp: , Rfl:     Calcium Carb-Cholecalciferol (CALCIUM + D3) 600-800 MG-UNIT TABS, Take by mouth, Disp: , Rfl:     doxepin (SINEQUAN) 100 MG capsule, 200 mg nightly , Disp: , Rfl:     NOVOLOG 100 UNIT/ML injection vial, , Disp: , Rfl:     LEVEMIR 100 UNIT/ML injection vial, 46 in am 43 at bedtime, Disp: , Rfl:     MULTIPLE VITAMIN PO, Take by mouth, Disp: , Rfl:     psyllium ([employer] DAILY FIBER) 0.52 g capsule, Take by mouth, Disp: , Rfl:     lisinopril (PRINIVIL;ZESTRIL) 5 MG tablet, Take 1 tablet by mouth daily, Tobacco Use    Smoking status: Passive Smoke Exposure - Never Smoker    Smokeless tobacco: Never Used   Vaping Use    Vaping Use: Never used   Substance Use Topics    Alcohol use: No     Alcohol/week: 0.0 standard drinks    Drug use: No         ALLERGIES     Allergies   Allergen Reactions    Bactrim [Sulfamethoxazole-Trimethoprim] Other (See Comments)     Pt states cannot take due to kidneys    Dye [Iodides]      Pt states due to kidneys    Ibuprofen Other (See Comments)     Due to kidney failure    Lipitor [Atorvastatin] Other (See Comments)     Pt states does not tolerate lipitor does atorvastatin does not         FAMILY HISTORY     Family History   Problem Relation Age of Onset    Diabetes Mother     Heart Disease Mother     Stroke Mother     Diabetes Father     Heart Disease Father     Diabetes Sister     Stroke Sister     Thyroid Disease Brother          PREVIOUS RECORDS   Previous records reviewed: Last admission for atypical chest pain. [place] PHYSICAL EXAM     ED Triage Vitals   BP Temp Temp Source Pulse Resp SpO2 Height Weight   09/10/21 2228 09/10/21 2228 09/10/21 2228 09/10/21 2228 09/10/21 2228 09/10/21 2228 09/10/21 2237 09/10/21 2237   (!) 140/63 98.2 °F (36.8 °C) Oral 61 18 99 % 4' 11\" (1.499 m) 196 lb (88.9 kg)     Initial vital signs and nursing assessment reviewed and normal. Body mass index is 39.59 kg/m². Pulsoximetry is normal per my interpretation. Additional Vital Signs:  Vitals:    09/11/21 0002   BP: (!) 156/69   Pulse: 65   Resp: 16   Temp:    SpO2: 99%       Physical Exam  Vitals and nursing note reviewed. Constitutional:       Appearance: Normal appearance. HENT:      Head: Normocephalic and atraumatic. Eyes:      General: No scleral icterus. Right eye: No discharge. Left eye: No discharge. Conjunctiva/sclera: Conjunctivae normal.   Cardiovascular:      Rate and Rhythm: Normal rate and regular rhythm.    Pulmonary:      Effort: Pulmonary effort is normal.      Breath sounds: Normal breath sounds. Musculoskeletal:         General: Tenderness present. No swelling, deformity or signs of injury. Normal range of motion. Cervical back: Normal range of motion. Right lower leg: No edema. Left lower leg: No edema. Skin:     General: Skin is warm and dry. Capillary Refill: Capillary refill takes less than 2 seconds. Neurological:      Mental Status: She is alert and oriented to person, place, and time. Psychiatric:         Mood and Affect: Mood normal.         Behavior: Behavior normal.             MEDICAL DECISION MAKING   Initial Differential Diagnosis:   1. Musculoskeletal pain  2. Acute coronary syndrome (risk factors: age, diabetes, hypertension, CHF, less convincing history)    Plan:    CBC, CMP, troponin   ECG   Analgesia with acetaminophen    Summary:  Labs unremarkable, ECG normal sinus rhythm. Pain likely musculoskeletal in nature, discharged home with instructions to take over-the-counter pain medications as needed. She already scheduled to follow-up with her psychiatrist and primary care provider early next week.         ED RESULTS   Laboratory results:  Labs Reviewed   CBC WITH AUTO DIFFERENTIAL - Abnormal; Notable for the following components:       Result Value    RDW-SD 45.2 (*)     All other components within normal limits   COMPREHENSIVE METABOLIC PANEL - Abnormal; Notable for the following components:    Glucose 123 (*)     Alkaline Phosphatase 136 (*)     Total Protein 5.9 (*)     Total Bilirubin 0.2 (*)     All other components within normal limits   ANION GAP - Abnormal; Notable for the following components:    Anion Gap 7.0 (*)     All other components within normal limits   GLOMERULAR FILTRATION RATE, ESTIMATED - Abnormal; Notable for the following components:    Est, Glom Filt Rate 63 (*)     All other components within normal limits   OSMOLALITY   TROPONIN       Radiologic studies results:  No orders to display       ED Medications administered this visit:   Medications   acetaminophen (TYLENOL) tablet 1,000 mg (1,000 mg Oral Given 9/11/21 0006)         ED COURSE     ED Course as of Sep 11 0102   Sat Sep 11, 2021   0055 Discussed results and plan to discharge home with patient, she is agreeable.    [SC]      ED Course User Index  [SC] Lupe Delgado MD       Strict return precautions and follow up instructions were discussed with the patient prior to discharge, with which the patient agrees. MEDICATION CHANGES     Discharge Medication List as of 9/11/2021 12:50 AM            FINAL DISPOSITION     Final diagnoses:   Right arm pain     Condition: condition: fair  Dispo: Discharge to home      This transcription was electronically signed. Parts of this transcriptions may have been dictated by use of voice recognition software and electronically transcribed, and parts may have been transcribed with the assistance of an ED scribe. The transcription may contain errors not detected in proofreading. Please refer to my supervising physician's documentation if my documentation differs.     Electronically Signed: Sia Sandhu MD, 09/11/21, 1:02 AM         Lupe Delgado MD  Resident  09/11/21 8449

## 2021-09-17 ENCOUNTER — HOSPITAL ENCOUNTER (OUTPATIENT)
Dept: WOMENS IMAGING | Age: 66
Discharge: HOME | End: 2021-09-17
Payer: MEDICARE

## 2021-09-17 DIAGNOSIS — Z12.31 VISIT FOR SCREENING MAMMOGRAM: ICD-10-CM

## 2021-09-17 PROCEDURE — 77063 BREAST TOMOSYNTHESIS BI: CPT

## 2021-10-18 ENCOUNTER — HOSPITAL ENCOUNTER (OUTPATIENT)
Age: 66
Setting detail: SPECIMEN
Discharge: HOME | End: 2021-10-18
Payer: MEDICARE

## 2021-10-18 LAB
ABSOLUTE EOS #: 0.18 K/UL (ref 0–0.44)
ABSOLUTE IMMATURE GRANULOCYTE: 0.04 K/UL (ref 0–0.3)
ABSOLUTE LYMPH #: 3.29 K/UL (ref 1.1–3.7)
ABSOLUTE MONO #: 0.87 K/UL (ref 0.1–1.2)
ANION GAP SERPL CALCULATED.3IONS-SCNC: 14 MMOL/L (ref 9–17)
BASOPHILS # BLD: 0 % (ref 0–2)
BASOPHILS ABSOLUTE: 0.04 K/UL (ref 0–0.2)
BUN BLDV-MCNC: 19 MG/DL (ref 8–23)
BUN/CREAT BLD: ABNORMAL (ref 9–20)
CALCIUM SERPL-MCNC: 8.8 MG/DL (ref 8.6–10.4)
CHLORIDE BLD-SCNC: 103 MMOL/L (ref 98–107)
CHOLESTEROL/HDL RATIO: 7.8
CHOLESTEROL: 204 MG/DL
CO2: 24 MMOL/L (ref 20–31)
CREAT SERPL-MCNC: 1.03 MG/DL (ref 0.5–0.9)
DIFFERENTIAL TYPE: NORMAL
EOSINOPHILS RELATIVE PERCENT: 2 % (ref 1–4)
GFR AFRICAN AMERICAN: >60 ML/MIN
GFR NON-AFRICAN AMERICAN: 54 ML/MIN
GFR SERPL CREATININE-BSD FRML MDRD: ABNORMAL ML/MIN/{1.73_M2}
GFR SERPL CREATININE-BSD FRML MDRD: ABNORMAL ML/MIN/{1.73_M2}
GLUCOSE BLD-MCNC: 210 MG/DL (ref 70–99)
HCT VFR BLD CALC: 41.2 % (ref 36.3–47.1)
HDLC SERPL-MCNC: 26 MG/DL
HEMOGLOBIN: 12.6 G/DL (ref 11.9–15.1)
IMMATURE GRANULOCYTES: 0 %
LDL CHOLESTEROL: 150 MG/DL (ref 0–130)
LYMPHOCYTES # BLD: 33 % (ref 24–43)
MCH RBC QN AUTO: 28.5 PG (ref 25.2–33.5)
MCHC RBC AUTO-ENTMCNC: 30.6 G/DL (ref 28.4–34.8)
MCV RBC AUTO: 93.2 FL (ref 82.6–102.9)
MONOCYTES # BLD: 9 % (ref 3–12)
NRBC AUTOMATED: 0 PER 100 WBC
PDW BLD-RTO: 14 % (ref 11.8–14.4)
PLATELET # BLD: 287 K/UL (ref 138–453)
PLATELET ESTIMATE: NORMAL
PMV BLD AUTO: 12 FL (ref 8.1–13.5)
POTASSIUM SERPL-SCNC: 4.3 MMOL/L (ref 3.7–5.3)
RBC # BLD: 4.42 M/UL (ref 3.95–5.11)
RBC # BLD: NORMAL 10*6/UL
SEG NEUTROPHILS: 56 % (ref 36–65)
SEGMENTED NEUTROPHILS ABSOLUTE COUNT: 5.7 K/UL (ref 1.5–8.1)
SODIUM BLD-SCNC: 141 MMOL/L (ref 135–144)
TRIGL SERPL-MCNC: 140 MG/DL
TSH SERPL DL<=0.05 MIU/L-ACNC: 0.63 MIU/L (ref 0.3–5)
VLDLC SERPL CALC-MCNC: ABNORMAL MG/DL (ref 1–30)
WBC # BLD: 10.1 K/UL (ref 3.5–11.3)
WBC # BLD: NORMAL 10*3/UL

## 2022-01-17 ENCOUNTER — HOSPITAL ENCOUNTER (EMERGENCY)
Age: 67
Discharge: HOME | End: 2022-01-17
Payer: MEDICARE

## 2022-01-17 VITALS
RESPIRATION RATE: 20 BRPM | HEART RATE: 84 BPM | TEMPERATURE: 98.3 F | SYSTOLIC BLOOD PRESSURE: 159 MMHG | OXYGEN SATURATION: 95 % | WEIGHT: 200 LBS | DIASTOLIC BLOOD PRESSURE: 65 MMHG | BODY MASS INDEX: 40.32 KG/M2 | HEIGHT: 59 IN

## 2022-01-17 DIAGNOSIS — M79.605 LEFT LEG PAIN: Primary | ICD-10-CM

## 2022-01-17 DIAGNOSIS — R25.2 MUSCLE CRAMPING: ICD-10-CM

## 2022-01-17 LAB
ALBUMIN SERPL-MCNC: 3.4 G/DL (ref 3.5–5.1)
ALP BLD-CCNC: 139 U/L (ref 38–126)
ALT SERPL-CCNC: 27 U/L (ref 11–66)
ANION GAP SERPL CALCULATED.3IONS-SCNC: 10 MEQ/L (ref 8–16)
AST SERPL-CCNC: 23 U/L (ref 5–40)
BASOPHILS # BLD: 0.4 %
BASOPHILS ABSOLUTE: 0 THOU/MM3 (ref 0–0.1)
BILIRUB SERPL-MCNC: 0.3 MG/DL (ref 0.3–1.2)
BUN BLDV-MCNC: 10 MG/DL (ref 7–22)
CALCIUM SERPL-MCNC: 9 MG/DL (ref 8.5–10.5)
CHLORIDE BLD-SCNC: 103 MEQ/L (ref 98–111)
CO2: 23 MEQ/L (ref 23–33)
CREAT SERPL-MCNC: 1 MG/DL (ref 0.4–1.2)
EOSINOPHIL # BLD: 1.7 %
EOSINOPHILS ABSOLUTE: 0.2 THOU/MM3 (ref 0–0.4)
ERYTHROCYTE [DISTWIDTH] IN BLOOD BY AUTOMATED COUNT: 13.5 % (ref 11.5–14.5)
ERYTHROCYTE [DISTWIDTH] IN BLOOD BY AUTOMATED COUNT: 45.7 FL (ref 35–45)
GFR SERPL CREATININE-BSD FRML MDRD: 56 ML/MIN/1.73M2
GLUCOSE BLD-MCNC: 170 MG/DL (ref 70–108)
HCT VFR BLD CALC: 39.4 % (ref 37–47)
HEMOGLOBIN: 12.6 GM/DL (ref 12–16)
IMMATURE GRANS (ABS): 0.03 THOU/MM3 (ref 0–0.07)
IMMATURE GRANULOCYTES: 0.3 %
LYMPHOCYTES # BLD: 32.8 %
LYMPHOCYTES ABSOLUTE: 3.2 THOU/MM3 (ref 1–4.8)
MCH RBC QN AUTO: 29.4 PG (ref 26–33)
MCHC RBC AUTO-ENTMCNC: 32 GM/DL (ref 32.2–35.5)
MCV RBC AUTO: 91.8 FL (ref 81–99)
MONOCYTES # BLD: 8.6 %
MONOCYTES ABSOLUTE: 0.8 THOU/MM3 (ref 0.4–1.3)
NUCLEATED RED BLOOD CELLS: 0 /100 WBC
OSMOLALITY CALCULATION: 275 MOSMOL/KG (ref 275–300)
PLATELET # BLD: 249 THOU/MM3 (ref 130–400)
PMV BLD AUTO: 11.4 FL (ref 9.4–12.4)
POTASSIUM SERPL-SCNC: 4 MEQ/L (ref 3.5–5.2)
RBC # BLD: 4.29 MILL/MM3 (ref 4.2–5.4)
SEG NEUTROPHILS: 56.2 %
SEGMENTED NEUTROPHILS ABSOLUTE COUNT: 5.5 THOU/MM3 (ref 1.8–7.7)
SODIUM BLD-SCNC: 136 MEQ/L (ref 135–145)
TOTAL PROTEIN: 6.4 G/DL (ref 6.1–8)
WBC # BLD: 9.8 THOU/MM3 (ref 4.8–10.8)

## 2022-01-17 PROCEDURE — 80053 COMPREHEN METABOLIC PANEL: CPT

## 2022-01-17 PROCEDURE — 85025 COMPLETE CBC W/AUTO DIFF WBC: CPT

## 2022-01-17 PROCEDURE — 96372 THER/PROPH/DIAG INJ SC/IM: CPT

## 2022-01-17 PROCEDURE — 6360000002 HC RX W HCPCS: Performed by: PHYSICIAN ASSISTANT

## 2022-01-17 PROCEDURE — 99284 EMERGENCY DEPT VISIT MOD MDM: CPT

## 2022-01-17 RX ORDER — TIZANIDINE 4 MG/1
4 TABLET ORAL EVERY 6 HOURS PRN
Qty: 12 TABLET | Refills: 0 | Status: SHIPPED | OUTPATIENT
Start: 2022-01-17

## 2022-01-17 RX ORDER — ORPHENADRINE CITRATE 30 MG/ML
60 INJECTION INTRAMUSCULAR; INTRAVENOUS ONCE
Status: COMPLETED | OUTPATIENT
Start: 2022-01-17 | End: 2022-01-17

## 2022-01-17 RX ADMIN — ORPHENADRINE CITRATE 60 MG: 30 INJECTION INTRAMUSCULAR; INTRAVENOUS at 03:12

## 2022-01-17 ASSESSMENT — ENCOUNTER SYMPTOMS
ABDOMINAL PAIN: 0
COUGH: 0
NAUSEA: 0
PHOTOPHOBIA: 0
RHINORRHEA: 0
BACK PAIN: 0
VOMITING: 0
SHORTNESS OF BREATH: 0

## 2022-01-17 ASSESSMENT — PAIN SCALES - GENERAL: PAINLEVEL_OUTOF10: 7

## 2022-01-17 ASSESSMENT — PAIN DESCRIPTION - LOCATION: LOCATION: LEG

## 2022-01-17 ASSESSMENT — PAIN DESCRIPTION - PAIN TYPE: TYPE: ACUTE PAIN

## 2022-01-17 ASSESSMENT — PAIN DESCRIPTION - ORIENTATION: ORIENTATION: LEFT

## 2022-01-17 NOTE — ED NOTES
Labs drawn and sent. Pt denies other needs. Resp regular. Call light in reach.       Tanner Alaniz RN  01/17/22 0940

## 2022-01-17 NOTE — ED NOTES
Pt to er. Pt c/o lt leg pain. States from her lt thigh down to her foot. States has cramps in it off and on. Denies any injury. Pt has been ambulatory on it. Assessment completed. Resp regular. Call light in reach. Family at side.       J Luis Marquez RN  01/17/22 7198

## 2022-01-17 NOTE — ED NOTES
Pt resting in bed with family at side. Resp regular. Denies all needs. Call light in reach.       Avila Romero RN  01/17/22 5804

## 2022-01-17 NOTE — ED PROVIDER NOTES
[facility] [address] [address] EMERGENCY DEPT      CHIEF COMPLAINT       Chief Complaint   Patient presents with    Leg Pain     left       Nurses Notes reviewed and I agree except as noted in the HPI. HISTORY OF PRESENT ILLNESS    Yoana Bright is a 61 y.o. female who presents for left lower extremity cramping. Symptoms have been going on for 3 days. There is cramping and areas on the lateral side of her calf, groin, and posterior lateral thigh. Patient denies injury and has not previously experienced this. Symptoms woke the patient up this morning and she noticed swelling prompting her to come to the ER for evaluation. Patient has tried Tylenol with no relief. Patient denies paresthesias, back pain, fever, chills, abdominal pain, or other complaints. Patient has no new medications or dosage adjustments. Patient denies history of PE or DVT. REVIEW OF SYSTEMS     Review of Systems   Constitutional: Negative for chills, diaphoresis and fever. HENT: Negative for rhinorrhea. Eyes: Negative for photophobia. Respiratory: Negative for cough and shortness of breath. Cardiovascular: Positive for leg swelling. Negative for chest pain. Gastrointestinal: Negative for abdominal pain, nausea and vomiting. Genitourinary: Negative for decreased urine volume. Musculoskeletal: Positive for myalgias. Negative for back pain and gait problem. Skin: Negative for rash and wound. Neurological: Negative for weakness and numbness. Psychiatric/Behavioral: Negative for confusion.         PAST MEDICAL HISTORY    has a past medical history of Back pain, CAD (coronary artery disease), Chronic diastolic CHF (congestive heart failure) ([address] Colitis, Depression, Depression, Gastroesophageal reflux, GERD (gastroesophageal reflux disease), H/O endoscopy, Hyperlipidemia, Hypertension, Hypertension, Hypothyroidism, Incontinence of urine, Lordosis (acquired) (postural), Spondylosis of unspecified site without mention of myelopathy, Thoracic or lumbosacral neuritis or radiculitis, unspecified, Type II or unspecified type diabetes mellitus without mention of complication, not stated as uncontrolled, Unspecified sleep apnea, and Urinary incontinence. SURGICAL HISTORY      has a past surgical history that includes  section (); Carpal tunnel release (Bilateral, ); Abdomen surgery (); Dilation and curettage of uterus; cardiovascular stress test (2015); Colonoscopy (, ); Upper gastrointestinal endoscopy (); Cardiac catheterization (); other surgical history (Bilateral, 2018); pr inj dx/ther agnt paravert facet joint, lumbar/sac, 2nd level (Bilateral, 3/19/2018); Nerve Surgery (Bilateral, 2018); pr inj dx/ther agnt paravert facet joint, lumbar/sac, 2nd level (Bilateral, 2018); pr inject rx other periph nerve (Bilateral, 2018); pr office/outpt visit,procedure only (Right, 10/2/2018); Injection Procedure For Sacroiliac Joint (Right, 2018); Lumbar spine surgery (Bilateral, 2019); lumbar nerve block (Bilateral, 2019); Nerve Surgery (Bilateral, 10/4/2019); Pain management procedure (Bilateral, 2020); Pain management procedure (Left, 2020); Ovary removal (); and Hysterectomy ().     CURRENT MEDICATIONS       Previous Medications    ACCU-CHEK SOFTCLIX LANCETS MISC        ACETAMINOPHEN (TYLENOL) 325 MG TABLET    Take 2 tablets by mouth every 4 hours as needed for Pain    ASCORBIC ACID (VITAMIN C) 500 MG TABLET    Take 1,000 mg by mouth daily    ASPIRIN 81 MG TABLET    Take 81 mg by mouth daily    ATORVASTATIN (LIPITOR) 40 MG TABLET    Take 40 mg by mouth daily    BUSPIRONE (BUSPAR) 30 MG TABLET    Take 30 mg by mouth 3 times daily     CALCIUM CARB-CHOLECALCIFEROL (CALCIUM + D3) 600-800 MG-UNIT TABS    Take by mouth    CYANOCOBALAMIN (VITAMIN B 12 PO)    Take 500 mg by mouth daily     DOXEPIN (SINEQUAN) 100 MG CAPSULE    200 mg nightly     DULOXETINE (CYMBALTA) 60 MG EXTENDED RELEASE CAPSULE    Take 120 mg by mouth daily     FERROUS SULFATE 325 (65 FE) MG TABLET    Take 325 mg by mouth nightly     FUROSEMIDE (LASIX) 40 MG TABLET    Take 0.5 tablets by mouth daily    LACTOBACILLUS (ACIDOPHILUS/L-SPOROGENES) TABS        LAMOTRIGINE (LAMICTAL) 150 MG TABLET        LEVEMIR 100 UNIT/ML INJECTION VIAL    46 in am  43 at bedtime    LEVOTHYROXINE (SYNTHROID) 150 MCG TABLET        LIRAGLUTIDE (VICTOZA) 18 MG/3ML SOPN SC INJECTION    Inject 1.2 mg into the skin daily    LISINOPRIL (PRINIVIL;ZESTRIL) 5 MG TABLET    Take 1 tablet by mouth daily    METFORMIN (GLUCOPHAGE) 1000 MG TABLET    Take 1,000 mg by mouth 2 times daily (with meals) Indications: 1 tab if hr is >60 or SBP is >100     METOPROLOL SUCCINATE (TOPROL XL) 50 MG EXTENDED RELEASE TABLET    Take 50 mg by mouth daily    MULTIPLE VITAMIN PO    Take by mouth    NOVOLOG 100 UNIT/ML INJECTION VIAL        PANTOPRAZOLE (PROTONIX) 40 MG TABLET    Take 1 tablet by mouth daily    PSYLLIUM ([employer] DAILY FIBER) 0.52 G CAPSULE    Take by mouth    TRAZODONE (DESYREL) 50 MG TABLET    Take 50 mg by mouth nightly Indications: 1/2 tab     ULTICARE MINI PEN NEEDLES 31G X 6 MM MISC           ALLERGIES     is allergic to bactrim [sulfamethoxazole-trimethoprim], dye [iodides], ibuprofen, and lipitor [atorvastatin]. FAMILY HISTORY     She indicated that her mother is . She indicated that her father is . She indicated that both of her sisters are alive. She indicated that both of her brothers are alive. She indicated that her child is alive. family history includes Diabetes in her father, mother, and sister; Heart Disease in her father and mother; Stroke in her mother and sister; Thyroid Disease in her brother. SOCIAL HISTORY    reports that she is a non-smoker but has been exposed to tobacco smoke. She has never used smokeless tobacco. She reports that she does not drink alcohol and does not use drugs.     PHYSICAL EXAM     INITIAL VITALS:  height is 4' 11\" (1.499 m) and weight is 200 lb (90.7 kg). Her temperature is 98.3 °F (36.8 °C). Her blood pressure is 159/65 (abnormal) and her pulse is 84. Her respiration is 20 and oxygen saturation is 95%. Physical Exam  Constitutional:       General: She is not in acute distress. Appearance: She is well-developed. She is morbidly obese. She is not toxic-appearing. HENT:      Head: Normocephalic and atraumatic. Right Ear: Hearing normal.      Left Ear: Hearing normal.      Nose: Nose normal. No nasal deformity. Mouth/Throat:      Mouth: Mucous membranes are moist.      Pharynx: Oropharynx is clear. Eyes:      General: Lids are normal.      Extraocular Movements: Extraocular movements intact. Conjunctiva/sclera: Conjunctivae normal.   Neck:      Trachea: Trachea normal. No tracheal deviation. Cardiovascular:      Rate and Rhythm: Normal rate and regular rhythm. Pulses:           Dorsalis pedis pulses are 2+ on the right side and 2+ on the left side. Posterior tibial pulses are 2+ on the right side and 2+ on the left side. Pulmonary:      Effort: Pulmonary effort is normal. No tachypnea. Abdominal:      General: There is no distension. Palpations: Abdomen is soft. Tenderness: There is no abdominal tenderness. Musculoskeletal:      Cervical back: No rigidity. Lumbar back: Normal.      Left upper leg: Tenderness present. No swelling or bony tenderness. Left knee: Normal.      Left lower leg: Tenderness present. No swelling or bony tenderness. No edema. Left ankle: Normal.        Legs:    Skin:     General: Skin is warm and dry. Capillary Refill: Capillary refill takes less than 2 seconds. Coloration: Skin is not pale. Findings: No rash. Neurological:      Mental Status: She is alert. GCS: GCS eye subscore is 4. GCS verbal subscore is 5. GCS motor subscore is 6. Sensory: No sensory deficit. Motor: No weakness. Coordination: Coordination normal.      Gait: Gait normal.   Psychiatric:         Speech: Speech normal.         Behavior: Behavior normal. Behavior is cooperative. Thought Content: Thought content normal.         DIFFERENTIAL DIAGNOSIS:   Including but not limited to: Muscle cramps, dehydration, hyperkalemia, strain, less likely but considered as clinically not evident DVT    DIAGNOSTIC RESULTS     EKG: All E[facility]'s are [facility] who either signs or Co-signs this chart in the absence of a cardiologist.  None    RADIOLOGY: non-plain film images(s) such as CT,Ultrasound and MRI are read by the radiologist.  Plain radiographic images are visualized and preliminarily interpreted by the emergency physician unless otherwise stated below. VL DUP LOWER EXTREMITY VENOUS LEFT    (Results Pending)       LABS:   Labs Reviewed   CBC WITH AUTO DIFFERENTIAL - Abnormal; Notable for the following components:       Result Value    MCHC 32.0 (*)     RDW-SD 45.7 (*)     All other components within normal limits   COMPREHENSIVE METABOLIC PANEL - Abnormal; Notable for the following components:    Glucose 170 (*)     Alkaline Phosphatase 139 (*)     Albumin 3.4 (*)     All other components within normal limits   GLOMERULAR FILTRATION RATE, ESTIMATED - Abnormal; Notable for the following components:    Est, Glom Filt Rate 56 (*)     All other components within normal limits   ANION GAP   OSMOLALITY       EMERGENCY DEPARTMENT COURSE:   Vitals:    Vitals:    01/17/22 0238 01/17/22 0400   BP: (!) 159/65    Pulse: 83 84   Resp: 20 20   Temp: 98.3 °F (36.8 °C)    SpO2: 96% 95%   Weight: 200 lb (90.7 kg)    Height: 4' 11\" (1.499 m)        Patient was seen in the emergency department during the global pandemic, when there was surge capacity and regional healthcare crisis. MDM:  Patient was seen in the emergency department for left lower extremity cramping.   Physical exam revealed reproducible muscular tenderness in the calf and thigh. No swelling was appreciated and the patient was neurovascularly intact. Vital signs reviewed and noted stable. Old records were reviewed. Ultrasound was not in house at this time. Labs were ordered and reviewed upon completion. They were negative for acute abnormality. Patient was medicated with Norflex which provided minimal relief. Patient was offered to remain in the department for a few hours until ultrasound arrived. Patient declined and wanted to go home. I think it was low likelihood that symptoms were due to DVT so Lovenox was not warranted. Patient was given a prescription for ultrasound. Anticipatory guidance given. I have given the patient strict written and verbal instructions about care at home, follow-up, and signs and symptoms of worsening of condition and they did verbalize understanding. CRITICAL CARE:   None    CONSULTS:  None    PROCEDURES:  None    FINAL IMPRESSION      1. Left leg pain    2. Muscle cramping          DISPOSITION/PLAN     1. Left leg pain    2.  Muscle cramping        PATIENT REFERRED TO:  RAIMUNDO Ruby NP  [place] [address] [place] [address]  [phone]    Schedule an appointment as soon as possible for a visit in 2 days        DISCHARGE MEDICATIONS:  New Prescriptions    No medications on file       (Please note that portions of this note were completed with a voice recognition program.  Efforts were made to edit the dictations but occasionally words are mis-transcribed.)    Radha Siu PA-C 01/17/22 4:57 AM    CRISTINA Mckeon PA-C  01/17/22 0515

## 2022-01-21 ENCOUNTER — HOSPITAL ENCOUNTER (OUTPATIENT)
Dept: INTERVENTIONAL RADIOLOGY/VASCULAR | Age: 67
Discharge: HOME | End: 2022-01-21
Payer: MEDICARE

## 2022-01-21 DIAGNOSIS — M79.605 LEFT LEG PAIN: ICD-10-CM

## 2022-01-21 PROCEDURE — 93971 EXTREMITY STUDY: CPT

## 2022-02-01 ENCOUNTER — HOSPITAL ENCOUNTER (OUTPATIENT)
Dept: GENERAL RADIOLOGY | Age: 67
Discharge: HOME | End: 2022-02-01
Payer: MEDICARE

## 2022-02-01 ENCOUNTER — HOSPITAL ENCOUNTER (OUTPATIENT)
Age: 67
Discharge: HOME | End: 2022-02-01
Payer: MEDICARE

## 2022-02-01 DIAGNOSIS — M51.16 DISPLACEMENT OF LUMBAR DISC WITH RADICULOPATHY: ICD-10-CM

## 2022-02-01 PROCEDURE — 72170 X-RAY EXAM OF PELVIS: CPT

## 2022-02-01 PROCEDURE — 72100 X-RAY EXAM L-S SPINE 2/3 VWS: CPT

## 2022-02-01 PROCEDURE — 72040 X-RAY EXAM NECK SPINE 2-3 VW: CPT

## 2022-02-01 PROCEDURE — 72072 X-RAY EXAM THORAC SPINE 3VWS: CPT

## 2022-05-12 ENCOUNTER — HOSPITAL ENCOUNTER (OUTPATIENT)
Age: 67
Setting detail: SPECIMEN
Discharge: HOME | End: 2022-05-12

## 2022-05-12 DIAGNOSIS — N18.32 STAGE 3B CHRONIC KIDNEY DISEASE (HCC): ICD-10-CM

## 2022-05-12 DIAGNOSIS — I10 ESSENTIAL HYPERTENSION: ICD-10-CM

## 2022-05-12 LAB
ANION GAP SERPL CALCULATED.3IONS-SCNC: 10 MMOL/L (ref 9–17)
BUN BLDV-MCNC: 17 MG/DL (ref 8–23)
CALCIUM SERPL-MCNC: 9.3 MG/DL (ref 8.6–10.4)
CHLORIDE BLD-SCNC: 102 MMOL/L (ref 98–107)
CO2: 26 MMOL/L (ref 20–31)
CREAT SERPL-MCNC: 1.06 MG/DL (ref 0.5–0.9)
CREATININE URINE: 94.2 MG/DL (ref 28–217)
CREATININE URINE: 95.9 MG/DL (ref 28–217)
GFR AFRICAN AMERICAN: >60 ML/MIN
GFR NON-AFRICAN AMERICAN: 52 ML/MIN
GFR SERPL CREATININE-BSD FRML MDRD: ABNORMAL ML/MIN/{1.73_M2}
GLUCOSE BLD-MCNC: 209 MG/DL (ref 70–99)
MICROALBUMIN/CREAT 24H UR: <12 MG/L
MICROALBUMIN/CREAT UR-RTO: NORMAL MCG/MG CREAT
POTASSIUM SERPL-SCNC: 4.8 MMOL/L (ref 3.7–5.3)
SODIUM BLD-SCNC: 138 MMOL/L (ref 135–144)
TOTAL PROTEIN, URINE: 8 MG/DL
URINE TOTAL PROTEIN CREATININE RATIO: 0.08 (ref 0–0.2)

## 2022-05-18 ENCOUNTER — OFFICE VISIT (OUTPATIENT)
Dept: NEPHROLOGY | Age: 67
End: 2022-05-18
Payer: MEDICARE

## 2022-05-18 VITALS
HEART RATE: 60 BPM | WEIGHT: 209 LBS | TEMPERATURE: 97.2 F | OXYGEN SATURATION: 100 % | BODY MASS INDEX: 42.21 KG/M2 | DIASTOLIC BLOOD PRESSURE: 67 MMHG | SYSTOLIC BLOOD PRESSURE: 120 MMHG

## 2022-05-18 DIAGNOSIS — I10 ESSENTIAL HYPERTENSION: Primary | ICD-10-CM

## 2022-05-18 DIAGNOSIS — N18.32 STAGE 3B CHRONIC KIDNEY DISEASE (HCC): ICD-10-CM

## 2022-05-18 PROCEDURE — 99213 OFFICE O/P EST LOW 20 MIN: CPT | Performed by: INTERNAL MEDICINE

## 2022-05-18 RX ORDER — VORTIOXETINE 20 MG/1
TABLET, FILM COATED ORAL DAILY
COMMUNITY
Start: 2022-05-12

## 2022-05-18 RX ORDER — PRAVASTATIN SODIUM 20 MG
TABLET ORAL
COMMUNITY
Start: 2022-02-11 | End: 2022-05-18

## 2022-05-18 NOTE — PROGRESS NOTES
[facility]          Outpatient Follow-Up note         5/18/2022 10:07 AM    Patient Name:   Pascual Patel  YOB: 1955  Primary Care Physician:  RAIMUNDO Aguilar NP     Chief Complaint / Reason for follow-up : Follow Up of CKD     Interval History :  Patient seen and examined by me. Feels well. No cp or SOB.   No hospitalizations and no med changes     Past History :  Past Medical History:   Diagnosis Date    Back pain     with radiation    CAD (coronary artery disease)     Chronic diastolic CHF (congestive heart failure) (HCC) 7/21/2017    Colitis     Depression     Depression     Gastroesophageal reflux     GERD (gastroesophageal reflux disease)     H/O endoscopy     stretch the eso    Hyperlipidemia     Hypertension     Hypertension     Hypothyroidism     Incontinence of urine     Lordosis (acquired) (postural)     Spondylosis of unspecified site without mention of myelopathy     Thoracic or lumbosacral neuritis or radiculitis, unspecified     Type II or unspecified type diabetes mellitus without mention of complication, not stated as uncontrolled     diagnosed 2006    Unspecified sleep apnea      cpap mask    Urinary incontinence      Past Surgical History:   Procedure Laterality Date    ABDOMEN SURGERY  1980'S    LAPAROSCOPY    CARDIAC CATHETERIZATION  2014    CARDIOVASCULAR STRESS TEST  6/2015    was not positive but proceeding cath    CARPAL TUNNEL RELEASE Bilateral 1970'S   [address]    COLONOSCOPY  2010, 2016    DILATION AND CURETTAGE OF UTERUS      HC INJECTION PROCEDURE FOR SACROILIAC JOINT Right 12/7/2018    SACROILIAC JOINT INJECTION Right SI MBB #2, performed by Nehal Ventura MD at [address] [facility]  2002    Total    LUMBAR NERVE BLOCK Bilateral 8/22/2019    LESI L3 #1 performed by Nehal Ventura MD at [address] [facility] 6/27/2019    Lumbar RFA Bilateral L4-5,L5-S1 performed by Tish Bradford MD at [facility] 98 Bilateral 05/08/2018    LUMBAR FACET MBB L3-4, L4-5, L5-S1    NERVE SURGERY Bilateral 10/4/2019    LESI L3 #2 performed by Tish Bradford MD at [facility] 104 Bilateral 03/19/2018    Lumbar Facet MBB L3-4, L4-5, L5-S1    OVARY REMOVAL  2002    PAIN MANAGEMENT PROCEDURE Bilateral 1/20/2020    TFESI L5 LEFT #1 performed by Tish Bradford MD at [facility] 113 Left 8/20/2020    TFESI L5 LEFT #2 performed by Tish Bradford MD at [address] [place] [address] DX/THER AGNT PARAVERT FACET JOINT, LUMBAR/SAC, 2ND LEVEL Bilateral 3/19/2018    LUMBAR FACET MBB   @L3-4, L4-5, L5-S1  BILATERAL performed by Tish Bradford MD at [address] [place] [address] DX/THER AGNT PARAVERT FACET JOINT, LUMBAR/SAC, 2ND LEVEL Bilateral 5/8/2018    LUMBAR FACET MBB   @L3-4, L4-5, L5-S1  BILATERAL performed by Tish Bradford MD at [address] Bilateral 7/2/2018    LUMBAR RFA  Bilateral @L3-4,4-5,5-S1, performed by Tish Bradford MD at [address] [facility] OFFICE/OUTPT VISIT,PROCEDURE ONLY Right 10/2/2018    SACROILIAC JOINT INJECTION SI MBB RIGHT SIDE, performed by Tish Bradford MD at [address] [facility]  2016    [facility] Dr. Gertrude Conteh        Medications :     Outpatient Medications Marked as Taking for the 5/18/22 encounter (Office Visit) with Mark Hogan MD   Medication Sig Dispense Refill    TRINTELLIX 20 MG TABS tablet daily      tiZANidine (ZANAFLEX) 4 MG tablet Take 1 tablet by mouth every 6 hours as needed (muscle pain) 12 tablet 0    Lactobacillus (ACIDOPHILUS/L-SPOROGENES) TABS       levothyroxine (SYNTHROID) 150 MCG tablet       Calcium Carb-Cholecalciferol (CALCIUM + D3) 600-800 MG-UNIT TABS Take by mouth      doxepin (SINEQUAN) 100 MG capsule 200 mg nightly       NOVOLOG 100 UNIT/ML injection vial       LEVEMIR 100 UNIT/ML injection vial 46 in am  43 at bedtime      lamoTRIgine (LAMICTAL) 150 MG tablet       MULTIPLE VITAMIN PO Take by mouth      psyllium ([employer] DAILY FIBER) 0.52 g capsule Take by mouth      lisinopril (PRINIVIL;ZESTRIL) 5 MG tablet Take 1 tablet by mouth daily 90 tablet 4    ULTICARE MINI PEN NEEDLES 31G X 6 MM MISC       Accu-Chek Softclix Lancets MISC       furosemide (LASIX) 40 MG tablet Take 0.5 tablets by mouth daily 60 tablet 3    DULoxetine (CYMBALTA) 60 MG extended release capsule Take 120 mg by mouth daily       Liraglutide (VICTOZA) 18 MG/3ML SOPN SC injection Inject 1.2 mg into the skin daily (Patient taking differently: Inject 1.8 mg into the skin daily ) 4 pen 5    acetaminophen (TYLENOL) 325 MG tablet Take 2 tablets by mouth every 4 hours as needed for Pain 120 tablet 3    metoprolol succinate (TOPROL XL) 50 MG extended release tablet Take 50 mg by mouth daily      traZODone (DESYREL) 50 MG tablet Take 50 mg by mouth nightly Indications: 1/2 tab       Ascorbic Acid (VITAMIN C) 500 MG tablet Take 1,000 mg by mouth daily      Cyanocobalamin (VITAMIN B 12 PO) Take 500 mg by mouth daily       busPIRone (BUSPAR) 30 MG tablet Take 30 mg by mouth 3 times daily       pantoprazole (PROTONIX) 40 MG tablet Take 1 tablet by mouth daily 30 tablet 12    aspirin 81 MG tablet Take 81 mg by mouth daily      atorvastatin (LIPITOR) 40 MG tablet Take 40 mg by mouth daily      ferrous sulfate 325 (65 FE) MG tablet Take 325 mg by mouth nightly       metFORMIN (GLUCOPHAGE) 1000 MG tablet Take 1,000 mg by mouth 2 times daily (with meals) Indications: 1 tab if hr is >60 or SBP is >100          Vitals     /67 (Site: Left Upper Arm, Position: Sitting, Cuff Size: Medium Adult)   Pulse 60   Temp 97.2 °F (36.2 °C) (Temporal)   Wt 209 lb (94.8 kg)   SpO2 100%   BMI 42.21 kg/m²  Wt Readings from Last 3 Encounters:   05/18/22 209 lb (94.8 kg)   01/17/22 200 lb (90.7 kg)   09/10/21 196 lb (88.9 kg)        Physical Exam     General -- well developed and well nourished  Lungs -- clear  Heart -- S1, S2 heard, JVD - no  Abdomen - soft, non-tender  Extremities -- no edema  CNS - awake and alert    Labs, Radiology and Tests    Labs -    7/16 7/17 2/19 3/20 5/21 5/22       Potassium 4 4.4 4.5 4.4 4.5 4.8       BUN 19 16 22 17 16 17       Creatinine 1.05 1.2 1.3 1.24 1.10 1.06       eGFR 54 46 42 44 50 52                    UPCR - < 100 80 120  80       UMCR      < 30                      Renal Ultrasound Scan -- 1/2016  Right kidney 11.39 cm and left kidney 11.95 cm   Normal echogenicity and normal ultrasound scan. Assessment    1. Renal-most likely chronic kidney disease stage III, with estimated GFR of around 52 ML / Minute. This is most likely secondary to her long-standing hypertension, diabetes and chronic nonsteroidal anti-inflammatory use. - overall back to baseline   2. Essential hypertension-reasonable continue current medications  3. Diabetes mellitus-currently running well. 4. Grade 1 diastolic dysfunction-well compensated not on lasix  5. Medications reviewed and discussed with the patient. 6. FU in 1 year. Tests and orders placed this Encounter     No orders of the defined types were placed in this encounter. Halle Suero M.D  [facility].

## 2022-06-06 ENCOUNTER — HOSPITAL ENCOUNTER (EMERGENCY)
Age: 67
Discharge: HOME | End: 2022-06-06
Payer: MEDICARE

## 2022-06-06 ENCOUNTER — APPOINTMENT (OUTPATIENT)
Dept: GENERAL RADIOLOGY | Age: 67
End: 2022-06-06
Payer: MEDICARE

## 2022-06-06 VITALS
SYSTOLIC BLOOD PRESSURE: 129 MMHG | DIASTOLIC BLOOD PRESSURE: 69 MMHG | BODY MASS INDEX: 40.4 KG/M2 | HEART RATE: 65 BPM | WEIGHT: 200 LBS | OXYGEN SATURATION: 98 % | RESPIRATION RATE: 20 BRPM | TEMPERATURE: 98.3 F

## 2022-06-06 DIAGNOSIS — R07.9 CHEST PAIN, UNSPECIFIED TYPE: Primary | ICD-10-CM

## 2022-06-06 LAB
ANION GAP SERPL CALCULATED.3IONS-SCNC: 11 MEQ/L (ref 8–16)
BASOPHILS # BLD: 0.4 %
BASOPHILS ABSOLUTE: 0 THOU/MM3 (ref 0–0.1)
BUN BLDV-MCNC: 17 MG/DL (ref 7–22)
CALCIUM SERPL-MCNC: 8.4 MG/DL (ref 8.5–10.5)
CHLORIDE BLD-SCNC: 102 MEQ/L (ref 98–111)
CO2: 24 MEQ/L (ref 23–33)
CREAT SERPL-MCNC: 1.1 MG/DL (ref 0.4–1.2)
EKG ATRIAL RATE: 66 BPM
EKG P AXIS: 46 DEGREES
EKG P-R INTERVAL: 172 MS
EKG Q-T INTERVAL: 424 MS
EKG QRS DURATION: 70 MS
EKG QTC CALCULATION (BAZETT): 444 MS
EKG R AXIS: 32 DEGREES
EKG T AXIS: 56 DEGREES
EKG VENTRICULAR RATE: 66 BPM
EOSINOPHIL # BLD: 1.2 %
EOSINOPHILS ABSOLUTE: 0.1 THOU/MM3 (ref 0–0.4)
ERYTHROCYTE [DISTWIDTH] IN BLOOD BY AUTOMATED COUNT: 13.3 % (ref 11.5–14.5)
ERYTHROCYTE [DISTWIDTH] IN BLOOD BY AUTOMATED COUNT: 44.1 FL (ref 35–45)
GFR SERPL CREATININE-BSD FRML MDRD: 50 ML/MIN/1.73M2
GLUCOSE BLD-MCNC: 187 MG/DL (ref 70–108)
HCT VFR BLD CALC: 38.6 % (ref 37–47)
HEMOGLOBIN: 12.4 GM/DL (ref 12–16)
IMMATURE GRANS (ABS): 0.03 THOU/MM3 (ref 0–0.07)
IMMATURE GRANULOCYTES: 0.3 %
LYMPHOCYTES # BLD: 27.9 %
LYMPHOCYTES ABSOLUTE: 2.6 THOU/MM3 (ref 1–4.8)
MCH RBC QN AUTO: 29.2 PG (ref 26–33)
MCHC RBC AUTO-ENTMCNC: 32.1 GM/DL (ref 32.2–35.5)
MCV RBC AUTO: 90.8 FL (ref 81–99)
MONOCYTES # BLD: 8.3 %
MONOCYTES ABSOLUTE: 0.8 THOU/MM3 (ref 0.4–1.3)
NUCLEATED RED BLOOD CELLS: 0 /100 WBC
OSMOLALITY CALCULATION: 280.3 MOSMOL/KG (ref 275–300)
PLATELET # BLD: 244 THOU/MM3 (ref 130–400)
PMV BLD AUTO: 11.7 FL (ref 9.4–12.4)
POTASSIUM REFLEX MAGNESIUM: 4.3 MEQ/L (ref 3.5–5.2)
PRO-BNP: 47.2 PG/ML (ref 0–900)
RBC # BLD: 4.25 MILL/MM3 (ref 4.2–5.4)
SEG NEUTROPHILS: 61.9 %
SEGMENTED NEUTROPHILS ABSOLUTE COUNT: 5.7 THOU/MM3 (ref 1.8–7.7)
SODIUM BLD-SCNC: 137 MEQ/L (ref 135–145)
TROPONIN T: < 0.01 NG/ML
TROPONIN T: < 0.01 NG/ML
WBC # BLD: 9.2 THOU/MM3 (ref 4.8–10.8)

## 2022-06-06 PROCEDURE — 99285 EMERGENCY DEPT VISIT HI MDM: CPT

## 2022-06-06 PROCEDURE — 93005 ELECTROCARDIOGRAM TRACING: CPT | Performed by: EMERGENCY MEDICINE

## 2022-06-06 PROCEDURE — 93010 ELECTROCARDIOGRAM REPORT: CPT | Performed by: INTERNAL MEDICINE

## 2022-06-06 PROCEDURE — 2580000003 HC RX 258: Performed by: NURSE PRACTITIONER

## 2022-06-06 PROCEDURE — 85025 COMPLETE CBC W/AUTO DIFF WBC: CPT

## 2022-06-06 PROCEDURE — 83880 ASSAY OF NATRIURETIC PEPTIDE: CPT

## 2022-06-06 PROCEDURE — 71045 X-RAY EXAM CHEST 1 VIEW: CPT

## 2022-06-06 PROCEDURE — 36415 COLL VENOUS BLD VENIPUNCTURE: CPT

## 2022-06-06 PROCEDURE — 84484 ASSAY OF TROPONIN QUANT: CPT

## 2022-06-06 PROCEDURE — 80048 BASIC METABOLIC PNL TOTAL CA: CPT

## 2022-06-06 RX ORDER — 0.9 % SODIUM CHLORIDE 0.9 %
500 INTRAVENOUS SOLUTION INTRAVENOUS ONCE
Status: COMPLETED | OUTPATIENT
Start: 2022-06-06 | End: 2022-06-06

## 2022-06-06 RX ADMIN — SODIUM CHLORIDE 500 ML: 9 INJECTION, SOLUTION INTRAVENOUS at 09:58

## 2022-06-06 ASSESSMENT — PAIN DESCRIPTION - DESCRIPTORS: DESCRIPTORS: CRUSHING;SQUEEZING

## 2022-06-06 ASSESSMENT — ENCOUNTER SYMPTOMS
SORE THROAT: 0
COLOR CHANGE: 0
ABDOMINAL DISTENTION: 0
BACK PAIN: 0
NAUSEA: 0
SHORTNESS OF BREATH: 0
COUGH: 0
RHINORRHEA: 0
VOMITING: 0
ABDOMINAL PAIN: 0
CHEST TIGHTNESS: 0

## 2022-06-06 ASSESSMENT — PAIN SCALES - GENERAL
PAINLEVEL_OUTOF10: 5
PAINLEVEL_OUTOF10: 4
PAINLEVEL_OUTOF10: 4

## 2022-06-06 ASSESSMENT — PAIN DESCRIPTION - PAIN TYPE
TYPE: ACUTE PAIN
TYPE: ACUTE PAIN

## 2022-06-06 ASSESSMENT — PAIN DESCRIPTION - ONSET: ONSET: OTHER (COMMENT)

## 2022-06-06 ASSESSMENT — PAIN DESCRIPTION - LOCATION
LOCATION: CHEST

## 2022-06-06 ASSESSMENT — PAIN DESCRIPTION - FREQUENCY
FREQUENCY: CONTINUOUS
FREQUENCY: CONTINUOUS

## 2022-06-06 ASSESSMENT — PAIN - FUNCTIONAL ASSESSMENT
PAIN_FUNCTIONAL_ASSESSMENT: 0-10
PAIN_FUNCTIONAL_ASSESSMENT: 0-10

## 2022-06-06 ASSESSMENT — HEART SCORE: ECG: 0

## 2022-06-06 NOTE — ED PROVIDER NOTES
[facility] Emergency Department    CHIEF COMPLAINT       Chief Complaint   Patient presents with    Chest Pain       Nurses Notes reviewed and I agree except as noted in the HPI. HISTORY OF PRESENT ILLNESS    Pipre Ugalde is a 61 y.o. female who presents to the ED for evaluation of chest pain. Patient notes chest pain that started approximately 7:30 AM this morning when she woke up. Notes pain is located to the left chest, described as a pressure, denies any radiation of the pain. Denies any aggravating factors. Currently rates the pain a 5 out of 10. She called 911, was given aspirin and nitro in route, they noted low blood pressure, given 500 mL bolus. She notes history of coronary artery disease, never had stenting before in the past.  Last heart cath was 2015. She had a 20% lesion in the right coronary artery. She notes history of hypertension, hyperlipidemia, diabetes and congestive heart failure. She notes starting Trintellix recently. She notes her diabetes is not well controlled. She denies any recent illnesses, denies fevers or chills cough or shortness of breath. She denies nausea or vomiting. She denies any change in urination. HPI was provided by the patient. REVIEW OF SYSTEMS     Review of Systems   Constitutional: Negative for activity change, chills, fatigue and fever. HENT: Negative for congestion, rhinorrhea and sore throat. Respiratory: Negative for cough, chest tightness and shortness of breath. Cardiovascular: Positive for chest pain. Negative for leg swelling. Gastrointestinal: Negative for abdominal distention, abdominal pain, nausea and vomiting. Genitourinary: Negative for decreased urine volume and difficulty urinating. Musculoskeletal: Negative for arthralgias and back pain. Skin: Negative for color change and rash. Allergic/Immunologic: Negative for immunocompromised state.    Neurological: Negative for dizziness, weakness, light-headedness, numbness and headaches. Hematological: Does not bruise/bleed easily. Psychiatric/Behavioral: Negative for agitation, behavioral problems and confusion. PAST MEDICAL HISTORY     Past Medical History:   Diagnosis Date    Back pain     with radiation    CAD (coronary artery disease)     Chronic diastolic CH[facility]congestive heart failure) ([facility]) 2017    Colitis     Depression     Depression     Gastroesophageal reflux     GERD (gastroesophageal reflux disease)     H/O endoscopy     stretch the eso    Hyperlipidemia     Hypertension     Hypertension     Hypothyroidism     Incontinence of urine     Lordosis (acquired) (postural)     Spondylosis of unspecified site without mention of myelopathy     Thoracic or lumbosacral neuritis or radiculitis, unspecified     Type II or unspecified type diabetes mellitus without mention of complication, not stated as uncontrolled     diagnosed 2006    Unspecified sleep apnea      cpap mask    Urinary incontinence        SURGICALHISTORY      has a past surgical history that includes  section (); Carpal tunnel release (Bilateral, ); Abdomen surgery (); Dilation and curettage of uterus; cardiovascular stress test (2015); Colonoscopy (, ); Upper gastrointestinal endoscopy (); Cardiac catheterization (); other surgical history (Bilateral, 2018); pr inj dx/ther agnt paravert facet joint, lumbar/sac, 2nd level (Bilateral, 3/19/2018); Nerve Surgery (Bilateral, 2018); pr inj dx/ther agnt paravert facet joint, lumbar/sac, 2nd level (Bilateral, 2018); pr inject rx other periph nerve (Bilateral, 2018); pr office/outpt visit,procedure only (Right, 10/2/2018); Injection Procedure For Sacroiliac Joint (Right, 2018); Lumbar spine surgery (Bilateral, 2019); lumbar nerve block (Bilateral, 2019); Nerve Surgery (Bilateral, 10/4/2019);  Pain management procedure (Bilateral, 1/20/2020); Pain management procedure (Left, 8/20/2020); Ovary removal (2002); and Hysterectomy (2002).     CURRENT MEDICATIONS       Discharge Medication List as of 6/6/2022 11:29 AM      CONTINUE these medications which have NOT CHANGED    Details   TRINTELLIX 20 MG TABS tablet daily, DAWHistorical Med      tiZANidine (ZANAFLEX) 4 MG tablet Take 1 tablet by mouth every 6 hours as needed (muscle pain), Disp-12 tablet, R-0Print      Lactobacillus (ACIDOPHILUS/L-SPOROGENES) TABS Historical Med      levothyroxine (SYNTHROID) 150 MCG tablet Historical Med      Calcium Carb-Cholecalciferol (CALCIUM + D3) 600-800 MG-UNIT TABS Take by mouthHistorical Med      doxepin (SINEQUAN) 100 MG capsule 200 mg nightly Historical Med      NOVOLOG 100 UNIT/ML injection vial DAWHistorical Med      LEVEMIR 100 UNIT/ML injection vial 46 in am  43 at bedtime, DAWHistorical Med      MULTIPLE VITAMIN PO Take by mouthHistorical Med      psyllium ([employer] DAILY FIBER) 0.52 g capsule Take by mouthHistorical Med      lisinopril (PRINIVIL;ZESTRIL) 5 MG tablet Take 1 tablet by mouth daily, Disp-90 tablet, R-4NO PRINT      ULTICARE MINI PEN NEEDLES 31G X 6 MM MISC Starting Fri 12/4/2020, MAGGIE, Historical Med      Accu-Chek Softclix Lancets MISC Starting Fri 12/4/2020, Historical Med      Liraglutide (VICTOZA) 18 MG/3ML SOPN SC injection Inject 1.2 mg into the skin daily, Disp-4 pen, R-5Normal      acetaminophen (TYLENOL) 325 MG tablet Take 2 tablets by mouth every 4 hours as needed for Pain, Disp-120 tablet, R-3OTC      metoprolol succinate (TOPROL XL) 50 MG extended release tablet Take 50 mg by mouth dailyHistorical Med      Ascorbic Acid (VITAMIN C) 500 MG tablet Take 1,000 mg by mouth dailyHistorical Med      Cyanocobalamin (VITAMIN B 12 PO) Take 500 mg by mouth daily Historical Med      busPIRone (BUSPAR) 30 MG tablet Take 30 mg by mouth 3 times daily Historical Med      pantoprazole (PROTONIX) 40 MG tablet Take 1 tablet by mouth daily, Disp-30 tablet, R-12Normal      aspirin 81 MG tablet Take 81 mg by mouth dailyHistorical Med      ferrous sulfate 325 (65 FE) MG tablet Take 325 mg by mouth nightly Historical Med             ALLERGIES     is allergic to bactrim [sulfamethoxazole-trimethoprim], dye [iodides], ibuprofen, and lipitor [atorvastatin]. FAMILY HISTORY     She indicated that her mother is . She indicated that her father is . She indicated that both of her sisters are alive. She indicated that both of her brothers are alive. She indicated that her child is alive. family history includes Diabetes in her father, mother, and sister; Heart Disease in her father and mother; Stroke in her mother and sister; Thyroid Disease in her brother. SOCIAL HISTORY       Social History     Socioeconomic History    Marital status:      Spouse name: Not on file    Number of children: Not on file    Years of education: Not on file    Highest education level: Not on file   Occupational History    Not on file   Tobacco Use    Smoking status: Passive Smoke Exposure - Never Smoker    Smokeless tobacco: Never Used   Vaping Use    Vaping Use: Never used   Substance and Sexual Activity    Alcohol use: No     Alcohol/week: 0.0 standard drinks    Drug use: No    Sexual activity: Yes     Partners: Male   Other Topics Concern    Not on file   Social History Narrative    Not on file     Social Determinants of Health     Financial Resource Strain:     Difficulty of Paying Living Expenses: Not on file   Food Insecurity:     Worried About Running Out of Food in the Last Year: Not on file    [place] of Food in the Last Year: Not on file   Transportation Needs:     Lack of Transportation (Medical): Not on file    Lack of Transportation (Non-Medical):  Not on file   Physical Activity:     Days of Exercise per Week: Not on file    Minutes of Exercise per Session: Not on file   Stress:     Feeling of Stress : Not on file   Social Connections:     Frequency of Communication with Friends and Family: Not on file    Frequency of Social Gatherings with Friends and Family: Not on file    Attends [employer] Services: Not on file    Active Member of Clubs or Organizations: Not on file    Attends Club or Organization Meetings: Not on file    Marital Status: Not on file   Intimate Partner Violence:     Fear of Current or Ex-Partner: Not on file    Emotionally Abused: Not on file    Physically Abused: Not on file    Sexually Abused: Not on file   Housing Stability:     Unable to Pay for Housing in the Last Year: Not on file    Number of [place] in the Last Year: Not on file    Unstable Housing in the Last Year: Not on file       PHYSICAL EXAM     INITIAL VITALS:  weight is 200 lb (90.7 kg). Her oral temperature is 98.3 °F (36.8 °C). Her blood pressure is 129/69 and her pulse is 65. Her respiration is 20 and oxygen saturation is 98%. Physical Exam  Vitals and nursing note reviewed. Constitutional:       Appearance: Normal appearance. She is well-developed. HENT:      Head: Normocephalic. Mouth/Throat:      Pharynx: Uvula midline. Eyes:      Conjunctiva/sclera: Conjunctivae normal.   Cardiovascular:      Rate and Rhythm: Normal rate and regular rhythm. Heart sounds: Normal heart sounds, S1 normal and S2 normal.   Pulmonary:      Effort: Pulmonary effort is normal. No respiratory distress. Breath sounds: Normal breath sounds. Chest:      Chest wall: No tenderness. Abdominal:      General: Bowel sounds are normal. There is no distension. Palpations: Abdomen is soft. Tenderness: There is no abdominal tenderness. Musculoskeletal:         General: Normal range of motion. Cervical back: Normal range of motion and neck supple. Lymphadenopathy:      Cervical: No cervical adenopathy. Skin:     General: Skin is warm and dry.    Neurological:      Mental Status: She is alert and oriented to person, place, and time. Psychiatric:         Speech: Speech normal.         Behavior: Behavior normal.         Thought Content: Thought content normal.         DIFFERENTIAL DIAGNOSIS:   Angina, ACS, pleurisy, musculoskeletal pain  DIAGNOSTIC RESULTS     EKG: All EKG's are interpreted by the Emergency Department Physician who eithersigns or Co-signs this chart in the absence of a cardiologist.    EKG read and interpreted by myself with comparison to September 11, 2021 gives impression of normal sinus rhythm with heart rate of 66 interval 172; QRS 70;QTc 444; axis P-46, R-32, T-87. RADIOLOGY: non-plainfilm images(s) such as CT, Ultrasound and MRI are read by the radiologist.  Plain radiographic images are visualized and preliminarily interpreted by the emergency physician unless otherwise stated below. XR CHEST PORTABLE   Final Result   1. No acute cardiopulmonary finding. **This report has been created using voice recognition software. It may contain minor errors which are inherent in voice recognition technology. **      Final report electronically signed by Dr Eva Soto on 6/6/2022 9:02 AM            LABS:   Labs Reviewed   BASIC METABOLIC PANEL W/ REFLEX TO MG FOR LOW K - Abnormal; Notable for the following components:       Result Value    Glucose 187 (*)     Calcium 8.4 (*)     All other components within normal limits   CBC WITH AUTO DIFFERENTIAL - Abnormal; Notable for the following components:    MCHC 32.1 (*)     All other components within normal limits   GLOMERULAR FILTRATION RATE, ESTIMATED - Abnormal; Notable for the following components:    Est, Glom Filt Rate 50 (*)     All other components within normal limits   BRAIN NATRIURETIC PEPTIDE   TROPONIN   ANION GAP   OSMOLALITY   TROPONIN       EMERGENCY DEPARTMENT COURSE:   Vitals:    Vitals:    06/06/22 0847 06/06/22 0958 06/06/22 1116   BP: (!) 116/55 (!) 114/59 129/69   Pulse: 63 60 65   Resp: 17 19 20   Temp: 98.3 °F (36.8 °C) TempSrc: Oral     SpO2: 97% 99% 98%   Weight: 200 lb (90.7 kg)         MDM    Patient was seen and evaluated in the emergency department, patient appears to be in no acute distress, vital signs are reviewed, no significant findings are noted. Physical exam is completed, no significant chest wall tenderness, heart rate is regular, no murmurs are noted, lungs are clear, pulses are all equal in all extremities. Labs and imaging are ordered. Patient was already treated with aspirin and nitro in route. Will hold off on further nitro as blood pressure is slightly labile. Labs and imaging were reviewed, negative results were noted. Heart score was calculated at 4. Discussed my findings with the patient, we discussed admission versus outpatient follow-up, she feels comfortable with outpatient follow-up. I offered to make appointment for her, she notes she will make the appointment. She is advised to return to the ER if symptoms worsen. Patient verbalized understanding of plan of care. Medications   0.9 % sodium chloride bolus (0 mLs IntraVENous Stopped 6/6/22 1147)       Patient was seenindependently by myself. The patient's final impression and disposition and plan was determined by myself. CRITICAL CARE:   None    CONSULTS:  None    PROCEDURES:  None    FINAL IMPRESSION     1.  Chest pain, unspecified type          DISPOSITION/PLAN   Patient discharged    PATIENT REFERREDTO:  Kennedy Gonzalez MD  [facility] [place] Simmons [address]  [phone]    Call   For follow up and evaluation      DISCHARGE MEDICATIONS:  Discharge Medication List as of 6/6/2022 11:29 AM          (Please note that portions of this note were completed with a voice recognition program.  Efforts were made to edit the dictations but occasionally words are mis-transcribed.)      Provider:  I personally performed the services described in the documentation,reviewed and edited the documentation which was dictated to the scribe in my presence, and it accurately records my words and actions.     Star Frazier, CNP 06/06/22 3:00 PM    Ese Page Counts, APRN - CNP        [place], APRN - CNP  06/06/22 [id number]

## 2022-06-06 NOTE — ED TRIAGE NOTES
Patient presents to the ED via [facility] with [facility] from home for chest pain. Patient states she woke up and the chest pain began and she immediately called EMS. [facility] intercepted due to [employer]. x2 nitro given by [employer] and 324mg ASA. Medics report slight decrease in BP from nitro, so 100mL fluid bolus given. Fluids stopped upon arrival to ED due to CHF. Patient rates pain at a 5/10 and describes as a squeezing pain. EKG completed. Protocols ordered.

## 2022-06-06 NOTE — ED NOTES
Patient resting in cot, talking with  and watching tv. Call light in reach. Will continue to monitor.       Jonnie Joyner RN  06/06/22 [id number]

## 2022-06-22 ENCOUNTER — PATIENT MESSAGE (OUTPATIENT)
Dept: NEPHROLOGY | Age: 67
End: 2022-06-22

## 2022-06-22 NOTE — TELEPHONE ENCOUNTER
From: Piper Ugalde  To: Dr. Margaret Mustafa: 6/22/2022 8:33 AM EDT  Subject: request medication    I have chronic back pain. Is there anything else I can take besides tylenol? I have a Rx for Tizanidine but it doesn't help and I don't think it is good for my kidneys. Can you prescribe something that is safe?

## 2022-06-22 NOTE — TELEPHONE ENCOUNTER
Tylenol is the best option you can take extra strength Tylenol 2 tablets 3 times a day  However you should not be taking any ibuprofen or Aleve but if you are having severe pain you can take it as needed not more than couple of times in a week

## 2022-07-14 ENCOUNTER — HOSPITAL ENCOUNTER (OUTPATIENT)
Age: 67
Setting detail: SPECIMEN
Discharge: HOME | End: 2022-07-14

## 2022-07-14 LAB
ABSOLUTE EOS #: 0.13 K/UL (ref 0–0.44)
ABSOLUTE IMMATURE GRANULOCYTE: 0.03 K/UL (ref 0–0.3)
ABSOLUTE LYMPH #: 2.53 K/UL (ref 1.1–3.7)
ABSOLUTE MONO #: 0.86 K/UL (ref 0.1–1.2)
ALBUMIN SERPL-MCNC: 3.7 G/DL (ref 3.5–5.2)
ALBUMIN/GLOBULIN RATIO: 1.3 (ref 1–2.5)
ALP BLD-CCNC: 149 U/L (ref 35–104)
ALT SERPL-CCNC: 22 U/L (ref 5–33)
ANION GAP SERPL CALCULATED.3IONS-SCNC: 13 MMOL/L (ref 9–17)
AST SERPL-CCNC: 19 U/L
BASOPHILS # BLD: 0 % (ref 0–2)
BASOPHILS ABSOLUTE: 0.04 K/UL (ref 0–0.2)
BILIRUB SERPL-MCNC: 0.18 MG/DL (ref 0.3–1.2)
BUN BLDV-MCNC: 17 MG/DL (ref 8–23)
CALCIUM SERPL-MCNC: 9.2 MG/DL (ref 8.6–10.4)
CHLORIDE BLD-SCNC: 102 MMOL/L (ref 98–107)
CHOLESTEROL/HDL RATIO: 6.7
CHOLESTEROL: 188 MG/DL
CO2: 25 MMOL/L (ref 20–31)
CREAT SERPL-MCNC: 0.95 MG/DL (ref 0.5–0.9)
EOSINOPHILS RELATIVE PERCENT: 1 % (ref 1–4)
GFR AFRICAN AMERICAN: >60 ML/MIN
GFR NON-AFRICAN AMERICAN: 59 ML/MIN
GFR SERPL CREATININE-BSD FRML MDRD: ABNORMAL ML/MIN/{1.73_M2}
GLUCOSE BLD-MCNC: 174 MG/DL (ref 70–99)
HCT VFR BLD CALC: 38.8 % (ref 36.3–47.1)
HDLC SERPL-MCNC: 28 MG/DL
HEMOGLOBIN: 12.7 G/DL (ref 11.9–15.1)
IMMATURE GRANULOCYTES: 0 %
LDL CHOLESTEROL: 115 MG/DL (ref 0–130)
LYMPHOCYTES # BLD: 24 % (ref 24–43)
MCH RBC QN AUTO: 30.3 PG (ref 25.2–33.5)
MCHC RBC AUTO-ENTMCNC: 32.7 G/DL (ref 28.4–34.8)
MCV RBC AUTO: 92.6 FL (ref 82.6–102.9)
MONOCYTES # BLD: 8 % (ref 3–12)
NRBC AUTOMATED: 0 PER 100 WBC
PDW BLD-RTO: 13.7 % (ref 11.8–14.4)
PLATELET # BLD: 249 K/UL (ref 138–453)
PMV BLD AUTO: 12 FL (ref 8.1–13.5)
POTASSIUM SERPL-SCNC: 4.5 MMOL/L (ref 3.7–5.3)
RBC # BLD: 4.19 M/UL (ref 3.95–5.11)
SEG NEUTROPHILS: 67 % (ref 36–65)
SEGMENTED NEUTROPHILS ABSOLUTE COUNT: 7.02 K/UL (ref 1.5–8.1)
SODIUM BLD-SCNC: 140 MMOL/L (ref 135–144)
TOTAL PROTEIN: 6.5 G/DL (ref 6.4–8.3)
TRIGL SERPL-MCNC: 226 MG/DL
TSH SERPL DL<=0.05 MIU/L-ACNC: 0.47 UIU/ML (ref 0.3–5)
WBC # BLD: 10.6 K/UL (ref 3.5–11.3)

## 2022-08-08 ENCOUNTER — OFFICE VISIT (OUTPATIENT)
Dept: PULMONOLOGY | Age: 67
End: 2022-08-08
Payer: MEDICARE

## 2022-08-08 VITALS
WEIGHT: 204.4 LBS | HEART RATE: 74 BPM | HEIGHT: 59 IN | OXYGEN SATURATION: 98 % | DIASTOLIC BLOOD PRESSURE: 66 MMHG | BODY MASS INDEX: 41.2 KG/M2 | SYSTOLIC BLOOD PRESSURE: 130 MMHG | TEMPERATURE: 97.9 F

## 2022-08-08 DIAGNOSIS — Z99.89 OSA ON CPAP: Primary | ICD-10-CM

## 2022-08-08 DIAGNOSIS — G47.33 OSA ON CPAP: Primary | ICD-10-CM

## 2022-08-08 DIAGNOSIS — G47.10 HYPERSOMNIA: ICD-10-CM

## 2022-08-08 PROCEDURE — 99214 OFFICE O/P EST MOD 30 MIN: CPT | Performed by: NURSE PRACTITIONER

## 2022-08-08 RX ORDER — MODAFINIL 100 MG/1
100 TABLET ORAL DAILY
Qty: 30 TABLET | Refills: 2 | Status: SHIPPED | OUTPATIENT
Start: 2022-08-08 | End: 2022-11-06

## 2022-08-08 ASSESSMENT — ENCOUNTER SYMPTOMS
GASTROINTESTINAL NEGATIVE: 1
ALLERGIC/IMMUNOLOGIC NEGATIVE: 1
WHEEZING: 0
SHORTNESS OF BREATH: 0
COUGH: 0
EYES NEGATIVE: 1
RESPIRATORY NEGATIVE: 1

## 2022-08-08 NOTE — PROGRESS NOTES
[facility]      Maria E Arredondo         [id number]  8/8/2022   Chief Complaint   Patient presents with    Follow-up     SERENA 1 year follow up        Pt of Kendra DOUGHERTY Download:   Original or initial AHI: 102.7     Date of initial study: 9/24/14      Compliant  100%     Noncompliant 0 %     PAP Type Remstar Level  10   Avg Hrs/Day 8 hrs 49 mins 11 secs  AHI: 0.4   Recorded compliance dates , 7/9/22-8/7/22  Machine/Mfg:   [] ResMed    [x] Respironics/Dreamstation   Interface:   [x] Nasal    [] Nasal pillows   [] FFM      Provider:      [x] [facility]     [][employer]     [] [employer]    [] Roxene Mayelasjuan    [] [facility]               [] [facility]      [] [employer]    [] [address]      [] Other    Neck Size: 18.75  Mallampati Mallampati 4  ESS:    SAQLI:     Here is a scan of the most recent download:            Presentation:   Shahbaz Do presents for sleep medicine follow up for obstructive sleep apnea    Patient reports PAP pressure is: ok     Patient reports not many air leaks. Patient is falling asleep with difficulty: yes     Patient is having difficulty remaining asleep: yes     Patient needs supplies: Yes     Patient is having daytime sleepiness: yes     Patient is snoring: No     Other: cushion on her mask is bothering her     Equipment issues: The pressure is  acceptable, the mask is acceptable     Sleep issues:  Do you feel better? Some days  More rested? No   Better concentration? no    Progress History:   Since last visit any new medical issues? No  New ER or hospital visits? Yes 6/6/22 chest pain ED visit no significant findings then   Any new or changes in medicines? No  Any new sleep medicines? No    Review of Systems -   Review of Systems   Constitutional:  Positive for fatigue. Negative for chills and fever. HENT: Negative. Negative for congestion. Eyes: Negative. Respiratory: Negative. Negative for cough, shortness of breath and wheezing.     Cardiovascular: Positive for chest pain. Negative for leg swelling. Gastrointestinal: Negative. Endocrine: Negative. Genitourinary: Negative. Musculoskeletal: Negative. Allergic/Immunologic: Negative. Neurological: Negative. Hematological: Negative. Psychiatric/Behavioral:  Positive for sleep disturbance. Physical Exam:    BMI:  Body mass index is 41.28 kg/m². Wt Readings from Last 3 Encounters:   08/08/22 204 lb 6.4 oz (92.7 kg)   06/06/22 200 lb (90.7 kg)   05/18/22 209 lb (94.8 kg)     Weight stable / unchanged  Vitals: /66   Pulse 74   Temp 97.9 °F (36.6 °C)   Ht 4' 11\" (1.499 m)   Wt 204 lb 6.4 oz (92.7 kg)   SpO2 98%   BMI 41.28 kg/m²       Physical Exam  Vitals and nursing note reviewed. Constitutional:       Appearance: She is morbidly obese. HENT:      Head: Normocephalic and atraumatic. Eyes:      Conjunctiva/sclera: Conjunctivae normal.      Pupils: Pupils are equal, round, and reactive to light. Neck:      Vascular: No JVD. Cardiovascular:      Rate and Rhythm: Normal rate and regular rhythm. Heart sounds: Normal heart sounds. No murmur heard. No friction rub. No gallop. Pulmonary:      Effort: Pulmonary effort is normal. No respiratory distress. Breath sounds: Normal breath sounds. No wheezing or rales. Abdominal:      General: Bowel sounds are normal.      Palpations: Abdomen is soft. Musculoskeletal:         General: Normal range of motion. Cervical back: Normal range of motion and neck supple. Skin:     General: Skin is warm and dry. Capillary Refill: Capillary refill takes less than 2 seconds. Neurological:      Mental Status: She is alert and oriented to person, place, and time. Psychiatric:         Behavior: Behavior normal.         Thought Content: Thought content normal.         Judgment: Judgment normal.         ASSESSMENT/DIAGNOSIS     Diagnosis Orders   1. SERENA on CPAP  DME Order for CPAP as OP      2.  Hypersomnia modafinil (PROVIGIL) 100 MG tablet               Plan   Do you need any equipment today? Yes    - Download reviewed and discussed with patient  - She  was advised to continue current positive airway pressure therapy with above described pressure. - She  advised to keep good compliance with current recommended pressure to get optimal results and clinical improvement  - Recommend 7-9 hours of sleep with PAP  - She was advised to call [employer] regarding supplies if needed.   -She call my office for earlier appointment if needed for worsening of sleep symptoms.   - She was instructed on weight loss  - Chris Egan was educated about my impression and plan. Patient verbalizesunderstanding.   -RX done for Provigil 100 mg PO daily to help increase daytime alertness and help with fatigue   We will see Maxine Shah back in: 3 months for medication check     -RTC 1 year with DL     Information added by my medical assistant/LPN was reviewed today   Electronically signed by RAIMUNDO Seguar CNP on 8/8/2022 at 12:08 PM

## 2022-08-08 NOTE — PROGRESS NOTES
Patient reports PAP pressure is: ok    Patient reports not many air leaks.     Patient is falling asleep with difficulty: yes    Patient is having difficulty remaining asleep: yes    Patient needs supplies: Yes    Patient is having daytime sleepiness: yes    Patient is snoring: No    Other: cushion on her mask is bothering her

## 2022-09-30 ENCOUNTER — OFFICE VISIT (OUTPATIENT)
Dept: CARDIOLOGY CLINIC | Age: 67
End: 2022-09-30
Payer: MEDICARE

## 2022-09-30 VITALS
HEIGHT: 59 IN | DIASTOLIC BLOOD PRESSURE: 72 MMHG | HEART RATE: 78 BPM | BODY MASS INDEX: 42.41 KG/M2 | SYSTOLIC BLOOD PRESSURE: 186 MMHG | WEIGHT: 210.38 LBS

## 2022-09-30 DIAGNOSIS — R00.2 PALPITATIONS: Primary | ICD-10-CM

## 2022-09-30 DIAGNOSIS — I50.32 CHRONIC HEART FAILURE WITH PRESERVED EJECTION FRACTION (HFPEF) (HCC): ICD-10-CM

## 2022-09-30 DIAGNOSIS — R07.9 CHEST PAIN IN ADULT: ICD-10-CM

## 2022-09-30 PROCEDURE — 93000 ELECTROCARDIOGRAM COMPLETE: CPT | Performed by: INTERNAL MEDICINE

## 2022-09-30 PROCEDURE — 99214 OFFICE O/P EST MOD 30 MIN: CPT | Performed by: INTERNAL MEDICINE

## 2022-09-30 RX ORDER — DOXEPIN HYDROCHLORIDE 10 MG/ML
SOLUTION ORAL
COMMUNITY
Start: 2022-09-23 | End: 2022-09-30

## 2022-09-30 RX ORDER — LISINOPRIL 5 MG/1
5 TABLET ORAL DAILY
Qty: 90 TABLET | Refills: 4 | Status: SHIPPED | OUTPATIENT
Start: 2022-09-30

## 2022-09-30 RX ORDER — DOXEPIN HYDROCHLORIDE 10 MG/1
20 CAPSULE ORAL NIGHTLY
COMMUNITY

## 2022-09-30 NOTE — PROGRESS NOTES
Pt here for check up - palpitations     Pt c/o heart palpitations ,notice when laying down, chest pain     Pt concerned b/p running high 150s/70s    Pt continues with sob ,     EKG done today

## 2022-09-30 NOTE — PROGRESS NOTES
[address] [place] [address],[place] [address] Adelia Horne [address] [address]  Dept: [phone]  Dept Fax: [phone]  Loc: [phone]    Visit Date: 9/30/2022    Ms. Alejo Arvizu is a 61 y.o. female  who presented for:  Chief Complaint   Patient presents with    Check-Up    Palpitations    Congestive Heart Failure       HPI:   62 yo F c hx of DM, HTN, HLD, SEERNA on CPAP, Obesity, Diastolic HF, is here for a follow up. Had seen Dr. Daja Morales in 11/24 who d/c aldactazide. Currently her diuretics are on hold. She reports palpitations and she has been under a lot of stress. Current Outpatient Medications:     doxepin (SINEQUAN) 10 MG capsule, Take 20 mg by mouth nightly, Disp: , Rfl:     modafinil (PROVIGIL) 100 MG tablet, Take 1 tablet by mouth in the morning for 90 days. , Disp: 30 tablet, Rfl: 2    TRINTELLIX 20 MG TABS tablet, daily, Disp: , Rfl:     levothyroxine (SYNTHROID) 150 MCG tablet, 112 mcg, Disp: , Rfl:     Calcium Carb-Cholecalciferol (CALCIUM + D3) 600-800 MG-UNIT TABS, Take by mouth, Disp: , Rfl:     NOVOLOG 100 UNIT/ML injection vial, 22 units w/breakfast 26 units with lunch 30 with supper with sliding scale, Disp: , Rfl:     LEVEMIR 100 UNIT/ML injection vial, 60in am 40at bedtime, Disp: , Rfl:     MULTIPLE VITAMIN PO, Take by mouth, Disp: , Rfl:     psyllium ([employer] DAILY FIBER) 0.52 g capsule, Take by mouth, Disp: , Rfl:     Liraglutide (VICTOZA) 18 MG/3ML SOPN SC injection, Inject 1.2 mg into the skin daily (Patient taking differently: Inject 1.8 mg into the skin daily), Disp: 4 pen, Rfl: 5    metoprolol succinate (TOPROL XL) 50 MG extended release tablet, Take 50 mg by mouth daily, Disp: , Rfl:     Ascorbic Acid (VITAMIN C) 500 MG tablet, Take 1,000 mg by mouth daily, Disp: , Rfl:     pantoprazole (PROTONIX) 40 MG tablet, Take 1 tablet by mouth daily, Disp: 30 tablet, Rfl: 12    aspirin 81 MG tablet, Take 81 mg by mouth daily, Disp: , Rfl:     ferrous sulfate 325 (65 FE) MG tablet, Take 325 mg by mouth nightly , Disp: , Rfl:     tiZANidine (ZANAFLEX) 4 MG tablet, Take 1 tablet by mouth every 6 hours as needed (muscle pain), Disp: 12 tablet, Rfl: 0    ULTICARE MINI PEN NEEDLES 31G X 6 MM MISC, , Disp: , Rfl:     Past Medical History  Tereso Whittaker  has a past medical history of Back pain, CAD (coronary artery disease), Chronic diastolic CHF (congestive heart failure) ([facility] [address] Colitis, Depression, Depression, Gastroesophageal reflux, GERD (gastroesophageal reflux disease), H/O endoscopy, Hyperlipidemia, Hypertension, Hypertension, Hypothyroidism, Incontinence of urine, Lordosis (acquired) (postural), Spondylosis of unspecified site without mention of myelopathy, Thoracic or lumbosacral neuritis or radiculitis, unspecified, Type II or unspecified type diabetes mellitus without mention of complication, not stated as uncontrolled, Unspecified sleep apnea, and Urinary incontinence. Social History  Tereso Whittaker  reports that she is a non-smoker but has been exposed to tobacco smoke. She has never used smokeless tobacco. She reports that she does not drink alcohol and does not use drugs. Family History  Tereso Whittaker family history includes Diabetes in her father, mother, and sister; Heart Disease in her father and mother; Stroke in her mother and sister; Thyroid Disease in her brother.     Past Surgical History   Past Surgical History:   Procedure Laterality Date    ABDOMEN SURGERY  1980'S    LAPAROSCOPY    CARDIAC CATHETERIZATION  2014    CARDIOVASCULAR STRESS TEST  6/2015    was not positive but proceeding cath    CARPAL TUNNEL RELEASE Bilateral 1970'S    [address]    COLONOSCOPY  2010, 2016    DILATION AND CURETTAGE OF UTERUS      HC INJECTION PROCEDURE FOR SACROILIAC JOINT Right 12/7/2018    SACROILIAC JOINT INJECTION Right SI MBB #2, performed by Gerry Hannon MD at [address] [place] [address] ([facility])  2002    Total    LUMBAR NERVE BLOCK Bilateral 8/22/2019    LESI L3 #1 performed by Lino Albert MD at [facility] Bilateral 6/27/2019    Lumbar RFA Bilateral L4-5,L5-S1 performed by Lino Albert MD at [address] [place] [address] Bilateral 05/08/2018    LUMBAR FACET MBB L3-4, L4-5, L5-S1    NERVE SURGERY Bilateral 10/4/2019    LESI L3 #2 performed by Lino Albert MD at [place] [address] Bilateral 03/19/2018    Lumbar Facet MBB L3-4, L4-5, L5-S1    OVARY REMOVAL  2002    PAIN MANAGEMENT PROCEDURE Bilateral 1/20/2020    TFESI L5 LEFT #1 performed by Lino Albert MD at [facility] Left 8/20/2020    TFESI L5 LEFT #2 performed by Lino Albret MD at [facility] 219 DX/THER AGNT PARAVERT FACET JOINT, LUMBAR/SAC, 2ND LEVEL Bilateral 3/19/2018    LUMBAR FACET MBB   @L3-4, L4-5, L5-S1  BILATERAL performed by Lino Albert MD at [facility] 219 DX/THER AGNT PARAVERT FACET JOINT, LUMBAR/SAC, 2ND LEVEL Bilateral 5/8/2018    LUMBAR FACET MBB   @L3-4, L4-5, L5-S1  BILATERAL performed by Lino Albert MD at [address] [place] [address] Bilateral 7/2/2018    LUMBAR RFA  Bilateral @L3-4,4-5,5-S1, performed by Lino Albert MD at [facility] OFFICE/OUTPT [facility] Right 10/2/2018    SACROILIAC JOINT INJECTION SI MBB RIGHT SIDE, performed by Lino Albert MD at [address] [place] [address] Dr. Nia Oswald       Subjective:     REVIEW OF SYSTEMS  Constitutional: denies sweats, chills and fever  HENT: denies  congestion, sinus pressure, sneezing and sore throat. Eyes: denies  pain, discharge, redness and itching.    Respiratory: denies apnea, cough  Gastrointestinal: denies blood in stool, constipation, diarrhea   Endocrine: denies cold intolerance, heat intolerance, polydipsia. Genitourinary: denies dysuria, enuresis, flank pain and hematuria. Musculoskeletal: denies arthralgias, joint swelling and neck pain. Neurological: denies numbness and headaches. Psychiatric/Behavioral: denies agitation, confusion, decreased concentration and dysphoric mood    All others reviewed and are negative. Objective:     BP (!) 186/72   Pulse 78   Ht 4' 11\" (1.499 m)   Wt 210 lb 6 oz (95.4 kg)   BMI 42.49 kg/m²     Wt Readings from Last 3 Encounters:   09/30/22 210 lb 6 oz (95.4 kg)   08/08/22 204 lb 6.4 oz (92.7 kg)   06/06/22 200 lb (90.7 kg)     BP Readings from Last 3 Encounters:   09/30/22 (!) 186/72   08/08/22 130/66   06/06/22 129/69       PHYSICAL EXAM  Constitutional: Oriented to person, place, and time. Appears well-developed and well-nourished. HENT:   Head: Normocephalic and atraumatic. Eyes: EOM are normal. Pupils are equal, round, and reactive to light. Neck: Normal range of motion. Neck supple. No JVD present. Cardiovascular: Normal rate , normal heart sounds and intact distal pulses. Pulmonary/Chest: Effort normal and breath sounds normal. No respiratory distress. No wheezes. No rales. Abdominal: Soft. Bowel sounds are normal. No distension. There is no tenderness. Musculoskeletal: Normal range of motion. No edema. Neurological: Alert and oriented to person, place, and time. No cranial nerve deficit. Coordination normal.   Skin: Skin is warm and dry. Psychiatric: Normal mood and affect.        Lab Results   Component Value Date/Time    CKTOTAL 102 09/08/2020 11:33 AM       Lab Results   Component Value Date/Time    WBC 10.6 07/14/2022 01:55 PM    RBC 4.19 07/14/2022 01:55 PM    HGB 12.7 07/14/2022 01:55 PM    HCT 38.8 07/14/2022 01:55 PM    MCV 92.6 07/14/2022 01:55 PM    MCH 30.3 07/14/2022 01:55 PM    MCHC 32.7 07/14/2022 01:55 PM    RDW 13.7 07/14/2022 01:55 PM     07/14/2022 01:55 PM    MPV 12.0 07/14/2022 01:55 PM       Lab Results   Component Value Date/Time     07/14/2022 01:55 PM    K 4.5 07/14/2022 01:55 PM    K 4.3 06/06/2022 09:20 AM     07/14/2022 01:55 PM    CO2 25 07/14/2022 01:55 PM    BUN 17 07/14/2022 01:55 PM    LABALBU 3.7 07/14/2022 01:55 PM    CREATININE 0.95 07/14/2022 01:55 PM    CALCIUM 9.2 07/14/2022 01:55 PM    GFRAA >60 07/14/2022 01:55 PM    LABGLOM 59 07/14/2022 01:55 PM    LABGLOM 50 06/06/2022 09:20 AM    GLUCOSE 174 07/14/2022 01:55 PM    GLUCOSE 74 01/24/2018 11:26 AM       Lab Results   Component Value Date/Time    ALKPHOS 149 07/14/2022 01:55 PM    ALT 22 07/14/2022 01:55 PM    AST 19 07/14/2022 01:55 PM    PROT 6.5 07/14/2022 01:55 PM    BILITOT 0.18 07/14/2022 01:55 PM    BILIDIR <0.2 04/10/2019 11:40 AM    LABALBU 3.7 07/14/2022 01:55 PM       Lab Results   Component Value Date/Time    MG 1.7 02/24/2019 06:26 AM       No results found for: INR, PROTIME      Lab Results   Component Value Date/Time    LABA1C 7.2 03/08/2021 09:49 AM       Lab Results   Component Value Date/Time    TRIG 226 07/14/2022 01:55 PM    HDL 28 07/14/2022 01:55 PM    LDLCALC 53 03/02/2020 06:15 AM    LABVLDL 24 10/25/2017 08:15 AM       Lab Results   Component Value Date/Time    TSH 0.47 07/14/2022 01:55 PM         Testing Reviewed:      I haveindividually reviewed the below cardiac tests    EKG:    ECHO:   Results for orders placed during the hospital encounter of 07/21/17   ECHO Complete 2D W Doppler W Color    Narrative Transthoracic Echocardiography Report (TTE)     Demographics      Patient Name    Latoya Cochran  Gender               Female                   F      MR #            [id number]        Race                                                        Ethnicity      Account #       [de-identified]        Room Number          0028      Accession       [id number]        Date of Study        07/21/2017   Number      Date of Birth   1955       Referring Physician  Al[place]ia Jackson MD [place] Sep      Age             63 year(s)       Te Boyle,                                                         [facility]                                       Interpreting         Geovanny Julian MD                                    Physician     Procedure    Type of Study      TTE procedure:ECHOCARDIOGRAM COMPLETE 2D W DOPPLER W COLOR. Procedure Date  Date: 07/21/2017 Start: 10:51 AM    Study Location: Echo Lab  Technical Quality: Poor visualization due to body habitus. Indications:Shortness of breath. Additional Medical History:Diabetic, Hyperlipidemia, GERD, Hypertension,  Obstructive sleep apnea, Chronic Kidney disease. Patient Status: Routine    Height: 60 inches Weight: 201 pounds BSA: 1.87 m^2 BMI: 39.26 kg/m^2    BP: 149/59 mmHg     Conclusions      Summary   Normal left ventricle size and systolic function. Ejection fraction was   estimated at 62%. Doppler parameters were consistent with abnormal left ventricular   relaxation (grade 1 diastolic dysfunction). Normal function of all valves. Signature      ----------------------------------------------------------------   Electronically signed by Geovanny Julian MD (Interpreting   physician) on 07/22/2017 at 12:43 PM   ----------------------------------------------------------------      Findings      Mitral Valve   The mitral valve structure was normal with normal leaflet separation. DOPPLER: The transmitral velocity was within the normal range with no   evidence for mitral stenosis. There was no evidence of mitral   regurgitation. Aortic Valve   The aortic valve was trileaflet with normal thickness and cuspal   separation. DOPPLER: Transaortic velocity was within the normal range with   no evidence of aortic stenosis. There was no evidence of aortic   regurgitation.       Tricuspid Valve   The tricuspid valve structure was normal with normal leaflet separation. DOPPLER: There was no evidence of tricuspid stenosis. There was no   evidence of tricuspid regurgitation. Pulmonic Valve   The pulmonic valve leaflets exhibited normal thickness, no calcification,   and normal cuspal separation. DOPPLER: The transpulmonic velocity was   within the normal range with no evidence for regurgitation. Left Atrium   Left atrial size was normal.      Left Ventricle   Normal left ventricle size and systolic function. Ejection fraction was   estimated at 62%. There were no regional left ventricular wall motion   abnormalities and wall thickness was within normal limits. Doppler parameters were consistent with abnormal left ventricular   relaxation (grade 1 diastolic dysfunction). Right Atrium   Right atrial size was normal.      Right Ventricle   The right ventricular size was normal with normal systolic function and   wall thickness. Pericardial Effusion   The pericardium was normal in appearance with no evidence of a pericardial   effusion. Pleural Effusion   No evidence of pleural effusion. Aorta / Great Vessels   -Aortic root dimension within normal limits.   -The Pulmonary artery is within normal limits. -IVC size is within normal limits with normal respiratory phasic changes.      M-Mode/2D Measurements & Calculations      LV Diastolic      LV Systolic Dimension: 3.1 cm    LA Dimension: 3.6 cmAO   Dimension: 4.2 cm LV Volume Diastolic: 61.3 ml     Root Dimension: 2.4 cm   LV FS:26.2 %      LV Volume Systolic: 00.3 ml   LV PW Diastolic:  LV EDV/LV EDV Index: 78.6 ml/42   1.1 cm            m^2LV ESV/LV ESV Index: 31.3   Septum Diastolic: AL/93 m^2                        RV Diastolic Dimension:   1.1 cm            EF Calculated: 51.8 %            2.4 cm                                                         LA/Aorta: 1.5     Doppler Measurements & Calculations      MV Peak E-Wave: 78 cm/s    AV Peak Velocity: 198  LVOT Peak Velocity: 129   MV Peak A-Wave: 97.2 cm/s  cm/s                   cm/s   MV E/A Ratio: 0.8          AV Peak Gradient:      LVOT Peak Gradient: 7   MV Peak Gradient: 2.43     15.68 mmHg             mmHg   mmHg                                                     TV Peak E-Wave: 73.5   MV Deceleration Time: 335                         cm/s   msec                                              TV Peak A-Wave: 70.6                              IVRT: 67 msec          cm/s      MV E' Septal Velocity:                            TV Peak Gradient: 2.16   8.77 cm/s                  AV DVI (Vmax):0.65     mmHg   MV A' Septal Velocity:                            TR Velocity:186 cm/s   13.8 cm/s                                         TR Gradient:13.84 mmHg   MV E' Lateral Velocity:                           PV Peak Velocity: 119   9.16 cm/s                                         cm/s   MV A' Lateral Velocity:                           PV Peak Gradient: 5.66   11.8 cm/s                                         mmHg   E/E' septal: 8.89   E/E' lateral: 8.52     [email or web address] [id number]       STRESS:    CATH:    Assessment/Plan       Diagnosis Orders   1. Palpitations  EKG 12 lead      2. Chronic heart failure with preserved ejection fraction (HFpEF) (HCC)  EKG 12 lead      3.  Chest pain in adult  EKG 12 lead        Palpitations  Diastolic dysfunction  DM VMN5Y: 8.5  HTN  HLD  Obesity  SERENA on CPAP  CKD--1.9-->1.1     Holter monitor showed no significant findings  Has a lot of family stress issues  Currently on hold of lasix and lisinopril  Reviewed Echo, normal LVSF  Reviewed prior workup  BP well controlled  Continue Aspirin, metoprolol,  and statin  Not been taking lisinopril, as she ran out  Will refill  Advised to stay compliant with meds  The patient is asked to make an attempt to improve diet and exercise patterns to aid in medical management of this problem. Advised patient to call office or seek immediate medical attention if there is any new onset of  any chest pain, sob, palpitations, lightheadedness, dizziness, orthopnea, PND or pedal edema. Thank youfor allowing me to participate in the care of this patient. Please do not hesitate to contact me for any further questions. No follow-ups on file.        Electronically signed by Melody Dhaliwal MD [facility]  9/30/2022 at 11:01 AM

## 2022-10-04 ENCOUNTER — HOSPITAL ENCOUNTER (OUTPATIENT)
Dept: WOMENS IMAGING | Age: 67
Discharge: HOME | End: 2022-10-04
Payer: MEDICARE

## 2022-10-04 DIAGNOSIS — Z12.31 VISIT FOR SCREENING MAMMOGRAM: ICD-10-CM

## 2022-10-04 PROCEDURE — 77063 BREAST TOMOSYNTHESIS BI: CPT

## 2022-12-08 DIAGNOSIS — G47.33 OSA ON CPAP: Primary | ICD-10-CM

## 2022-12-08 DIAGNOSIS — Z99.89 OSA ON CPAP: Primary | ICD-10-CM

## 2022-12-29 ENCOUNTER — TELEPHONE (OUTPATIENT)
Dept: NEPHROLOGY | Age: 67
End: 2022-12-29

## 2022-12-29 NOTE — TELEPHONE ENCOUNTER
----- Message from Cherry Marie MD sent at 12/29/2022  3:32 PM EST -----  Regarding: RE: covid medication  Yes that medicine and that dose he is okay  ----- Message -----  From: Hilary Hanley LPN  Sent: 06/63/1236   6:59 AM EST  To: Cherry Marie MD  Subject: FW: covid medication                               ----- Message -----  From: Anali Love  Sent: 12/29/2022   2:57 AM EST  To: [facility] Clinical Staff  Subject: covid medication                                 I tested positive for covid on Wednesday December 28th. I was prescribed Paxlovid 300 mg/100 mg dual pack. Since I have kidney failure, is this medicine ok to take?

## 2023-01-04 ENCOUNTER — HOSPITAL ENCOUNTER (OUTPATIENT)
Age: 68
Setting detail: SPECIMEN
Discharge: HOME | End: 2023-01-04

## 2023-01-04 LAB
ABSOLUTE EOS #: 0.17 K/UL (ref 0–0.44)
ABSOLUTE IMMATURE GRANULOCYTE: <0.03 K/UL (ref 0–0.3)
ABSOLUTE LYMPH #: 2.11 K/UL (ref 1.1–3.7)
ABSOLUTE MONO #: 0.71 K/UL (ref 0.1–1.2)
ALBUMIN SERPL-MCNC: 3.7 G/DL (ref 3.5–5.2)
ALBUMIN/GLOBULIN RATIO: 1.2 (ref 1–2.5)
ALP BLD-CCNC: 138 U/L (ref 35–104)
ALT SERPL-CCNC: 21 U/L (ref 5–33)
ANION GAP SERPL CALCULATED.3IONS-SCNC: 12 MMOL/L (ref 9–17)
AST SERPL-CCNC: 24 U/L
BASOPHILS # BLD: 0 % (ref 0–2)
BASOPHILS ABSOLUTE: <0.03 K/UL (ref 0–0.2)
BILIRUB SERPL-MCNC: 0.3 MG/DL (ref 0.3–1.2)
BUN BLDV-MCNC: 11 MG/DL (ref 8–23)
CALCIUM SERPL-MCNC: 9 MG/DL (ref 8.6–10.4)
CHLORIDE BLD-SCNC: 100 MMOL/L (ref 98–107)
CHOLESTEROL/HDL RATIO: 9.8
CHOLESTEROL: 225 MG/DL
CO2: 27 MMOL/L (ref 20–31)
CREAT SERPL-MCNC: 0.85 MG/DL (ref 0.5–0.9)
EOSINOPHILS RELATIVE PERCENT: 2 % (ref 1–4)
GFR SERPL CREATININE-BSD FRML MDRD: >60 ML/MIN/1.73M2
GLUCOSE BLD-MCNC: 143 MG/DL (ref 70–99)
HCT VFR BLD CALC: 42.6 % (ref 36.3–47.1)
HDLC SERPL-MCNC: 23 MG/DL
HEMOGLOBIN: 13.5 G/DL (ref 11.9–15.1)
IMMATURE GRANULOCYTES: 0 %
LDL CHOLESTEROL: 151 MG/DL (ref 0–130)
LYMPHOCYTES # BLD: 29 % (ref 24–43)
MCH RBC QN AUTO: 28.8 PG (ref 25.2–33.5)
MCHC RBC AUTO-ENTMCNC: 31.7 G/DL (ref 28.4–34.8)
MCV RBC AUTO: 90.8 FL (ref 82.6–102.9)
MONOCYTES # BLD: 10 % (ref 3–12)
NRBC AUTOMATED: 0 PER 100 WBC
PDW BLD-RTO: 13.3 % (ref 11.8–14.4)
PLATELET # BLD: 211 K/UL (ref 138–453)
PMV BLD AUTO: 12.5 FL (ref 8.1–13.5)
POTASSIUM SERPL-SCNC: 4.5 MMOL/L (ref 3.7–5.3)
RBC # BLD: 4.69 M/UL (ref 3.95–5.11)
SEG NEUTROPHILS: 59 % (ref 36–65)
SEGMENTED NEUTROPHILS ABSOLUTE COUNT: 4.2 K/UL (ref 1.5–8.1)
SODIUM BLD-SCNC: 139 MMOL/L (ref 135–144)
TOTAL PROTEIN: 6.8 G/DL (ref 6.4–8.3)
TRIGL SERPL-MCNC: 256 MG/DL
TSH SERPL DL<=0.05 MIU/L-ACNC: 1 UIU/ML (ref 0.3–5)
WBC # BLD: 7.2 K/UL (ref 3.5–11.3)

## 2023-05-02 ENCOUNTER — HOSPITAL ENCOUNTER (OUTPATIENT)
Age: 68
Discharge: HOME | End: 2023-05-02
Payer: MEDICARE

## 2023-05-02 DIAGNOSIS — N18.32 STAGE 3B CHRONIC KIDNEY DISEASE (HCC): ICD-10-CM

## 2023-05-02 DIAGNOSIS — I10 ESSENTIAL HYPERTENSION: ICD-10-CM

## 2023-05-02 LAB
ANION GAP SERPL CALC-SCNC: 12 MEQ/L (ref 8–16)
BACTERIA: ABNORMAL
BILIRUB UR QL STRIP: NEGATIVE
BUN SERPL-MCNC: 22 MG/DL (ref 7–22)
CALCIUM SERPL-MCNC: 9.7 MG/DL (ref 8.5–10.5)
CASTS #/AREA URNS LPF: ABNORMAL /LPF
CASTS #/AREA URNS LPF: ABNORMAL /LPF
CHARACTER UR: ABNORMAL
CHARCOAL URNS QL MICRO: ABNORMAL
CHLORIDE SERPL-SCNC: 100 MEQ/L (ref 98–111)
CO2 SERPL-SCNC: 25 MEQ/L (ref 23–33)
COLOR UR: YELLOW
CREAT SERPL-MCNC: 1.1 MG/DL (ref 0.4–1.2)
CREAT UR-MCNC: 186.4 MG/DL
CREAT UR-MCNC: 186.4 MG/DL
CRYSTALS URNS QL MICRO: ABNORMAL
EPITHELIAL CELLS, UA: ABNORMAL /HPF
GFR SERPL CREATININE-BSD FRML MDRD: 56 ML/MIN/1.73M2
GLUCOSE SERPL-MCNC: 279 MG/DL (ref 70–108)
GLUCOSE UR QL STRIP.AUTO: 250 MG/DL
HGB UR QL STRIP.AUTO: NEGATIVE
KETONES UR QL STRIP.AUTO: NEGATIVE
LEUKOCYTE ESTERASE UR QL STRIP.AUTO: ABNORMAL
MICROALBUMIN UR-MCNC: < 1.2 MG/DL
MICROALBUMIN/CREAT RATIO PNL UR: 6 MG/G (ref 0–30)
NITRITE UR QL STRIP.AUTO: NEGATIVE
PH UR STRIP.AUTO: 5 [PH] (ref 5–9)
POTASSIUM SERPL-SCNC: 4.5 MEQ/L (ref 3.5–5.2)
PROT UR STRIP.AUTO-MCNC: NEGATIVE MG/DL
PROT UR-MCNC: 18 MG/DL
PROT/CREAT 24H UR: 0.1 MG/G{CREAT}
RBC #/AREA URNS HPF: ABNORMAL /HPF
RENAL EPI CELLS #/AREA URNS HPF: ABNORMAL /[HPF]
SODIUM SERPL-SCNC: 137 MEQ/L (ref 135–145)
SPECIFIC GRAVITY UA: 1.02 (ref 1–1.03)
UROBILINOGEN, URINE: 0.2 EU/DL (ref 0–1)
WBC #/AREA URNS HPF: ABNORMAL /HPF
YEAST LIKE FUNGI URNS QL MICRO: ABNORMAL

## 2023-05-02 PROCEDURE — 84156 ASSAY OF PROTEIN URINE: CPT

## 2023-05-02 PROCEDURE — 81001 URINALYSIS AUTO W/SCOPE: CPT

## 2023-05-02 PROCEDURE — 36415 COLL VENOUS BLD VENIPUNCTURE: CPT

## 2023-05-02 PROCEDURE — 82570 ASSAY OF URINE CREATININE: CPT

## 2023-05-02 PROCEDURE — 80048 BASIC METABOLIC PNL TOTAL CA: CPT

## 2023-05-02 PROCEDURE — 82043 UR ALBUMIN QUANTITATIVE: CPT

## 2023-06-08 ENCOUNTER — OFFICE VISIT (OUTPATIENT)
Dept: NEPHROLOGY | Age: 68
End: 2023-06-08
Payer: MEDICARE

## 2023-06-08 VITALS — HEART RATE: 60 BPM | OXYGEN SATURATION: 96 % | SYSTOLIC BLOOD PRESSURE: 128 MMHG | DIASTOLIC BLOOD PRESSURE: 61 MMHG

## 2023-06-08 DIAGNOSIS — I10 ESSENTIAL HYPERTENSION: Primary | ICD-10-CM

## 2023-06-08 DIAGNOSIS — N18.31 STAGE 3A CHRONIC KIDNEY DISEASE (HCC): ICD-10-CM

## 2023-06-08 PROCEDURE — 3078F DIAST BP <80 MM HG: CPT | Performed by: INTERNAL MEDICINE

## 2023-06-08 PROCEDURE — 3074F SYST BP LT 130 MM HG: CPT | Performed by: INTERNAL MEDICINE

## 2023-06-08 PROCEDURE — 1123F ACP DISCUSS/DSCN MKR DOCD: CPT | Performed by: INTERNAL MEDICINE

## 2023-06-08 PROCEDURE — 99213 OFFICE O/P EST LOW 20 MIN: CPT | Performed by: INTERNAL MEDICINE

## 2023-06-08 NOTE — PROGRESS NOTES
[facility]          Outpatient Follow-Up note         6/8/2023 9:57 AM    Patient Name:   Dontrell Oakes  YOB: 1955  Primary Care Physician:  RAIMUNDO Acosta NP     Chief Complaint / Reason for follow-up : Follow Up of CKD     Interval History :  Patient seen and examined by me. Feels well. No cp or SOB.   No hospitalizations and no med changes     Past History :  Past Medical History:   Diagnosis Date    Back pain     with radiation    CAD (coronary artery disease)     Chronic diastolic CHF (congestive heart failure) (HCC) 7/21/2017    Colitis     Depression     Depression     Gastroesophageal reflux     GERD (gastroesophageal reflux disease)     H/O endoscopy     stretch the eso    Hyperlipidemia     Hypertension     Hypertension     Hypothyroidism     Incontinence of urine     Lordosis (acquired) (postural)     Spondylosis of unspecified site without mention of myelopathy     Thoracic or lumbosacral neuritis or radiculitis, unspecified     Type II or unspecified type diabetes mellitus without mention of complication, not stated as uncontrolled     diagnosed 2006    Unspecified sleep apnea      cpap mask    Urinary incontinence      Past Surgical History:   Procedure Laterality Date    ABDOMEN SURGERY  1980'S    LAPAROSCOPY    CARDIAC CATHETERIZATION  2014    CARDIOVASCULAR STRESS TEST  6/2015    was not positive but proceeding cath    CARPAL TUNNEL RELEASE Bilateral 1970'S    [address]    COLONOSCOPY  2010, 2016    DILATION AND CURETTAGE OF UTERUS      HC INJECTION PROCEDURE FOR SACROILIAC JOINT Right 12/7/2018    SACROILIAC JOINT INJECTION Right SI MBB #2, performed by Kayleen Valdez MD at [address] [place] [address] ([address] [place] [address])  2002    Total    LUMBAR NERVE BLOCK Bilateral 8/22/2019    LESI L3 #1 performed by Kayleen Valdez MD at [facility] Bilateral 6/27/2019

## 2023-07-25 ENCOUNTER — HOSPITAL ENCOUNTER (OUTPATIENT)
Age: 68
Setting detail: SPECIMEN
Discharge: HOME | End: 2023-07-25

## 2023-07-25 LAB
CHOLEST SERPL-MCNC: 196 MG/DL
CHOLESTEROL/HDL RATIO: 6.5
HDLC SERPL-MCNC: 30 MG/DL
LDLC SERPL CALC-MCNC: 134 MG/DL (ref 0–130)
TRIGL SERPL-MCNC: 162 MG/DL

## 2023-08-04 ENCOUNTER — HOSPITAL ENCOUNTER (OUTPATIENT)
Dept: AUDIOLOGY | Age: 68
Discharge: HOME | End: 2023-08-04
Payer: MEDICARE

## 2023-08-04 PROCEDURE — 92557 COMPREHENSIVE HEARING TEST: CPT | Performed by: AUDIOLOGIST

## 2023-08-04 PROCEDURE — 92567 TYMPANOMETRY: CPT | Performed by: AUDIOLOGIST

## 2023-08-04 NOTE — PROGRESS NOTES
AUDIOLOGICAL EVALUATION      REASON FOR TESTING:  Audiometric evaluation per the request of CAROLYNE Proctor, due to the diagnosis of unspecified hearing loss, bilateral. The patient reports an intermittent \"clicking\" sound in her left ear for the past several months. She denies any symptoms of otalgia, otorrhea, aural fullness, vertigo, history of ear infections or previous ear surgery. She denies any loud noise exposure or familial hearing loss. OTOSCOPY: Clear external ear canals, bilaterally. AUDIOGRAM          Reliability: Good    COMMENTS: Pure tone audiogram indicates normal low frequency sloping to moderate high frequency sensorineural hearing loss, bilaterally. Speech reception thresholds are in agreement with pure tone averages, bilaterally. Word recognition scores are excellent at 100% in the right ear and 96% in the left ear with speech presented at average conversation levels (60dB) in quiet. Tympanometry indicates normal ear canal volume, peak pressure and compliance, bilaterally. RECOMMENDATION(S):   Follow up with referring provider as planned. ENT referral could be considered due to unilateral \"clicking\" tinnitus in the left ear, per discretion of physician. Repeat audiometric testing in 12 months to rule out progression, sooner with noticeable changes. Consider binaural amplification pending medical clearance and verification of insurance benefits. Hearing protection in noise.

## 2023-08-07 ENCOUNTER — OFFICE VISIT (OUTPATIENT)
Dept: PULMONOLOGY | Age: 68
End: 2023-08-07
Payer: MEDICARE

## 2023-08-07 VITALS
HEIGHT: 59 IN | WEIGHT: 210.6 LBS | DIASTOLIC BLOOD PRESSURE: 60 MMHG | BODY MASS INDEX: 42.46 KG/M2 | SYSTOLIC BLOOD PRESSURE: 136 MMHG | HEART RATE: 72 BPM | OXYGEN SATURATION: 96 %

## 2023-08-07 DIAGNOSIS — G47.33 OSA ON CPAP: Primary | ICD-10-CM

## 2023-08-07 DIAGNOSIS — Z99.89 OSA ON CPAP: Primary | ICD-10-CM

## 2023-08-07 DIAGNOSIS — E66.01 MORBID OBESITY WITH BMI OF 40.0-44.9, ADULT (HCC): ICD-10-CM

## 2023-08-07 PROCEDURE — 3075F SYST BP GE 130 - 139MM HG: CPT | Performed by: NURSE PRACTITIONER

## 2023-08-07 PROCEDURE — 3078F DIAST BP <80 MM HG: CPT | Performed by: NURSE PRACTITIONER

## 2023-08-07 PROCEDURE — 1123F ACP DISCUSS/DSCN MKR DOCD: CPT | Performed by: NURSE PRACTITIONER

## 2023-08-07 PROCEDURE — 99204 OFFICE O/P NEW MOD 45 MIN: CPT | Performed by: NURSE PRACTITIONER

## 2023-08-07 RX ORDER — OXYBUTYNIN CHLORIDE 10 MG/1
10 TABLET, EXTENDED RELEASE ORAL DAILY
COMMUNITY

## 2023-08-07 RX ORDER — ORAL SEMAGLUTIDE 3 MG/1
3 TABLET ORAL DAILY
COMMUNITY

## 2023-08-07 ASSESSMENT — ENCOUNTER SYMPTOMS
WHEEZING: 0
COUGH: 0
RESPIRATORY NEGATIVE: 1
GASTROINTESTINAL NEGATIVE: 1
SHORTNESS OF BREATH: 0
ALLERGIC/IMMUNOLOGIC NEGATIVE: 1
EYES NEGATIVE: 1

## 2023-08-07 NOTE — PROGRESS NOTES
obesity with BMI of 40.0-44.9, adult [facility])             Plan   Do you need any equipment today? Yes   - Download reviewed and discussed with Torrie Estrada and myself today in the office   - She  was advised to continue current positive airway pressure therapy with above described pressure. - She  advised to keep good compliance with current recommended pressure to get optimal results and clinical improvement  - Recommend 7-9 hours of sleep with PAP  - She was advised to call [employer] company regarding supplies if needed.   -She call my office for earlier appointment if needed for worsening of sleep symptoms.   - She was instructed on weight loss and reaching out to PCP for further options   - Torrie Estrada was educated about my impression and plan.  Patient verbalizesunderstanding.  - Pressure set in office today on her machine to 10 cm H2O   We will see Randolph  back in: 1 year with download  -Sample mask given to patient Resmed N20 size small     Information added by my medical assistant/LPN was reviewed today   Electronically signed by RAIMUNDO Morataya CNP on 2023 at 1:50 PM

## 2023-10-20 ENCOUNTER — HOSPITAL ENCOUNTER (OUTPATIENT)
Age: 68
Setting detail: SPECIMEN
Discharge: HOME | End: 2023-10-20

## 2023-10-20 LAB
ALBUMIN SERPL-MCNC: 3.3 G/DL (ref 3.5–5.2)
ALBUMIN/GLOB SERPL: 1 {RATIO} (ref 1–2.5)
ALP SERPL-CCNC: 124 U/L (ref 35–104)
ALT SERPL-CCNC: 23 U/L (ref 5–33)
ANION GAP SERPL CALCULATED.3IONS-SCNC: 12 MMOL/L (ref 9–17)
AST SERPL-CCNC: 20 U/L
BASOPHILS # BLD: 0.03 K/UL (ref 0–0.2)
BASOPHILS NFR BLD: 0 % (ref 0–2)
BILIRUB SERPL-MCNC: 0.2 MG/DL (ref 0.3–1.2)
BUN SERPL-MCNC: 20 MG/DL (ref 8–23)
CALCIUM SERPL-MCNC: 8.8 MG/DL (ref 8.6–10.4)
CHLORIDE SERPL-SCNC: 101 MMOL/L (ref 98–107)
CHOLEST SERPL-MCNC: 231 MG/DL
CHOLESTEROL/HDL RATIO: 6.6
CO2 SERPL-SCNC: 25 MMOL/L (ref 20–31)
CREAT SERPL-MCNC: 1.3 MG/DL (ref 0.5–0.9)
EOSINOPHIL # BLD: 0.2 K/UL (ref 0–0.44)
EOSINOPHILS RELATIVE PERCENT: 3 % (ref 1–4)
ERYTHROCYTE [DISTWIDTH] IN BLOOD BY AUTOMATED COUNT: 14 % (ref 11.8–14.4)
GFR SERPL CREATININE-BSD FRML MDRD: 46 ML/MIN/1.73M2
GLUCOSE SERPL-MCNC: 170 MG/DL (ref 70–99)
HCT VFR BLD AUTO: 40.9 % (ref 36.3–47.1)
HDLC SERPL-MCNC: 35 MG/DL
HGB BLD-MCNC: 12.7 G/DL (ref 11.9–15.1)
IMM GRANULOCYTES # BLD AUTO: <0.03 K/UL (ref 0–0.3)
IMM GRANULOCYTES NFR BLD: 0 %
LDLC SERPL CALC-MCNC: 160 MG/DL (ref 0–130)
LYMPHOCYTES NFR BLD: 2.69 K/UL (ref 1.1–3.7)
LYMPHOCYTES RELATIVE PERCENT: 40 % (ref 24–43)
MAGNESIUM SERPL-MCNC: 1.8 MG/DL (ref 1.6–2.6)
MCH RBC QN AUTO: 29.5 PG (ref 25.2–33.5)
MCHC RBC AUTO-ENTMCNC: 31.1 G/DL (ref 28.4–34.8)
MCV RBC AUTO: 94.9 FL (ref 82.6–102.9)
MONOCYTES NFR BLD: 0.46 K/UL (ref 0.1–1.2)
MONOCYTES NFR BLD: 7 % (ref 3–12)
NEUTROPHILS NFR BLD: 50 % (ref 36–65)
NEUTS SEG NFR BLD: 3.27 K/UL (ref 1.5–8.1)
NRBC BLD-RTO: 0 PER 100 WBC
PLATELET # BLD AUTO: 193 K/UL (ref 138–453)
PMV BLD AUTO: 11.6 FL (ref 8.1–13.5)
POTASSIUM SERPL-SCNC: 4.2 MMOL/L (ref 3.7–5.3)
PROT SERPL-MCNC: 6.5 G/DL (ref 6.4–8.3)
RBC # BLD AUTO: 4.31 M/UL (ref 3.95–5.11)
SODIUM SERPL-SCNC: 138 MMOL/L (ref 135–144)
T4 FREE SERPL-MCNC: 1.4 NG/DL (ref 0.9–1.7)
TRIGL SERPL-MCNC: 180 MG/DL
TSH SERPL DL<=0.05 MIU/L-ACNC: 5.19 UIU/ML (ref 0.3–5)
WBC OTHER # BLD: 6.7 K/UL (ref 3.5–11.3)

## 2023-11-27 ENCOUNTER — HOSPITAL ENCOUNTER (OUTPATIENT)
Age: 68
Setting detail: SPECIMEN
Discharge: HOME | End: 2023-11-27

## 2023-11-27 LAB
ALBUMIN SERPL-MCNC: 3.6 G/DL (ref 3.5–5.2)
ALBUMIN/GLOB SERPL: 1.1 {RATIO} (ref 1–2.5)
ALP SERPL-CCNC: 116 U/L (ref 35–104)
ALT SERPL-CCNC: 22 U/L (ref 5–33)
ANION GAP SERPL CALCULATED.3IONS-SCNC: 9 MMOL/L (ref 9–17)
AST SERPL-CCNC: 18 U/L
BILIRUB SERPL-MCNC: 0.2 MG/DL (ref 0.3–1.2)
BUN SERPL-MCNC: 31 MG/DL (ref 8–23)
CALCIUM SERPL-MCNC: 9 MG/DL (ref 8.6–10.4)
CHLORIDE SERPL-SCNC: 105 MMOL/L (ref 98–107)
CHOLEST SERPL-MCNC: 194 MG/DL
CHOLESTEROL/HDL RATIO: 5.4
CO2 SERPL-SCNC: 24 MMOL/L (ref 20–31)
CREAT SERPL-MCNC: 1.4 MG/DL (ref 0.5–0.9)
GFR SERPL CREATININE-BSD FRML MDRD: 42 ML/MIN/1.73M2
GLUCOSE SERPL-MCNC: 70 MG/DL (ref 70–99)
HDLC SERPL-MCNC: 36 MG/DL
LDLC SERPL CALC-MCNC: 130 MG/DL (ref 0–130)
POTASSIUM SERPL-SCNC: 4.3 MMOL/L (ref 3.7–5.3)
PROT SERPL-MCNC: 6.8 G/DL (ref 6.4–8.3)
SODIUM SERPL-SCNC: 138 MMOL/L (ref 135–144)
TRIGL SERPL-MCNC: 142 MG/DL
TSH SERPL DL<=0.05 MIU/L-ACNC: 2.41 UIU/ML (ref 0.3–5)

## 2024-01-06 ENCOUNTER — HOSPITAL ENCOUNTER (INPATIENT)
Age: 69
LOS: 2 days | Discharge: HOME | DRG: 871 | End: 2024-01-08
Attending: STUDENT IN AN ORGANIZED HEALTH CARE EDUCATION/TRAINING PROGRAM | Admitting: PHYSICIAN ASSISTANT
Payer: COMMERCIAL

## 2024-01-06 ENCOUNTER — APPOINTMENT (OUTPATIENT)
Dept: GENERAL RADIOLOGY | Age: 69
DRG: 871 | End: 2024-01-06
Payer: COMMERCIAL

## 2024-01-06 ENCOUNTER — APPOINTMENT (OUTPATIENT)
Dept: CT IMAGING | Age: 69
DRG: 871 | End: 2024-01-06
Payer: COMMERCIAL

## 2024-01-06 DIAGNOSIS — J18.9 PNEUMONIA OF LEFT UPPER LOBE DUE TO INFECTIOUS ORGANISM: Primary | ICD-10-CM

## 2024-01-06 LAB
ALBUMIN SERPL BCG-MCNC: 3.1 G/DL (ref 3.5–5.1)
ALP SERPL-CCNC: 111 U/L (ref 38–126)
ALT SERPL W/O P-5'-P-CCNC: 18 U/L (ref 11–66)
ANION GAP SERPL CALC-SCNC: 10 MEQ/L (ref 8–16)
AST SERPL-CCNC: 20 U/L (ref 5–40)
BASOPHILS ABSOLUTE: 0 THOU/MM3 (ref 0–0.1)
BASOPHILS NFR BLD AUTO: 0.4 %
BILIRUB CONJ SERPL-MCNC: < 0.2 MG/DL (ref 0–0.3)
BILIRUB SERPL-MCNC: 0.5 MG/DL (ref 0.3–1.2)
BUN SERPL-MCNC: 10 MG/DL (ref 7–22)
CA-I BLD ISE-SCNC: 1 MMOL/L (ref 1.12–1.32)
CALCIUM SERPL-MCNC: 8.1 MG/DL (ref 8.5–10.5)
CHLORIDE SERPL-SCNC: 101 MEQ/L (ref 98–111)
CO2 SERPL-SCNC: 25 MEQ/L (ref 23–33)
CREAT SERPL-MCNC: 1 MG/DL (ref 0.4–1.2)
D DIMER PPP IA.FEU-MCNC: 1034 NG/ML FEU (ref 0–500)
DEPRECATED RDW RBC AUTO: 42.8 FL (ref 35–45)
EKG ATRIAL RATE: 91 BPM
EKG P AXIS: 49 DEGREES
EKG P-R INTERVAL: 162 MS
EKG Q-T INTERVAL: 354 MS
EKG QRS DURATION: 70 MS
EKG QTC CALCULATION (BAZETT): 435 MS
EKG R AXIS: 31 DEGREES
EKG T AXIS: 50 DEGREES
EKG VENTRICULAR RATE: 91 BPM
EOSINOPHIL NFR BLD AUTO: 2.8 %
EOSINOPHILS ABSOLUTE: 0.3 THOU/MM3 (ref 0–0.4)
ERYTHROCYTE [DISTWIDTH] IN BLOOD BY AUTOMATED COUNT: 13.4 % (ref 11.5–14.5)
FLUAV RNA RESP QL NAA+PROBE: NOT DETECTED
FLUBV RNA RESP QL NAA+PROBE: NOT DETECTED
GFR SERPL CREATININE-BSD FRML MDRD: > 60 ML/MIN/1.73M2
GLUCOSE BLD STRIP.AUTO-MCNC: > 600 MG/DL (ref 70–108)
GLUCOSE SERPL-MCNC: 201 MG/DL (ref 70–108)
HCT VFR BLD AUTO: 41.1 % (ref 37–47)
HGB BLD-MCNC: 13.6 GM/DL (ref 12–16)
IMM GRANULOCYTES # BLD AUTO: 0.06 THOU/MM3 (ref 0–0.07)
IMM GRANULOCYTES NFR BLD AUTO: 0.5 %
LACTATE SERPL-SCNC: 2.3 MMOL/L (ref 0.5–2)
LACTATE SERPL-SCNC: 2.6 MMOL/L (ref 0.5–2)
LACTIC ACID, SEPSIS: 1.8 MMOL/L (ref 0.5–1.9)
LACTIC ACID, SEPSIS: 2.4 MMOL/L (ref 0.5–1.9)
LYMPHOCYTES ABSOLUTE: 3.3 THOU/MM3 (ref 1–4.8)
LYMPHOCYTES NFR BLD AUTO: 28.1 %
MCH RBC QN AUTO: 28.9 PG (ref 26–33)
MCHC RBC AUTO-ENTMCNC: 33.1 GM/DL (ref 32.2–35.5)
MCV RBC AUTO: 87.3 FL (ref 81–99)
MONOCYTES ABSOLUTE: 1 THOU/MM3 (ref 0.4–1.3)
MONOCYTES NFR BLD AUTO: 8.8 %
NEUTROPHILS NFR BLD AUTO: 59.4 %
NRBC BLD AUTO-RTO: 0 /100 WBC
NT-PROBNP SERPL IA-MCNC: 69.9 PG/ML (ref 0–124)
OSMOLALITY SERPL CALC.SUM OF ELEC: 276.7 MOSMOL/KG (ref 275–300)
PLATELET # BLD AUTO: 229 THOU/MM3 (ref 130–400)
PMV BLD AUTO: 11.3 FL (ref 9.4–12.4)
POTASSIUM SERPL-SCNC: 3.9 MEQ/L (ref 3.5–5.2)
PROCALCITONIN SERPL IA-MCNC: 0.15 NG/ML (ref 0.01–0.09)
PROT SERPL-MCNC: 6.7 G/DL (ref 6.1–8)
RBC # BLD AUTO: 4.71 MILL/MM3 (ref 4.2–5.4)
REASON FOR REJECTION: NORMAL
REJECTED TEST: NORMAL
SARS-COV-2 RNA RESP QL NAA+PROBE: NOT DETECTED
SEGMENTED NEUTROPHILS ABSOLUTE COUNT: 6.9 THOU/MM3 (ref 1.8–7.7)
SODIUM SERPL-SCNC: 136 MEQ/L (ref 135–145)
TROPONIN, HIGH SENSITIVITY: 12 NG/L (ref 0–12)
WBC # BLD AUTO: 11.6 THOU/MM3 (ref 4.8–10.8)

## 2024-01-06 PROCEDURE — 6360000002 HC RX W HCPCS: Performed by: NURSE PRACTITIONER

## 2024-01-06 PROCEDURE — 80048 BASIC METABOLIC PNL TOTAL CA: CPT

## 2024-01-06 PROCEDURE — 83605 ASSAY OF LACTIC ACID: CPT

## 2024-01-06 PROCEDURE — 96361 HYDRATE IV INFUSION ADD-ON: CPT

## 2024-01-06 PROCEDURE — 2580000003 HC RX 258: Performed by: STUDENT IN AN ORGANIZED HEALTH CARE EDUCATION/TRAINING PROGRAM

## 2024-01-06 PROCEDURE — 84484 ASSAY OF TROPONIN QUANT: CPT

## 2024-01-06 PROCEDURE — 87040 BLOOD CULTURE FOR BACTERIA: CPT

## 2024-01-06 PROCEDURE — 2500000003 HC RX 250 WO HCPCS

## 2024-01-06 PROCEDURE — 2580000003 HC RX 258: Performed by: NURSE PRACTITIONER

## 2024-01-06 PROCEDURE — 93010 ELECTROCARDIOGRAM REPORT: CPT | Performed by: INTERNAL MEDICINE

## 2024-01-06 PROCEDURE — 6370000000 HC RX 637 (ALT 250 FOR IP): Performed by: NURSE PRACTITIONER

## 2024-01-06 PROCEDURE — 87636 SARSCOV2 & INF A&B AMP PRB: CPT

## 2024-01-06 PROCEDURE — 93005 ELECTROCARDIOGRAM TRACING: CPT | Performed by: STUDENT IN AN ORGANIZED HEALTH CARE EDUCATION/TRAINING PROGRAM

## 2024-01-06 PROCEDURE — 96374 THER/PROPH/DIAG INJ IV PUSH: CPT

## 2024-01-06 PROCEDURE — 94640 AIRWAY INHALATION TREATMENT: CPT

## 2024-01-06 PROCEDURE — 82330 ASSAY OF CALCIUM: CPT

## 2024-01-06 PROCEDURE — 85379 FIBRIN DEGRADATION QUANT: CPT

## 2024-01-06 PROCEDURE — 71045 X-RAY EXAM CHEST 1 VIEW: CPT

## 2024-01-06 PROCEDURE — 6370000000 HC RX 637 (ALT 250 FOR IP)

## 2024-01-06 PROCEDURE — 84145 PROCALCITONIN (PCT): CPT

## 2024-01-06 PROCEDURE — 36415 COLL VENOUS BLD VENIPUNCTURE: CPT

## 2024-01-06 PROCEDURE — 85025 COMPLETE CBC W/AUTO DIFF WBC: CPT

## 2024-01-06 PROCEDURE — 2580000003 HC RX 258

## 2024-01-06 PROCEDURE — 6360000002 HC RX W HCPCS: Performed by: STUDENT IN AN ORGANIZED HEALTH CARE EDUCATION/TRAINING PROGRAM

## 2024-01-06 PROCEDURE — 71275 CT ANGIOGRAPHY CHEST: CPT

## 2024-01-06 PROCEDURE — 99285 EMERGENCY DEPT VISIT HI MDM: CPT

## 2024-01-06 PROCEDURE — 80076 HEPATIC FUNCTION PANEL: CPT

## 2024-01-06 PROCEDURE — 6360000002 HC RX W HCPCS

## 2024-01-06 PROCEDURE — 1200000003 HC TELEMETRY R&B

## 2024-01-06 PROCEDURE — 83880 ASSAY OF NATRIURETIC PEPTIDE: CPT

## 2024-01-06 PROCEDURE — 82948 REAGENT STRIP/BLOOD GLUCOSE: CPT

## 2024-01-06 PROCEDURE — 6360000004 HC RX CONTRAST MEDICATION: Performed by: NURSE PRACTITIONER

## 2024-01-06 PROCEDURE — 6370000000 HC RX 637 (ALT 250 FOR IP): Performed by: STUDENT IN AN ORGANIZED HEALTH CARE EDUCATION/TRAINING PROGRAM

## 2024-01-06 PROCEDURE — 99223 1ST HOSP IP/OBS HIGH 75: CPT | Performed by: INTERNAL MEDICINE

## 2024-01-06 RX ORDER — ACETAMINOPHEN 500 MG
1000 TABLET ORAL ONCE
Status: COMPLETED | OUTPATIENT
Start: 2024-01-06 | End: 2024-01-06

## 2024-01-06 RX ORDER — LEVOTHYROXINE SODIUM 112 UG/1
112 TABLET ORAL DAILY
Status: DISCONTINUED | OUTPATIENT
Start: 2024-01-07 | End: 2024-01-08 | Stop reason: HOSPADM

## 2024-01-06 RX ORDER — ASPIRIN 81 MG/1
81 TABLET ORAL DAILY
Status: DISCONTINUED | OUTPATIENT
Start: 2024-01-07 | End: 2024-01-08 | Stop reason: HOSPADM

## 2024-01-06 RX ORDER — DOXEPIN HYDROCHLORIDE 50 MG/1
50 CAPSULE ORAL NIGHTLY
Status: DISCONTINUED | OUTPATIENT
Start: 2024-01-06 | End: 2024-01-08 | Stop reason: HOSPADM

## 2024-01-06 RX ORDER — ENOXAPARIN SODIUM 100 MG/ML
40 INJECTION SUBCUTANEOUS DAILY
Status: DISCONTINUED | OUTPATIENT
Start: 2024-01-06 | End: 2024-01-08 | Stop reason: HOSPADM

## 2024-01-06 RX ORDER — SODIUM CHLORIDE 0.9 % (FLUSH) 0.9 %
10 SYRINGE (ML) INJECTION PRN
Status: DISCONTINUED | OUTPATIENT
Start: 2024-01-06 | End: 2024-01-08 | Stop reason: HOSPADM

## 2024-01-06 RX ORDER — PRAZOSIN HYDROCHLORIDE 1 MG/1
1 CAPSULE ORAL NIGHTLY
Status: DISCONTINUED | OUTPATIENT
Start: 2024-01-06 | End: 2024-01-08 | Stop reason: HOSPADM

## 2024-01-06 RX ORDER — MAGNESIUM SULFATE IN WATER 40 MG/ML
2000 INJECTION, SOLUTION INTRAVENOUS PRN
Status: DISCONTINUED | OUTPATIENT
Start: 2024-01-06 | End: 2024-01-08 | Stop reason: HOSPADM

## 2024-01-06 RX ORDER — OXYBUTYNIN CHLORIDE 10 MG/1
10 TABLET, EXTENDED RELEASE ORAL DAILY
Status: DISCONTINUED | OUTPATIENT
Start: 2024-01-06 | End: 2024-01-06

## 2024-01-06 RX ORDER — POLYETHYLENE GLYCOL 3350 17 G/17G
17 POWDER, FOR SOLUTION ORAL DAILY PRN
Status: DISCONTINUED | OUTPATIENT
Start: 2024-01-06 | End: 2024-01-08 | Stop reason: HOSPADM

## 2024-01-06 RX ORDER — SODIUM CHLORIDE 9 MG/ML
INJECTION, SOLUTION INTRAVENOUS PRN
Status: DISCONTINUED | OUTPATIENT
Start: 2024-01-06 | End: 2024-01-08 | Stop reason: HOSPADM

## 2024-01-06 RX ORDER — LISINOPRIL 5 MG/1
5 TABLET ORAL DAILY
Status: DISCONTINUED | OUTPATIENT
Start: 2024-01-06 | End: 2024-01-08 | Stop reason: HOSPADM

## 2024-01-06 RX ORDER — ONDANSETRON 2 MG/ML
4 INJECTION INTRAMUSCULAR; INTRAVENOUS EVERY 6 HOURS PRN
Status: DISCONTINUED | OUTPATIENT
Start: 2024-01-06 | End: 2024-01-08 | Stop reason: HOSPADM

## 2024-01-06 RX ORDER — INSULIN LISPRO 100 [IU]/ML
0-4 INJECTION, SOLUTION INTRAVENOUS; SUBCUTANEOUS NIGHTLY
Status: DISCONTINUED | OUTPATIENT
Start: 2024-01-06 | End: 2024-01-08 | Stop reason: HOSPADM

## 2024-01-06 RX ORDER — METOPROLOL SUCCINATE 50 MG/1
50 TABLET, EXTENDED RELEASE ORAL DAILY
Status: DISCONTINUED | OUTPATIENT
Start: 2024-01-06 | End: 2024-01-08 | Stop reason: HOSPADM

## 2024-01-06 RX ORDER — INSULIN ASPART 100 [IU]/ML
0-4 INJECTION, SOLUTION INTRAVENOUS; SUBCUTANEOUS
COMMUNITY

## 2024-01-06 RX ORDER — 0.9 % SODIUM CHLORIDE 0.9 %
1000 INTRAVENOUS SOLUTION INTRAVENOUS ONCE
Status: COMPLETED | OUTPATIENT
Start: 2024-01-06 | End: 2024-01-06

## 2024-01-06 RX ORDER — IBUPROFEN 600 MG/1
1 TABLET ORAL PRN
Status: DISCONTINUED | OUTPATIENT
Start: 2024-01-06 | End: 2024-01-08 | Stop reason: HOSPADM

## 2024-01-06 RX ORDER — IPRATROPIUM BROMIDE AND ALBUTEROL SULFATE 2.5; .5 MG/3ML; MG/3ML
2 SOLUTION RESPIRATORY (INHALATION) ONCE
Status: COMPLETED | OUTPATIENT
Start: 2024-01-06 | End: 2024-01-06

## 2024-01-06 RX ORDER — ONDANSETRON 4 MG/1
4 TABLET, ORALLY DISINTEGRATING ORAL EVERY 8 HOURS PRN
Status: DISCONTINUED | OUTPATIENT
Start: 2024-01-06 | End: 2024-01-08 | Stop reason: HOSPADM

## 2024-01-06 RX ORDER — DEXTROSE MONOHYDRATE 100 MG/ML
INJECTION, SOLUTION INTRAVENOUS CONTINUOUS PRN
Status: DISCONTINUED | OUTPATIENT
Start: 2024-01-06 | End: 2024-01-08 | Stop reason: HOSPADM

## 2024-01-06 RX ORDER — ACETAMINOPHEN 325 MG/1
650 TABLET ORAL EVERY 6 HOURS PRN
Status: DISCONTINUED | OUTPATIENT
Start: 2024-01-06 | End: 2024-01-08 | Stop reason: HOSPADM

## 2024-01-06 RX ORDER — INSULIN LISPRO 100 [IU]/ML
0-4 INJECTION, SOLUTION INTRAVENOUS; SUBCUTANEOUS
Status: DISCONTINUED | OUTPATIENT
Start: 2024-01-06 | End: 2024-01-08 | Stop reason: HOSPADM

## 2024-01-06 RX ORDER — DOXYCYCLINE HYCLATE 100 MG
100 TABLET ORAL ONCE
Status: COMPLETED | OUTPATIENT
Start: 2024-01-06 | End: 2024-01-06

## 2024-01-06 RX ORDER — SODIUM CHLORIDE 0.9 % (FLUSH) 0.9 %
10 SYRINGE (ML) INJECTION EVERY 12 HOURS SCHEDULED
Status: DISCONTINUED | OUTPATIENT
Start: 2024-01-06 | End: 2024-01-08 | Stop reason: HOSPADM

## 2024-01-06 RX ORDER — SODIUM CHLORIDE, SODIUM LACTATE, POTASSIUM CHLORIDE, CALCIUM CHLORIDE 600; 310; 30; 20 MG/100ML; MG/100ML; MG/100ML; MG/100ML
INJECTION, SOLUTION INTRAVENOUS CONTINUOUS
Status: ACTIVE | OUTPATIENT
Start: 2024-01-06 | End: 2024-01-06

## 2024-01-06 RX ORDER — POTASSIUM CHLORIDE 20 MEQ/1
40 TABLET, EXTENDED RELEASE ORAL PRN
Status: DISCONTINUED | OUTPATIENT
Start: 2024-01-06 | End: 2024-01-08 | Stop reason: HOSPADM

## 2024-01-06 RX ORDER — ACETAMINOPHEN 650 MG/1
650 SUPPOSITORY RECTAL EVERY 6 HOURS PRN
Status: DISCONTINUED | OUTPATIENT
Start: 2024-01-06 | End: 2024-01-08 | Stop reason: HOSPADM

## 2024-01-06 RX ORDER — INSULIN GLARGINE 100 [IU]/ML
5 INJECTION, SOLUTION SUBCUTANEOUS NIGHTLY
Status: DISCONTINUED | OUTPATIENT
Start: 2024-01-06 | End: 2024-01-08 | Stop reason: HOSPADM

## 2024-01-06 RX ORDER — PRAZOSIN HYDROCHLORIDE 1 MG/1
1 CAPSULE ORAL NIGHTLY
COMMUNITY

## 2024-01-06 RX ORDER — POTASSIUM CHLORIDE 7.45 MG/ML
10 INJECTION INTRAVENOUS PRN
Status: DISCONTINUED | OUTPATIENT
Start: 2024-01-06 | End: 2024-01-08 | Stop reason: HOSPADM

## 2024-01-06 RX ADMIN — DOXYCYCLINE HYCLATE 100 MG: 100 TABLET, COATED ORAL at 08:24

## 2024-01-06 RX ADMIN — SODIUM CHLORIDE 1000 ML: 9 INJECTION, SOLUTION INTRAVENOUS at 05:20

## 2024-01-06 RX ADMIN — INSULIN GLARGINE 5 UNITS: 100 INJECTION, SOLUTION SUBCUTANEOUS at 20:37

## 2024-01-06 RX ADMIN — DOXYCYCLINE 100 MG: 100 INJECTION, POWDER, LYOPHILIZED, FOR SOLUTION INTRAVENOUS at 20:46

## 2024-01-06 RX ADMIN — METOPROLOL SUCCINATE 50 MG: 50 TABLET, EXTENDED RELEASE ORAL at 13:09

## 2024-01-06 RX ADMIN — CEFTRIAXONE SODIUM 2000 MG: 2 INJECTION, POWDER, FOR SOLUTION INTRAMUSCULAR; INTRAVENOUS at 09:25

## 2024-01-06 RX ADMIN — SODIUM CHLORIDE, POTASSIUM CHLORIDE, SODIUM LACTATE AND CALCIUM CHLORIDE: 600; 310; 30; 20 INJECTION, SOLUTION INTRAVENOUS at 13:01

## 2024-01-06 RX ADMIN — IPRATROPIUM BROMIDE AND ALBUTEROL SULFATE 2 DOSE: .5; 3 SOLUTION RESPIRATORY (INHALATION) at 05:22

## 2024-01-06 RX ADMIN — LISINOPRIL 5 MG: 5 TABLET ORAL at 13:09

## 2024-01-06 RX ADMIN — METHYLPREDNISOLONE SODIUM SUCCINATE 125 MG: 125 INJECTION, POWDER, FOR SOLUTION INTRAMUSCULAR; INTRAVENOUS at 05:23

## 2024-01-06 RX ADMIN — SODIUM CHLORIDE, PRESERVATIVE FREE 10 ML: 5 INJECTION INTRAVENOUS at 20:40

## 2024-01-06 RX ADMIN — DOXEPIN HYDROCHLORIDE 50 MG: 50 CAPSULE ORAL at 20:37

## 2024-01-06 RX ADMIN — IOPAMIDOL 80 ML: 755 INJECTION, SOLUTION INTRAVENOUS at 07:40

## 2024-01-06 RX ADMIN — BREXPIPRAZOLE 2 MG: 2 TABLET ORAL at 20:37

## 2024-01-06 RX ADMIN — PRAZOSIN HYDROCHLORIDE 1 MG: 1 CAPSULE ORAL at 20:37

## 2024-01-06 RX ADMIN — VORTIOXETINE 20 MG: 10 TABLET, FILM COATED ORAL at 16:03

## 2024-01-06 RX ADMIN — ACETAMINOPHEN 1000 MG: 500 TABLET ORAL at 06:56

## 2024-01-06 RX ADMIN — ENOXAPARIN SODIUM 40 MG: 100 INJECTION SUBCUTANEOUS at 13:09

## 2024-01-06 ASSESSMENT — PAIN - FUNCTIONAL ASSESSMENT
PAIN_FUNCTIONAL_ASSESSMENT: NONE - DENIES PAIN
PAIN_FUNCTIONAL_ASSESSMENT: NONE - DENIES PAIN

## 2024-01-06 NOTE — ED TRIAGE NOTES
Pt presents to ED via lobby for evaluation of SOB and concerns for pneumonia. Pt was diagnosed with an upper respiratory infection on 1/2, and states she was sent home with mucinex. Pt has been getting worse since then, and states there has been no relief. Pt denies medications prior to arrival. Vitals, labs, EKG, swabs obtained at this time.

## 2024-01-06 NOTE — ED PROVIDER NOTES
[facility]  EMERGENCY DEPARTMENT  - TRANSFER OF CARE NOTE    Patient Name: Mumtaz Sanchez  MRN: [id number]  YOB: 1955  Date of Evaluation: 1/6/2024    TRANSFER OF CARE:   Advanced Practice Clinician Signing out: Erica Glynn NP  Receiving Physician: Hans Werner MD  Sign out time: 5:56 AM EST       Brief history:  68-year-old female with a history of CHF, pneumonia, morbid obesity, here with respiratory symptoms for the past few days.    Items pending that need to be checked:  Lab results, swabs, imaging      Tentative Impression of patient:  Pneumonia, viral illness    Expected disposition of patient:  Pending results, admitted.    PHYSICAL EXAM:     Physical Exam  Vitals and nursing note reviewed.   Constitutional:       General: She is not in acute distress.     Appearance: Normal appearance. She is obese. She is ill-appearing and toxic-appearing. She is not diaphoretic.   HENT:      Head: Normocephalic and atraumatic.      Nose: Nose normal.      Mouth/Throat:      Mouth: Mucous membranes are moist.      Pharynx: Oropharynx is clear.   Eyes:      Conjunctiva/sclera: Conjunctivae normal.   Cardiovascular:      Rate and Rhythm: Regular rhythm. Tachycardia present.      Pulses: Normal pulses.      Heart sounds: Normal heart sounds.   Pulmonary:      Effort: Pulmonary effort is normal. Tachypnea present.      Breath sounds: Normal breath sounds. No decreased breath sounds, wheezing, rhonchi or rales.   Chest:      Chest wall: Tenderness present.   Abdominal:      General: Abdomen is flat. Bowel sounds are normal.      Palpations: Abdomen is soft.      Tenderness: There is no abdominal tenderness.   Musculoskeletal:      Cervical back: Normal range of motion.      Right lower leg: No tenderness. No edema.      Left lower leg: No tenderness. No edema.   Skin:     General: Skin is warm and dry.      Capillary Refill: Capillary refill takes less than 2 seconds. 
instructions were discussed with the patient prior to discharge, with which the patient agrees.    Physical assessment findings, diagnostic testing(s) if applicable, and vital signs reviewed with patient/patient representative.  Questions answered.   Medications asdirected, including OTC medications for supportive care.   Education provided on medications.  Differential diagnosis(s) discussed with patient/patient representative.  Home care/self care instructions reviewed withpatient/patient representative.  Patient is to follow-up with family care provider in 2-3 days if no improvement.  Patient is to go to the emergency department if symptoms worsen.  Patient/patient representative isaware of care plan, questions answered, verbalizes understanding and is in agreement.     ED Medications administered this visit:  (None if blank)  Medications   sodium chloride 0.9 % bolus 1,000 mL (1,000 mLs IntraVENous New Bag 1/6/24 0520)   methylPREDNISolone sodium (PF) (SOLU-MEDROL PF) injection 125 mg (125 mg IntraVENous Given 1/6/24 0523)   ipratropium 0.5 mg-albuterol 2.5 mg (DUONEB) nebulizer solution 2 Dose (2 Doses Inhalation Given 1/6/24 0522)         CONSULTS:  None    PROCEDURES: (None if blank)  Procedures:     CRITICAL CARE: (None if blank)      DISCHARGE PRESCRIPTIONS: (None if blank)  New Prescriptions    No medications on file       FINAL IMPRESSION      1. Shortness of breath          DISPOSITION/PLAN   DISPOSITION        OUTPATIENT FOLLOW UP THE PATIENT:  No follow-up provider specified.    RAIMUNDO Mccracken CNP, Kristy J, APRN - CNP  01/06/24 0555

## 2024-01-06 NOTE — H&P
276.7 275.0 - 300.0 mOsmol/kg   EKG 12 Lead   Result Value Ref Range    Ventricular Rate 91 BPM    Atrial Rate 91 BPM    P-R Interval 162 ms    QRS Duration 70 ms    Q-T Interval 354 ms    QTc Calculation (Bazett) 435 ms    P Axis 49 degrees    R Axis 31 degrees    T Axis 50 degrees       Diagnostics:  CTA CHEST W WO CONTRAST    Result Date: 1/6/2024  CTA THORAX / PULMONARY EMBOLUS STUDY: CLINICAL INFORMATION: Shortness of breath, hypoxia, tachycardia, rule out pulmonary embolism COMPARISON; 11/23/2020 TECHNIQUE: 1.5 mm axial images were obtained through the chest following the administration of IV contrast (ISOVUE).  A non-contrast localizer was obtained.  3D reconstructions were performed on the scanner to include sagittal and coronal MIP images through both the right and left pulmonary arteries. ALL CT SCANS AT THIS FACILITY use dose modulation, iterative reconstruction, and/or weight-based dosing when appropriate to reduce radiation dose to as low as reasonably achievable. FINDINGS: Upper abdomen: Unremarkable. No gross abnormality is seen. Mediastinum and kamille: No abnormally enlarged or suspicious appearing lymph nodes are seen.. Bones: There is a lucent focus in the left side of the manubrium, unchanged from prior study, consistent with a bone cyst. Lungs: Small patch atelectasis/pneumonia in the superior segment left lower lobe and a second tiny patch of atelectasis/pneumonia in the left posterior lateral costophrenic sulcus. No effusion is seen.. There is a small 4 mm stable appearing noncalcified  nodule left upper lobe anteriorly, unchanged from prior study, likely a poorly calcified granuloma. There is an additional small 5 mm noncalcified nodule right midlung, also unchanged. Vascular: No pulmonary emboli are seen. There is no large vessel aneurysm or dissection. Prominent pulmonary trunk, consistent with pulmonary arterial hypertension. This finding also unchanged from prior study.     1.  No pulmonary

## 2024-01-07 LAB
ANION GAP SERPL CALC-SCNC: 12 MEQ/L (ref 8–16)
BUN SERPL-MCNC: 19 MG/DL (ref 7–22)
CALCIUM SERPL-MCNC: 8.2 MG/DL (ref 8.5–10.5)
CHLORIDE SERPL-SCNC: 102 MEQ/L (ref 98–111)
CO2 SERPL-SCNC: 24 MEQ/L (ref 23–33)
CREAT SERPL-MCNC: 1 MG/DL (ref 0.4–1.2)
DEPRECATED RDW RBC AUTO: 43.2 FL (ref 35–45)
ERYTHROCYTE [DISTWIDTH] IN BLOOD BY AUTOMATED COUNT: 13.4 % (ref 11.5–14.5)
GFR SERPL CREATININE-BSD FRML MDRD: > 60 ML/MIN/1.73M2
GLUCOSE BLD STRIP.AUTO-MCNC: 273 MG/DL (ref 70–108)
GLUCOSE SERPL-MCNC: 197 MG/DL (ref 70–108)
HCT VFR BLD AUTO: 36.5 % (ref 37–47)
HGB BLD-MCNC: 12.1 GM/DL (ref 12–16)
MCH RBC QN AUTO: 29.1 PG (ref 26–33)
MCHC RBC AUTO-ENTMCNC: 33.2 GM/DL (ref 32.2–35.5)
MCV RBC AUTO: 87.7 FL (ref 81–99)
PLATELET # BLD AUTO: 231 THOU/MM3 (ref 130–400)
PMV BLD AUTO: 11.5 FL (ref 9.4–12.4)
POTASSIUM SERPL-SCNC: 5.1 MEQ/L (ref 3.5–5.2)
RBC # BLD AUTO: 4.16 MILL/MM3 (ref 4.2–5.4)
REASON FOR REJECTION: NORMAL
REASON FOR REJECTION: NORMAL
REJECTED TEST: NORMAL
REJECTED TEST: NORMAL
SODIUM SERPL-SCNC: 138 MEQ/L (ref 135–145)
WBC # BLD AUTO: 15.5 THOU/MM3 (ref 4.8–10.8)

## 2024-01-07 PROCEDURE — 6370000000 HC RX 637 (ALT 250 FOR IP)

## 2024-01-07 PROCEDURE — 2500000003 HC RX 250 WO HCPCS

## 2024-01-07 PROCEDURE — 87070 CULTURE OTHR SPECIMN AEROBIC: CPT

## 2024-01-07 PROCEDURE — 36415 COLL VENOUS BLD VENIPUNCTURE: CPT

## 2024-01-07 PROCEDURE — 82948 REAGENT STRIP/BLOOD GLUCOSE: CPT

## 2024-01-07 PROCEDURE — 87631 RESP VIRUS 3-5 TARGETS: CPT

## 2024-01-07 PROCEDURE — 87798 DETECT AGENT NOS DNA AMP: CPT

## 2024-01-07 PROCEDURE — 87581 M.PNEUMON DNA AMP PROBE: CPT

## 2024-01-07 PROCEDURE — 6360000002 HC RX W HCPCS

## 2024-01-07 PROCEDURE — 87486 CHLMYD PNEUM DNA AMP PROBE: CPT

## 2024-01-07 PROCEDURE — 87541 LEGION PNEUMO DNA AMP PROB: CPT

## 2024-01-07 PROCEDURE — 1200000003 HC TELEMETRY R&B

## 2024-01-07 PROCEDURE — 85027 COMPLETE CBC AUTOMATED: CPT

## 2024-01-07 PROCEDURE — 99232 SBSQ HOSP IP/OBS MODERATE 35: CPT | Performed by: PHYSICIAN ASSISTANT

## 2024-01-07 PROCEDURE — 80048 BASIC METABOLIC PNL TOTAL CA: CPT

## 2024-01-07 PROCEDURE — 2580000003 HC RX 258

## 2024-01-07 RX ADMIN — DOXEPIN HYDROCHLORIDE 50 MG: 50 CAPSULE ORAL at 21:30

## 2024-01-07 RX ADMIN — ENOXAPARIN SODIUM 40 MG: 100 INJECTION SUBCUTANEOUS at 07:56

## 2024-01-07 RX ADMIN — LISINOPRIL 5 MG: 5 TABLET ORAL at 07:56

## 2024-01-07 RX ADMIN — SODIUM CHLORIDE, PRESERVATIVE FREE 10 ML: 5 INJECTION INTRAVENOUS at 07:59

## 2024-01-07 RX ADMIN — SODIUM CHLORIDE, PRESERVATIVE FREE 10 ML: 5 INJECTION INTRAVENOUS at 21:35

## 2024-01-07 RX ADMIN — DOXYCYCLINE 100 MG: 100 INJECTION, POWDER, LYOPHILIZED, FOR SOLUTION INTRAVENOUS at 09:54

## 2024-01-07 RX ADMIN — PRAZOSIN HYDROCHLORIDE 1 MG: 1 CAPSULE ORAL at 21:30

## 2024-01-07 RX ADMIN — ASPIRIN 81 MG: 81 TABLET, COATED ORAL at 07:56

## 2024-01-07 RX ADMIN — METOPROLOL SUCCINATE 50 MG: 50 TABLET, EXTENDED RELEASE ORAL at 07:56

## 2024-01-07 RX ADMIN — LEVOTHYROXINE SODIUM 112 MCG: 0.11 TABLET ORAL at 06:33

## 2024-01-07 RX ADMIN — INSULIN GLARGINE 5 UNITS: 100 INJECTION, SOLUTION SUBCUTANEOUS at 21:34

## 2024-01-07 RX ADMIN — DOXYCYCLINE 100 MG: 100 INJECTION, POWDER, LYOPHILIZED, FOR SOLUTION INTRAVENOUS at 21:36

## 2024-01-07 RX ADMIN — CEFTRIAXONE SODIUM 1000 MG: 1 INJECTION, POWDER, FOR SOLUTION INTRAMUSCULAR; INTRAVENOUS at 09:25

## 2024-01-07 RX ADMIN — INSULIN LISPRO 4 UNITS: 100 INJECTION, SOLUTION INTRAVENOUS; SUBCUTANEOUS at 17:10

## 2024-01-07 RX ADMIN — BREXPIPRAZOLE 2 MG: 2 TABLET ORAL at 21:30

## 2024-01-07 RX ADMIN — VORTIOXETINE 20 MG: 10 TABLET, FILM COATED ORAL at 07:56

## 2024-01-07 RX ADMIN — INSULIN LISPRO 3 UNITS: 100 INJECTION, SOLUTION INTRAVENOUS; SUBCUTANEOUS at 12:50

## 2024-01-07 NOTE — PLAN OF CARE
Problem: Discharge Planning  Goal: Discharge to home or other facility with appropriate resources  Outcome: Progressing  Flowsheets (Taken 1/6/2024 2030)  Discharge to home or other facility with appropriate resources:   Identify barriers to discharge with patient and caregiver   Arrange for needed discharge resources and transportation as appropriate   Identify discharge learning needs (meds, wound care, etc)   Refer to discharge planning if patient needs post-hospital services based on physician order or complex needs related to functional status, cognitive ability or social support system     Problem: Safety - Adult  Goal: Free from fall injury  Outcome: Progressing  Flowsheets (Taken 1/7/2024 0522)  Free From Fall Injury: Instruct family/caregiver on patient safety     Problem: Respiratory - Adult  Goal: Achieves optimal ventilation and oxygenation  Outcome: Progressing  Flowsheets (Taken 1/7/2024 0522)  Achieves optimal ventilation and oxygenation:   Assess for changes in respiratory status   Assess for changes in mentation and behavior   Position to facilitate oxygenation and minimize respiratory effort   Encourage broncho-pulmonary hygiene including cough, deep breathe, incentive spirometry     Problem: Chronic Conditions and Co-morbidities  Goal: Patient's chronic conditions and co-morbidity symptoms are monitored and maintained or improved  Outcome: Progressing  Flowsheets (Taken 1/7/2024 0522)  Care Plan - Patient's Chronic Conditions and Co-Morbidity Symptoms are Monitored and Maintained or Improved:   Monitor and assess patient's chronic conditions and comorbid symptoms for stability, deterioration, or improvement   Collaborate with multidisciplinary team to address chronic and comorbid conditions and prevent exacerbation or deterioration   Update acute care plan with appropriate goals if chronic or comorbid symptoms are exacerbated and prevent overall improvement and discharge       Care plan reviewed

## 2024-01-07 NOTE — PLAN OF CARE
Problem: Discharge Planning  Goal: Discharge to home or other facility with appropriate resources  1/7/2024 1341 by Darlene Bryan RN  Outcome: Progressing  Flowsheets (Taken 1/7/2024 1311)  Discharge to home or other facility with appropriate resources: Identify barriers to discharge with patient and caregiver  Note: Patient lives at home with spouse and plans to return. Patient denies discharge needs at this time.      Problem: Safety - Adult  Goal: Free from fall injury  1/7/2024 1341 by Darlene Bryan RN  Outcome: Progressing  Flowsheets (Taken 1/7/2024 1311)  Free From Fall Injury: Instruct family/caregiver on patient safety  Note: Patient free from falls. Patient up in room as tolerated with steady gait.      Problem: Respiratory - Adult  Goal: Achieves optimal ventilation and oxygenation  1/7/2024 1341 by Darlene Bryan RN  Outcome: Progressing  Flowsheets (Taken 1/7/2024 1311)  Achieves optimal ventilation and oxygenation: Assess for changes in respiratory status  Note: Respirations even and unlabored. Patient does complain of shortness of breath with exertion. Patient is not requiring O2 at this time.      Problem: Chronic Conditions and Co-morbidities  Goal: Patient's chronic conditions and co-morbidity symptoms are monitored and maintained or improved  1/7/2024 1341 by Darlene Bryan RN  Outcome: Progressing  Flowsheets (Taken 1/7/2024 1341)  Care Plan - Patient's Chronic Conditions and Co-Morbidity Symptoms are Monitored and Maintained or Improved: Monitor and assess patient's chronic conditions and comorbid symptoms for stability, deterioration, or improvement  Note: Chronic conditions and co morbidities managed.      Problem: Cardiovascular - Adult  Goal: Absence of cardiac dysrhythmias or at baseline  Outcome: Progressing  Flowsheets (Taken 1/7/2024 1311)  Absence of cardiac dysrhythmias or at baseline: Monitor cardiac rate and rhythm  Note: Telemetry monitoring continues as ordered. NSR

## 2024-01-08 VITALS
RESPIRATION RATE: 18 BRPM | WEIGHT: 216 LBS | HEART RATE: 78 BPM | BODY MASS INDEX: 43.55 KG/M2 | TEMPERATURE: 98.1 F | HEIGHT: 59 IN | OXYGEN SATURATION: 97 % | DIASTOLIC BLOOD PRESSURE: 54 MMHG | SYSTOLIC BLOOD PRESSURE: 167 MMHG

## 2024-01-08 LAB
GLUCOSE BLD STRIP.AUTO-MCNC: 165 MG/DL (ref 70–108)
GLUCOSE BLD STRIP.AUTO-MCNC: 273 MG/DL (ref 70–108)

## 2024-01-08 PROCEDURE — 6370000000 HC RX 637 (ALT 250 FOR IP)

## 2024-01-08 PROCEDURE — 2580000003 HC RX 258

## 2024-01-08 PROCEDURE — 82948 REAGENT STRIP/BLOOD GLUCOSE: CPT

## 2024-01-08 PROCEDURE — 2500000003 HC RX 250 WO HCPCS

## 2024-01-08 PROCEDURE — 6360000002 HC RX W HCPCS

## 2024-01-08 RX ORDER — AMOXICILLIN AND CLAVULANATE POTASSIUM 875; 125 MG/1; MG/1
1 TABLET, FILM COATED ORAL 2 TIMES DAILY
Qty: 14 TABLET | Refills: 0 | Status: SHIPPED | OUTPATIENT
Start: 2024-01-08 | End: 2024-01-15

## 2024-01-08 RX ORDER — DOXYCYCLINE HYCLATE 100 MG
100 TABLET ORAL EVERY 12 HOURS SCHEDULED
Status: DISCONTINUED | OUTPATIENT
Start: 2024-01-08 | End: 2024-01-08 | Stop reason: HOSPADM

## 2024-01-08 RX ADMIN — INSULIN LISPRO 10 UNITS: 100 INJECTION, SOLUTION INTRAVENOUS; SUBCUTANEOUS at 12:32

## 2024-01-08 RX ADMIN — METOPROLOL SUCCINATE 50 MG: 50 TABLET, EXTENDED RELEASE ORAL at 09:11

## 2024-01-08 RX ADMIN — DOXYCYCLINE 100 MG: 100 INJECTION, POWDER, LYOPHILIZED, FOR SOLUTION INTRAVENOUS at 11:05

## 2024-01-08 RX ADMIN — ASPIRIN 81 MG: 81 TABLET, COATED ORAL at 09:10

## 2024-01-08 RX ADMIN — SODIUM CHLORIDE, PRESERVATIVE FREE 10 ML: 5 INJECTION INTRAVENOUS at 09:11

## 2024-01-08 RX ADMIN — LISINOPRIL 5 MG: 5 TABLET ORAL at 09:11

## 2024-01-08 RX ADMIN — VORTIOXETINE 20 MG: 10 TABLET, FILM COATED ORAL at 09:11

## 2024-01-08 RX ADMIN — ENOXAPARIN SODIUM 40 MG: 100 INJECTION SUBCUTANEOUS at 09:11

## 2024-01-08 RX ADMIN — CEFTRIAXONE SODIUM 1000 MG: 1 INJECTION, POWDER, FOR SOLUTION INTRAMUSCULAR; INTRAVENOUS at 11:06

## 2024-01-08 RX ADMIN — LEVOTHYROXINE SODIUM 112 MCG: 0.11 TABLET ORAL at 06:05

## 2024-01-08 ASSESSMENT — PAIN DESCRIPTION - LOCATION: LOCATION: GENERALIZED

## 2024-01-08 ASSESSMENT — PAIN SCALES - GENERAL: PAINLEVEL_OUTOF10: 2

## 2024-01-08 ASSESSMENT — PAIN DESCRIPTION - DESCRIPTORS: DESCRIPTORS: DISCOMFORT

## 2024-01-08 NOTE — PLAN OF CARE
Problem: Chronic Conditions and Co-morbidities  Goal: Patient's chronic conditions and co-morbidity symptoms are monitored and maintained or improved  1/7/2024 2327 by Vince Padilla RN  Outcome: Progressing  Flowsheets (Taken 1/7/2024 2125)  Care Plan - Patient's Chronic Conditions and Co-Morbidity Symptoms are Monitored and Maintained or Improved: Monitor and assess patient's chronic conditions and comorbid symptoms for stability, deterioration, or improvement  1/7/2024 1341 by Darlene Bryan RN  Outcome: Progressing  Flowsheets (Taken 1/7/2024 1341)  Care Plan - Patient's Chronic Conditions and Co-Morbidity Symptoms are Monitored and Maintained or Improved: Monitor and assess patient's chronic conditions and comorbid symptoms for stability, deterioration, or improvement  Note: Chronic conditions and co morbidities managed.      Problem: Respiratory - Adult  Goal: Achieves optimal ventilation and oxygenation  1/7/2024 2327 by Vince Padilla RN  Outcome: Progressing  1/7/2024 1341 by Darlene Bryan RN  Outcome: Progressing  Flowsheets (Taken 1/7/2024 1311)  Achieves optimal ventilation and oxygenation: Assess for changes in respiratory status  Note: Respirations even and unlabored. Patient does complain of shortness of breath with exertion. Patient is not requiring O2 at this time.      Problem: Discharge Planning  Goal: Discharge to home or other facility with appropriate resources  1/7/2024 2327 by Vince Padilla RN  Outcome: Progressing  Flowsheets (Taken 1/7/2024 2125)  Discharge to home or other facility with appropriate resources: Identify barriers to discharge with patient and caregiver  1/7/2024 1341 by Darlene Bryan RN  Outcome: Progressing  Flowsheets (Taken 1/7/2024 1311)  Discharge to home or other facility with appropriate resources: Identify barriers to discharge with patient and caregiver  Note: Patient lives at home with spouse and plans to return. Patient denies discharge needs at this

## 2024-01-08 NOTE — PROGRESS NOTES
[facility] Adult Intravenous to Oral Protocol    Doxycycline changed to PO per [facility] P&T IV to PO protocol.    Thank you,  Annette Nuñez PharmD 1/8/2024 1:14 PM      
20:38 Pt's POC glucose read 565 with recheck; pt reported giving self 15 units insulin pump bolus at this time. Pt is asymptomatic, vitals signs stable. Pt states that her blood glucose usually stays around 200-300, and that it isn't unusual for it to elevate to the 500-600 range when sick.    23:20 Pt's POC glucose read 398, pt is asymptomatic; pt reported giving self 10 units insulin pump bolus at this time.   00:52 Pt's POC glucose read 278, pt is asymptomatic.  Dr Mccauley (Internal Med) notified and aware of pt's elevated POC glucose levels throughout this shift.   
Patient educated on how to use incentive spirometer. Patient verbalized understanding and demonstrated proper use. Emphasized importance and usage of device, with coughing and deep breathing every 2 hours while awake.       
Patient educated on how to use incentive spirometer. Patient verbalized understanding and demonstrated proper use. Emphasized importance and usage of device, with coughing and deep breathing every 4 hours while awake.        
Pt refusing prescribed insulin sliding scale coverage during admission, pt states will dose self using insulin pump containing Novolog. This RN discussed pt's responsibilities while wearing an insulin pump during hospitalization; pt verbalized understanding and signed Patient Insulin Pump--Hospitalized Patient Responsibilities Form at this time, which was placed in pt's chart. Dr Mccauley (Internal Med) notified and aware.   
superior segment left lower lobe and left posterior lateral costophrenic sulcus.               **This report has been created using voice recognition software.  It may contain minor errors which are inherent in voice recognition technology.**          Final report electronically signed by Dr. Alejandro Cadena on 1/6/2024 7:56 AM      XR CHEST PORTABLE   Final Result   1. No acute cardiopulmonary process demonstrated.      This document has been electronically signed by: Rudi Parry MD on    01/06/2024 05:40 AM          Diet: ADULT DIET; Regular; 5 carb choices (75 gm/meal)    DVT prophylaxis: [x] Lovenox                                 [] SCDs                                 [] SQ Heparin                                 [] Encourage ambulation           [] Already on Anticoagulation     Disposition:    [x] Home       [] TCU       [] Rehab       [] Psych       [] SNF       [] Long Term Care Facility       [] Other-    Code Status: Full Code    PT/OT Eval:    Electronically signed by Tate Love PA-C on 1/7/2024 at 10:25 AM

## 2024-01-08 NOTE — CARE COORDINATION
current housing: none  Potential Assistance needed at discharge: N/A            Potential DME:    Patient expects to discharge to: House  Plan for transportation at discharge: Family    Financial    Payor: [facility] PLAN / Plan: ADVANTAGE [employer] REPLACEMENT / Product Type: *No Product type* /     Does insurance require precert for SNF: Yes    Potential assistance Purchasing Medications: No  Meds-to-Beds request: Yes      [facility] - [place], [place] - [address] [place] [address] [phone] - F [phone]  [address] [place] [address]  Phone: [phone] Fax: [phone]    [facility] [place] [address] [place], [place] - [address] [facility] [phone] - F [phone]  [address] [facility] [address]  Phone: [phone] Fax: [phone]    [facility] [address] [facility] [place] - [address] [facility] -  [phone] - F [phone]  [address] [facility] [place] [address]  Phone: [phone] Fax: [phone]      Notes:    Factors facilitating achievement of predicted outcomes: Family support, Motivated, Cooperative, Pleasant, and Has needed Durable Medical Equipment at home    Barriers to discharge: Medical complications and Medication managment    Additional Case Management Notes: To ED for evaluation of cough, headache, fever, sore throat, and SOB. Fever reportedly up to 101 at home. Hospitalist following. Regular; carb control diet. IS. Asa. Rexulti. IV rocephin q24h. Doxepin. IV doxycycline q12h. Lovenox. DM management. Synthroid. Lisinopril. Toprol XL. Prazosin. Trintellix. Tmax 99.0. Room air. Lactic 2.3 (2.6). WBC 15.5 (11.6). Blood culture pending.     Procedure:   1/6 CXR: No acute cardiopulmonary process demonstrated.   1/6 CTA Chest W WO: No pulmonary emboli are seen. Pulmonary arterial hypertension 2. Small patches of pneumonia superior segment left lower lobe and left posterior lateral costophrenic sulcus    The Plan for Transition of Care is related to the

## 2024-01-11 LAB
BACTERIA BLD AEROBE CULT: NORMAL
BACTERIA BLD AEROBE CULT: NORMAL

## 2024-06-05 DIAGNOSIS — N18.31 STAGE 3A CHRONIC KIDNEY DISEASE (HCC): Primary | ICD-10-CM

## 2024-06-11 DIAGNOSIS — N18.31 STAGE 3A CHRONIC KIDNEY DISEASE (HCC): ICD-10-CM

## 2024-06-11 DIAGNOSIS — I10 ESSENTIAL HYPERTENSION: Primary | ICD-10-CM

## 2024-06-11 LAB
ALBUMIN SERPL-MCNC: 3.7 G/DL
BILIRUBIN, URINE: NEGATIVE
BLOOD, URINE: NEGATIVE
BUN / CREAT RATIO: 17
BUN BLDV-MCNC: 19 MG/DL
CALCIUM SERPL-MCNC: 8.8 MG/DL
CHLORIDE BLD-SCNC: 100 MMOL/L
CLARITY: CLEAR
CO2: 25 MMOL/L
COLOR: YELLOW
CREAT SERPL-MCNC: 1.09 MG/DL
CREATININE URINE: 93.9 MG/DL
GFR SERPL CREATININE-BSD FRML MDRD: 56 ML/MIN/{1.73_M2}
GLUCOSE URINE: NEGATIVE
GLUCOSE: 197
KETONES, URINE: NEGATIVE
LEUKOCYTE ESTERASE, URINE: NORMAL
MICROALBUMIN/CREAT 24H UR: 3.2 MG/DL
MICROALBUMIN/CREAT UR-RTO: 3 MG/G
NITRITE, URINE: NEGATIVE
PH UA: 6 (ref 4.5–8)
PHOSPHORUS: 3.5 MG/DL
POTASSIUM SERPL-SCNC: 4.1 MMOL/L
PROTEIN UA: NEGATIVE
SODIUM BLD-SCNC: 140 MMOL/L
SPECIFIC GRAVITY, URINE: 1.02
UROBILINOGEN, URINE: NORMAL

## 2024-06-13 ENCOUNTER — OFFICE VISIT (OUTPATIENT)
Dept: NEPHROLOGY | Age: 69
End: 2024-06-13
Payer: COMMERCIAL

## 2024-06-13 VITALS
OXYGEN SATURATION: 96 % | WEIGHT: 210 LBS | DIASTOLIC BLOOD PRESSURE: 66 MMHG | HEART RATE: 55 BPM | SYSTOLIC BLOOD PRESSURE: 142 MMHG | BODY MASS INDEX: 42.41 KG/M2

## 2024-06-13 DIAGNOSIS — I10 ESSENTIAL HYPERTENSION: Primary | ICD-10-CM

## 2024-06-13 DIAGNOSIS — N18.31 STAGE 3A CHRONIC KIDNEY DISEASE (HCC): ICD-10-CM

## 2024-06-13 PROCEDURE — 3078F DIAST BP <80 MM HG: CPT | Performed by: INTERNAL MEDICINE

## 2024-06-13 PROCEDURE — 1036F TOBACCO NON-USER: CPT | Performed by: INTERNAL MEDICINE

## 2024-06-13 PROCEDURE — G8427 DOCREV CUR MEDS BY ELIG CLIN: HCPCS | Performed by: INTERNAL MEDICINE

## 2024-06-13 PROCEDURE — 1124F ACP DISCUSS-NO DSCNMKR DOCD: CPT | Performed by: INTERNAL MEDICINE

## 2024-06-13 PROCEDURE — 3077F SYST BP >= 140 MM HG: CPT | Performed by: INTERNAL MEDICINE

## 2024-06-13 PROCEDURE — G8399 PT W/DXA RESULTS DOCUMENT: HCPCS | Performed by: INTERNAL MEDICINE

## 2024-06-13 PROCEDURE — 3017F COLORECTAL CA SCREEN DOC REV: CPT | Performed by: INTERNAL MEDICINE

## 2024-06-13 PROCEDURE — 1090F PRES/ABSN URINE INCON ASSESS: CPT | Performed by: INTERNAL MEDICINE

## 2024-06-13 PROCEDURE — G8417 CALC BMI ABV UP PARAM F/U: HCPCS | Performed by: INTERNAL MEDICINE

## 2024-06-13 PROCEDURE — 99213 OFFICE O/P EST LOW 20 MIN: CPT | Performed by: INTERNAL MEDICINE

## 2024-06-13 RX ORDER — BUSPIRONE HYDROCHLORIDE 15 MG/1
15 TABLET ORAL 2 TIMES DAILY
COMMUNITY
Start: 2024-06-07

## 2024-06-13 NOTE — PROGRESS NOTES
[facility]          Outpatient Follow-Up note         2024 11:10 AM    Patient Name:   Mumtaz Sanchez  YOB: 1955  Primary Care Physician:  Sheila Landeros APRN - NP     Chief Complaint / Reason for follow-up : Follow Up of CKD     Interval History :  Patient seen and examined by me.   Feels well.  No cp or SOB.  No hospitalizations . Buspar started for anxiety     Past History :  Past Medical History:   Diagnosis Date    Back pain     with radiation    CAD (coronary artery disease)     Chronic diastolic CHF (congestive heart failure) ([facility]) 2017    CKD (chronic kidney disease) stage 3, GFR 30-59 ml/min ([facility])     Colitis     Depression     Depression     Gastroesophageal reflux     GERD (gastroesophageal reflux disease)     H/O endoscopy     stretch the eso    Hyperlipidemia     Hypertension     Hypertension     Hypothyroidism     Incontinence of urine     Lordosis (acquired) (postural)     Spondylosis of unspecified site without mention of myelopathy     Thoracic or lumbosacral neuritis or radiculitis, unspecified     Type II or unspecified type diabetes mellitus without mention of complication, not stated as uncontrolled     diagnosed     Unspecified sleep apnea      cpap mask    Urinary incontinence      Past Surgical History:   Procedure Laterality Date    ABDOMEN SURGERY  'S    LAPAROSCOPY    CARDIAC CATHETERIZATION      CARDIOVASCULAR STRESS TEST  2015    was not positive but proceeding cath    CARPAL TUNNEL RELEASE Bilateral 'S     SECTION  1975    COLONOSCOPY  , 2016    DILATION AND CURETTAGE OF UTERUS      HC INJECTION PROCEDURE FOR SACROILIAC JOINT Right 2018    SACROILIAC JOINT INJECTION Right SI MBB #2, performed by Alejandro Macario MD at [facility] [place] [facility]    HYSTERECTOMY (CERVIX STATUS UNKNOWN)      Total    LUMBAR NERVE BLOCK Bilateral 2019    LESI L3 #1 performed by Alejandro

## 2024-10-08 ENCOUNTER — HOSPITAL ENCOUNTER (EMERGENCY)
Age: 69
Discharge: HOME | End: 2024-10-08
Attending: EMERGENCY MEDICINE
Payer: COMMERCIAL

## 2024-10-08 VITALS
TEMPERATURE: 98.5 F | WEIGHT: 207 LBS | DIASTOLIC BLOOD PRESSURE: 62 MMHG | BODY MASS INDEX: 41.73 KG/M2 | OXYGEN SATURATION: 97 % | HEIGHT: 59 IN | RESPIRATION RATE: 17 BRPM | SYSTOLIC BLOOD PRESSURE: 124 MMHG | HEART RATE: 66 BPM

## 2024-10-08 DIAGNOSIS — F32.A DEPRESSION, UNSPECIFIED DEPRESSION TYPE: ICD-10-CM

## 2024-10-08 DIAGNOSIS — E11.65 UNCONTROLLED TYPE 2 DIABETES MELLITUS WITH HYPERGLYCEMIA (HCC): Primary | ICD-10-CM

## 2024-10-08 LAB
ALBUMIN SERPL BCG-MCNC: 3.5 G/DL (ref 3.5–5.1)
ALP SERPL-CCNC: 235 U/L (ref 38–126)
ALT SERPL W/O P-5'-P-CCNC: 29 U/L (ref 11–66)
AMORPH SED URNS QL MICRO: ABNORMAL
AMPHETAMINES UR QL SCN: NEGATIVE
ANION GAP SERPL CALC-SCNC: 13 MEQ/L (ref 8–16)
APAP SERPL-MCNC: < 5 UG/ML (ref 0–20)
AST SERPL-CCNC: 22 U/L (ref 5–40)
B-OH-BUTYR SERPL-MSCNC: 4.3 MG/DL (ref 0.2–2.81)
BACTERIA URNS QL MICRO: ABNORMAL /HPF
BARBITURATES UR QL SCN: NEGATIVE
BASOPHILS ABSOLUTE: 0 THOU/MM3 (ref 0–0.1)
BASOPHILS NFR BLD AUTO: 0.3 %
BENZODIAZ UR QL SCN: NEGATIVE
BILIRUB SERPL-MCNC: 0.3 MG/DL (ref 0.3–1.2)
BILIRUB UR QL STRIP.AUTO: NEGATIVE
BUN SERPL-MCNC: 24 MG/DL (ref 7–22)
BZE UR QL SCN: NEGATIVE
CALCIUM SERPL-MCNC: 9.5 MG/DL (ref 8.5–10.5)
CANNABINOIDS UR QL SCN: NEGATIVE
CASTS #/AREA URNS LPF: ABNORMAL /LPF
CASTS 2: ABNORMAL /LPF
CHARACTER UR: CLEAR
CHLORIDE SERPL-SCNC: 98 MEQ/L (ref 98–111)
CO2 SERPL-SCNC: 20 MEQ/L (ref 23–33)
COLOR, UA: YELLOW
CREAT SERPL-MCNC: 1.1 MG/DL (ref 0.4–1.2)
CRYSTALS URNS MICRO: ABNORMAL
DEPRECATED MEAN GLUCOSE BLD GHB EST-ACNC: 402 MG/DL (ref 70–126)
DEPRECATED RDW RBC AUTO: 41.3 FL (ref 35–45)
EKG ATRIAL RATE: 67 BPM
EKG P AXIS: 43 DEGREES
EKG P-R INTERVAL: 170 MS
EKG Q-T INTERVAL: 386 MS
EKG QRS DURATION: 78 MS
EKG QTC CALCULATION (BAZETT): 407 MS
EKG R AXIS: 28 DEGREES
EKG T AXIS: 53 DEGREES
EKG VENTRICULAR RATE: 67 BPM
EOSINOPHIL NFR BLD AUTO: 1.5 %
EOSINOPHILS ABSOLUTE: 0.1 THOU/MM3 (ref 0–0.4)
EPITHELIAL CELLS, UA: ABNORMAL /HPF
ERYTHROCYTE [DISTWIDTH] IN BLOOD BY AUTOMATED COUNT: 13.2 % (ref 11.5–14.5)
FENTANYL: NEGATIVE
GFR SERPL CREATININE-BSD FRML MDRD: 55 ML/MIN/1.73M2
GLUCOSE BLD STRIP.AUTO-MCNC: 344 MG/DL (ref 70–108)
GLUCOSE BLD STRIP.AUTO-MCNC: 431 MG/DL (ref 70–108)
GLUCOSE BLD STRIP.AUTO-MCNC: 451 MG/DL (ref 70–108)
GLUCOSE SERPL-MCNC: 452 MG/DL (ref 70–108)
GLUCOSE UR QL STRIP.AUTO: >= 1000 MG/DL
HBA1C MFR BLD HPLC: 15.4 % (ref 4.4–6.4)
HCT VFR BLD AUTO: 43.1 % (ref 37–47)
HGB BLD-MCNC: 14.7 GM/DL (ref 12–16)
HGB UR QL STRIP.AUTO: NEGATIVE
IMM GRANULOCYTES # BLD AUTO: 0.05 THOU/MM3 (ref 0–0.07)
IMM GRANULOCYTES NFR BLD AUTO: 0.5 %
KETONES UR QL STRIP.AUTO: NEGATIVE
LYMPHOCYTES ABSOLUTE: 2.5 THOU/MM3 (ref 1–4.8)
LYMPHOCYTES NFR BLD AUTO: 26.8 %
MCH RBC QN AUTO: 29.5 PG (ref 26–33)
MCHC RBC AUTO-ENTMCNC: 34.1 GM/DL (ref 32.2–35.5)
MCV RBC AUTO: 86.4 FL (ref 81–99)
MISCELLANEOUS 2: ABNORMAL
MONOCYTES ABSOLUTE: 0.7 THOU/MM3 (ref 0.4–1.3)
MONOCYTES NFR BLD AUTO: 7.1 %
MUCOUS THREADS URNS QL MICRO: ABNORMAL
NEUTROPHILS ABSOLUTE: 5.9 THOU/MM3 (ref 1.8–7.7)
NEUTROPHILS NFR BLD AUTO: 63.8 %
NITRITE UR QL STRIP: NEGATIVE
NRBC BLD AUTO-RTO: 0 /100 WBC
OPIATES UR QL SCN: NEGATIVE
OSMOLALITY SERPL CALC.SUM OF ELEC: 286.3 MOSMOL/KG (ref 275–300)
OXYCODONE: NEGATIVE
PCP UR QL SCN: NEGATIVE
PH UR STRIP.AUTO: 5.5 [PH] (ref 5–9)
PLATELET # BLD AUTO: 196 THOU/MM3 (ref 130–400)
PMV BLD AUTO: 11.6 FL (ref 9.4–12.4)
POTASSIUM SERPL-SCNC: 4.7 MEQ/L (ref 3.5–5.2)
PROT SERPL-MCNC: 7.1 G/DL (ref 6.1–8)
PROT UR STRIP.AUTO-MCNC: NEGATIVE MG/DL
RBC # BLD AUTO: 4.99 MILL/MM3 (ref 4.2–5.4)
RBC URINE: ABNORMAL /HPF
RENAL EPI CELLS #/AREA URNS HPF: ABNORMAL /[HPF]
SALICYLATES SERPL-MCNC: < 0.3 MG/DL (ref 2–10)
SODIUM SERPL-SCNC: 131 MEQ/L (ref 135–145)
SP GR UR REFRACT.AUTO: 1.03 (ref 1–1.03)
UROBILINOGEN, URINE: 0.2 EU/DL (ref 0–1)
WBC # BLD AUTO: 9.2 THOU/MM3 (ref 4.8–10.8)
WBC #/AREA URNS HPF: ABNORMAL /HPF
WBC #/AREA URNS HPF: ABNORMAL /[HPF]
YEAST LIKE FUNGI URNS QL MICRO: ABNORMAL

## 2024-10-08 PROCEDURE — 93005 ELECTROCARDIOGRAM TRACING: CPT | Performed by: EMERGENCY MEDICINE

## 2024-10-08 PROCEDURE — 81001 URINALYSIS AUTO W/SCOPE: CPT

## 2024-10-08 PROCEDURE — 85025 COMPLETE CBC W/AUTO DIFF WBC: CPT

## 2024-10-08 PROCEDURE — 96374 THER/PROPH/DIAG INJ IV PUSH: CPT

## 2024-10-08 PROCEDURE — 82010 KETONE BODYS QUAN: CPT

## 2024-10-08 PROCEDURE — 2580000003 HC RX 258: Performed by: EMERGENCY MEDICINE

## 2024-10-08 PROCEDURE — 82948 REAGENT STRIP/BLOOD GLUCOSE: CPT

## 2024-10-08 PROCEDURE — 36415 COLL VENOUS BLD VENIPUNCTURE: CPT

## 2024-10-08 PROCEDURE — 80179 DRUG ASSAY SALICYLATE: CPT

## 2024-10-08 PROCEDURE — 80053 COMPREHEN METABOLIC PANEL: CPT

## 2024-10-08 PROCEDURE — 80307 DRUG TEST PRSMV CHEM ANLYZR: CPT

## 2024-10-08 PROCEDURE — 96361 HYDRATE IV INFUSION ADD-ON: CPT

## 2024-10-08 PROCEDURE — 80143 DRUG ASSAY ACETAMINOPHEN: CPT

## 2024-10-08 PROCEDURE — 99285 EMERGENCY DEPT VISIT HI MDM: CPT

## 2024-10-08 PROCEDURE — 6370000000 HC RX 637 (ALT 250 FOR IP): Performed by: EMERGENCY MEDICINE

## 2024-10-08 PROCEDURE — 83036 HEMOGLOBIN GLYCOSYLATED A1C: CPT

## 2024-10-08 PROCEDURE — 93010 ELECTROCARDIOGRAM REPORT: CPT | Performed by: INTERNAL MEDICINE

## 2024-10-08 RX ORDER — 0.9 % SODIUM CHLORIDE 0.9 %
1000 INTRAVENOUS SOLUTION INTRAVENOUS ONCE
Status: COMPLETED | OUTPATIENT
Start: 2024-10-08 | End: 2024-10-08

## 2024-10-08 RX ADMIN — SODIUM CHLORIDE 1000 ML: 9 INJECTION, SOLUTION INTRAVENOUS at 14:02

## 2024-10-08 RX ADMIN — Medication 10 UNITS: at 14:02

## 2024-10-08 ASSESSMENT — LIFESTYLE VARIABLES
HOW OFTEN DO YOU HAVE A DRINK CONTAINING ALCOHOL: NEVER
HOW MANY STANDARD DRINKS CONTAINING ALCOHOL DO YOU HAVE ON A TYPICAL DAY: PATIENT DOES NOT DRINK

## 2024-10-08 ASSESSMENT — PATIENT HEALTH QUESTIONNAIRE - PHQ9
SUM OF ALL RESPONSES TO PHQ9 QUESTIONS 1 & 2: 2
1. LITTLE INTEREST OR PLEASURE IN DOING THINGS: SEVERAL DAYS
SUM OF ALL RESPONSES TO PHQ QUESTIONS 1-9: 2

## 2024-10-08 ASSESSMENT — PAIN - FUNCTIONAL ASSESSMENT: PAIN_FUNCTIONAL_ASSESSMENT: NONE - DENIES PAIN

## 2024-10-08 ASSESSMENT — SLEEP AND FATIGUE QUESTIONNAIRES: DO YOU HAVE DIFFICULTY SLEEPING: NO

## 2024-10-08 NOTE — ED PROVIDER NOTES
[facility]  EMERGENCY DEPARTMENT  - TRANSFER OF CARE NOTE    Patient Name: Mumtaz Sanchez  MRN: [id number]  YOB: 1955  Date of Evaluation: 10/8/2024    TRANSFER OF CARE:   Clinician Signing out: Homero Kellogg DO  Receiving Physician: Hans Werner MD  Sign out time: 3:58 PM EDT     Brief history:  Elise Zapien is a 68-year-old female with a history of morbid obesity, diabetes mellitus type 2 on chronic insulin, chronic kidney disease, congestive heart failure, depression.  Comes today complaining of not wanting to live anymore and an elevated blood sugar.  No plan of suicide.  Initial glucose elevated at 452, serum ketones 4.30, not acidotic her bicarb is 20.  She has received a liter of normal saline as well as 10 units of regular insulin with her repeat sugar coming down to 344.    Items pending that need to be checked:  Urine drug screen  Urinalysis  Evaluation by psychiatric social worker    Tentative Impression of patient:  Depression  Uncontrolled diabetes mellitus    Expected disposition of patient:  Admitted    PHYSICAL EXAM:   Physical Exam  Vitals and nursing note reviewed.   Constitutional:       General: She is not in acute distress.     Appearance: Normal appearance. She is obese. She is not ill-appearing, toxic-appearing or diaphoretic.   HENT:      Head: Normocephalic and atraumatic.      Nose: Nose normal.   Eyes:      Conjunctiva/sclera: Conjunctivae normal.   Cardiovascular:      Rate and Rhythm: Normal rate.   Pulmonary:      Effort: Pulmonary effort is normal.   Abdominal:      General: Abdomen is flat.   Skin:     General: Skin is warm and dry.   Neurological:      Mental Status: She is alert and oriented to person, place, and time.         RECENT VITALS:     Temp: 98.5 °F (36.9 °C),  Pulse: 66, Respirations: 17, BP: 124/62, SpO2: 97 %    FORMAL RESULTS:   RADIOLOGY: Interpretation per the Radiologist below, if available at the time of this note 
Esterase, Urine TRACE (*)     All other components within normal limits   HEMOGLOBIN A1C - Abnormal; Notable for the following components:    Hemoglobin A1C 15.4 (*)     Estimated Avg Glucose 402 (*)     All other components within normal limits   POCT GLUCOSE - Abnormal; Notable for the following components:    POC Glucose 451 (*)     All other components within normal limits   POCT GLUCOSE - Abnormal; Notable for the following components:    POC Glucose 431 (*)     All other components within normal limits   POCT GLUCOSE - Abnormal; Notable for the following components:    POC Glucose 344 (*)     All other components within normal limits   CBC WITH AUTO DIFFERENTIAL   URINE DRUG SCREEN   ACETAMINOPHEN LEVEL   ANION GAP   OSMOLALITY       EMERGENCY DEPARTMENT COURSE:   Vitals:    Vitals:    10/08/24 1402 10/08/24 1436 10/08/24 1523 10/08/24 1654   BP: (!) 113/52 (!) 128/55 (!) 154/137 124/62   Pulse: 67 70 70 66   Resp: 12 24 20 17   Temp:       TempSrc:       SpO2: 96% 97% 95% 97%   Weight:       Height:             CRITICAL CARE:       CONSULTS:  None    PROCEDURES:  none    FINAL IMPRESSION      1. Uncontrolled type 2 diabetes mellitus with hyperglycemia (HCC)    2. Depression, unspecified depression type          DISPOSITION/PLAN   Decision To Discharge    PATIENT REFERRED TO:  Sheila Landeros, APRN - NP  [address] [facility] [id number] [phone]    Schedule an appointment as soon as possible for a visit       [facility]  [address] [place] [id number] [phone]  Schedule an appointment as soon as possible for a visit       [facility] EMERGENCY DEPT  [address] [address]  [phone]  Go to   If symptoms worsen      DISCHARGE MEDICATIONS:  Discharge Medication List as of 10/8/2024  5:26 PM          (Please note that portions of this note were completed with a voice recognition program.  Efforts were made to edit the dictations but occasionally

## 2024-10-08 NOTE — PROGRESS NOTES
hyperglycemia. Patient tearful during triage. Patient states her depression has been bad over the past couple weeks. Denies plan. Alert and oriented. Respirations easy and unlabored.\"    Pt reports suicidal thoughts. No plan or intent. Last thought was 2-3 days ago. Pt states at times she just wants \"someone to kill me\". Pt states she is stressed due to her and her  caring for a 1 year old. Pt is a client at [facility] and sees a therapist and Tray Aly. Homicidal thoughts and/or plans denied. Hallucinations denied. No delusions noted. AOD use denied.    Provider Recommendation Information  Level of Care Disposition:    1527  Call from Dr. Kellogg requesting assessment. Consulted with medical provider. Patient is medically stabilized.  1620  Discussed case with Dr. Meyer. Pt to be discharged and follow-up [facility].   1700  Notified Dr. Werner of plan. Will complete safety plan.  1713  Pt tearful, declined safety plan. She wants to go home. Notified Dr. Werner.

## 2024-10-08 NOTE — DISCHARGE INSTRUCTIONS
You were seen today for evaluation of depression.  We also found that your blood sugar was quite high and you received fluids and insulin for this.    Follow-up with your primary care nurse practitioner about control of your blood sugar.    Follow-up with your established mental health team at [facility]    If symptoms are worse or other new concerns please come back to the Emergency Department for re-evaluation.

## 2024-10-08 NOTE — ED TRIAGE NOTES
Presents to ED from [facility] with c/o suicidal ideation, depression, and hyperglycemia. Patient tearful during triage. Patient states her depression has been bad over the past couple weeks. Denies plan. Alert and oriented. Respirations easy and unlabored.

## 2024-12-02 ENCOUNTER — APPOINTMENT (OUTPATIENT)
Dept: GENERAL RADIOLOGY | Age: 69
End: 2024-12-02
Payer: COMMERCIAL

## 2024-12-02 ENCOUNTER — HOSPITAL ENCOUNTER (EMERGENCY)
Age: 69
Discharge: HOME | End: 2024-12-02
Payer: COMMERCIAL

## 2024-12-02 VITALS
OXYGEN SATURATION: 95 % | BODY MASS INDEX: 41.81 KG/M2 | HEART RATE: 58 BPM | RESPIRATION RATE: 16 BRPM | DIASTOLIC BLOOD PRESSURE: 78 MMHG | TEMPERATURE: 97.8 F | WEIGHT: 207 LBS | SYSTOLIC BLOOD PRESSURE: 136 MMHG

## 2024-12-02 DIAGNOSIS — M25.552 BILATERAL HIP PAIN: ICD-10-CM

## 2024-12-02 DIAGNOSIS — M25.551 BILATERAL HIP PAIN: ICD-10-CM

## 2024-12-02 DIAGNOSIS — M54.41 ACUTE RIGHT-SIDED LOW BACK PAIN WITH RIGHT-SIDED SCIATICA: Primary | ICD-10-CM

## 2024-12-02 PROCEDURE — 96372 THER/PROPH/DIAG INJ SC/IM: CPT

## 2024-12-02 PROCEDURE — 99284 EMERGENCY DEPT VISIT MOD MDM: CPT

## 2024-12-02 PROCEDURE — 6360000002 HC RX W HCPCS: Performed by: PHYSICIAN ASSISTANT

## 2024-12-02 PROCEDURE — 6370000000 HC RX 637 (ALT 250 FOR IP): Performed by: PHYSICIAN ASSISTANT

## 2024-12-02 PROCEDURE — 73523 X-RAY EXAM HIPS BI 5/> VIEWS: CPT

## 2024-12-02 RX ORDER — ORPHENADRINE CITRATE 30 MG/ML
60 INJECTION INTRAMUSCULAR; INTRAVENOUS ONCE
Status: COMPLETED | OUTPATIENT
Start: 2024-12-02 | End: 2024-12-02

## 2024-12-02 RX ORDER — HYDROCODONE BITARTRATE AND ACETAMINOPHEN 5; 325 MG/1; MG/1
1 TABLET ORAL ONCE
Status: COMPLETED | OUTPATIENT
Start: 2024-12-02 | End: 2024-12-02

## 2024-12-02 RX ORDER — DEXAMETHASONE SODIUM PHOSPHATE 4 MG/ML
10 INJECTION, SOLUTION INTRA-ARTICULAR; INTRALESIONAL; INTRAMUSCULAR; INTRAVENOUS; SOFT TISSUE ONCE
Status: COMPLETED | OUTPATIENT
Start: 2024-12-02 | End: 2024-12-02

## 2024-12-02 RX ORDER — METHOCARBAMOL 750 MG/1
750 TABLET, FILM COATED ORAL 4 TIMES DAILY
Qty: 40 TABLET | Refills: 0 | Status: SHIPPED | OUTPATIENT
Start: 2024-12-02 | End: 2024-12-12

## 2024-12-02 RX ADMIN — HYDROCODONE BITARTRATE AND ACETAMINOPHEN 1 TABLET: 5; 325 TABLET ORAL at 15:01

## 2024-12-02 RX ADMIN — ORPHENADRINE CITRATE 60 MG: 60 INJECTION INTRAMUSCULAR; INTRAVENOUS at 15:01

## 2024-12-02 RX ADMIN — DEXAMETHASONE SODIUM PHOSPHATE 10 MG: 4 INJECTION, SOLUTION INTRAMUSCULAR; INTRAVENOUS at 15:01

## 2024-12-02 NOTE — ED PROVIDER NOTES
[facility] EMERGENCY DEPT      [facility] Emergency Department Encounter    Pt Name: Mumtaz Sanchez  MRN: [id number]  Birthdate 1955  Date of evaluation: 12/2/2024    PA: Shahbaz HAGER-EM      CHIEF COMPLAINT    Chief Complaint   Patient presents with    Back Pain             HISTORY OF PRESENT ILLNESS       Mumtaz Sanchez is a 69 y.o. female who presents to the emergency dept  with a complaint of back pain.Patient presents to the ED with complaint of back pain.  It is most notably on the right buttock area and SI joint area she states that shoots down the right leg.  She does not have any difficulty moving her legs no numbness or tingling distally.  She additionally has bilateral hip pain.  Apparently she has been lifting her 21 pound grandchild recently because she has temporary custody.  She denies any abdominal pain or urinary retention saddle anesthesia.  He has been afebrile.  She has no abdominal pain.  She has no additional symptoms at this time.  Patient states she does have an appointment with her doctor in a week and a half.  She does not currently take any pain medication or muscle here in the ED.  It did have quite a bit imaging was negative for any acute process.  She does not have any symptoms of acute nerve compression or cauda equina.  She does not have any symptoms of an abscess.  Abdomen is soft and nontender and not believe she has a kidney stone as she has no urinary symptoms nor flank pain.        PAST MEDICAL HISTORY   has a past medical history of Back pain, CAD (coronary artery disease), Chronic diastolic CHF (congestive heart failure) ([facility]), CKD (chronic kidney disease) stage 3, GFR 30-59 ml/min ([facility]), Colitis, Depression, Depression, Gastroesophageal reflux, GERD (gastroesophageal reflux disease), H/O endoscopy, Hyperlipidemia,

## 2024-12-02 NOTE — ED TRIAGE NOTES
Pt to er. Pt c/o chronic low back pain. States took tylenol and motrin at home.Denies any new injury.

## 2025-01-26 ENCOUNTER — APPOINTMENT (OUTPATIENT)
Dept: GENERAL RADIOLOGY | Age: 70
End: 2025-01-26
Payer: COMMERCIAL

## 2025-01-26 ENCOUNTER — HOSPITAL ENCOUNTER (EMERGENCY)
Age: 70
Discharge: HOME | End: 2025-01-26
Attending: EMERGENCY MEDICINE
Payer: COMMERCIAL

## 2025-01-26 VITALS
SYSTOLIC BLOOD PRESSURE: 149 MMHG | OXYGEN SATURATION: 94 % | HEIGHT: 59 IN | BODY MASS INDEX: 42.74 KG/M2 | RESPIRATION RATE: 16 BRPM | TEMPERATURE: 97.5 F | WEIGHT: 212 LBS | DIASTOLIC BLOOD PRESSURE: 59 MMHG | HEART RATE: 106 BPM

## 2025-01-26 DIAGNOSIS — U07.1 COVID-19: Primary | ICD-10-CM

## 2025-01-26 DIAGNOSIS — N30.00 ACUTE CYSTITIS WITHOUT HEMATURIA: ICD-10-CM

## 2025-01-26 LAB
ALBUMIN SERPL BCG-MCNC: 3.2 G/DL (ref 3.5–5.1)
ALP SERPL-CCNC: 138 U/L (ref 38–126)
ALT SERPL W/O P-5'-P-CCNC: 19 U/L (ref 11–66)
ANION GAP SERPL CALC-SCNC: 13 MEQ/L (ref 8–16)
AST SERPL-CCNC: 14 U/L (ref 5–40)
BACTERIA URNS QL MICRO: ABNORMAL /HPF
BASOPHILS ABSOLUTE: 0 THOU/MM3 (ref 0–0.1)
BASOPHILS NFR BLD AUTO: 0.2 %
BILIRUB SERPL-MCNC: 0.4 MG/DL (ref 0.3–1.2)
BILIRUB UR QL STRIP.AUTO: NEGATIVE
BUN SERPL-MCNC: 12 MG/DL (ref 7–22)
CALCIUM SERPL-MCNC: 8.6 MG/DL (ref 8.5–10.5)
CASTS #/AREA URNS LPF: ABNORMAL /LPF
CHARACTER UR: ABNORMAL
CHLORIDE SERPL-SCNC: 99 MEQ/L (ref 98–111)
CO2 SERPL-SCNC: 24 MEQ/L (ref 23–33)
COLOR, UA: ABNORMAL
CREAT SERPL-MCNC: 1.1 MG/DL (ref 0.4–1.2)
CRYSTALS URNS MICRO: ABNORMAL
DEPRECATED RDW RBC AUTO: 44.7 FL (ref 35–45)
EOSINOPHIL NFR BLD AUTO: 0.1 %
EOSINOPHILS ABSOLUTE: 0 THOU/MM3 (ref 0–0.4)
EPITHELIAL CELLS, UA: ABNORMAL /HPF
ERYTHROCYTE [DISTWIDTH] IN BLOOD BY AUTOMATED COUNT: 14 % (ref 11.5–14.5)
FLUAV RNA RESP QL NAA+PROBE: NOT DETECTED
FLUBV RNA RESP QL NAA+PROBE: NOT DETECTED
GFR SERPL CREATININE-BSD FRML MDRD: 55 ML/MIN/1.73M2
GLUCOSE SERPL-MCNC: 249 MG/DL (ref 70–108)
GLUCOSE UR QL STRIP.AUTO: >= 1000 MG/DL
HCT VFR BLD AUTO: 40.6 % (ref 37–47)
HGB BLD-MCNC: 13.4 GM/DL (ref 12–16)
HGB UR QL STRIP.AUTO: ABNORMAL
IMM GRANULOCYTES # BLD AUTO: 0.12 THOU/MM3 (ref 0–0.07)
IMM GRANULOCYTES NFR BLD AUTO: 0.6 %
KETONES UR QL STRIP.AUTO: ABNORMAL
LIPASE SERPL-CCNC: 26.5 U/L (ref 5.6–51.3)
LYMPHOCYTES ABSOLUTE: 2.6 THOU/MM3 (ref 1–4.8)
LYMPHOCYTES NFR BLD AUTO: 13.7 %
MAGNESIUM SERPL-MCNC: 1.6 MG/DL (ref 1.6–2.4)
MCH RBC QN AUTO: 28.8 PG (ref 26–33)
MCHC RBC AUTO-ENTMCNC: 33 GM/DL (ref 32.2–35.5)
MCV RBC AUTO: 87.3 FL (ref 81–99)
MISCELLANEOUS 2: ABNORMAL
MONOCYTES ABSOLUTE: 1.6 THOU/MM3 (ref 0.4–1.3)
MONOCYTES NFR BLD AUTO: 8.4 %
NEUTROPHILS ABSOLUTE: 14.4 THOU/MM3 (ref 1.8–7.7)
NEUTROPHILS NFR BLD AUTO: 77 %
NITRITE UR QL STRIP: NEGATIVE
NRBC BLD AUTO-RTO: 0 /100 WBC
OSMOLALITY SERPL CALC.SUM OF ELEC: 280.1 MOSMOL/KG (ref 275–300)
PH UR STRIP.AUTO: 5.5 [PH] (ref 5–9)
PLATELET # BLD AUTO: 197 THOU/MM3 (ref 130–400)
PMV BLD AUTO: 10.8 FL (ref 9.4–12.4)
POTASSIUM SERPL-SCNC: 3.9 MEQ/L (ref 3.5–5.2)
PROT SERPL-MCNC: 7 G/DL (ref 6.1–8)
PROT UR STRIP.AUTO-MCNC: 100 MG/DL
RBC # BLD AUTO: 4.65 MILL/MM3 (ref 4.2–5.4)
RBC URINE: ABNORMAL /HPF
RENAL EPI CELLS #/AREA URNS HPF: ABNORMAL /[HPF]
SARS-COV-2 RNA RESP QL NAA+PROBE: DETECTED
SODIUM SERPL-SCNC: 136 MEQ/L (ref 135–145)
SP GR UR REFRACT.AUTO: 1.03 (ref 1–1.03)
T4 FREE SERPL-MCNC: 1.52 NG/DL (ref 0.93–1.68)
TSH SERPL DL<=0.005 MIU/L-ACNC: 2.44 UIU/ML (ref 0.4–4.2)
UROBILINOGEN, URINE: 0.2 EU/DL (ref 0–1)
WBC # BLD AUTO: 18.7 THOU/MM3 (ref 4.8–10.8)
WBC #/AREA URNS HPF: > 100 /HPF
WBC #/AREA URNS HPF: ABNORMAL /[HPF]
YEAST LIKE FUNGI URNS QL MICRO: ABNORMAL

## 2025-01-26 PROCEDURE — 87086 URINE CULTURE/COLONY COUNT: CPT

## 2025-01-26 PROCEDURE — 80053 COMPREHEN METABOLIC PANEL: CPT

## 2025-01-26 PROCEDURE — 87636 SARSCOV2 & INF A&B AMP PRB: CPT

## 2025-01-26 PROCEDURE — 83690 ASSAY OF LIPASE: CPT

## 2025-01-26 PROCEDURE — 83735 ASSAY OF MAGNESIUM: CPT

## 2025-01-26 PROCEDURE — 84439 ASSAY OF FREE THYROXINE: CPT

## 2025-01-26 PROCEDURE — 71046 X-RAY EXAM CHEST 2 VIEWS: CPT

## 2025-01-26 PROCEDURE — 2580000003 HC RX 258: Performed by: STUDENT IN AN ORGANIZED HEALTH CARE EDUCATION/TRAINING PROGRAM

## 2025-01-26 PROCEDURE — 99284 EMERGENCY DEPT VISIT MOD MDM: CPT

## 2025-01-26 PROCEDURE — 6370000000 HC RX 637 (ALT 250 FOR IP)

## 2025-01-26 PROCEDURE — 85025 COMPLETE CBC W/AUTO DIFF WBC: CPT

## 2025-01-26 PROCEDURE — 81001 URINALYSIS AUTO W/SCOPE: CPT

## 2025-01-26 PROCEDURE — 36415 COLL VENOUS BLD VENIPUNCTURE: CPT

## 2025-01-26 PROCEDURE — 84443 ASSAY THYROID STIM HORMONE: CPT

## 2025-01-26 RX ORDER — ONDANSETRON 2 MG/ML
4 INJECTION INTRAMUSCULAR; INTRAVENOUS ONCE
Status: DISCONTINUED | OUTPATIENT
Start: 2025-01-26 | End: 2025-01-26

## 2025-01-26 RX ORDER — ONDANSETRON 4 MG/1
4 TABLET, ORALLY DISINTEGRATING ORAL ONCE
Status: COMPLETED | OUTPATIENT
Start: 2025-01-26 | End: 2025-01-26

## 2025-01-26 RX ORDER — 0.9 % SODIUM CHLORIDE 0.9 %
500 INTRAVENOUS SOLUTION INTRAVENOUS ONCE
Status: COMPLETED | OUTPATIENT
Start: 2025-01-26 | End: 2025-01-26

## 2025-01-26 RX ORDER — ONDANSETRON 4 MG/1
4 TABLET, ORALLY DISINTEGRATING ORAL 3 TIMES DAILY PRN
Qty: 21 TABLET | Refills: 0 | Status: SHIPPED | OUTPATIENT
Start: 2025-01-26

## 2025-01-26 RX ADMIN — ONDANSETRON 4 MG: 4 TABLET, ORALLY DISINTEGRATING ORAL at 11:03

## 2025-01-26 RX ADMIN — CEPHALEXIN 500 MG: 250 CAPSULE ORAL at 12:37

## 2025-01-26 RX ADMIN — SODIUM CHLORIDE 500 ML: 9 INJECTION, SOLUTION INTRAVENOUS at 11:31

## 2025-01-26 ASSESSMENT — PAIN - FUNCTIONAL ASSESSMENT: PAIN_FUNCTIONAL_ASSESSMENT: NONE - DENIES PAIN

## 2025-01-26 NOTE — DISCHARGE INSTRUCTIONS
Call and schedule an appointment with your PCP on 1/27  Take your medications as prescribed.   Stay hydrated and drink plenty of fluids  Return to the ER if new symptoms develop or symptoms worsen

## 2025-01-26 NOTE — ED PROVIDER NOTES
[facility] EMERGENCY DEPARTMENT  EMERGENCY DEPARTMENT ENCOUNTER          Pt Name: Mumtaz Sanchez  MRN: [id number]  Birthdate 1955  Date of evaluation: 1/26/2025  Physician: Guadalupe Gorman DPM  Supervising Attending Physician: Fransisco Munoz DO       CHIEF COMPLAINT       Chief Complaint   Patient presents with    Nausea         HISTORY OF PRESENT ILLNESS    The history is provided by the patient.     Mumtaz Sanchez is a 69 y.o. female who presents to the emergency department from home, as a walk in to the ED lobby for evaluation of nausea and vomiting. States she started feeling nauseous and vomiting last night. Has no associated abdominal pain. States she has been feeling fatigued and has had some nasal congestion and a sore throat for a few days. Denies SOB, chest pain, headache, or dizziness. States she took some cold medicine and she didn't feel that it helped her much. Denies taking any medication for nausea. Denies any blood in her vomit, her urine, or her BM. Denies any diarrhea. States she was around her grand child who had a sore throat. Denies recent travel. Denies any pain to her arms or legs.   The patient has no other acute complaints at this time.      REVIEW OF SYSTEMS   Review of Systems   Constitutional:  Positive for fatigue.   HENT:  Positive for congestion.    Respiratory:  Positive for cough. Negative for shortness of breath and wheezing.    Cardiovascular:  Negative for chest pain, palpitations and leg swelling.   Gastrointestinal:  Positive for nausea and vomiting. Negative for abdominal pain and diarrhea.   Genitourinary:  Negative for difficulty urinating, dysuria, flank pain and hematuria.   Musculoskeletal:  Negative for neck pain.         PAST MEDICAL AND SURGICAL HISTORY     Past Medical History:   Diagnosis Date    Back pain     with radiation    CAD (coronary artery disease)     Chronic diastolic CHF (congestive heart failure) (HCC) 07/21/2017

## 2025-01-26 NOTE — ED TRIAGE NOTES
Pt presents to ED with complaints of nausea and vomiting. Pt states this began yesterday. Pt states she has vomited 3x since last night.

## 2025-01-27 LAB
BACTERIA UR CULT: ABNORMAL
ORGANISM: ABNORMAL

## 2025-05-12 ENCOUNTER — TRANSCRIBE ORDERS (OUTPATIENT)
Dept: ADMINISTRATIVE | Age: 70
End: 2025-05-12

## 2025-05-12 DIAGNOSIS — R06.00 DYSPNEA, UNSPECIFIED TYPE: Primary | ICD-10-CM

## 2025-05-17 ENCOUNTER — APPOINTMENT (OUTPATIENT)
Dept: GENERAL RADIOLOGY | Age: 70
End: 2025-05-17
Payer: COMMERCIAL

## 2025-05-17 ENCOUNTER — HOSPITAL ENCOUNTER (EMERGENCY)
Age: 70
Discharge: LEFT BEFORE DISCHARGE | End: 2025-05-17
Attending: EMERGENCY MEDICINE
Payer: COMMERCIAL

## 2025-05-17 ENCOUNTER — APPOINTMENT (OUTPATIENT)
Dept: CT IMAGING | Age: 70
End: 2025-05-17
Payer: COMMERCIAL

## 2025-05-17 VITALS
BODY MASS INDEX: 44.76 KG/M2 | WEIGHT: 222 LBS | OXYGEN SATURATION: 96 % | TEMPERATURE: 98 F | HEIGHT: 59 IN | SYSTOLIC BLOOD PRESSURE: 119 MMHG | DIASTOLIC BLOOD PRESSURE: 51 MMHG | HEART RATE: 68 BPM | RESPIRATION RATE: 20 BRPM

## 2025-05-17 DIAGNOSIS — R07.9 CHEST PAIN, UNSPECIFIED TYPE: Primary | ICD-10-CM

## 2025-05-17 LAB
ALBUMIN SERPL BCG-MCNC: 3.4 G/DL (ref 3.4–4.9)
ALP SERPL-CCNC: 100 U/L (ref 38–126)
ALT SERPL W/O P-5'-P-CCNC: 17 U/L (ref 10–35)
ANION GAP SERPL CALC-SCNC: 12 MEQ/L (ref 8–16)
AST SERPL-CCNC: 20 U/L (ref 10–35)
BASOPHILS ABSOLUTE: 0 THOU/MM3 (ref 0–0.1)
BASOPHILS NFR BLD AUTO: 0.4 %
BILIRUB CONJ SERPL-MCNC: 0.2 MG/DL (ref 0–0.2)
BILIRUB SERPL-MCNC: 0.3 MG/DL (ref 0.3–1.2)
BUN SERPL-MCNC: 22 MG/DL (ref 8–23)
CALCIUM SERPL-MCNC: 9 MG/DL (ref 8.8–10.2)
CHLORIDE SERPL-SCNC: 104 MEQ/L (ref 98–111)
CO2 SERPL-SCNC: 23 MEQ/L (ref 22–29)
CREAT SERPL-MCNC: 1.3 MG/DL (ref 0.5–0.9)
D DIMER PPP IA.FEU-MCNC: 1106 NG/ML FEU (ref 0–500)
DEPRECATED RDW RBC AUTO: 48.5 FL (ref 35–45)
EOSINOPHIL NFR BLD AUTO: 2.1 %
EOSINOPHILS ABSOLUTE: 0.2 THOU/MM3 (ref 0–0.4)
ERYTHROCYTE [DISTWIDTH] IN BLOOD BY AUTOMATED COUNT: 15.1 % (ref 11.5–14.5)
GFR SERPL CREATININE-BSD FRML MDRD: 45 ML/MIN/1.73M2
GLUCOSE SERPL-MCNC: 137 MG/DL (ref 74–109)
HCT VFR BLD AUTO: 42 % (ref 37–47)
HGB BLD-MCNC: 13.6 GM/DL (ref 12–16)
IMM GRANULOCYTES # BLD AUTO: 0.02 THOU/MM3 (ref 0–0.07)
IMM GRANULOCYTES NFR BLD AUTO: 0.2 %
LIPASE SERPL-CCNC: 88 U/L (ref 13–60)
LYMPHOCYTES ABSOLUTE: 2.7 THOU/MM3 (ref 1–4.8)
LYMPHOCYTES NFR BLD AUTO: 33.1 %
MCH RBC QN AUTO: 28.2 PG (ref 26–33)
MCHC RBC AUTO-ENTMCNC: 32.4 GM/DL (ref 32.2–35.5)
MCV RBC AUTO: 87.1 FL (ref 81–99)
MONOCYTES ABSOLUTE: 0.7 THOU/MM3 (ref 0.4–1.3)
MONOCYTES NFR BLD AUTO: 8.3 %
NEUTROPHILS ABSOLUTE: 4.6 THOU/MM3 (ref 1.8–7.7)
NEUTROPHILS NFR BLD AUTO: 55.9 %
NRBC BLD AUTO-RTO: 0 /100 WBC
NT-PROBNP SERPL IA-MCNC: 123 PG/ML (ref 0–124)
OSMOLALITY SERPL CALC.SUM OF ELEC: 283 MOSMOL/KG (ref 275–300)
PLATELET # BLD AUTO: 214 THOU/MM3 (ref 130–400)
PMV BLD AUTO: 11.3 FL (ref 9.4–12.4)
POTASSIUM SERPL-SCNC: 4.2 MEQ/L (ref 3.5–5.2)
PROT SERPL-MCNC: 6.5 G/DL (ref 6.4–8.3)
RBC # BLD AUTO: 4.82 MILL/MM3 (ref 4.2–5.4)
SODIUM SERPL-SCNC: 139 MEQ/L (ref 135–145)
TROPONIN, HIGH SENSITIVITY: 14 NG/L (ref 0–12)
WBC # BLD AUTO: 8.2 THOU/MM3 (ref 4.8–10.8)

## 2025-05-17 PROCEDURE — 99285 EMERGENCY DEPT VISIT HI MDM: CPT

## 2025-05-17 PROCEDURE — 71045 X-RAY EXAM CHEST 1 VIEW: CPT

## 2025-05-17 PROCEDURE — 82248 BILIRUBIN DIRECT: CPT

## 2025-05-17 PROCEDURE — 84484 ASSAY OF TROPONIN QUANT: CPT

## 2025-05-17 PROCEDURE — 85025 COMPLETE CBC W/AUTO DIFF WBC: CPT

## 2025-05-17 PROCEDURE — 85379 FIBRIN DEGRADATION QUANT: CPT

## 2025-05-17 PROCEDURE — 6360000002 HC RX W HCPCS: Performed by: EMERGENCY MEDICINE

## 2025-05-17 PROCEDURE — 83690 ASSAY OF LIPASE: CPT

## 2025-05-17 PROCEDURE — 80053 COMPREHEN METABOLIC PANEL: CPT

## 2025-05-17 PROCEDURE — 83880 ASSAY OF NATRIURETIC PEPTIDE: CPT

## 2025-05-17 PROCEDURE — 93005 ELECTROCARDIOGRAM TRACING: CPT | Performed by: EMERGENCY MEDICINE

## 2025-05-17 PROCEDURE — 6370000000 HC RX 637 (ALT 250 FOR IP): Performed by: EMERGENCY MEDICINE

## 2025-05-17 PROCEDURE — 96374 THER/PROPH/DIAG INJ IV PUSH: CPT

## 2025-05-17 PROCEDURE — 36415 COLL VENOUS BLD VENIPUNCTURE: CPT

## 2025-05-17 RX ORDER — 0.9 % SODIUM CHLORIDE 0.9 %
500 INTRAVENOUS SOLUTION INTRAVENOUS ONCE
Status: DISCONTINUED | OUTPATIENT
Start: 2025-05-17 | End: 2025-05-17 | Stop reason: HOSPADM

## 2025-05-17 RX ORDER — MORPHINE SULFATE 2 MG/ML
2 INJECTION, SOLUTION INTRAMUSCULAR; INTRAVENOUS ONCE
Refills: 0 | Status: COMPLETED | OUTPATIENT
Start: 2025-05-17 | End: 2025-05-17

## 2025-05-17 RX ORDER — ASPIRIN 81 MG/1
324 TABLET, CHEWABLE ORAL ONCE
Status: COMPLETED | OUTPATIENT
Start: 2025-05-17 | End: 2025-05-17

## 2025-05-17 RX ADMIN — ASPIRIN 324 MG: 81 TABLET, CHEWABLE ORAL at 11:46

## 2025-05-17 RX ADMIN — MORPHINE SULFATE 2 MG: 2 INJECTION, SOLUTION INTRAMUSCULAR; INTRAVENOUS at 11:45

## 2025-05-17 ASSESSMENT — PAIN DESCRIPTION - ORIENTATION: ORIENTATION: MID

## 2025-05-17 ASSESSMENT — PAIN DESCRIPTION - LOCATION: LOCATION: CHEST

## 2025-05-17 ASSESSMENT — PAIN SCALES - GENERAL
PAINLEVEL_OUTOF10: 7
PAINLEVEL_OUTOF10: 7

## 2025-05-17 ASSESSMENT — PAIN - FUNCTIONAL ASSESSMENT: PAIN_FUNCTIONAL_ASSESSMENT: 0-10

## 2025-05-17 NOTE — ED PROVIDER NOTES
[facility] EMERGENCY SERVICES ENCOUNTER        PATIENT NAME: Mumtaz Sanchez  MRN: [id number]  : 1955  QUINTANILLA: 2025  PROVIDER: Jay Rosenberg MD    Patient was seen and evaluated at 11:20 AM EDT. Nurses Notes are reviewed and I agree except as noted in the HPI.  Chief Complaint   Patient presents with    Chest Pain    Shortness of Breath     HISTORY OF PRESENT ILLNESS     A 69-yo woman with PMH of obesity, chronic diastolic CHF, CAD (no prior PCI), CKD, depression, anxiety, GERD, HLD, HTN, and hypothyroidism presents with gradual onset chest pain since yesterday evening.  Pain has been persistent, from left chest, nonradiating.  Associated with some mild SOB worse on exertion.  She had similar pain before but it was not so severe.  She denies fever or chills.  No nausea, vomiting, or diarrhea.  No urinary symptoms.  She said her legs seem more swollen than before since yesterday.  She denies history of DVT or PE.  She is scheduled for a stress test on 2025.     PAST MEDICAL HISTORY    has a past medical history of Back pain, CAD (coronary artery disease), Chronic diastolic CHF (congestive heart failure) (HCC), CKD (chronic kidney disease) stage 3, GFR 30-59 ml/min (HCC), Colitis, Depression, Depression, Gastroesophageal reflux, GERD (gastroesophageal reflux disease), H/O endoscopy, Hyperlipidemia, Hypertension, Hypertension, Hypothyroidism, Incontinence of urine, Lordosis (acquired) (postural), Spondylosis of unspecified site without mention of myelopathy, Thoracic or lumbosacral neuritis or radiculitis, unspecified, Type II or unspecified type diabetes mellitus without mention of complication, not stated as uncontrolled, Unspecified sleep apnea, and Urinary incontinence.    SURGICAL HISTORY      has a past surgical history that includes  section (); Carpal tunnel release (Bilateral, ); Abdomen surgery (); Dilation and curettage of uterus;  cardiovascular stress test (6/2015); Colonoscopy (2010, 2016); Upper gastrointestinal endoscopy (2016); Cardiac catheterization (2014); other surgical history (Bilateral, 03/19/2018); pr njx dx/ther agt pvrt facet jt lmbr/sac 2nd level (Bilateral, 3/19/2018); Nerve Surgery (Bilateral, 05/08/2018); pr njx dx/ther agt pvrt facet jt lmbr/sac 2nd level (Bilateral, 5/8/2018); pr dstrj neurolytic agent other peripheral nerve (Bilateral, 7/2/2018); pr office/outpt visit,procedure only (Right, 10/2/2018); Injection Procedure For Sacroiliac Joint (Right, 12/7/2018); Lumbar spine surgery (Bilateral, 6/27/2019); lumbar nerve block (Bilateral, 8/22/2019); Nerve Surgery (Bilateral, 10/4/2019); Pain management procedure (Bilateral, 1/20/2020); Pain management procedure (Left, 8/20/2020); Ovary removal (2002); and Hysterectomy (2002).    CURRENT MEDICATIONS       Discharge Medication List as of 5/17/2025  2:15 PM        CONTINUE these medications which have NOT CHANGED    Details   cephALEXin (KEFLEX) 250 MG capsule Take 2 capsules by mouth 2 times daily, Disp-28 capsule, R-0Normal      ondansetron (ZOFRAN-ODT) 4 MG disintegrating tablet Take 1 tablet by mouth 3 times daily as needed for Nausea or Vomiting, Disp-21 tablet, R-0Normal      busPIRone (BUSPAR) 15 MG tablet Take 15 mg by mouth in the morning and at bedtimeHistorical Med      insulin aspart (NOVOLOG) 100 UNIT/ML injection vial Inject 0-4 Units into the skin 3 times daily (before meals)Historical Med      brexpiprazole (REXULTI) 2 MG TABS tablet Take 1 tablet by mouth nightlyHistorical Med      prazosin (MINIPRESS) 1 MG capsule Take 1 capsule by mouth nightlyHistorical Med      doxepin (SINEQUAN) 50 MG capsule Take 1 capsule by mouth nightlyHistorical Med      lisinopril (PRINIVIL;ZESTRIL) 5 MG tablet Take 1 tablet by mouth daily, Disp-90 tablet, R-4Normal      TRINTELLIX 20 MG TABS tablet daily, DAWHistorical Med      levothyroxine (SYNTHROID) 150 MCG tablet Take 112

## 2025-05-17 NOTE — ED TRIAGE NOTES
Pt to ED through the lobby, wheelchair to room 21 c/c chest pain and SOB. Pt reports she has been SOB for 6 weeks and the chest pain started yesterday. Currently rates pain 7/10. EKG completed. IV access established. Labs collected. Dr Rosenberg at bedside for pt assessment.

## 2025-05-18 LAB
EKG ATRIAL RATE: 69 BPM
EKG P AXIS: 49 DEGREES
EKG P-R INTERVAL: 180 MS
EKG Q-T INTERVAL: 404 MS
EKG QRS DURATION: 74 MS
EKG QTC CALCULATION (BAZETT): 432 MS
EKG R AXIS: 36 DEGREES
EKG T AXIS: 60 DEGREES
EKG VENTRICULAR RATE: 69 BPM

## 2025-05-18 PROCEDURE — 93010 ELECTROCARDIOGRAM REPORT: CPT | Performed by: INTERNAL MEDICINE

## 2025-06-02 ENCOUNTER — HOSPITAL ENCOUNTER (OUTPATIENT)
Dept: NUCLEAR MEDICINE | Age: 70
Discharge: HOME | End: 2025-06-02
Payer: COMMERCIAL

## 2025-06-02 ENCOUNTER — HOSPITAL ENCOUNTER (OUTPATIENT)
Age: 70
Discharge: HOME | End: 2025-06-04
Payer: COMMERCIAL

## 2025-06-02 DIAGNOSIS — R06.00 DYSPNEA, UNSPECIFIED TYPE: ICD-10-CM

## 2025-06-02 LAB
NUC STRESS EJECTION FRACTION: 77 %
STRESS BASELINE DIAS BP: 48 MMHG
STRESS BASELINE HR: 72 BPM
STRESS BASELINE ST DEPRESSION: 0 MM
STRESS BASELINE SYS BP: 124 MMHG
STRESS ESTIMATED WORKLOAD: 1 METS
STRESS PEAK DIAS BP: 47 MMHG
STRESS PEAK SYS BP: 142 MMHG
STRESS PERCENT HR ACHIEVED: 67 %
STRESS POST PEAK HR: 102 BPM
STRESS RATE PRESSURE PRODUCT: NORMAL BPM*MMHG
STRESS ST DEPRESSION: 0 MM
STRESS STAGE 1 BP: NORMAL MMHG
STRESS STAGE 1 DURATION: 1 MIN:SEC
STRESS STAGE 1 HR: 99 BPM
STRESS STAGE 2 BP: NORMAL MMHG
STRESS STAGE 2 DURATION: 1 MIN:SEC
STRESS STAGE 2 HR: 102 BPM
STRESS STAGE 3 BP: NORMAL MMHG
STRESS STAGE 3 DURATION: 1 MIN:SEC
STRESS STAGE 3 HR: 99 BPM
STRESS STAGE 5 COMMENTS: NORMAL
STRESS STAGE RECOVERY 1 BP: NORMAL MMHG
STRESS STAGE RECOVERY 1 DURATION: 1 MIN:SEC
STRESS STAGE RECOVERY 1 HR: 97 BPM
STRESS STAGE RECOVERY 2 BP: NORMAL MMHG
STRESS STAGE RECOVERY 2 DURATION: 1 MIN:SEC
STRESS STAGE RECOVERY 2 HR: 97 BPM
STRESS STAGE RECOVERY 3 BP: NORMAL MMHG
STRESS STAGE RECOVERY 3 DURATION: 1 MIN:SEC
STRESS STAGE RECOVERY 3 HR: 95 BPM
STRESS STAGE RECOVERY 4 BP: NORMAL MMHG
STRESS STAGE RECOVERY 4 DURATION: 2 MIN:SEC
STRESS STAGE RECOVERY 4 HR: 92 BPM
STRESS TARGET HR: 153 BPM
TID: 1.09

## 2025-06-02 PROCEDURE — 93018 CV STRESS TEST I&R ONLY: CPT | Performed by: INTERNAL MEDICINE

## 2025-06-02 PROCEDURE — 3430000000 HC RX DIAGNOSTIC RADIOPHARMACEUTICAL

## 2025-06-02 PROCEDURE — 93017 CV STRESS TEST TRACING ONLY: CPT

## 2025-06-02 PROCEDURE — 78452 HT MUSCLE IMAGE SPECT MULT: CPT

## 2025-06-02 PROCEDURE — 6360000002 HC RX W HCPCS

## 2025-06-02 PROCEDURE — 78452 HT MUSCLE IMAGE SPECT MULT: CPT | Performed by: INTERNAL MEDICINE

## 2025-06-02 PROCEDURE — A9500 TC99M SESTAMIBI: HCPCS

## 2025-06-02 PROCEDURE — 93016 CV STRESS TEST SUPVJ ONLY: CPT | Performed by: INTERNAL MEDICINE

## 2025-06-02 RX ORDER — TETRAKIS(2-METHOXYISOBUTYLISOCYANIDE)COPPER(I) TETRAFLUOROBORATE 1 MG/ML
30.6 INJECTION, POWDER, LYOPHILIZED, FOR SOLUTION INTRAVENOUS
Status: COMPLETED | OUTPATIENT
Start: 2025-06-02 | End: 2025-06-02

## 2025-06-02 RX ORDER — TETRAKIS(2-METHOXYISOBUTYLISOCYANIDE)COPPER(I) TETRAFLUOROBORATE 1 MG/ML
8.8 INJECTION, POWDER, LYOPHILIZED, FOR SOLUTION INTRAVENOUS
Status: COMPLETED | OUTPATIENT
Start: 2025-06-02 | End: 2025-06-02

## 2025-06-02 RX ORDER — REGADENOSON 0.08 MG/ML
0.4 INJECTION, SOLUTION INTRAVENOUS
Status: COMPLETED | OUTPATIENT
Start: 2025-06-02 | End: 2025-06-02

## 2025-06-02 RX ADMIN — Medication 8.8 MILLICURIE: at 13:20

## 2025-06-02 RX ADMIN — Medication 30.6 MILLICURIE: at 14:30

## 2025-06-02 RX ADMIN — REGADENOSON 0.4 MG: 0.08 INJECTION, SOLUTION INTRAVENOUS at 14:24

## 2025-06-06 LAB
ALBUMIN: 3.9 G/DL
BILIRUBIN, URINE: NEGATIVE
BLOOD, URINE: POSITIVE
BUN / CREAT RATIO: 14
BUN BLDV-MCNC: 18 MG/DL
CALCIUM SERPL-MCNC: 9 MG/DL
CHLORIDE BLD-SCNC: 102 MMOL/L
CLARITY, UA: CLEAR
CO2: 20 MMOL/L
COLOR, UA: YELLOW
CREAT SERPL-MCNC: 1.31 MG/DL
CREATININE URINE: 90.3 MG/DL
GFR, ESTIMATED: 45
GLUCOSE URINE: NORMAL
GLUCOSE: 125
KETONES, URINE: NEGATIVE
LEUKOCYTE ESTERASE, URINE: NORMAL
MICROALBUMIN/CREAT 24H UR: 13.5 MG/DL
MICROALBUMIN/CREAT UR-RTO: 15 MG/G
NITRITE, URINE: NEGATIVE
PH UA: 5.5 (ref 4.5–8)
PHOSPHORUS: 4.3 MG/DL
POTASSIUM SERPL-SCNC: 4.5 MMOL/L
PROTEIN UA: NEGATIVE
SODIUM BLD-SCNC: 140 MMOL/L
SPECIFIC GRAVITY UA: 1.03 (ref 1–1.03)
UROBILINOGEN, URINE: NORMAL

## 2025-06-12 ENCOUNTER — OFFICE VISIT (OUTPATIENT)
Dept: NEPHROLOGY | Age: 70
End: 2025-06-12
Payer: COMMERCIAL

## 2025-06-12 VITALS
WEIGHT: 223 LBS | OXYGEN SATURATION: 96 % | BODY MASS INDEX: 45.04 KG/M2 | HEART RATE: 89 BPM | DIASTOLIC BLOOD PRESSURE: 56 MMHG | SYSTOLIC BLOOD PRESSURE: 122 MMHG

## 2025-06-12 DIAGNOSIS — I10 ESSENTIAL HYPERTENSION: Primary | ICD-10-CM

## 2025-06-12 DIAGNOSIS — N18.31 STAGE 3A CHRONIC KIDNEY DISEASE (HCC): ICD-10-CM

## 2025-06-12 PROCEDURE — G2211 COMPLEX E/M VISIT ADD ON: HCPCS | Performed by: INTERNAL MEDICINE

## 2025-06-12 PROCEDURE — 99213 OFFICE O/P EST LOW 20 MIN: CPT | Performed by: INTERNAL MEDICINE

## 2025-06-12 PROCEDURE — 1124F ACP DISCUSS-NO DSCNMKR DOCD: CPT | Performed by: INTERNAL MEDICINE

## 2025-06-12 PROCEDURE — 1036F TOBACCO NON-USER: CPT | Performed by: INTERNAL MEDICINE

## 2025-06-12 PROCEDURE — G8427 DOCREV CUR MEDS BY ELIG CLIN: HCPCS | Performed by: INTERNAL MEDICINE

## 2025-06-12 PROCEDURE — G8399 PT W/DXA RESULTS DOCUMENT: HCPCS | Performed by: INTERNAL MEDICINE

## 2025-06-12 PROCEDURE — 1090F PRES/ABSN URINE INCON ASSESS: CPT | Performed by: INTERNAL MEDICINE

## 2025-06-12 PROCEDURE — G8417 CALC BMI ABV UP PARAM F/U: HCPCS | Performed by: INTERNAL MEDICINE

## 2025-06-12 PROCEDURE — 3078F DIAST BP <80 MM HG: CPT | Performed by: INTERNAL MEDICINE

## 2025-06-12 PROCEDURE — 3074F SYST BP LT 130 MM HG: CPT | Performed by: INTERNAL MEDICINE

## 2025-06-12 PROCEDURE — 3017F COLORECTAL CA SCREEN DOC REV: CPT | Performed by: INTERNAL MEDICINE

## 2025-06-12 PROCEDURE — 1159F MED LIST DOCD IN RCRD: CPT | Performed by: INTERNAL MEDICINE

## 2025-06-12 NOTE — PROGRESS NOTES
Lisinopril d/c  
mellitus-currently running well.  Grade 1 diastolic dysfunction-well compensated . prnlasix  Medications reviewed and discussed with the patient.  FU in 6 months  Tests and orders placed this Encounter     Orders Placed This Encounter   Procedures    Urinalysis with Microscopic    Albumin/Creatinine Ratio, Urine    Protein / creatinine ratio, urine    Renal Function Panel    BUN    Creatinine    Potassium       Herminio Patel M.D  [facility].

## 2025-07-21 LAB
ALBUMIN: 3.8 G/DL
BUN / CREAT RATIO: 21
BUN BLDV-MCNC: 28 MG/DL
CALCIUM SERPL-MCNC: 8.9 MG/DL
CHLORIDE BLD-SCNC: 100 MMOL/L
CO2: 20 MMOL/L
CREAT SERPL-MCNC: 1.31 MG/DL
GFR, ESTIMATED: 45
GLUCOSE: 311
PHOSPHORUS: 4.3 MG/DL
POTASSIUM SERPL-SCNC: 4.3 MMOL/L
SODIUM BLD-SCNC: 137 MMOL/L

## (undated) DEVICE — 3 ML SYRINGE LUER-LOCK TIP: Brand: MONOJECT

## (undated) DEVICE — GAUZE,SPONGE,4"X4",12PLY,STERILE,LF,2'S: Brand: MEDLINE

## (undated) DEVICE — SYRINGE MED 5ML STD CLR PLAS LUERLOCK TIP N CTRL DISP

## (undated) DEVICE — NEEDLE SYR 18GA L1.5IN RED PLAS HUB S STL BLNT FILL W/O

## (undated) DEVICE — GLOVE SURG SZ 65 THK91MIL LTX FREE SYN POLYISOPRENE

## (undated) DEVICE — HYPODERMIC SAFETY NEEDLE: Brand: MAGELLAN

## (undated) DEVICE — SHEET,DRAPE,3/4,53X77,STERILE: Brand: MEDLINE

## (undated) DEVICE — TOWEL,OR,DSP,ST,BLUE,DLX,4/PK,20PK/CS: Brand: MEDLINE

## (undated) DEVICE — SYRINGE MED 10ML LUERLOCK TIP W/O SFTY DISP

## (undated) DEVICE — TOWEL,OR,DSP,ST,BLUE,STD,4/PK,20PK/CS: Brand: MEDLINE

## (undated) DEVICE — Device

## (undated) DEVICE — CHLORAPREP 26ML CLEAR

## (undated) DEVICE — CURVED SHARP RF CANNULA, RADIOPAQUE MARKER: Brand: RADIOPAQUE RADIOFREQUENCY CANNULA

## (undated) DEVICE — NEEDLE SPNL 22GA L3.5IN BLK HUB S STL REG WALL FIT STYL W/

## (undated) DEVICE — BANDAGE ADH W1XL3IN NAT FAB WVN FLX DURABLE N ADH PD SEAL

## (undated) DEVICE — 6 ML SYRINGE LUER-LOCK TIP: Brand: MONOJECT

## (undated) DEVICE — Z DISCONTINUED USE 2537982 ELECTRODE DISPER W/ CRD DISP

## (undated) DEVICE — NEEDLE SPNL 22 GAX5 IN HUB QNCKE FUNNEL SHP STYL BLK SPINCAN

## (undated) DEVICE — GLOVE ORANGE PI 7 1/2   MSG9075